# Patient Record
Sex: MALE | Race: ASIAN | NOT HISPANIC OR LATINO
[De-identification: names, ages, dates, MRNs, and addresses within clinical notes are randomized per-mention and may not be internally consistent; named-entity substitution may affect disease eponyms.]

---

## 2021-10-18 PROBLEM — Z00.00 ENCOUNTER FOR PREVENTIVE HEALTH EXAMINATION: Status: ACTIVE | Noted: 2021-10-18

## 2021-10-29 ENCOUNTER — APPOINTMENT (OUTPATIENT)
Dept: ORTHOPEDIC SURGERY | Facility: CLINIC | Age: 61
End: 2021-10-29
Payer: MEDICARE

## 2021-10-29 VITALS
HEIGHT: 62 IN | WEIGHT: 180 LBS | DIASTOLIC BLOOD PRESSURE: 69 MMHG | HEART RATE: 75 BPM | SYSTOLIC BLOOD PRESSURE: 126 MMHG | BODY MASS INDEX: 33.13 KG/M2

## 2021-10-29 DIAGNOSIS — Z87.39 PERSONAL HISTORY OF OTHER DISEASES OF THE MUSCULOSKELETAL SYSTEM AND CONNECTIVE TISSUE: ICD-10-CM

## 2021-10-29 DIAGNOSIS — Z87.19 PERSONAL HISTORY OF OTHER DISEASES OF THE DIGESTIVE SYSTEM: ICD-10-CM

## 2021-10-29 DIAGNOSIS — M17.0 BILATERAL PRIMARY OSTEOARTHRITIS OF KNEE: ICD-10-CM

## 2021-10-29 DIAGNOSIS — Z86.39 PERSONAL HISTORY OF OTHER ENDOCRINE, NUTRITIONAL AND METABOLIC DISEASE: ICD-10-CM

## 2021-10-29 PROCEDURE — 73562 X-RAY EXAM OF KNEE 3: CPT | Mod: LT

## 2021-10-29 PROCEDURE — 99204 OFFICE O/P NEW MOD 45 MIN: CPT

## 2021-10-29 NOTE — HISTORY OF PRESENT ILLNESS
[Pain Location] : pain [8] : a current pain level of 8/10 [3] : a minimum pain level of 3/10 [9] : a maximum pain level of 9/10 [Constant] : ~He/She~ states the symptoms seem to be constant [Walking] : worsened by walking [Rest] : relieved by rest [Worsening] : worsening [Bending] : worsened by bending [de-identified] : 59 y/o M presents with b/l knee pain. He has more pain in the left knee than the right knee. He has received both cortisone injections and HA injections in the left knee which were not that helpful. He has a lot of stiffness once he stands after sitting for a long period of time. He also reports pain with walking. He has not had any injections in the right knee. He has a hx of prediabetes and colon cancer.

## 2021-10-29 NOTE — END OF VISIT
[FreeTextEntry3] : I, Tera Tucker, acted solely as a scribe for Dr. Macros Ventura on this date 10/29/2021.

## 2021-10-29 NOTE — PHYSICAL EXAM
[LE] : Sensory: Intact in bilateral lower extremities [ALL] : dorsalis pedis, posterior tibial, femoral, popliteal, and radial 2+ and symmetric bilaterally [Normal] : Alert and in no acute distress [Poor Appearance] : well-appearing [de-identified] : GENERAL APPEARANCE: Well nourished and hydrated, pleasant, alert, and oriented x 3. Appears their stated age. \par HEENT: Normocephalic, extraocular eye motion intact. Nasal septum midline. Oral cavity clear. External auditory canal clear. \par RESPIRATORY: Breath sounds clear and audible in all lobes. No wheezing, No accessory muscle use.\par CARDIOVASCULAR: No apparent abnormalities. No lower leg edema. No varicosities. Pedal pulses are palpable.\par NEUROLOGIC: Sensation is normal, no muscle weakness in the upper or lower extremities.\par DERMATOLOGIC: No apparent skin lesions, moist, warm, no rash.\par SPINE: Cervical spine appears normal and moves freely; thoracic spine appears normal and moves freely; lumbosacral spine appears normal and moves freely, normal, nontender.\par MUSCULOSKELETAL: Hands, wrists, and elbows are normal and move freely, shoulders are normal and move freely.  [de-identified] : Left knee exam shows ROM 5-120 degrees, medial joint line tenderness, no effusion\par Right knee exam shows no effusion, ROM 0-120 degrees [de-identified] : 5V xray of the b/l knee done in the office today and reviewed by Dr. Marcos Ventura demonstrates bone on bone medial compartmental osteoarthritis of the left knee and mild tricompartmental osteoarthritis of the right knee.

## 2021-10-29 NOTE — DISCUSSION/SUMMARY
[Medication Risks Reviewed] : Medication risks reviewed [Surgical risks reviewed] : Surgical risks reviewed [de-identified] : 59 y/o M with bone on bone medial compartmental osteoarthritis of the left knee and mild tricompartmental osteoarthritis of the right knee. Conservative therapy and surgical options discussed in detail with the patient. Based on imaging, he is a candidate for a left TKA; however, he is not a candidate for a right TKA. He previously received cortisone injections and HA injections in the left knee; however, they did not provide a lot of relief. Since he has failed conservative therapies, he would like to pursue the left TKA. We discussed pre-op, surgery, and post-op in detail. In regards to the right knee, he defers a cortisone injection today. He scheduled the left TKA today and all of his questions were answered. \par \par The patient is a 60 year individual with end stage arthritis of their left knee joint. Based upon the patient's continued symptoms and failure to respond to conservative treatment I have recommended a left total knee arthroplasty for this patient. A long discussion took place with the patient describing what a total joint replacement is and what the procedure would entail. A total knee arthroplasty model, similar to the implant that was used during the operation, was utilized to demonstrate and to discuss the various bearing surfaces of the implants. The hospitalization and post-operative care and rehabilitation were also discussed. The use of perioperative antibiotics and DVT prophylaxis were discussed. The risk, benefits and alternatives to a surgical intervention were discussed at length with the patient. The patient was also advised of risks related to the medical comorbidities, elevated body mass index (BMI), and smoking where applicable. We discussed how to reduce modifiable risk factors and encouraged smoking cessation were applicable.. A lengthy discussion took place to review the most common complications including but not limited to: deep vein thrombosis, pulmonary embolus, heart attack, stroke, infection, wound breakdown, numbness, damage to nerves, tendon, muscles, arteries or other blood vessels, death and other possible complications from anesthesia. The patient was told that we will take steps to minimize these risks by using sterile technique, antibiotics and DVT prophylaxis when appropriate and follow the patient postoperatively in the office setting to monitor progress. The possibility of recurrent pain, no improvement in pain and actual worsening of pain were also discussed with the patient.\par The discharge plan of care focused on the patient going home following surgery. The patient was encouraged to make the necessary arrangements to have someone stay with them when they are discharged home. Following discharge, a home care nurse was to the patient. The home care nurse would open the patient’s home care case and request home physical therapy services. Home physical therapy was to commence following discharge provided it was appropriate and covered by the health insurance benefit plan. \par The benefits of surgery were discussed with the patient including the potential for improving his current clinical condition through operative intervention. Alternatives to surgical intervention including continued conservative management were also discussed in detail. All questions were answered to the satisfaction of the patient. The treatment plan of care, as well as a model of a total knee arthroplasty equivalent to the one that will be used for their total joint replacement, was shared with the patient. The patient agreed to the plan of care as well as the use of implants in their total joint replacement.

## 2022-02-25 ENCOUNTER — OUTPATIENT (OUTPATIENT)
Dept: OUTPATIENT SERVICES | Facility: HOSPITAL | Age: 62
LOS: 1 days | End: 2022-02-25
Payer: COMMERCIAL

## 2022-02-25 VITALS
TEMPERATURE: 98 F | HEIGHT: 62 IN | OXYGEN SATURATION: 96 % | RESPIRATION RATE: 20 BRPM | SYSTOLIC BLOOD PRESSURE: 130 MMHG | WEIGHT: 164.69 LBS | DIASTOLIC BLOOD PRESSURE: 78 MMHG | HEART RATE: 65 BPM

## 2022-02-25 DIAGNOSIS — Z01.818 ENCOUNTER FOR OTHER PREPROCEDURAL EXAMINATION: ICD-10-CM

## 2022-02-25 DIAGNOSIS — Z90.49 ACQUIRED ABSENCE OF OTHER SPECIFIED PARTS OF DIGESTIVE TRACT: Chronic | ICD-10-CM

## 2022-02-25 DIAGNOSIS — Z98.890 OTHER SPECIFIED POSTPROCEDURAL STATES: Chronic | ICD-10-CM

## 2022-02-25 LAB
A1C WITH ESTIMATED AVERAGE GLUCOSE RESULT: 6.3 % — HIGH (ref 4–5.6)
ALBUMIN SERPL ELPH-MCNC: 4.3 G/DL — SIGNIFICANT CHANGE UP (ref 3.3–5.2)
ALP SERPL-CCNC: 148 U/L — HIGH (ref 40–120)
ALT FLD-CCNC: 59 U/L — HIGH
ANION GAP SERPL CALC-SCNC: 16 MMOL/L — SIGNIFICANT CHANGE UP (ref 5–17)
APTT BLD: 30.1 SEC — SIGNIFICANT CHANGE UP (ref 27.5–35.5)
AST SERPL-CCNC: 59 U/L — HIGH
BASOPHILS # BLD AUTO: 0.05 K/UL — SIGNIFICANT CHANGE UP (ref 0–0.2)
BASOPHILS NFR BLD AUTO: 0.6 % — SIGNIFICANT CHANGE UP (ref 0–2)
BILIRUB SERPL-MCNC: 0.5 MG/DL — SIGNIFICANT CHANGE UP (ref 0.4–2)
BLD GP AB SCN SERPL QL: SIGNIFICANT CHANGE UP
BUN SERPL-MCNC: 24.7 MG/DL — HIGH (ref 8–20)
CALCIUM SERPL-MCNC: 9.4 MG/DL — SIGNIFICANT CHANGE UP (ref 8.6–10.2)
CHLORIDE SERPL-SCNC: 101 MMOL/L — SIGNIFICANT CHANGE UP (ref 98–107)
CO2 SERPL-SCNC: 20 MMOL/L — LOW (ref 22–29)
CREAT SERPL-MCNC: 0.67 MG/DL — SIGNIFICANT CHANGE UP (ref 0.5–1.3)
EOSINOPHIL # BLD AUTO: 0.31 K/UL — SIGNIFICANT CHANGE UP (ref 0–0.5)
EOSINOPHIL NFR BLD AUTO: 3.5 % — SIGNIFICANT CHANGE UP (ref 0–6)
ESTIMATED AVERAGE GLUCOSE: 134 MG/DL — HIGH (ref 68–114)
GLUCOSE SERPL-MCNC: 117 MG/DL — HIGH (ref 70–99)
HCT VFR BLD CALC: 40.1 % — SIGNIFICANT CHANGE UP (ref 39–50)
HGB BLD-MCNC: 12.9 G/DL — LOW (ref 13–17)
IMM GRANULOCYTES NFR BLD AUTO: 0.3 % — SIGNIFICANT CHANGE UP (ref 0–1.5)
INR BLD: 1.11 RATIO — SIGNIFICANT CHANGE UP (ref 0.88–1.16)
LYMPHOCYTES # BLD AUTO: 1.12 K/UL — SIGNIFICANT CHANGE UP (ref 1–3.3)
LYMPHOCYTES # BLD AUTO: 12.8 % — LOW (ref 13–44)
MCHC RBC-ENTMCNC: 27.2 PG — SIGNIFICANT CHANGE UP (ref 27–34)
MCHC RBC-ENTMCNC: 32.2 GM/DL — SIGNIFICANT CHANGE UP (ref 32–36)
MCV RBC AUTO: 84.4 FL — SIGNIFICANT CHANGE UP (ref 80–100)
MONOCYTES # BLD AUTO: 0.8 K/UL — SIGNIFICANT CHANGE UP (ref 0–0.9)
MONOCYTES NFR BLD AUTO: 9.1 % — SIGNIFICANT CHANGE UP (ref 2–14)
MRSA PCR RESULT.: SIGNIFICANT CHANGE UP
NEUTROPHILS # BLD AUTO: 6.45 K/UL — SIGNIFICANT CHANGE UP (ref 1.8–7.4)
NEUTROPHILS NFR BLD AUTO: 73.7 % — SIGNIFICANT CHANGE UP (ref 43–77)
PLATELET # BLD AUTO: 133 K/UL — LOW (ref 150–400)
POTASSIUM SERPL-MCNC: 4.8 MMOL/L — SIGNIFICANT CHANGE UP (ref 3.5–5.3)
POTASSIUM SERPL-SCNC: 4.8 MMOL/L — SIGNIFICANT CHANGE UP (ref 3.5–5.3)
PROT SERPL-MCNC: 6.6 G/DL — SIGNIFICANT CHANGE UP (ref 6.6–8.7)
PROTHROM AB SERPL-ACNC: 12.9 SEC — SIGNIFICANT CHANGE UP (ref 10.5–13.4)
RBC # BLD: 4.75 M/UL — SIGNIFICANT CHANGE UP (ref 4.2–5.8)
RBC # FLD: 15 % — HIGH (ref 10.3–14.5)
S AUREUS DNA NOSE QL NAA+PROBE: SIGNIFICANT CHANGE UP
SODIUM SERPL-SCNC: 136 MMOL/L — SIGNIFICANT CHANGE UP (ref 135–145)
WBC # BLD: 8.76 K/UL — SIGNIFICANT CHANGE UP (ref 3.8–10.5)
WBC # FLD AUTO: 8.76 K/UL — SIGNIFICANT CHANGE UP (ref 3.8–10.5)

## 2022-02-25 PROCEDURE — G0463: CPT

## 2022-02-25 PROCEDURE — 93010 ELECTROCARDIOGRAM REPORT: CPT

## 2022-02-25 PROCEDURE — 93005 ELECTROCARDIOGRAM TRACING: CPT

## 2022-02-25 RX ORDER — SODIUM CHLORIDE 9 MG/ML
3 INJECTION INTRAMUSCULAR; INTRAVENOUS; SUBCUTANEOUS EVERY 8 HOURS
Refills: 0 | Status: DISCONTINUED | OUTPATIENT
Start: 2022-03-18 | End: 2022-03-21

## 2022-02-25 NOTE — H&P PST ADULT - PROBLEM SELECTOR PLAN 2
Medical clearance pending for left total knee replacement on 3/17/22 with Dr. Marcos Ventura secondary to unilateral primary OA of left knee.

## 2022-02-25 NOTE — H&P PST ADULT - HISTORY OF PRESENT ILLNESS
60 y/o male presents today to PST pending left total knee replacement on 3/17/22 with Dr. Marcos Ventura secondary to unilateral primary OA of left knee. Pt. with history of type 2 DM, HTN, HLD, colon cancer s/p colon resection, prostate cancer 2015 s/p radiation therapy. Pt. states he has had left knee pain for past 5 years, states he has had injections in the knee that did not work, states he had an xray that showed "bone to bone."  Pt. states pain in left knee pain is "heavy," intermittent, states he "doesn't know a number" for the pain,  states she has pain at night and walking makes pain worse. Denies radiation of pain, numbness/ tingling of leg. Denies palliating measures. Denies cane or walker.

## 2022-02-25 NOTE — H&P PST ADULT - PROBLEM SELECTOR PLAN 3
STOP BANG =4, medical clearance pending, medical follow up and management with PCP as indicated, surgical team to follow.

## 2022-02-25 NOTE — H&P PST ADULT - NEGATIVE MUSCULOSKELETAL SYMPTOMS
no arthralgia/no joint swelling/no myalgia/no muscle cramps/no muscle weakness/no stiffness/no neck pain/no arm pain L/no arm pain R/no back pain

## 2022-02-25 NOTE — PATIENT PROFILE ADULT - FALL HARM RISK - HARM RISK INTERVENTIONS

## 2022-02-25 NOTE — H&P PST ADULT - NSICDXPASTMEDICALHX_GEN_ALL_CORE_FT
PAST MEDICAL HISTORY:  Colon cancer     COVID-19 vaccine series completed     Hyperlipidemia     Hypertension     Prostate cancer Treated with Radiation 2015    Type 2 diabetes mellitus     Unilateral primary osteoarthritis, left knee

## 2022-02-25 NOTE — PATIENT PROFILE ADULT - FUNCTIONAL SCREEN CURRENT LEVEL: COMMUNICATION, MLM
Efren Grigsby from the [facility] calling to stated a US biopsy needs to be ordered for the left breast.    Order pended. They also faxed the order yesterday. 0 = understands/communicates without difficulty

## 2022-02-25 NOTE — H&P PST ADULT - 
ADDITIONAL INFORMATION
pre-op covid swab appt info for 3/14. Pt. states he does not have card, requested that he fax copy of card PST fax number provided and pt. agreed. Denies desire for booster, advised to f/u with PCP and pt. agreed pre-op covid swab appt info for 3/14. Pt. states he does not have card, requested that he fax copy of card PST fax number provided and pt. agreed. Denies desire for booster, advised to f/u with PCP and pt. agreed. 3/1/22 10:54 Received  COVID vaccine card copy and placed on chart. Pawan MS, FNP-BC

## 2022-02-25 NOTE — H&P PST ADULT - NEGATIVE GENERAL GENITOURINARY SYMPTOMS
no hematuria/no renal colic/no flank pain L/no flank pain R/no urine discoloration/no gas in urine/no bladder infections/no incontinence/no dysuria/no urinary hesitancy/no nocturia/normal libido

## 2022-02-25 NOTE — H&P PST ADULT - LAB RESULTS AND INTERPRETATION
labs pending labs pending  2/25/22 15:05 All available labs noted as documented. All abnormal labs noted as documented and have been faxed to PCP with whom medical clearance, as well as emailed to Np Yeon Elebiary of Dr. Marcos Ventura. Yeon Elebiary of Dr. Marcos Ventura also made aware of oral Thrush and recent HGBA1C level and present prescription for metformin in setting of type 2 DM. Lena MS, FNP-BC

## 2022-02-25 NOTE — H&P PST ADULT - ASSESSMENT
60 y/o male presents today to PST pending left total knee replacement on 3/17/22 with Dr. Marcos Ventura secondary to unilateral primary OA of left knee. Pt. with history of type 2 DM, HTN, HLD, colon cancer s/p colon resection, prostate cancer 2015 s/p radiation therapy. Pt. states he has had left knee pain for past 5 years, states he has had injections in the knee that did not work, states he had an xray that showed "bone to bone."  Pt. states pain in left knee pain is "heavy," intermittent, states he "doesn't know a number" for the pain,  states she has pain at night and walking makes pain worse. Denies radiation of pain, numbness/ tingling of leg. Denies palliating measures. Denies cane or walker.     Pt. to have medical clearance with Dr. Rivera, pt. verbalized agreement and understanding.  Patient educated on surgical scrub, COVID testing, preadmission instructions, medical clearance and day of procedure medications as per policy, verbalizes understanding and agreement.   Pt. to discuss preoperative instructions regarding metformin with PCP and pt. verbalized agreement and understanding.   Pt instructed to stop vitamins/supplements/herbal medications/NSAIDS for one week prior to surgery and discuss with PMD and verbalizes understanding and agreement.   Pt. ed       60 y/o male presents today to PST pending left total knee replacement on 3/17/22 with Dr. Marcos Ventura secondary to unilateral primary OA of left knee. Pt. with history of type 2 DM, HTN, HLD, colon cancer s/p colon resection, prostate cancer 2015 s/p radiation therapy. Pt. states he has had left knee pain for past 5 years, states he has had injections in the knee that did not work, states he had an xray that showed "bone to bone."  Pt. states pain in left knee pain is "heavy," intermittent, states he "doesn't know a number" for the pain,  states she has pain at night and walking makes pain worse. Denies radiation of pain, numbness/ tingling of leg. Denies palliating measures. Denies cane or walker.     Pt. to have medical clearance with Dr. Rivera, pt. verbalized agreement and understanding.  Patient educated on surgical scrub, COVID testing, preadmission instructions, medical clearance and day of procedure medications as per policy, and pt. verbalizes understanding and agreement.   Pt. to discuss preoperative instructions regarding metformin with PCP and pt. verbalized agreement and understanding.   Pt instructed to stop vitamins/supplements/herbal medications/NSAIDS for one week prior to surgery and discuss with PMD and pt. verbalizes understanding and agreement.   Pt. educated regarding all preoperative instruction and education via both verbal and written means of communication as per policy  and pt. verbalizes understanding and agreement.     OPIOID RISK TOOL    JOVANNY EACH BOX THAT APPLIES AND ADD TOTALS AT THE END    FAMILY HISTORY OF SUBSTANCE ABUSE                 FEMALE         MALE                                                Alcohol                             [  ]1 pt          [  ]3pts                                               Illegal Durgs                     [  ]2 pts        [  ]3pts                                               Rx Drugs                           [  ]4 pts        [  ]4 pts    PERSONAL HISTORY OF SUBSTANCE ABUSE                                                                                          Alcohol                             [  ]3 pts       [  ]3 pts                                               Illegal Drugs                     [  ]4 pts        [  ]4 pts                                               Rx Drugs                           [  ]5 pts        [  ]5 pts    AGE BETWEEN 16-45 YEARS                                      [  ]1 pt         [  ]1 pt    HISTORY OF PREADOLESCENT   SEXUAL ABUSE                                                             [  ]3 pts        [  ]0pts    PSYCHOLOGICAL DISEASE                     ADD, OCD, Bipolar, Schizophrenia        [  ]2 pts         [  ]2 pts                      Depression                                               [  ]1 pt           [  ]1 pt           SCORING TOTAL   (add numbers and type here)              (0)                                     A score of 3 or lower indicated LOW risk for future opioid abuse  A score of 4 to 7 indicated moderate risk for future opioid abuse  A score of 8 or higher indicates a high risk for opioid abuse    CAPRINI SCORE    AGE RELATED RISK FACTORS                                                             [ ] Age 41-60 years                                            (1 Point)  [x ] Age: 61-74 years                                           (2 Points)                 [ ] Age= 75 years                                                (3 Points)             DISEASE RELATED RISK FACTORS                                                       [ ] Edema in the lower extremities                 (1 Point)                     [ ] Varicose veins                                               (1 Point)                                 [x ] BMI > 25 Kg/m2                                            (1 Point)                                  [ ] Serious infection (ie PNA)                            (1 Point)                     [ ] Lung disease ( COPD, Emphysema)            (1 Point)                                                                          [ ] Acute myocardial infarction                         (1 Point)                  [ ] Congestive heart failure (in the previous month)  (1 Point)         [ ] Inflammatory bowel disease                            (1 Point)                  [ ] Central venous access, PICC or Port               (2 points)       (within the last month)                                                                [ ] Stroke (in the previous month)                        (5 Points)    [x ] Previous or present malignancy                       (2 points)                                                                                                                                                         HEMATOLOGY RELATED FACTORS                                                         [ ] Prior episodes of VTE                                     (3 Points)                     [ ] Positive family history for VTE                      (3 Points)                  [ ] Prothrombin 90055 A                                     (3 Points)                     [ ] Factor V Leiden                                                (3 Points)                        [ ] Lupus anticoagulants                                      (3 Points)                                                           [ ] Anticardiolipin antibodies                              (3 Points)                                                       [ ] High homocysteine in the blood                   (3 Points)                                             [ ] Other congenital or acquired thrombophilia      (3 Points)                                                [ ] Heparin induced thrombocytopenia                  (3 Points)                                        MOBILITY RELATED FACTORS  [ ] Bed rest                                                         (1 Point)  [ ] Plaster cast                                                    (2 points)  [ ] Bed bound for more than 72 hours           (2 Points)    GENDER SPECIFIC FACTORS  [ ] Pregnancy or had a baby within the last month   (1 Point)  [ ] Post-partum < 6 weeks                                   (1 Point)  [ ] Hormonal therapy  or oral contraception   (1 Point)  [ ] History of pregnancy complications              (1 point)  [ ] Unexplained or recurrent              (1 Point)    OTHER RISK FACTORS                                           (1 Point)  [ ] BMI >40, smoking, diabetes requiring insulin, chemotherapy  blood transfusions and length of surgery over 2 hours    SURGERY RELATED RISK FACTORS  [ ]  Section within the last month     (1 Point)  [ ] Minor surgery                                                  (1 Point)  [ ] Arthroscopic surgery                                       (2 Points)  [ ] Planned major surgery lasting more            (2 Points)      than 45 minutes     [x ] Elective hip or knee joint replacement       (5 points)       surgery                                                TRAUMA RELATED RISK FACTORS  [ ] Fracture of the hip, pelvis, or leg                       (5 Points)  [ ] Spinal cord injury resulting in paralysis             (5 points)       (in the previous month)    [ ] Paralysis  (less than 1 month)                             (5 Points)  [ ] Multiple Trauma within 1 month                        (5 Points)    Total Score [     10   ]    Caprini Score 0-2: Low Risk, NO VTE prophylaxis required for most patients, encourage ambulation  Caprini Score 3-6: Moderate Risk , pharmacologic VTE prophylaxis is indicated for most patients (in the absence of contraindications)  Caprini Score Greater than or =7: High risk, pharmocologic VTE prophylaxis indicated for most patients (in the absence of contraindications)

## 2022-02-25 NOTE — H&P PST ADULT - MUSCULOSKELETAL
details… normal/ROM intact/no joint swelling/no joint erythema/no joint warmth/no calf tenderness/normal strength/decreased ROM/decreased ROM due to pain detailed exam

## 2022-03-17 ENCOUNTER — TRANSCRIPTION ENCOUNTER (OUTPATIENT)
Age: 62
End: 2022-03-17

## 2022-03-18 ENCOUNTER — APPOINTMENT (OUTPATIENT)
Dept: ORTHOPEDIC SURGERY | Facility: HOSPITAL | Age: 62
End: 2022-03-18

## 2022-03-18 ENCOUNTER — INPATIENT (INPATIENT)
Facility: HOSPITAL | Age: 62
LOS: 2 days | Discharge: HOME HEALTH | DRG: 470 | End: 2022-03-21
Attending: ORTHOPAEDIC SURGERY | Admitting: ORTHOPAEDIC SURGERY
Payer: COMMERCIAL

## 2022-03-18 ENCOUNTER — TRANSCRIPTION ENCOUNTER (OUTPATIENT)
Age: 62
End: 2022-03-18

## 2022-03-18 VITALS
SYSTOLIC BLOOD PRESSURE: 137 MMHG | DIASTOLIC BLOOD PRESSURE: 73 MMHG | RESPIRATION RATE: 16 BRPM | HEIGHT: 62 IN | OXYGEN SATURATION: 99 % | TEMPERATURE: 98 F | HEART RATE: 73 BPM | WEIGHT: 164.69 LBS

## 2022-03-18 DIAGNOSIS — M17.12 UNILATERAL PRIMARY OSTEOARTHRITIS, LEFT KNEE: ICD-10-CM

## 2022-03-18 DIAGNOSIS — Z98.890 OTHER SPECIFIED POSTPROCEDURAL STATES: Chronic | ICD-10-CM

## 2022-03-18 DIAGNOSIS — Z29.9 ENCOUNTER FOR PROPHYLACTIC MEASURES, UNSPECIFIED: ICD-10-CM

## 2022-03-18 DIAGNOSIS — Z91.89 OTHER SPECIFIED PERSONAL RISK FACTORS, NOT ELSEWHERE CLASSIFIED: ICD-10-CM

## 2022-03-18 DIAGNOSIS — Z90.49 ACQUIRED ABSENCE OF OTHER SPECIFIED PARTS OF DIGESTIVE TRACT: Chronic | ICD-10-CM

## 2022-03-18 LAB
BLD GP AB SCN SERPL QL: SIGNIFICANT CHANGE UP
GLUCOSE BLDC GLUCOMTR-MCNC: 111 MG/DL — HIGH (ref 70–99)
GLUCOSE BLDC GLUCOMTR-MCNC: 118 MG/DL — HIGH (ref 70–99)
GLUCOSE BLDC GLUCOMTR-MCNC: 125 MG/DL — HIGH (ref 70–99)
GLUCOSE BLDC GLUCOMTR-MCNC: 183 MG/DL — HIGH (ref 70–99)
SARS-COV-2 RNA SPEC QL NAA+PROBE: SIGNIFICANT CHANGE UP

## 2022-03-18 PROCEDURE — 27447 TOTAL KNEE ARTHROPLASTY: CPT | Mod: LT

## 2022-03-18 PROCEDURE — 73560 X-RAY EXAM OF KNEE 1 OR 2: CPT | Mod: 26,LT

## 2022-03-18 PROCEDURE — 27447 TOTAL KNEE ARTHROPLASTY: CPT | Mod: AS,LT

## 2022-03-18 PROCEDURE — 20985 CPTR-ASST DIR MS PX: CPT

## 2022-03-18 PROCEDURE — 99222 1ST HOSP IP/OBS MODERATE 55: CPT

## 2022-03-18 DEVICE — FEM PERSONA PS CMT CCR STD SZ 5 L: Type: IMPLANTABLE DEVICE | Site: LEFT | Status: FUNCTIONAL

## 2022-03-18 DEVICE — STEM TIB PSN SZ E L 5 DEG: Type: IMPLANTABLE DEVICE | Site: LEFT | Status: FUNCTIONAL

## 2022-03-18 DEVICE — PATELLA VE 29MM: Type: IMPLANTABLE DEVICE | Site: LEFT | Status: FUNCTIONAL

## 2022-03-18 DEVICE — IMPLANTABLE DEVICE: Type: IMPLANTABLE DEVICE | Site: LEFT | Status: FUNCTIONAL

## 2022-03-18 DEVICE — PIN THREADED HEADED STRL: Type: IMPLANTABLE DEVICE | Site: LEFT | Status: FUNCTIONAL

## 2022-03-18 DEVICE — STEM EXT PERSONA 14MM PLUS 30M: Type: IMPLANTABLE DEVICE | Site: LEFT | Status: FUNCTIONAL

## 2022-03-18 DEVICE — CEMENT BONE PALACOS R W/O GENTAMICIN 1X40: Type: IMPLANTABLE DEVICE | Site: LEFT | Status: FUNCTIONAL

## 2022-03-18 RX ORDER — SENNA PLUS 8.6 MG/1
2 TABLET ORAL AT BEDTIME
Refills: 0 | Status: DISCONTINUED | OUTPATIENT
Start: 2022-03-18 | End: 2022-03-21

## 2022-03-18 RX ORDER — OXYCODONE HYDROCHLORIDE 5 MG/1
5 TABLET ORAL
Refills: 0 | Status: DISCONTINUED | OUTPATIENT
Start: 2022-03-18 | End: 2022-03-21

## 2022-03-18 RX ORDER — CELECOXIB 200 MG/1
200 CAPSULE ORAL EVERY 12 HOURS
Refills: 0 | Status: DISCONTINUED | OUTPATIENT
Start: 2022-03-20 | End: 2022-03-21

## 2022-03-18 RX ORDER — HYDROMORPHONE HYDROCHLORIDE 2 MG/ML
0.5 INJECTION INTRAMUSCULAR; INTRAVENOUS; SUBCUTANEOUS
Refills: 0 | Status: DISCONTINUED | OUTPATIENT
Start: 2022-03-18 | End: 2022-03-18

## 2022-03-18 RX ORDER — DEXTROSE 50 % IN WATER 50 %
25 SYRINGE (ML) INTRAVENOUS ONCE
Refills: 0 | Status: DISCONTINUED | OUTPATIENT
Start: 2022-03-18 | End: 2022-03-21

## 2022-03-18 RX ORDER — GLUCAGON INJECTION, SOLUTION 0.5 MG/.1ML
1 INJECTION, SOLUTION SUBCUTANEOUS ONCE
Refills: 0 | Status: DISCONTINUED | OUTPATIENT
Start: 2022-03-18 | End: 2022-03-21

## 2022-03-18 RX ORDER — ONDANSETRON 8 MG/1
4 TABLET, FILM COATED ORAL ONCE
Refills: 0 | Status: DISCONTINUED | OUTPATIENT
Start: 2022-03-18 | End: 2022-03-18

## 2022-03-18 RX ORDER — DEXTROSE 50 % IN WATER 50 %
12.5 SYRINGE (ML) INTRAVENOUS ONCE
Refills: 0 | Status: DISCONTINUED | OUTPATIENT
Start: 2022-03-18 | End: 2022-03-21

## 2022-03-18 RX ORDER — NEBIVOLOL HYDROCHLORIDE 5 MG/1
5 TABLET ORAL DAILY
Refills: 0 | Status: DISCONTINUED | OUTPATIENT
Start: 2022-03-18 | End: 2022-03-21

## 2022-03-18 RX ORDER — SODIUM CHLORIDE 9 MG/ML
1000 INJECTION, SOLUTION INTRAVENOUS
Refills: 0 | Status: DISCONTINUED | OUTPATIENT
Start: 2022-03-18 | End: 2022-03-21

## 2022-03-18 RX ORDER — SODIUM CHLORIDE 9 MG/ML
500 INJECTION INTRAMUSCULAR; INTRAVENOUS; SUBCUTANEOUS ONCE
Refills: 0 | Status: COMPLETED | OUTPATIENT
Start: 2022-03-18 | End: 2022-03-18

## 2022-03-18 RX ORDER — OXYCODONE HYDROCHLORIDE 5 MG/1
10 TABLET ORAL
Refills: 0 | Status: DISCONTINUED | OUTPATIENT
Start: 2022-03-18 | End: 2022-03-21

## 2022-03-18 RX ORDER — SODIUM CHLORIDE 9 MG/ML
1000 INJECTION INTRAMUSCULAR; INTRAVENOUS; SUBCUTANEOUS
Refills: 0 | Status: DISCONTINUED | OUTPATIENT
Start: 2022-03-18 | End: 2022-03-21

## 2022-03-18 RX ORDER — TRANEXAMIC ACID 100 MG/ML
1000 INJECTION, SOLUTION INTRAVENOUS ONCE
Refills: 0 | Status: DISCONTINUED | OUTPATIENT
Start: 2022-03-18 | End: 2022-03-18

## 2022-03-18 RX ORDER — PANTOPRAZOLE SODIUM 20 MG/1
40 TABLET, DELAYED RELEASE ORAL
Refills: 0 | Status: DISCONTINUED | OUTPATIENT
Start: 2022-03-18 | End: 2022-03-21

## 2022-03-18 RX ORDER — APIXABAN 2.5 MG/1
2.5 TABLET, FILM COATED ORAL EVERY 12 HOURS
Refills: 0 | Status: DISCONTINUED | OUTPATIENT
Start: 2022-03-19 | End: 2022-03-21

## 2022-03-18 RX ORDER — ATORVASTATIN CALCIUM 80 MG/1
10 TABLET, FILM COATED ORAL AT BEDTIME
Refills: 0 | Status: DISCONTINUED | OUTPATIENT
Start: 2022-03-18 | End: 2022-03-21

## 2022-03-18 RX ORDER — DEXTROSE 50 % IN WATER 50 %
15 SYRINGE (ML) INTRAVENOUS ONCE
Refills: 0 | Status: DISCONTINUED | OUTPATIENT
Start: 2022-03-18 | End: 2022-03-21

## 2022-03-18 RX ORDER — APREPITANT 80 MG/1
40 CAPSULE ORAL ONCE
Refills: 0 | Status: COMPLETED | OUTPATIENT
Start: 2022-03-18 | End: 2022-03-18

## 2022-03-18 RX ORDER — POLYETHYLENE GLYCOL 3350 17 G/17G
17 POWDER, FOR SOLUTION ORAL AT BEDTIME
Refills: 0 | Status: DISCONTINUED | OUTPATIENT
Start: 2022-03-18 | End: 2022-03-21

## 2022-03-18 RX ORDER — SODIUM CHLORIDE 9 MG/ML
1000 INJECTION, SOLUTION INTRAVENOUS
Refills: 0 | Status: DISCONTINUED | OUTPATIENT
Start: 2022-03-18 | End: 2022-03-18

## 2022-03-18 RX ORDER — OXYBUTYNIN CHLORIDE 5 MG
10 TABLET ORAL AT BEDTIME
Refills: 0 | Status: DISCONTINUED | OUTPATIENT
Start: 2022-03-18 | End: 2022-03-21

## 2022-03-18 RX ORDER — ONDANSETRON 8 MG/1
4 TABLET, FILM COATED ORAL EVERY 6 HOURS
Refills: 0 | Status: DISCONTINUED | OUTPATIENT
Start: 2022-03-18 | End: 2022-03-21

## 2022-03-18 RX ORDER — ACETAMINOPHEN 500 MG
975 TABLET ORAL EVERY 8 HOURS
Refills: 0 | Status: DISCONTINUED | OUTPATIENT
Start: 2022-03-18 | End: 2022-03-19

## 2022-03-18 RX ORDER — KETOROLAC TROMETHAMINE 30 MG/ML
15 SYRINGE (ML) INJECTION EVERY 6 HOURS
Refills: 0 | Status: DISCONTINUED | OUTPATIENT
Start: 2022-03-18 | End: 2022-03-19

## 2022-03-18 RX ORDER — CELECOXIB 200 MG/1
400 CAPSULE ORAL ONCE
Refills: 0 | Status: COMPLETED | OUTPATIENT
Start: 2022-03-18 | End: 2022-03-18

## 2022-03-18 RX ORDER — MAGNESIUM HYDROXIDE 400 MG/1
30 TABLET, CHEWABLE ORAL DAILY
Refills: 0 | Status: DISCONTINUED | OUTPATIENT
Start: 2022-03-18 | End: 2022-03-21

## 2022-03-18 RX ORDER — HYDROMORPHONE HYDROCHLORIDE 2 MG/ML
0.5 INJECTION INTRAMUSCULAR; INTRAVENOUS; SUBCUTANEOUS
Refills: 0 | Status: DISCONTINUED | OUTPATIENT
Start: 2022-03-18 | End: 2022-03-21

## 2022-03-18 RX ORDER — HYDROMORPHONE HYDROCHLORIDE 2 MG/ML
4 INJECTION INTRAMUSCULAR; INTRAVENOUS; SUBCUTANEOUS
Refills: 0 | Status: DISCONTINUED | OUTPATIENT
Start: 2022-03-18 | End: 2022-03-21

## 2022-03-18 RX ORDER — ACETAMINOPHEN 500 MG
975 TABLET ORAL ONCE
Refills: 0 | Status: COMPLETED | OUTPATIENT
Start: 2022-03-18 | End: 2022-03-18

## 2022-03-18 RX ORDER — CEFAZOLIN SODIUM 1 G
2000 VIAL (EA) INJECTION
Refills: 0 | Status: COMPLETED | OUTPATIENT
Start: 2022-03-18 | End: 2022-03-19

## 2022-03-18 RX ORDER — INSULIN LISPRO 100/ML
VIAL (ML) SUBCUTANEOUS
Refills: 0 | Status: DISCONTINUED | OUTPATIENT
Start: 2022-03-18 | End: 2022-03-21

## 2022-03-18 RX ORDER — CEFAZOLIN SODIUM 1 G
2000 VIAL (EA) INJECTION ONCE
Refills: 0 | Status: DISCONTINUED | OUTPATIENT
Start: 2022-03-18 | End: 2022-03-18

## 2022-03-18 RX ADMIN — Medication 15 MILLIGRAM(S): at 18:00

## 2022-03-18 RX ADMIN — Medication 100 MILLIGRAM(S): at 20:19

## 2022-03-18 RX ADMIN — Medication 15 MILLIGRAM(S): at 17:35

## 2022-03-18 RX ADMIN — SODIUM CHLORIDE 500 MILLILITER(S): 9 INJECTION INTRAMUSCULAR; INTRAVENOUS; SUBCUTANEOUS at 15:06

## 2022-03-18 RX ADMIN — Medication 10 MILLIGRAM(S): at 21:07

## 2022-03-18 RX ADMIN — Medication 975 MILLIGRAM(S): at 21:08

## 2022-03-18 RX ADMIN — Medication 975 MILLIGRAM(S): at 10:41

## 2022-03-18 RX ADMIN — ATORVASTATIN CALCIUM 10 MILLIGRAM(S): 80 TABLET, FILM COATED ORAL at 21:07

## 2022-03-18 RX ADMIN — APREPITANT 40 MILLIGRAM(S): 80 CAPSULE ORAL at 10:41

## 2022-03-18 RX ADMIN — CELECOXIB 400 MILLIGRAM(S): 200 CAPSULE ORAL at 10:42

## 2022-03-18 RX ADMIN — Medication 15 MILLIGRAM(S): at 23:09

## 2022-03-18 RX ADMIN — SODIUM CHLORIDE 3 MILLILITER(S): 9 INJECTION INTRAMUSCULAR; INTRAVENOUS; SUBCUTANEOUS at 21:03

## 2022-03-18 RX ADMIN — Medication 975 MILLIGRAM(S): at 21:07

## 2022-03-18 RX ADMIN — SODIUM CHLORIDE 125 MILLILITER(S): 9 INJECTION INTRAMUSCULAR; INTRAVENOUS; SUBCUTANEOUS at 17:35

## 2022-03-18 RX ADMIN — Medication 15 MILLIGRAM(S): at 23:10

## 2022-03-18 NOTE — PHYSICAL THERAPY INITIAL EVALUATION ADULT - ACTIVE RANGE OF MOTION EXAMINATION, REHAB EVAL
left LE 2-/5 assessed during functional mobility, resistance not applied/bilateral upper extremity Active ROM was WFL (within functional limits)/Right LE Active ROM was WFL (within functional limits)

## 2022-03-18 NOTE — DISCHARGE NOTE PROVIDER - NSDCCPCAREPLAN_GEN_ALL_CORE_FT
PRINCIPAL DISCHARGE DIAGNOSIS  Diagnosis: Primary localized osteoarthritis of left knee  Assessment and Plan of Treatment:

## 2022-03-18 NOTE — CONSULT NOTE ADULT - ASSESSMENT
62 y/o male with Hx of  Colon cancer s/p colon resection, Hyperlipidemia, Hypertension, Prostate cancer Treated with Radiation 2015, diabetes mellitus unilateral primary OA of left knee, patient has left knee pain for past 5 years, states he has had injections in the knee that did not work, he came in here for elective left total knee replacement on 3/17/22 with Dr. Marcos Ventura s/p Procedure.     Plan:     OA of the left Knee s/p TKR post op day 0:     - post op no complications  - VS stable  - abx per ortho   - c/w anti hypertensive to be restarted post op day 02 except if blood pressure goes >150 systolic   - c/w IVF x 24 hrs then reassess per ortho  - opiate induced constipation regimen   - encouraging incentive spirometry   -c/w local wound care per ortho   -DVT prophylaxis and Pain meds as per Ortho team   -PT/OT and weight bearing per ortho     HTN: Bystolic 5 mg once a day with holding parameters     DM: Will hold metFORMIN 1000 mg oral tablet, extended release: Last Dose Taken:  , 1 tab(s) orally once a day  · 	oxybutynin 5 mg oral tablet: Last Dose Taken:  , 2  orally once a day (at bedtime)  · 	pravastatin 40 mg oral tablet: Last Dose Taken:  , 1 tab(s) orally once a day  · 	diclofenac sodium 75 mg oral delayed release tablet: Last Dose Taken:  , 1 tab(s) orally 2 times a day  · 	Tylenol Arthritis Caplet 650 mg oral tablet, extended release: Last Dose Taken:  , 1  orally 2 times a day   60 y/o male with Hx of  Colon cancer s/p colon resection, Hyperlipidemia, Hypertension, Prostate cancer Treated with Radiation 2015, diabetes mellitus unilateral primary OA of left knee, patient has left knee pain for past 5 years, states he has had injections in the knee that did not work, he came in here for elective left total knee replacement on 3/17/22 with Dr. Marcos Ventura s/p Procedure.     Plan:     OA of the left Knee s/p TKR post op day 0:     - post op no complications  - VS stable  - abx per ortho   - c/w anti hypertensive to be restarted post op day 02 except if blood pressure goes >150 systolic   - c/w IVF x 24 hrs then reassess per ortho  - opiate induced constipation regimen   - encouraging incentive spirometry   -c/w local wound care per ortho   -DVT prophylaxis and Pain meds as per Ortho team   -PT/OT and weight bearing per ortho     HTN: Bystolic 5 mg once a day with holding parameters     DM: Will hold metFORMIN, will do fs monitoring and coverage insulin    Over active bladder: will continue with Oxybutynin 5 mg once a day (at bedtime)    HLD: Will continue with pravastatin 40 mg once a day     62 y/o male with Hx of  Colon cancer s/p colon resection, Hyperlipidemia, Hypertension, Prostate cancer Treated with Radiation 2015, diabetes mellitus unilateral primary OA of left knee, patient has left knee pain for past 5 years, states he has had injections in the knee that did not work, he came in here for elective left total knee replacement on 3/17/22 with Dr. Marcos Ventura s/p Procedure.     Plan:     OA of the left Knee s/p TKR post op day 0:     - post op no complications  - VS stable  - abx per ortho   - c/w anti hypertensive to be restarted post op day 02 except if blood pressure goes >150 systolic   - c/w IVF x 24 hrs then reassess per ortho  - opiate induced constipation regimen   - encouraging incentive spirometry   -c/w local wound care per ortho   -DVT prophylaxis and Pain meds as per Ortho team   -PT/OT and weight bearing per ortho     HTN: Bystolic 5 mg once a day with holding parameters     DM: Will hold metFORMIN, will do fs monitoring and coverage insulin    Over active bladder: will continue with Oxybutynin 5 mg once a day (at bedtime)    HLD: Will continue with pravastatin 40 mg once a day    High risk DVT prophylaxis because of Hx of cancer: would recommend Lovenox for dvt prophylaxis.

## 2022-03-18 NOTE — DISCHARGE NOTE PROVIDER - NSDCFUADDINST_GEN_ALL_CORE_FT
The patient will be seen in the office between 2-3 weeks for wound check.   **Your first post-operative visit has been scheduled prior to your admission. PLEASE CONTACT OFFICE TO CONFIRM THE APPOINTMENT DATE. Sutures/Staples/Tape will be removed at that time.  **  The silver based dressing is to be removed 7 days from the date of surgery.   ** CONTACT THE OFFICE IF THE FOLLOWING DEVELOP:  - the dressing becomes soiled or saturated  - you develop a fever greater that 101F  - the wound becomes red or you develop blistering around the wound  * Patient may shower after post-op day #3.   * The patient will continue home PT consistent with  total knee replacement protocol. Transition to outpatient PT will occur at the time of the first office visit.   * The patient will practice knee extension exercises regularly to minimize hamstring contraction.   * The patient is FULL weight bearing.  * The patient will continue ASPIRIN for 6 weeks after surgery for blood clot prevention.  *** While on aspirin, the patient will take daily omeprazole or other similar medication to protect the stomach from irritation.   * The patient will take OXYCODONE AND TYLENOL for pain control and adjust according to prescription and patient needs. Contact the office if pain increases while taking prescribed pain medications or related concerns develop.  * Celebrex will be taken twice daily for 3 weeks for pain control and prevention of excessive bone growth. Additional prescription may be requested at your office follow-up visit.   * The patient will take Senna S while taking oxycodone to prevent narcotic associated constipation.  Additionally, increase water intake (drink at least 8 glasses of water daily) and try adding fiber to the diet by eating fruits, vegetables and foods that are rich in grains. If constipation is experienced, contact the medical/primary care provider to discuss further treatment options.  * To avoid injury at home:  - continue use of rolling walker until cleared by physical therapist  - have family or friend remove all throw rug or objects in hallways that may present a trip hazard.  - if you experience any dizziness or medical concerns, call your medical doctor or  911.  * The implant may activate metal detection devices.   The patient will be seen in the office between 2-3 weeks for wound check.   **Your first post-operative visit has been scheduled prior to your admission. PLEASE CONTACT OFFICE TO CONFIRM THE APPOINTMENT DATE. Sutures/Staples/Tape will be removed at that time.  **  The silver based dressing is to be removed 7 days from the date of surgery.   ** CONTACT THE OFFICE IF THE FOLLOWING DEVELOP:  - the dressing becomes soiled or saturated  - you develop a fever greater that 101F  - the wound becomes red or you develop blistering around the wound  * Patient may shower after post-op day #3.   * The patient will continue home PT consistent with  total knee replacement protocol. Transition to outpatient PT will occur at the time of the first office visit.   * The patient will practice knee extension exercises regularly to minimize hamstring contraction.   * The patient is FULL weight bearing.  * The patient will continue Eliquis 2.5 mg twice daily followed by ASPIRIN 325 mg twice daily for 4 weeks after surgery for blood clot prevention.  *** While on aspirin, the patient will take daily omeprazole or other similar medication to protect the stomach from irritation.   * The patient will take OXYCODONE AND TYLENOL for pain control and adjust according to prescription and patient needs. Contact the office if pain increases while taking prescribed pain medications or related concerns develop.  * Celebrex will be taken twice daily for 3 weeks for pain control and prevention of excessive bone growth. Additional prescription may be requested at your office follow-up visit.   * The patient will take Senna S while taking oxycodone to prevent narcotic associated constipation.  Additionally, increase water intake (drink at least 8 glasses of water daily) and try adding fiber to the diet by eating fruits, vegetables and foods that are rich in grains. If constipation is experienced, contact the medical/primary care provider to discuss further treatment options.  * To avoid injury at home:  - continue use of rolling walker until cleared by physical therapist  - have family or friend remove all throw rug or objects in hallways that may present a trip hazard.  - if you experience any dizziness or medical concerns, call your medical doctor or  911.  * The implant may activate metal detection devices.   The patient will be seen in the office between 2-3 weeks for wound check.   **Your first post-operative visit has been scheduled prior to your admission. PLEASE CONTACT OFFICE TO CONFIRM THE APPOINTMENT DATE. Tape will be removed at that time.  **  The silver based dressing is to be removed 7 days from the date of surgery (3/25/22)  ** CONTACT THE OFFICE IF THE FOLLOWING DEVELOP:  - the dressing becomes soiled or saturated  - you develop a fever greater that 101F  - the wound becomes red or you develop blistering around the wound  * Patient may shower after post-op day #3.   * The patient will continue home PT consistent with total knee replacement protocol. Transition to outpatient PT will occur at the time of the first office visit.   * The patient will practice knee extension exercises regularly to minimize hamstring contraction.   * The patient is FULL weight bearing.  * The patient will continue Eliquis 2.5 mg twice daily for two weeks followed by ASPIRIN 325 mg twice daily for 4 weeks after surgery for blood clot prevention.  *** While on aspirin, the patient will take daily omeprazole or other similar medication to protect the stomach from irritation.   * The patient will take OXYCODONE AND TYLENOL for pain control and adjust according to prescription and patient needs. Contact the office if pain increases while taking prescribed pain medications or related concerns develop.  * Celebrex will be taken twice daily for 3 weeks for pain control and prevention of excessive bone growth. Additional prescription may be requested at your office follow-up visit.   * The patient will take Senna S while taking oxycodone to prevent narcotic associated constipation.  Additionally, increase water intake (drink at least 8 glasses of water daily) and try adding fiber to the diet by eating fruits, vegetables and foods that are rich in grains. If constipation is experienced, contact the medical/primary care provider to discuss further treatment options.  * To avoid injury at home:  - continue use of rolling walker until cleared by physical therapist  - have family or friend remove all throw rug or objects in hallways that may present a trip hazard.  - if you experience any dizziness or medical concerns, call your medical doctor or  911.  * The implant may activate metal detection devices.

## 2022-03-18 NOTE — DISCHARGE NOTE PROVIDER - NSDCFUSCHEDAPPT_GEN_ALL_CORE_FT
RENATO LEVI ; 04/06/2022 ; [facility] [address] ; 05/10/2022 ; [facility] [address] ; 06/08/2022 ; [facility] [address]

## 2022-03-18 NOTE — PROGRESS NOTE ADULT - SUBJECTIVE AND OBJECTIVE BOX
Orthopedic PA Postop Note  Patient S/P Left TKA  Patient in bed comfortable   Left Leg  Dressing C/D/I   Pulse intact   Calf Soft NT  Dorsi/Plantar Flexion intact     A/P S/P Left TKA  1. DVTP - ASA  2. PT   3. Pain Control   Orthopedic PA Postop Note  Patient S/P Left TKA  Patient in bed comfortable   Left Leg  Dressing C/D/I   Pulse intact   Calf Soft NT  Dorsi/Plantar Flexion intact     A/P S/P Left TKA  1. DVTP - eliquis  2. PT   3. Pain Control

## 2022-03-18 NOTE — CONSULT NOTE ADULT - SUBJECTIVE AND OBJECTIVE BOX
62 y/o male with Hx of  Colon cancer s/p colon resection, Hyperlipidemia, Hypertension, Prostate cancer Treated with Radiation 2015, diabetes mellitus unilateral primary OA of left knee, patient has left knee pain for past 5 years, states he has had injections in the knee that did not work, he came in here for elective left total knee replacement on 3/17/22 with Dr. Marcos Ventura s/p Procedure.       Allergies:  	No Known Allergies:     Home Medications:   * Incomplete Medication History as of 25-Feb-2022 10:21 documented in Structured Notes  · 	Bystolic 5 mg oral tablet: Last Dose Taken:  , 1 tab(s) orally once a day  · 	folic acid 1 mg oral tablet: Last Dose Taken:  , 1 tab(s) orally once a day  · 	metFORMIN 1000 mg oral tablet, extended release: Last Dose Taken:  , 1 tab(s) orally once a day  · 	oxybutynin 5 mg oral tablet: Last Dose Taken:  , 2  orally once a day (at bedtime)  · 	pravastatin 40 mg oral tablet: Last Dose Taken:  , 1 tab(s) orally once a day  · 	diclofenac sodium 75 mg oral delayed release tablet: Last Dose Taken:  , 1 tab(s) orally 2 times a day  · 	Tylenol Arthritis Caplet 650 mg oral tablet, extended release: Last Dose Taken:  , 1  orally 2 times a day      PAST MEDICAL HISTORY:  Colon cancer     COVID-19 vaccine series completed     Hyperlipidemia     Hypertension     Prostate cancer Treated with Radiation 2015    Type 2 diabetes mellitus     Unilateral primary osteoarthritis, left knee.     PAST SURGICAL HISTORY:  S/P colon resection     S/P hernia repair.     FAMILY HISTORY:  No pertinent family history in first degree relatives. No pertinent family history of: congestive heart failure, COPD, coronary disease, diabetes and heart disease.      Social History: Not a smoker, drinker or using any drugs     Vital Signs Last 24 Hrs  T(C): 36.5 (18 Mar 2022 15:06), Max: 36.6 (18 Mar 2022 10:13)  T(F): 97.7 (18 Mar 2022 15:06), Max: 97.8 (18 Mar 2022 10:13)  HR: 62 (18 Mar 2022 15:45) (62 - 73)  BP: 110/65 (18 Mar 2022 15:45) (110/65 - 137/73)  BP(mean): --  RR: 20 (18 Mar 2022 15:45) (14 - 20)  SpO2: 95% (18 Mar 2022 15:45) (95% - 99%)    PHYSICAL EXAM:    GENERAL: Middle age male looking comfortable   HEENT: PERRL, +EOMI  NECK: soft, Supple, No JVD,   CHEST/LUNG: Clear to auscultate bilaterally; No wheezing  HEART: S1S2+, Regular rate and rhythm; No murmurs  ABDOMEN: Soft, Nontender, Nondistended; Bowel sounds present  EXTREMITIES:  2+ Peripheral Pulses, No edema, left Knee Joint area cover with dressing, no bleeding or soaking   SKIN: No rashes or lesions  NEURO: AAOX3, no focal deficits, no motor r sensory loss  PSYCH: normal mood   60 y/o male with Hx of  Colon cancer s/p colon resection, Hyperlipidemia, Hypertension, Prostate cancer Treated with Radiation 2015, diabetes mellitus unilateral primary OA of left knee, patient has left knee pain for past 5 years, states he has had injections in the knee that did not work, he came in here for elective left total knee replacement on 3/17/22 with Dr. Marcos Ventura s/p Procedure.     REVIEW OF SYSTEMS:    CONSTITUTIONAL: No fever, some fatigue  RESPIRATORY: No cough, No shortness of breath  CARDIOVASCULAR: No chest pain, palpitations  GASTROINTESTINAL: No abdominal, No nausea, vomiting  NEUROLOGICAL: No headaches,  loss of strength.  MISCELLANEOUS: Left knee pain is controlled     Allergies:  	No Known Allergies:     Home Medications:   * Incomplete Medication History as of 25-Feb-2022 10:21 documented in Structured Notes  · 	Bystolic 5 mg oral tablet: Last Dose Taken:  , 1 tab(s) orally once a day  · 	folic acid 1 mg oral tablet: Last Dose Taken:  , 1 tab(s) orally once a day  · 	metFORMIN 1000 mg oral tablet, extended release: Last Dose Taken:  , 1 tab(s) orally once a day  · 	oxybutynin 5 mg oral tablet: Last Dose Taken:  , 2  orally once a day (at bedtime)  · 	pravastatin 40 mg oral tablet: Last Dose Taken:  , 1 tab(s) orally once a day  · 	diclofenac sodium 75 mg oral delayed release tablet: Last Dose Taken:  , 1 tab(s) orally 2 times a day  · 	Tylenol Arthritis Caplet 650 mg oral tablet, extended release: Last Dose Taken:  , 1  orally 2 times a day      PAST MEDICAL HISTORY:  Colon cancer     COVID-19 vaccine series completed     Hyperlipidemia     Hypertension     Prostate cancer Treated with Radiation 2015    Type 2 diabetes mellitus     Unilateral primary osteoarthritis, left knee.     PAST SURGICAL HISTORY:  S/P colon resection     S/P hernia repair.     FAMILY HISTORY:  No pertinent family history in first degree relatives. No pertinent family history of: congestive heart failure, COPD, coronary disease, diabetes and heart disease.      Social History: Not a smoker, drinker or using any drugs     Vital Signs Last 24 Hrs  T(C): 36.5 (18 Mar 2022 15:06), Max: 36.6 (18 Mar 2022 10:13)  T(F): 97.7 (18 Mar 2022 15:06), Max: 97.8 (18 Mar 2022 10:13)  HR: 62 (18 Mar 2022 15:45) (62 - 73)  BP: 110/65 (18 Mar 2022 15:45) (110/65 - 137/73)  BP(mean): --  RR: 20 (18 Mar 2022 15:45) (14 - 20)  SpO2: 95% (18 Mar 2022 15:45) (95% - 99%)    PHYSICAL EXAM:    GENERAL: Middle age male looking comfortable   HEENT: PERRL, +EOMI  NECK: soft, Supple, No JVD,   CHEST/LUNG: Clear to auscultate bilaterally; No wheezing  HEART: S1S2+, Regular rate and rhythm; No murmurs  ABDOMEN: Soft, Nontender, Nondistended; Bowel sounds present  EXTREMITIES:  2+ Peripheral Pulses, No edema, left Knee Joint area cover with dressing, no bleeding or soaking   SKIN: No rashes or lesions  NEURO: AAOX3, no focal deficits, no motor r sensory loss  PSYCH: normal mood

## 2022-03-18 NOTE — DISCHARGE NOTE PROVIDER - NSDCMRMEDTOKEN_GEN_ALL_CORE_FT
Bystolic 5 mg oral tablet: 1 tab(s) orally once a day  diclofenac sodium 75 mg oral delayed release tablet: 1 tab(s) orally 2 times a day  folic acid 1 mg oral tablet: 1 tab(s) orally once a day  metFORMIN 1000 mg oral tablet, extended release: 1 tab(s) orally once a day  oxybutynin 5 mg oral tablet: 2  orally once a day (at bedtime)  pravastatin 40 mg oral tablet: 1 tab(s) orally once a day  Tylenol Arthritis Caplet 650 mg oral tablet, extended release: 1  orally 2 times a day   acetaminophen 325 mg oral tablet: 3 tab(s) orally every 8 hours  Aspirin Enteric Coated 325 mg oral delayed release tablet: 1 tab(s) orally 2 times a day MDD:2  Bystolic 5 mg oral tablet: 1 tab(s) orally once a day  CeleBREX 200 mg oral capsule: 1 cap(s) orally 2 times a day MDD:2  Eliquis 2.5 mg oral tablet: 1 tab(s) orally 2 times a day MDD:2  folic acid 1 mg oral tablet: 1 tab(s) orally once a day  metFORMIN 1000 mg oral tablet, extended release: 1 tab(s) orally once a day  omeprazole 20 mg oral delayed release capsule: 1 cap(s) orally once a day before breakfast MDD:1  oxybutynin 5 mg oral tablet: 2  orally once a day (at bedtime)  oxyCODONE 5 mg oral tablet: 1 tab(s) orally every 6 hours, As Needed -for severe pain MDD:4  pravastatin 40 mg oral tablet: 1 tab(s) orally once a day  Senna S 50 mg-8.6 mg oral tablet: 2 tab(s) orally once a day (at bedtime) MDD:2   acetaminophen 325 mg oral tablet: 2 tab(s) orally every 8 hours  Aspirin Enteric Coated 325 mg oral delayed release tablet: 1 tab(s) orally 2 times a day MDD:2  Bystolic 5 mg oral tablet: 1 tab(s) orally once a day  CeleBREX 200 mg oral capsule: 1 cap(s) orally 2 times a day MDD:2  Eliquis 2.5 mg oral tablet: 1 tab(s) orally 2 times a day MDD:2  folic acid 1 mg oral tablet: 1 tab(s) orally once a day  metFORMIN 1000 mg oral tablet, extended release: 1 tab(s) orally once a day  omeprazole 20 mg oral delayed release capsule: 1 cap(s) orally once a day before breakfast MDD:1  oxybutynin 5 mg oral tablet: 2  orally once a day (at bedtime)  oxyCODONE 5 mg oral tablet: 1 tab(s) orally every 6 hours, As Needed -for severe pain MDD:4  pravastatin 40 mg oral tablet: 1 tab(s) orally once a day  Senna S 50 mg-8.6 mg oral tablet: 2 tab(s) orally once a day (at bedtime) MDD:2   acetaminophen 325 mg oral tablet: 2 tab(s) orally every 8 hours  Bystolic 5 mg oral tablet: 1 tab(s) orally once a day  folic acid 1 mg oral tablet: 1 tab(s) orally once a day  metFORMIN 1000 mg oral tablet, extended release: 1 tab(s) orally once a day  oxybutynin 5 mg oral tablet: 2  orally once a day (at bedtime)  pravastatin 40 mg oral tablet: 1 tab(s) orally once a day  Senna S 50 mg-8.6 mg oral tablet: 2 tab(s) orally once a day (at bedtime) MDD:2

## 2022-03-18 NOTE — DISCHARGE NOTE PROVIDER - CARE PROVIDER_API CALL
Marcos Ventura)  Orthopaedic Surgery  [address]  [place] [address]  Phone: [phone]  Fax: [phone]  Follow Up Time:

## 2022-03-18 NOTE — PHYSICAL THERAPY INITIAL EVALUATION ADULT - ADDITIONAL COMMENTS
as per pt: resides in the private house with few steps (+) rail to enter, denies hx of falls, family available to assist as needed upon D/C home

## 2022-03-18 NOTE — DISCHARGE NOTE PROVIDER - HOSPITAL COURSE
The patient underwent a LEFT TOTAL KNEE REPLACEMENT on 3/18/22. The patient received antibiotics consistent with SCIP guidelines. The patient underwent the procedure and had no intra-operative complications. Post-operatively, the patient was seen by medicine and PT. The patient received ASA for DVTP. The patient received pain medications per orthopedic pain management protocol and the pain was appropriately controlled. The patient did not have any post-operative medical complications. The patient was discharged in stable condition.   The patient underwent a LEFT TOTAL KNEE REPLACEMENT on 3/18/22. The patient received antibiotics consistent with SCIP guidelines. The patient underwent the procedure and had no intra-operative complications. Post-operatively, the patient was seen by medicine and PT. The patient received Eliquis for DVTP. The patient received pain medications per orthopedic pain management protocol and the pain was appropriately controlled. The patient did not have any post-operative medical complications. The patient was discharged in stable condition.

## 2022-03-19 LAB
ANION GAP SERPL CALC-SCNC: 14 MMOL/L — SIGNIFICANT CHANGE UP (ref 5–17)
ANISOCYTOSIS BLD QL: SLIGHT — SIGNIFICANT CHANGE UP
BUN SERPL-MCNC: 23.1 MG/DL — HIGH (ref 8–20)
CALCIUM SERPL-MCNC: 8.4 MG/DL — LOW (ref 8.6–10.2)
CHLORIDE SERPL-SCNC: 104 MMOL/L — SIGNIFICANT CHANGE UP (ref 98–107)
CO2 SERPL-SCNC: 20 MMOL/L — LOW (ref 22–29)
CREAT SERPL-MCNC: 0.65 MG/DL — SIGNIFICANT CHANGE UP (ref 0.5–1.3)
EGFR: 107 ML/MIN/1.73M2 — SIGNIFICANT CHANGE UP
GIANT PLATELETS BLD QL SMEAR: PRESENT — SIGNIFICANT CHANGE UP
GLUCOSE BLDC GLUCOMTR-MCNC: 113 MG/DL — HIGH (ref 70–99)
GLUCOSE BLDC GLUCOMTR-MCNC: 135 MG/DL — HIGH (ref 70–99)
GLUCOSE BLDC GLUCOMTR-MCNC: 162 MG/DL — HIGH (ref 70–99)
GLUCOSE SERPL-MCNC: 132 MG/DL — HIGH (ref 70–99)
HCT VFR BLD CALC: 34.2 % — LOW (ref 39–50)
HGB BLD-MCNC: 11.1 G/DL — LOW (ref 13–17)
MANUAL SMEAR VERIFICATION: SIGNIFICANT CHANGE UP
MCHC RBC-ENTMCNC: 28 PG — SIGNIFICANT CHANGE UP (ref 27–34)
MCHC RBC-ENTMCNC: 32.5 GM/DL — SIGNIFICANT CHANGE UP (ref 32–36)
MCV RBC AUTO: 86.1 FL — SIGNIFICANT CHANGE UP (ref 80–100)
MICROCYTES BLD QL: SLIGHT — SIGNIFICANT CHANGE UP
MYELOCYTES NFR BLD: 0.9 % — HIGH (ref 0–0)
OVALOCYTES BLD QL SMEAR: SLIGHT — SIGNIFICANT CHANGE UP
PLAT MORPH BLD: NORMAL — SIGNIFICANT CHANGE UP
PLATELET # BLD AUTO: 127 K/UL — LOW (ref 150–400)
POIKILOCYTOSIS BLD QL AUTO: SLIGHT — SIGNIFICANT CHANGE UP
POTASSIUM SERPL-MCNC: 4.5 MMOL/L — SIGNIFICANT CHANGE UP (ref 3.5–5.3)
POTASSIUM SERPL-SCNC: 4.5 MMOL/L — SIGNIFICANT CHANGE UP (ref 3.5–5.3)
RBC # BLD: 3.97 M/UL — LOW (ref 4.2–5.8)
RBC # FLD: 14.9 % — HIGH (ref 10.3–14.5)
RBC BLD AUTO: ABNORMAL
SODIUM SERPL-SCNC: 138 MMOL/L — SIGNIFICANT CHANGE UP (ref 135–145)
WBC # BLD: 11.25 K/UL — HIGH (ref 3.8–10.5)
WBC # FLD AUTO: 11.25 K/UL — HIGH (ref 3.8–10.5)

## 2022-03-19 PROCEDURE — 99232 SBSQ HOSP IP/OBS MODERATE 35: CPT

## 2022-03-19 RX ORDER — ACETAMINOPHEN 500 MG
650 TABLET ORAL EVERY 8 HOURS
Refills: 0 | Status: DISCONTINUED | OUTPATIENT
Start: 2022-03-19 | End: 2022-03-21

## 2022-03-19 RX ORDER — DICLOFENAC SODIUM 75 MG/1
1 TABLET, DELAYED RELEASE ORAL
Qty: 0 | Refills: 0 | DISCHARGE

## 2022-03-19 RX ORDER — ACETAMINOPHEN 500 MG
2 TABLET ORAL
Qty: 0 | Refills: 0 | DISCHARGE
Start: 2022-03-19

## 2022-03-19 RX ORDER — OXYCODONE HYDROCHLORIDE 5 MG/1
1 TABLET ORAL
Qty: 28 | Refills: 0
Start: 2022-03-19 | End: 2022-03-25

## 2022-03-19 RX ORDER — APIXABAN 2.5 MG/1
1 TABLET, FILM COATED ORAL
Qty: 28 | Refills: 0
Start: 2022-03-19 | End: 2022-04-01

## 2022-03-19 RX ORDER — ACETAMINOPHEN 500 MG
3 TABLET ORAL
Qty: 0 | Refills: 0 | DISCHARGE
Start: 2022-03-19

## 2022-03-19 RX ORDER — ACETAMINOPHEN 500 MG
650 TABLET ORAL EVERY 6 HOURS
Refills: 0 | Status: DISCONTINUED | OUTPATIENT
Start: 2022-03-19 | End: 2022-03-19

## 2022-03-19 RX ORDER — SENNOSIDES/DOCUSATE SODIUM 8.6MG-50MG
2 TABLET ORAL
Qty: 14 | Refills: 0
Start: 2022-03-19 | End: 2022-03-25

## 2022-03-19 RX ORDER — OMEPRAZOLE 10 MG/1
1 CAPSULE, DELAYED RELEASE ORAL
Qty: 42 | Refills: 0
Start: 2022-03-19 | End: 2022-04-29

## 2022-03-19 RX ORDER — ACETAMINOPHEN 500 MG
1 TABLET ORAL
Qty: 0 | Refills: 0 | DISCHARGE

## 2022-03-19 RX ADMIN — SODIUM CHLORIDE 3 MILLILITER(S): 9 INJECTION INTRAMUSCULAR; INTRAVENOUS; SUBCUTANEOUS at 04:48

## 2022-03-19 RX ADMIN — ATORVASTATIN CALCIUM 10 MILLIGRAM(S): 80 TABLET, FILM COATED ORAL at 21:39

## 2022-03-19 RX ADMIN — APIXABAN 2.5 MILLIGRAM(S): 2.5 TABLET, FILM COATED ORAL at 17:33

## 2022-03-19 RX ADMIN — Medication 975 MILLIGRAM(S): at 04:53

## 2022-03-19 RX ADMIN — Medication 650 MILLIGRAM(S): at 21:39

## 2022-03-19 RX ADMIN — Medication 10 MILLIGRAM(S): at 21:39

## 2022-03-19 RX ADMIN — Medication 650 MILLIGRAM(S): at 13:38

## 2022-03-19 RX ADMIN — PANTOPRAZOLE SODIUM 40 MILLIGRAM(S): 20 TABLET, DELAYED RELEASE ORAL at 04:51

## 2022-03-19 RX ADMIN — APIXABAN 2.5 MILLIGRAM(S): 2.5 TABLET, FILM COATED ORAL at 04:51

## 2022-03-19 RX ADMIN — Medication 15 MILLIGRAM(S): at 04:53

## 2022-03-19 RX ADMIN — SODIUM CHLORIDE 3 MILLILITER(S): 9 INJECTION INTRAMUSCULAR; INTRAVENOUS; SUBCUTANEOUS at 21:38

## 2022-03-19 RX ADMIN — Medication 650 MILLIGRAM(S): at 22:30

## 2022-03-19 RX ADMIN — Medication 2: at 11:26

## 2022-03-19 RX ADMIN — Medication 15 MILLIGRAM(S): at 04:51

## 2022-03-19 RX ADMIN — Medication 15 MILLIGRAM(S): at 11:20

## 2022-03-19 RX ADMIN — SODIUM CHLORIDE 3 MILLILITER(S): 9 INJECTION INTRAMUSCULAR; INTRAVENOUS; SUBCUTANEOUS at 13:19

## 2022-03-19 RX ADMIN — OXYCODONE HYDROCHLORIDE 10 MILLIGRAM(S): 5 TABLET ORAL at 21:45

## 2022-03-19 RX ADMIN — Medication 100 MILLIGRAM(S): at 04:51

## 2022-03-19 RX ADMIN — Medication 650 MILLIGRAM(S): at 13:37

## 2022-03-19 RX ADMIN — Medication 15 MILLIGRAM(S): at 11:35

## 2022-03-19 RX ADMIN — Medication 975 MILLIGRAM(S): at 04:52

## 2022-03-19 RX ADMIN — OXYCODONE HYDROCHLORIDE 10 MILLIGRAM(S): 5 TABLET ORAL at 22:50

## 2022-03-19 NOTE — PROGRESS NOTE ADULT - SUBJECTIVE AND OBJECTIVE BOX
Patient seen and examined . S/p L TKA   , POD # 1.Pain well controlled , no n/v , voiding without difficulty , participating with physical therapy .     CC : L knee chronic pain postop well controlled         MEDICATIONS  (STANDING):  acetaminophen     Tablet .. 975 milliGRAM(s) Oral every 8 hours  apixaban 2.5 milliGRAM(s) Oral every 12 hours  atorvastatin 10 milliGRAM(s) Oral at bedtime  dextrose 40% Gel 15 Gram(s) Oral once  dextrose 5%. 1000 milliLiter(s) (50 mL/Hr) IV Continuous <Continuous>  dextrose 5%. 1000 milliLiter(s) (100 mL/Hr) IV Continuous <Continuous>  dextrose 50% Injectable 25 Gram(s) IV Push once  dextrose 50% Injectable 12.5 Gram(s) IV Push once  dextrose 50% Injectable 25 Gram(s) IV Push once  glucagon  Injectable 1 milliGRAM(s) IntraMuscular once  insulin lispro (ADMELOG) corrective regimen sliding scale   SubCutaneous three times a day before meals  nebivolol 5 milliGRAM(s) Oral daily  oxybutynin 10 milliGRAM(s) Oral at bedtime  pantoprazole    Tablet 40 milliGRAM(s) Oral before breakfast  polyethylene glycol 3350 17 Gram(s) Oral at bedtime  senna 2 Tablet(s) Oral at bedtime  sodium chloride 0.9% lock flush 3 milliLiter(s) IV Push every 8 hours  sodium chloride 0.9%. 1000 milliLiter(s) (125 mL/Hr) IV Continuous <Continuous>    MEDICATIONS  (PRN):  HYDROmorphone   Tablet 4 milliGRAM(s) Oral every 3 hours PRN Severe Pain (7 - 10)  HYDROmorphone  Injectable 0.5 milliGRAM(s) IV Push every 3 hours PRN breakthru  magnesium hydroxide Suspension 30 milliLiter(s) Oral daily PRN Constipation  ondansetron Injectable 4 milliGRAM(s) IV Push every 6 hours PRN Nausea and/or Vomiting  oxyCODONE    IR 5 milliGRAM(s) Oral every 3 hours PRN Mild Pain (1 - 3)  oxyCODONE    IR 10 milliGRAM(s) Oral every 3 hours PRN Moderate Pain (4 - 6)      LABS:                          11.1   11.25 )-----------( 127      ( 19 Mar 2022 06:08 )             34.2     03-19    138  |  104  |  23.1<H>  ----------------------------<  132<H>  4.5   |  20.0<L>  |  0.65    Ca    8.4<L>      19 Mar 2022 06:08      RADIOLOGY & ADDITIONAL TESTS:  < from: Xray Knee 1 or 2 Views, Left (03.18.22 @ 15:05) >    ACC: [id number] EXAM:  XR KNEE 1-2 VIEWS LT                          PROCEDURE DATE:  03/18/2022          INTERPRETATION:  HISTORY: Postoperative  knee replacement.    TECHNIQUE: Two views of the LEFT knee are submitted.    FINDINGS: Status post tricompartmental knee replacement with the femoral,   tibial and patellar components in anatomic alignment.  There is no fracture .    IMPRESSION:  Knee prosthetic components in proper anatomical alignment.    --- End of Report ---    < end of copied text >        REVIEW OF SYSTEMS:    L knee chronic pain postop well controlled , all other systems are reviewed   and are negative .     Vital Signs Last 24 Hrs  T(C): 36.6 (19 Mar 2022 11:28), Max: 36.8 (18 Mar 2022 18:09)  T(F): 97.8 (19 Mar 2022 11:28), Max: 98.2 (18 Mar 2022 18:09)  HR: 73 (19 Mar 2022 11:28) (60 - 73)  BP: 94/52 (19 Mar 2022 11:28) (94/52 - 155/79)  BP(mean): --  RR: 18 (19 Mar 2022 11:28) (14 - 20)  SpO2: 93% (19 Mar 2022 11:28) (93% - 100%)  PHYSICAL EXAM:    GENERAL: NAD, well-groomed, well-developed  HEAD:  Atraumatic, Normocephalic  EYES: EOMI, PERRLA, conjunctiva and sclera clear  NECK: Supple, No JVD, Normal thyroid  NERVOUS SYSTEM:  Alert & Oriented X3, no focal deficit  CHEST/LUNG: CTA b/l ,  no  rales, rhonchi, wheezing, or rubs  HEART: Regular rate and rhythm; No murmurs, rubs, or gallops  ABDOMEN: Soft, Nontender, Nondistended; Bowel sounds present  EXTREMITIES:  2+ Peripheral Pulses, No clubbing, cyanosis, or edema,   L knee dressing + , clean and dry   LYMPH: No lymphadenopathy noted  SKIN: No rashes or lesions

## 2022-03-19 NOTE — PROGRESS NOTE ADULT - SUBJECTIVE AND OBJECTIVE BOX
RENATO LEVI  [id number]    History: 61y Male (history of colon cancer, prostate cancer, HTN, HLD, DM) is status post left total knee arthroplasty on 3/18/22, POD # 1. Patient was examined at the bedside, reports he is doing well and is comfortable. The patient's pain is controlled using the prescribed pain medications. The patient is participating in physical therapy. Denies nausea, vomiting, chest pain, shortness of breath, abdominal pain or fever. No new complaints.                          11.1   11.25 )-----------( 127      ( 19 Mar 2022 06:08 )             34.2     03-19    138  |  104  |  23.1<H>  ----------------------------<  132<H>  4.5   |  20.0<L>  |  0.65    Ca    8.4<L>      19 Mar 2022 06:08            Physical exam: The left knee dressing is clean, dry and intact. No drainage or discharge. No erythema, blistering, or ecchymosis is noted to visible skin. The calf is supple and nontender. Sensation to light touch is grossly intact distally. + plantar/dorsi/EHL/FHL. No foot drop. 2+ dorsalis pedis pulse. Cap refill < 2 seconds. No cyanosis.        Primary Orthopedic Assessment:  • s/p LEFT total knee replacement, POD#1    Secondary  Orthopedic Assessment(s):   •     Secondary  Medical Assessment(s):   •     Plan:   • DVT prophylaxis as prescribed (Eliquis for two weeks, then Aspirin for four weeks per Dr. Leslie and Dr. Ventura), including use of compression devices and ankle pumps  • Continue physical therapy  • Weightbearing as tolerated of the left lower extremity with assistance of a walker  • Incentive spirometry encouraged  • Pain control as clinically indicated  • Discharge planning – anticipated discharge is Home

## 2022-03-19 NOTE — PROGRESS NOTE ADULT - ASSESSMENT
60 y/o male with Hx of  Colon cancer s/p colon resection, Hyperlipidemia, Hypertension, Prostate cancer Treated with Radiation 2015, diabetes mellitus unilateral primary OA of left knee, patient has left knee pain for past 5 years, states he has had injections in the knee that did not work, he came in here for elective left total knee replacement on 3/17/22 with Dr. Marcos Ventura s/p Procedure.     Plan:     OA of the left Knee s/p TKR post op day 01.   PT/OT/pain mgmt  DVT prophylaxis- as per ortho  Abx as per SCIP- given   Incentive spirometry  Prophylaxis of opioid  induced constipation.      HTN: Bystolic 5 mg once a day with holding parameters     DM: Will hold Metformin , restart on d/c , ISSC , accu check     Over active bladder: will continue with Oxybutynin 5 mg once a day (at bedtime)    HLD: Will continue with pravastatin 40 mg once a day    Preop LFT's noted elevated - patient states - known to him , known history of fatty liver ,   will recommend to decrease Tylenol tp 650 mg Q8 hrs .     Anemia - acute blood lost anemia - secondary to surgical blood lost ,   likely on chronic - patient is  asymptomatic.     DVT prophylaxis  - as per ortho protocol- agree with Eliquis   Opioid induced constipation  prophylaxis - bowel regimen   Dispo plan is Home - likely today .   D/W ortho PA / nurse / CM.    Medically stable to d/c once cleared by physical therapy / ortho .

## 2022-03-20 LAB
ANION GAP SERPL CALC-SCNC: 13 MMOL/L — SIGNIFICANT CHANGE UP (ref 5–17)
BUN SERPL-MCNC: 21.4 MG/DL — HIGH (ref 8–20)
CALCIUM SERPL-MCNC: 8 MG/DL — LOW (ref 8.6–10.2)
CHLORIDE SERPL-SCNC: 102 MMOL/L — SIGNIFICANT CHANGE UP (ref 98–107)
CO2 SERPL-SCNC: 21 MMOL/L — LOW (ref 22–29)
CREAT SERPL-MCNC: 0.66 MG/DL — SIGNIFICANT CHANGE UP (ref 0.5–1.3)
EGFR: 106 ML/MIN/1.73M2 — SIGNIFICANT CHANGE UP
GLUCOSE BLDC GLUCOMTR-MCNC: 117 MG/DL — HIGH (ref 70–99)
GLUCOSE BLDC GLUCOMTR-MCNC: 119 MG/DL — HIGH (ref 70–99)
GLUCOSE BLDC GLUCOMTR-MCNC: 120 MG/DL — HIGH (ref 70–99)
GLUCOSE BLDC GLUCOMTR-MCNC: 125 MG/DL — HIGH (ref 70–99)
GLUCOSE SERPL-MCNC: 126 MG/DL — HIGH (ref 70–99)
HCT VFR BLD CALC: 30.8 % — LOW (ref 39–50)
HGB BLD-MCNC: 10.1 G/DL — LOW (ref 13–17)
MCHC RBC-ENTMCNC: 28.5 PG — SIGNIFICANT CHANGE UP (ref 27–34)
MCHC RBC-ENTMCNC: 32.8 GM/DL — SIGNIFICANT CHANGE UP (ref 32–36)
MCV RBC AUTO: 86.8 FL — SIGNIFICANT CHANGE UP (ref 80–100)
PLATELET # BLD AUTO: 100 K/UL — LOW (ref 150–400)
POTASSIUM SERPL-MCNC: 3.8 MMOL/L — SIGNIFICANT CHANGE UP (ref 3.5–5.3)
POTASSIUM SERPL-SCNC: 3.8 MMOL/L — SIGNIFICANT CHANGE UP (ref 3.5–5.3)
RBC # BLD: 3.55 M/UL — LOW (ref 4.2–5.8)
RBC # FLD: 15.4 % — HIGH (ref 10.3–14.5)
SODIUM SERPL-SCNC: 136 MMOL/L — SIGNIFICANT CHANGE UP (ref 135–145)
WBC # BLD: 7.53 K/UL — SIGNIFICANT CHANGE UP (ref 3.8–10.5)
WBC # FLD AUTO: 7.53 K/UL — SIGNIFICANT CHANGE UP (ref 3.8–10.5)

## 2022-03-20 PROCEDURE — 99233 SBSQ HOSP IP/OBS HIGH 50: CPT

## 2022-03-20 RX ORDER — SODIUM CHLORIDE 9 MG/ML
1000 INJECTION, SOLUTION INTRAVENOUS
Refills: 0 | Status: DISCONTINUED | OUTPATIENT
Start: 2022-03-20 | End: 2022-03-21

## 2022-03-20 RX ORDER — OXYCODONE HYDROCHLORIDE 5 MG/1
1 TABLET ORAL
Qty: 28 | Refills: 0
Start: 2022-03-20 | End: 2022-03-26

## 2022-03-20 RX ORDER — CELECOXIB 200 MG/1
1 CAPSULE ORAL
Qty: 42 | Refills: 0
Start: 2022-03-20 | End: 2022-04-09

## 2022-03-20 RX ORDER — APIXABAN 2.5 MG/1
1 TABLET, FILM COATED ORAL
Qty: 28 | Refills: 0
Start: 2022-03-20 | End: 2022-04-02

## 2022-03-20 RX ORDER — OMEPRAZOLE 10 MG/1
1 CAPSULE, DELAYED RELEASE ORAL
Qty: 42 | Refills: 0
Start: 2022-03-20 | End: 2022-04-30

## 2022-03-20 RX ORDER — SENNOSIDES/DOCUSATE SODIUM 8.6MG-50MG
2 TABLET ORAL
Qty: 14 | Refills: 0
Start: 2022-03-20 | End: 2022-03-26

## 2022-03-20 RX ORDER — LACTULOSE 10 G/15ML
30 SOLUTION ORAL ONCE
Refills: 0 | Status: COMPLETED | OUTPATIENT
Start: 2022-03-20 | End: 2022-03-20

## 2022-03-20 RX ADMIN — Medication 650 MILLIGRAM(S): at 21:25

## 2022-03-20 RX ADMIN — OXYCODONE HYDROCHLORIDE 10 MILLIGRAM(S): 5 TABLET ORAL at 06:21

## 2022-03-20 RX ADMIN — OXYCODONE HYDROCHLORIDE 10 MILLIGRAM(S): 5 TABLET ORAL at 05:33

## 2022-03-20 RX ADMIN — OXYCODONE HYDROCHLORIDE 10 MILLIGRAM(S): 5 TABLET ORAL at 22:25

## 2022-03-20 RX ADMIN — CELECOXIB 200 MILLIGRAM(S): 200 CAPSULE ORAL at 06:20

## 2022-03-20 RX ADMIN — LACTULOSE 30 GRAM(S): 10 SOLUTION ORAL at 18:15

## 2022-03-20 RX ADMIN — Medication 650 MILLIGRAM(S): at 06:20

## 2022-03-20 RX ADMIN — APIXABAN 2.5 MILLIGRAM(S): 2.5 TABLET, FILM COATED ORAL at 18:16

## 2022-03-20 RX ADMIN — ONDANSETRON 4 MILLIGRAM(S): 8 TABLET, FILM COATED ORAL at 16:50

## 2022-03-20 RX ADMIN — SENNA PLUS 2 TABLET(S): 8.6 TABLET ORAL at 00:14

## 2022-03-20 RX ADMIN — OXYCODONE HYDROCHLORIDE 10 MILLIGRAM(S): 5 TABLET ORAL at 21:25

## 2022-03-20 RX ADMIN — Medication 650 MILLIGRAM(S): at 13:18

## 2022-03-20 RX ADMIN — APIXABAN 2.5 MILLIGRAM(S): 2.5 TABLET, FILM COATED ORAL at 05:35

## 2022-03-20 RX ADMIN — PANTOPRAZOLE SODIUM 40 MILLIGRAM(S): 20 TABLET, DELAYED RELEASE ORAL at 05:37

## 2022-03-20 RX ADMIN — Medication 650 MILLIGRAM(S): at 05:35

## 2022-03-20 RX ADMIN — NEBIVOLOL HYDROCHLORIDE 5 MILLIGRAM(S): 5 TABLET ORAL at 05:35

## 2022-03-20 RX ADMIN — ATORVASTATIN CALCIUM 10 MILLIGRAM(S): 80 TABLET, FILM COATED ORAL at 21:24

## 2022-03-20 RX ADMIN — CELECOXIB 200 MILLIGRAM(S): 200 CAPSULE ORAL at 18:16

## 2022-03-20 RX ADMIN — OXYCODONE HYDROCHLORIDE 10 MILLIGRAM(S): 5 TABLET ORAL at 11:10

## 2022-03-20 RX ADMIN — SODIUM CHLORIDE 100 MILLILITER(S): 9 INJECTION, SOLUTION INTRAVENOUS at 13:16

## 2022-03-20 RX ADMIN — Medication 10 MILLIGRAM(S): at 21:24

## 2022-03-20 RX ADMIN — CELECOXIB 200 MILLIGRAM(S): 200 CAPSULE ORAL at 18:17

## 2022-03-20 RX ADMIN — SODIUM CHLORIDE 3 MILLILITER(S): 9 INJECTION INTRAMUSCULAR; INTRAVENOUS; SUBCUTANEOUS at 05:09

## 2022-03-20 RX ADMIN — SODIUM CHLORIDE 100 MILLILITER(S): 9 INJECTION, SOLUTION INTRAVENOUS at 21:23

## 2022-03-20 RX ADMIN — OXYCODONE HYDROCHLORIDE 10 MILLIGRAM(S): 5 TABLET ORAL at 10:11

## 2022-03-20 RX ADMIN — Medication 650 MILLIGRAM(S): at 22:25

## 2022-03-20 RX ADMIN — CELECOXIB 200 MILLIGRAM(S): 200 CAPSULE ORAL at 05:35

## 2022-03-20 RX ADMIN — SODIUM CHLORIDE 3 MILLILITER(S): 9 INJECTION INTRAMUSCULAR; INTRAVENOUS; SUBCUTANEOUS at 13:17

## 2022-03-20 NOTE — PROGRESS NOTE ADULT - ASSESSMENT
62 y/o male with Hx of  Colon cancer s/p colon resection, Hyperlipidemia, Hypertension, Prostate cancer Treated with Radiation 2015, diabetes mellitus unilateral primary OA of left knee, patient has left knee pain for past 5 years, states he has had injections in the knee that did not work, he came in here for elective left total knee replacement on 3/17/22 with Dr. Marcos Ventura s/p Procedure.     Plan:     OA of the left Knee s/p TKR post op day 01.   PT/OT/pain mgmt  DVT prophylaxis- as per ortho  Abx as per SCIP- given   Incentive spirometry  Prophylaxis of opioid  induced constipation.      HTN: Bystolic 5 mg once a day with holding parameters     DM: Will hold Metformin , restart on d/c , ISSC , accu check     Over active bladder: will continue with Oxybutynin 5 mg once a day (at bedtime)    HLD: Will continue with pravastatin 40 mg once a day    Preop LFT's noted elevated - patient states - known to him , known history of fatty liver ,   will recommend to decrease Tylenol  650 mg Q8 hrs .     Anemia - acute blood lost anemia - secondary to surgical blood lost ,   likely on chronic - patient is  asymptomatic.     Abdominal distention / decreased BS / frequent BM since   yesterday . Patient with hx of colon cancer and s/p resection ( 12/21) - states   since then having frequent BM 2-3 BM / day . Since yesterday with   increasing abd distention , more frequent BM , on exam diminished bowel sounds ,   ? cause . Will get abdominal exam , NPO , IVF for now .       DVT prophylaxis  - as per ortho protocol- agree with Eliquis      D/ISELA ortho PA / nurse- aware of plan .   Not stable to be d/c today .

## 2022-03-20 NOTE — PROGRESS NOTE ADULT - SUBJECTIVE AND OBJECTIVE BOX
RENATO LEVI  [id number]    History: 61y Male is status post left total knee arthroplasty, POD # 2. Patient is doing well and is comfortable. The patient's pain is controlled using the prescribed pain medications. The patient is participating in physical therapy. Denies nausea, vomiting, chest pain, shortness of breath, abdominal pain or fever. No new complaints.    Vital Signs Last 24 Hrs  T(C): 37.1 (20 Mar 2022 07:43), Max: 37.1 (20 Mar 2022 07:43)  T(F): 98.8 (20 Mar 2022 07:43), Max: 98.8 (20 Mar 2022 07:43)  HR: 68 (20 Mar 2022 07:43) (62 - 96)  BP: 124/61 (20 Mar 2022 07:43) (94/52 - 132/73)  BP(mean): --  RR: 18 (20 Mar 2022 07:43) (18 - 18)  SpO2: 93% (20 Mar 2022 07:43) (93% - 96%)                        10.1   7.53  )-----------( 100      ( 20 Mar 2022 05:18 )             30.8     03-20    136  |  102  |  21.4<H>  ----------------------------<  126<H>  3.8   |  21.0<L>  |  0.66    Ca    8.0<L>      20 Mar 2022 05:18    MEDICATIONS  (STANDING):  acetaminophen     Tablet .. 650 milliGRAM(s) Oral every 8 hours  apixaban 2.5 milliGRAM(s) Oral every 12 hours  atorvastatin 10 milliGRAM(s) Oral at bedtime  celecoxib 200 milliGRAM(s) Oral every 12 hours  dextrose 40% Gel 15 Gram(s) Oral once  dextrose 5%. 1000 milliLiter(s) (50 mL/Hr) IV Continuous <Continuous>  dextrose 5%. 1000 milliLiter(s) (100 mL/Hr) IV Continuous <Continuous>  dextrose 50% Injectable 25 Gram(s) IV Push once  dextrose 50% Injectable 12.5 Gram(s) IV Push once  dextrose 50% Injectable 25 Gram(s) IV Push once  glucagon  Injectable 1 milliGRAM(s) IntraMuscular once  insulin lispro (ADMELOG) corrective regimen sliding scale   SubCutaneous three times a day before meals  nebivolol 5 milliGRAM(s) Oral daily  oxybutynin 10 milliGRAM(s) Oral at bedtime  pantoprazole    Tablet 40 milliGRAM(s) Oral before breakfast  polyethylene glycol 3350 17 Gram(s) Oral at bedtime  senna 2 Tablet(s) Oral at bedtime  sodium chloride 0.9% lock flush 3 milliLiter(s) IV Push every 8 hours  sodium chloride 0.9%. 1000 milliLiter(s) (125 mL/Hr) IV Continuous <Continuous>    MEDICATIONS  (PRN):  bisacodyl Suppository 10 milliGRAM(s) Rectal once PRN Constipation  HYDROmorphone   Tablet 4 milliGRAM(s) Oral every 3 hours PRN Severe Pain (7 - 10)  HYDROmorphone  Injectable 0.5 milliGRAM(s) IV Push every 3 hours PRN breakthru  magnesium hydroxide Suspension 30 milliLiter(s) Oral daily PRN Constipation  ondansetron Injectable 4 milliGRAM(s) IV Push every 6 hours PRN Nausea and/or Vomiting  oxyCODONE    IR 5 milliGRAM(s) Oral every 3 hours PRN Mild Pain (1 - 3)  oxyCODONE    IR 10 milliGRAM(s) Oral every 3 hours PRN Moderate Pain (4 - 6)    Physical exam: The left knee ACE dressing is clean, dry and intact. ACE/Webril dressing removed. Mepilex dressing C/D/I. No drainage or discharge. No erythema is noted. No blistering. No ecchymosis. The calf is supple nontender. Passive range of motion is acceptable to due postoperative pain. Sensation to light touch is grossly intact distally. Motor function distally is 5/5. Extensor hallucis longus and flexor hallucis longus are intact. No foot drop. 2+ dorsalis pedis pulse. Capillary refill is less than 2 seconds. No cyanosis.    Primary Orthopedic Assessment:  •	s/p LEFT total knee replacement	    Secondary  Medical Assessment(s):   •	Colon CA, Prostate CA, HTN, DM, HLD    Plan:   •	DVT prophylaxis as prescribed, including use of compression devices and ankle pumps  •	Continue physical therapy  •	Weightbearing as tolerated of the left lower extremity with assistance of a walker  •	Incentive spirometry encouraged  •	Pain control as clinically indicated  •	Discharge planning – anticipated discharge is Home today when cleared by PT, Medicine clearance appreciated

## 2022-03-20 NOTE — PROGRESS NOTE ADULT - SUBJECTIVE AND OBJECTIVE BOX
Patient seen and examined . S/p L TKA , POD #2 . Pain well controlled , no n/v , voiding without difficulty , c/o abdominal distention , having frequent BM since yesterday , no abdominal pain .    CC : L knee chronic pain postop well controlled , abdominal distention ,   frequent loose BM since yesterday             MEDICATIONS  (STANDING):  acetaminophen     Tablet .. 650 milliGRAM(s) Oral every 8 hours  apixaban 2.5 milliGRAM(s) Oral every 12 hours  atorvastatin 10 milliGRAM(s) Oral at bedtime  celecoxib 200 milliGRAM(s) Oral every 12 hours  dextrose 40% Gel 15 Gram(s) Oral once  dextrose 5%. 1000 milliLiter(s) (100 mL/Hr) IV Continuous <Continuous>  dextrose 5%. 1000 milliLiter(s) (50 mL/Hr) IV Continuous <Continuous>  dextrose 50% Injectable 25 Gram(s) IV Push once  dextrose 50% Injectable 12.5 Gram(s) IV Push once  dextrose 50% Injectable 25 Gram(s) IV Push once  glucagon  Injectable 1 milliGRAM(s) IntraMuscular once  insulin lispro (ADMELOG) corrective regimen sliding scale   SubCutaneous three times a day before meals  lactated ringers. 1000 milliLiter(s) (100 mL/Hr) IV Continuous <Continuous>  nebivolol 5 milliGRAM(s) Oral daily  oxybutynin 10 milliGRAM(s) Oral at bedtime  pantoprazole    Tablet 40 milliGRAM(s) Oral before breakfast  polyethylene glycol 3350 17 Gram(s) Oral at bedtime  senna 2 Tablet(s) Oral at bedtime  sodium chloride 0.9% lock flush 3 milliLiter(s) IV Push every 8 hours  sodium chloride 0.9%. 1000 milliLiter(s) (125 mL/Hr) IV Continuous <Continuous>    MEDICATIONS  (PRN):  bisacodyl Suppository 10 milliGRAM(s) Rectal once PRN Constipation  HYDROmorphone   Tablet 4 milliGRAM(s) Oral every 3 hours PRN Severe Pain (7 - 10)  HYDROmorphone  Injectable 0.5 milliGRAM(s) IV Push every 3 hours PRN breakthru  magnesium hydroxide Suspension 30 milliLiter(s) Oral daily PRN Constipation  ondansetron Injectable 4 milliGRAM(s) IV Push every 6 hours PRN Nausea and/or Vomiting  oxyCODONE    IR 5 milliGRAM(s) Oral every 3 hours PRN Mild Pain (1 - 3)  oxyCODONE    IR 10 milliGRAM(s) Oral every 3 hours PRN Moderate Pain (4 - 6)      LABS:                          10.1   7.53  )-----------( 100      ( 20 Mar 2022 05:18 )             30.8     03-20    136  |  102  |  21.4<H>  ----------------------------<  126<H>  3.8   |  21.0<L>  |  0.66    Ca    8.0<L>      20 Mar 2022 05:18            RADIOLOGY & ADDITIONAL TESTS:      REVIEW OF SYSTEMS:  L knee chronic pain postop well controlled , abdominal distention ,   frequent loose BM since yesterday , all other systems are reviewed and are negative .     Vital Signs Last 24 Hrs  T(C): 37.1 (20 Mar 2022 07:43), Max: 37.1 (20 Mar 2022 07:43)  T(F): 98.8 (20 Mar 2022 07:43), Max: 98.8 (20 Mar 2022 07:43)  HR: 68 (20 Mar 2022 07:43) (62 - 96)  BP: 124/61 (20 Mar 2022 07:43) (105/54 - 132/73)  BP(mean): --  RR: 18 (20 Mar 2022 07:43) (18 - 18)  SpO2: 93% (20 Mar 2022 07:43) (93% - 96%)  PHYSICAL EXAM:    GENERAL: NAD, well-groomed, well-developed  HEAD:  Atraumatic, Normocephalic  EYES: EOMI, PERRLA, conjunctiva and sclera clear  NECK: Supple, No JVD, Normal thyroid  NERVOUS SYSTEM:  Alert & Oriented X3, no focal deficit  CHEST/LUNG: CTA b/l ,  no  rales, rhonchi, wheezing, or rubs  HEART: Regular rate and rhythm; No murmurs, rubs, or gallops  ABDOMEN: Soft, Nontender, distended , no rebound ,   no guarding , Bowel sounds  diminished   EXTREMITIES:  2+ Peripheral Pulses, No clubbing, cyanosis, or edema, L knee   dressing + , clean and dry   LYMPH: No lymphadenopathy noted  SKIN: No rashes or lesions

## 2022-03-21 ENCOUNTER — TRANSCRIPTION ENCOUNTER (OUTPATIENT)
Age: 62
End: 2022-03-21

## 2022-03-21 VITALS
OXYGEN SATURATION: 93 % | DIASTOLIC BLOOD PRESSURE: 61 MMHG | HEART RATE: 69 BPM | TEMPERATURE: 98 F | RESPIRATION RATE: 14 BRPM | SYSTOLIC BLOOD PRESSURE: 107 MMHG

## 2022-03-21 LAB
ANION GAP SERPL CALC-SCNC: 11 MMOL/L — SIGNIFICANT CHANGE UP (ref 5–17)
BUN SERPL-MCNC: 10.3 MG/DL — SIGNIFICANT CHANGE UP (ref 8–20)
CALCIUM SERPL-MCNC: 8.3 MG/DL — LOW (ref 8.6–10.2)
CHLORIDE SERPL-SCNC: 105 MMOL/L — SIGNIFICANT CHANGE UP (ref 98–107)
CO2 SERPL-SCNC: 24 MMOL/L — SIGNIFICANT CHANGE UP (ref 22–29)
CREAT SERPL-MCNC: 0.63 MG/DL — SIGNIFICANT CHANGE UP (ref 0.5–1.3)
EGFR: 108 ML/MIN/1.73M2 — SIGNIFICANT CHANGE UP
GLUCOSE BLDC GLUCOMTR-MCNC: 122 MG/DL — HIGH (ref 70–99)
GLUCOSE BLDC GLUCOMTR-MCNC: 173 MG/DL — HIGH (ref 70–99)
GLUCOSE SERPL-MCNC: 119 MG/DL — HIGH (ref 70–99)
HCT VFR BLD CALC: 30.3 % — LOW (ref 39–50)
HGB BLD-MCNC: 9.7 G/DL — LOW (ref 13–17)
MAGNESIUM SERPL-MCNC: 1.8 MG/DL — SIGNIFICANT CHANGE UP (ref 1.6–2.6)
MCHC RBC-ENTMCNC: 28.2 PG — SIGNIFICANT CHANGE UP (ref 27–34)
MCHC RBC-ENTMCNC: 32 GM/DL — SIGNIFICANT CHANGE UP (ref 32–36)
MCV RBC AUTO: 88.1 FL — SIGNIFICANT CHANGE UP (ref 80–100)
PLATELET # BLD AUTO: 102 K/UL — LOW (ref 150–400)
POTASSIUM SERPL-MCNC: 3.9 MMOL/L — SIGNIFICANT CHANGE UP (ref 3.5–5.3)
POTASSIUM SERPL-SCNC: 3.9 MMOL/L — SIGNIFICANT CHANGE UP (ref 3.5–5.3)
RBC # BLD: 3.44 M/UL — LOW (ref 4.2–5.8)
RBC # FLD: 15.9 % — HIGH (ref 10.3–14.5)
SODIUM SERPL-SCNC: 139 MMOL/L — SIGNIFICANT CHANGE UP (ref 135–145)
WBC # BLD: 6.11 K/UL — SIGNIFICANT CHANGE UP (ref 3.8–10.5)
WBC # FLD AUTO: 6.11 K/UL — SIGNIFICANT CHANGE UP (ref 3.8–10.5)

## 2022-03-21 PROCEDURE — 74019 RADEX ABDOMEN 2 VIEWS: CPT | Mod: 26

## 2022-03-21 PROCEDURE — 85027 COMPLETE CBC AUTOMATED: CPT

## 2022-03-21 PROCEDURE — C1776: CPT

## 2022-03-21 PROCEDURE — 82962 GLUCOSE BLOOD TEST: CPT

## 2022-03-21 PROCEDURE — 74019 RADEX ABDOMEN 2 VIEWS: CPT

## 2022-03-21 PROCEDURE — 97110 THERAPEUTIC EXERCISES: CPT

## 2022-03-21 PROCEDURE — C1713: CPT

## 2022-03-21 PROCEDURE — 97116 GAIT TRAINING THERAPY: CPT

## 2022-03-21 PROCEDURE — 36415 COLL VENOUS BLD VENIPUNCTURE: CPT

## 2022-03-21 PROCEDURE — U0005: CPT

## 2022-03-21 PROCEDURE — 83735 ASSAY OF MAGNESIUM: CPT

## 2022-03-21 PROCEDURE — 86900 BLOOD TYPING SEROLOGIC ABO: CPT

## 2022-03-21 PROCEDURE — 80048 BASIC METABOLIC PNL TOTAL CA: CPT

## 2022-03-21 PROCEDURE — 73560 X-RAY EXAM OF KNEE 1 OR 2: CPT

## 2022-03-21 PROCEDURE — 86901 BLOOD TYPING SEROLOGIC RH(D): CPT

## 2022-03-21 PROCEDURE — 99232 SBSQ HOSP IP/OBS MODERATE 35: CPT

## 2022-03-21 PROCEDURE — 97163 PT EVAL HIGH COMPLEX 45 MIN: CPT

## 2022-03-21 PROCEDURE — 86850 RBC ANTIBODY SCREEN: CPT

## 2022-03-21 PROCEDURE — U0003: CPT

## 2022-03-21 RX ORDER — LACTULOSE 10 G/15ML
10 SOLUTION ORAL ONCE
Refills: 0 | Status: COMPLETED | OUTPATIENT
Start: 2022-03-21 | End: 2022-03-21

## 2022-03-21 RX ADMIN — LACTULOSE 10 GRAM(S): 10 SOLUTION ORAL at 10:39

## 2022-03-21 RX ADMIN — Medication 2: at 11:58

## 2022-03-21 RX ADMIN — Medication 650 MILLIGRAM(S): at 14:45

## 2022-03-21 RX ADMIN — PANTOPRAZOLE SODIUM 40 MILLIGRAM(S): 20 TABLET, DELAYED RELEASE ORAL at 06:20

## 2022-03-21 RX ADMIN — CELECOXIB 200 MILLIGRAM(S): 200 CAPSULE ORAL at 06:20

## 2022-03-21 RX ADMIN — SODIUM CHLORIDE 3 MILLILITER(S): 9 INJECTION INTRAMUSCULAR; INTRAVENOUS; SUBCUTANEOUS at 13:49

## 2022-03-21 RX ADMIN — Medication 650 MILLIGRAM(S): at 13:59

## 2022-03-21 RX ADMIN — APIXABAN 2.5 MILLIGRAM(S): 2.5 TABLET, FILM COATED ORAL at 06:21

## 2022-03-21 RX ADMIN — Medication 650 MILLIGRAM(S): at 06:20

## 2022-03-21 NOTE — DISCHARGE NOTE NURSING/CASE MANAGEMENT/SOCIAL WORK - NSDCPEFALRISK_GEN_ALL_CORE
For information on Fall & Injury Prevention, visit: [email or web address] OR  [email or web address] OR  https://www.cdc.gov/steadi/patient.html

## 2022-03-21 NOTE — PROGRESS NOTE ADULT - SUBJECTIVE AND OBJECTIVE BOX
Patient seen and examined . S/p L TKA  , POD # 3. Pain well controlled , had episode of vomiting yesterday late after noon , kept NPO /IVF, given Lactulose with good BM last night . This am feels better , passing flatus , abdominal distention resolved , no abdominal pain , no n/v, voiding without difficulty .     CC : as above           MEDICATIONS  (STANDING):  acetaminophen     Tablet .. 650 milliGRAM(s) Oral every 8 hours  apixaban 2.5 milliGRAM(s) Oral every 12 hours  atorvastatin 10 milliGRAM(s) Oral at bedtime  celecoxib 200 milliGRAM(s) Oral every 12 hours  dextrose 40% Gel 15 Gram(s) Oral once  dextrose 5%. 1000 milliLiter(s) (50 mL/Hr) IV Continuous <Continuous>  dextrose 5%. 1000 milliLiter(s) (100 mL/Hr) IV Continuous <Continuous>  dextrose 50% Injectable 25 Gram(s) IV Push once  dextrose 50% Injectable 12.5 Gram(s) IV Push once  dextrose 50% Injectable 25 Gram(s) IV Push once  glucagon  Injectable 1 milliGRAM(s) IntraMuscular once  insulin lispro (ADMELOG) corrective regimen sliding scale   SubCutaneous three times a day before meals  lactated ringers. 1000 milliLiter(s) (100 mL/Hr) IV Continuous <Continuous>  nebivolol 5 milliGRAM(s) Oral daily  oxybutynin 10 milliGRAM(s) Oral at bedtime  pantoprazole    Tablet 40 milliGRAM(s) Oral before breakfast  polyethylene glycol 3350 17 Gram(s) Oral at bedtime  senna 2 Tablet(s) Oral at bedtime  sodium chloride 0.9% lock flush 3 milliLiter(s) IV Push every 8 hours  sodium chloride 0.9%. 1000 milliLiter(s) (125 mL/Hr) IV Continuous <Continuous>    MEDICATIONS  (PRN):  bisacodyl Suppository 10 milliGRAM(s) Rectal once PRN Constipation  HYDROmorphone   Tablet 4 milliGRAM(s) Oral every 3 hours PRN Severe Pain (7 - 10)  HYDROmorphone  Injectable 0.5 milliGRAM(s) IV Push every 3 hours PRN breakthru  magnesium hydroxide Suspension 30 milliLiter(s) Oral daily PRN Constipation  ondansetron Injectable 4 milliGRAM(s) IV Push every 6 hours PRN Nausea and/or Vomiting  oxyCODONE    IR 5 milliGRAM(s) Oral every 3 hours PRN Mild Pain (1 - 3)  oxyCODONE    IR 10 milliGRAM(s) Oral every 3 hours PRN Moderate Pain (4 - 6)      LABS:                          9.7    6.11  )-----------( 102      ( 21 Mar 2022 06:33 )             30.3     03-21    139  |  105  |  10.3  ----------------------------<  119<H>  3.9   |  24.0  |  0.63    Ca    8.3<L>      21 Mar 2022 06:33  Mg     1.8     03-21        RADIOLOGY & ADDITIONAL TESTS:        REVIEW OF SYSTEMS:    As above , all other systems are reviewed and are negative .    Vital Signs Last 24 Hrs  T(C): 36.7 (21 Mar 2022 08:46), Max: 36.9 (21 Mar 2022 00:03)  T(F): 98.1 (21 Mar 2022 08:46), Max: 98.4 (21 Mar 2022 00:03)  HR: 61 (21 Mar 2022 08:46) (61 - 78)  BP: 106/67 (21 Mar 2022 08:46) (98/61 - 147/84)  BP(mean): --  RR: 16 (21 Mar 2022 08:46) (16 - 19)  SpO2: 96% (21 Mar 2022 08:46) (91% - 97%)    PHYSICAL EXAM:    GENERAL: NAD, well-groomed, well-developed  HEAD:  Atraumatic, Normocephalic  EYES: EOMI, PERRLA, conjunctiva and sclera clear  NECK: Supple, No JVD, Normal thyroid  NERVOUS SYSTEM:  Alert & Oriented X3, no focal deficit  CHEST/LUNG: CTA b/l ,  no  rales, rhonchi, wheezing, or rubs  HEART: Regular rate and rhythm; No murmurs, rubs, or gallops  ABDOMEN: Soft, Nontender, Nondistended; Bowel sounds present  EXTREMITIES:  2+ Peripheral Pulses, No clubbing, cyanosis, or edema  LYMPH: No lymphadenopathy noted  SKIN: No rashes or lesions

## 2022-03-21 NOTE — DISCHARGE NOTE NURSING/CASE MANAGEMENT/SOCIAL WORK - PATIENT PORTAL LINK FT
You can access the FollowMyHealth Patient Portal offered by [facility] by registering at the following website: [email or web address]. By joining [facility]’s FollowMyHealth portal, you will also be able to view your health information using other applications (apps) compatible with our system.

## 2022-03-21 NOTE — PROGRESS NOTE ADULT - ASSESSMENT
62 y/o male with Hx of  Colon cancer s/p colon resection, Hyperlipidemia, Hypertension, Prostate cancer Treated with Radiation 2015, diabetes mellitus unilateral primary OA of left knee, patient has left knee pain for past 5 years, states he has had injections in the knee that did not work, he came in here for elective left total knee replacement on 3/17/22 with Dr. Marcos Ventura s/p Procedure.     Plan:     OA of the left Knee s/p TKR post op day 01.   PT/OT/pain mgmt  DVT prophylaxis- as per ortho  Abx as per SCIP- given   Incentive spirometry  Prophylaxis of opioid  induced constipation.      HTN: Bystolic 5 mg once a day with holding parameters     DM: Will hold Metformin , restart on d/c , ISSC , accu check     Over active bladder: will continue with Oxybutynin 5 mg once a day (at bedtime), no  symptoms     HLD: Will continue with pravastatin 40 mg once a day    Preop LFT's noted elevated - patient states - known to him , known history of fatty liver ,   will recommend to decrease Tylenol  650 mg Q8 hrs .     Anemia - acute blood lost anemia - secondary to surgical blood lost ,   likely on chronic - patient is  asymptomatic.     Abdominal distention , episode of vomiting - abdominal XR noted and reviewed by me -   likely fecal impaction , awaiting for official report . Patient kept NPO with ivf , lactulose dose of 30 gm given yesterday with good BM after. This am patient states he is feeling better , passing gas , abdominal distention resolved .   Will start full liquid diet and advance to regular if tolerates, will give dose of lactulose , continue Senna / Miralax .     DVT prophylaxis  - as per ortho protocol- agree with Eliquis      D/ISELA ortho PA / nurse- aware of plan .   If patient tolerates diet may d/c Home after lunch .  60 y/o male with Hx of  Colon cancer s/p colon resection, Hyperlipidemia, Hypertension, Prostate cancer Treated with Radiation 2015, diabetes mellitus unilateral primary OA of left knee, patient has left knee pain for past 5 years, states he has had injections in the knee that did not work, he came in here for elective left total knee replacement on 3/17/22 with Dr. Marcos Ventura s/p Procedure.     Plan:     OA of the left Knee s/p TKR post op day 01.   PT/OT/pain mgmt  DVT prophylaxis- as per ortho  Abx as per SCIP- given   Incentive spirometry  Prophylaxis of opioid  induced constipation.      HTN: Bystolic 5 mg once a day with holding parameters     DM: Will hold Metformin , restart on d/c , ISSC , accu check     Over active bladder: will continue with Oxybutynin 5 mg once a day (at bedtime), no  symptoms     HLD: Will continue with pravastatin 40 mg once a day    Preop LFT's noted elevated - patient states - known to him , known history of fatty liver ,   will recommend to decrease Tylenol  650 mg Q8 hrs .     Anemia - acute blood lost anemia - secondary to surgical blood lost ,   likely on chronic - patient is  asymptomatic.     Abdominal distention , episode of vomiting - abdominal XR noted and reviewed by me -   likely fecal impaction , awaiting for official report . Patient kept NPO with ivf , lactulose dose of 30 gm given yesterday with good BM after. This am patient states he is feeling better , passing gas , abdominal distention resolved .   Will start full liquid diet and advance to regular if tolerates, will give dose of lactulose , continue Senna / Miralax .     DVT prophylaxis  - as per ortho protocol- agree with Eliquis     Thrombocytopenia - stable - likely due to increase consumption post op .      D/W ortho PA / nurse- aware of plan .   If patient tolerates diet may d/c Home after lunch .

## 2022-03-21 NOTE — PROGRESS NOTE ADULT - SUBJECTIVE AND OBJECTIVE BOX
RENATO LEVI  [id number]    History: 61y Male is status post left total knee arthroplasty on 3/18, POD#3. Patient is doing well and is comfortable. The patient's pain is controlled using the prescribed pain medications. The patient is participating in physical therapy, [facility].   Denies nausea, vomiting, chest pain, shortness of breath, abdominal pain or fever. No new complaints. Patient denies abdominal pain at this time. Patient states he had a bowel movement yesterday.                            9.7    6.11  )-----------( 102      ( 21 Mar 2022 06:33 )             30.3     03-21    139  |  105  |  10.3  ----------------------------<  119<H>  3.9   |  24.0  |  0.63    Ca    8.3<L>      21 Mar 2022 06:33  Mg     1.8     03-21        MEDICATIONS  (STANDING):  acetaminophen     Tablet .. 650 milliGRAM(s) Oral every 8 hours  apixaban 2.5 milliGRAM(s) Oral every 12 hours  atorvastatin 10 milliGRAM(s) Oral at bedtime  celecoxib 200 milliGRAM(s) Oral every 12 hours  dextrose 40% Gel 15 Gram(s) Oral once  dextrose 5%. 1000 milliLiter(s) (50 mL/Hr) IV Continuous <Continuous>  dextrose 5%. 1000 milliLiter(s) (100 mL/Hr) IV Continuous <Continuous>  dextrose 50% Injectable 25 Gram(s) IV Push once  dextrose 50% Injectable 12.5 Gram(s) IV Push once  dextrose 50% Injectable 25 Gram(s) IV Push once  glucagon  Injectable 1 milliGRAM(s) IntraMuscular once  insulin lispro (ADMELOG) corrective regimen sliding scale   SubCutaneous three times a day before meals  lactated ringers. 1000 milliLiter(s) (100 mL/Hr) IV Continuous <Continuous>  nebivolol 5 milliGRAM(s) Oral daily  oxybutynin 10 milliGRAM(s) Oral at bedtime  pantoprazole    Tablet 40 milliGRAM(s) Oral before breakfast  polyethylene glycol 3350 17 Gram(s) Oral at bedtime  senna 2 Tablet(s) Oral at bedtime  sodium chloride 0.9% lock flush 3 milliLiter(s) IV Push every 8 hours  sodium chloride 0.9%. 1000 milliLiter(s) (125 mL/Hr) IV Continuous <Continuous>    MEDICATIONS  (PRN):  bisacodyl Suppository 10 milliGRAM(s) Rectal once PRN Constipation  HYDROmorphone   Tablet 4 milliGRAM(s) Oral every 3 hours PRN Severe Pain (7 - 10)  HYDROmorphone  Injectable 0.5 milliGRAM(s) IV Push every 3 hours PRN breakthru  magnesium hydroxide Suspension 30 milliLiter(s) Oral daily PRN Constipation  ondansetron Injectable 4 milliGRAM(s) IV Push every 6 hours PRN Nausea and/or Vomiting  oxyCODONE    IR 5 milliGRAM(s) Oral every 3 hours PRN Mild Pain (1 - 3)  oxyCODONE    IR 10 milliGRAM(s) Oral every 3 hours PRN Moderate Pain (4 - 6)      Physical exam: The left knee dressing is clean, dry and intact. No drainage or discharge. No erythema is noted. No blistering. No ecchymosis. The calf is supple nontender. Passive range of motion is acceptable to due postoperative pain. No calf tenderness. Sensation to light touch is grossly intact distally. Motor function distally is 5/5. Extensor hallucis longus and flexor hallucis longus are intact. No foot drop. 2+ dorsalis pedis pulse. Capillary refill is less than 2 seconds. No cyanosis.    Primary Orthopedic Assessment:  •	s/p LEFT total knee replacement    Secondary  Orthopedic Assessment(s):   •	    Secondary  Medical Assessment(s):   •	    Plan:   •	DVT prophylaxis as prescribed, including use of compression devices and ankle pumps  •	Continue physical therapy  •	Weightbearing as tolerated of the Left  lower extremity with assistance of a walker  •	Incentive spirometry encouraged  •	Pain control as clinically indicated  •	Discharge planning – anticipated discharge is Home once medically optimized.

## 2022-03-25 ENCOUNTER — INPATIENT (INPATIENT)
Facility: HOSPITAL | Age: 62
LOS: 8 days | Discharge: HOME HEALTH | DRG: 233 | End: 2022-04-03
Attending: THORACIC SURGERY (CARDIOTHORACIC VASCULAR SURGERY) | Admitting: STUDENT IN AN ORGANIZED HEALTH CARE EDUCATION/TRAINING PROGRAM
Payer: COMMERCIAL

## 2022-03-25 VITALS
OXYGEN SATURATION: 97 % | HEIGHT: 62 IN | HEART RATE: 75 BPM | DIASTOLIC BLOOD PRESSURE: 81 MMHG | TEMPERATURE: 98 F | SYSTOLIC BLOOD PRESSURE: 137 MMHG | RESPIRATION RATE: 18 BRPM | WEIGHT: 164.91 LBS

## 2022-03-25 DIAGNOSIS — Z98.890 OTHER SPECIFIED POSTPROCEDURAL STATES: Chronic | ICD-10-CM

## 2022-03-25 DIAGNOSIS — I21.4 NON-ST ELEVATION (NSTEMI) MYOCARDIAL INFARCTION: ICD-10-CM

## 2022-03-25 DIAGNOSIS — Z90.49 ACQUIRED ABSENCE OF OTHER SPECIFIED PARTS OF DIGESTIVE TRACT: Chronic | ICD-10-CM

## 2022-03-25 PROBLEM — Z92.29 PERSONAL HISTORY OF OTHER DRUG THERAPY: Chronic | Status: ACTIVE | Noted: 2022-02-25

## 2022-03-25 PROBLEM — E78.5 HYPERLIPIDEMIA, UNSPECIFIED: Chronic | Status: ACTIVE | Noted: 2022-02-25

## 2022-03-25 PROBLEM — C61 MALIGNANT NEOPLASM OF PROSTATE: Chronic | Status: ACTIVE | Noted: 2022-02-25

## 2022-03-25 PROBLEM — I10 ESSENTIAL (PRIMARY) HYPERTENSION: Chronic | Status: ACTIVE | Noted: 2022-02-25

## 2022-03-25 PROBLEM — E11.9 TYPE 2 DIABETES MELLITUS WITHOUT COMPLICATIONS: Chronic | Status: ACTIVE | Noted: 2022-02-25

## 2022-03-25 PROBLEM — C18.9 MALIGNANT NEOPLASM OF COLON, UNSPECIFIED: Chronic | Status: ACTIVE | Noted: 2022-02-25

## 2022-03-25 PROBLEM — M17.12 UNILATERAL PRIMARY OSTEOARTHRITIS, LEFT KNEE: Chronic | Status: ACTIVE | Noted: 2022-02-25

## 2022-03-25 LAB
ALBUMIN SERPL ELPH-MCNC: 3.9 G/DL — SIGNIFICANT CHANGE UP (ref 3.3–5.2)
ALP SERPL-CCNC: 146 U/L — HIGH (ref 40–120)
ALT FLD-CCNC: 44 U/L — HIGH
ANION GAP SERPL CALC-SCNC: 16 MMOL/L — SIGNIFICANT CHANGE UP (ref 5–17)
APTT BLD: 28.3 SEC — SIGNIFICANT CHANGE UP (ref 27.5–35.5)
AST SERPL-CCNC: 58 U/L — HIGH
BASOPHILS # BLD AUTO: 0.04 K/UL — SIGNIFICANT CHANGE UP (ref 0–0.2)
BASOPHILS NFR BLD AUTO: 0.5 % — SIGNIFICANT CHANGE UP (ref 0–2)
BILIRUB SERPL-MCNC: 1 MG/DL — SIGNIFICANT CHANGE UP (ref 0.4–2)
BUN SERPL-MCNC: 27.5 MG/DL — HIGH (ref 8–20)
CALCIUM SERPL-MCNC: 9.1 MG/DL — SIGNIFICANT CHANGE UP (ref 8.6–10.2)
CHLORIDE SERPL-SCNC: 102 MMOL/L — SIGNIFICANT CHANGE UP (ref 98–107)
CK SERPL-CCNC: 124 U/L — SIGNIFICANT CHANGE UP (ref 30–200)
CO2 SERPL-SCNC: 21 MMOL/L — LOW (ref 22–29)
CREAT SERPL-MCNC: 0.77 MG/DL — SIGNIFICANT CHANGE UP (ref 0.5–1.3)
EGFR: 101 ML/MIN/1.73M2 — SIGNIFICANT CHANGE UP
EOSINOPHIL # BLD AUTO: 0.38 K/UL — SIGNIFICANT CHANGE UP (ref 0–0.5)
EOSINOPHIL NFR BLD AUTO: 4.4 % — SIGNIFICANT CHANGE UP (ref 0–6)
GLUCOSE SERPL-MCNC: 148 MG/DL — HIGH (ref 70–99)
HCT VFR BLD CALC: 34.2 % — LOW (ref 39–50)
HGB BLD-MCNC: 10.3 G/DL — LOW (ref 13–17)
IMM GRANULOCYTES NFR BLD AUTO: 2 % — HIGH (ref 0–1.5)
INR BLD: 1.41 RATIO — HIGH (ref 0.88–1.16)
LACTATE SERPL-SCNC: 1.7 MMOL/L — SIGNIFICANT CHANGE UP (ref 0.5–2)
LYMPHOCYTES # BLD AUTO: 0.83 K/UL — LOW (ref 1–3.3)
LYMPHOCYTES # BLD AUTO: 9.5 % — LOW (ref 13–44)
MAGNESIUM SERPL-MCNC: 1.9 MG/DL — SIGNIFICANT CHANGE UP (ref 1.6–2.6)
MCHC RBC-ENTMCNC: 27.7 PG — SIGNIFICANT CHANGE UP (ref 27–34)
MCHC RBC-ENTMCNC: 30.1 GM/DL — LOW (ref 32–36)
MCV RBC AUTO: 91.9 FL — SIGNIFICANT CHANGE UP (ref 80–100)
MONOCYTES # BLD AUTO: 0.95 K/UL — HIGH (ref 0–0.9)
MONOCYTES NFR BLD AUTO: 10.9 % — SIGNIFICANT CHANGE UP (ref 2–14)
NEUTROPHILS # BLD AUTO: 6.34 K/UL — SIGNIFICANT CHANGE UP (ref 1.8–7.4)
NEUTROPHILS NFR BLD AUTO: 72.7 % — SIGNIFICANT CHANGE UP (ref 43–77)
NRBC # BLD: 1 /100 WBCS — HIGH (ref 0–0)
NT-PROBNP SERPL-SCNC: 5573 PG/ML — HIGH (ref 0–300)
PLATELET # BLD AUTO: 172 K/UL — SIGNIFICANT CHANGE UP (ref 150–400)
POTASSIUM SERPL-MCNC: 4.6 MMOL/L — SIGNIFICANT CHANGE UP (ref 3.5–5.3)
POTASSIUM SERPL-SCNC: 4.6 MMOL/L — SIGNIFICANT CHANGE UP (ref 3.5–5.3)
PROT SERPL-MCNC: 6.6 G/DL — SIGNIFICANT CHANGE UP (ref 6.6–8.7)
PROTHROM AB SERPL-ACNC: 16.4 SEC — HIGH (ref 10.5–13.4)
RAPID RVP RESULT: SIGNIFICANT CHANGE UP
RBC # BLD: 3.72 M/UL — LOW (ref 4.2–5.8)
RBC # FLD: 18.3 % — HIGH (ref 10.3–14.5)
SARS-COV-2 RNA SPEC QL NAA+PROBE: SIGNIFICANT CHANGE UP
SODIUM SERPL-SCNC: 139 MMOL/L — SIGNIFICANT CHANGE UP (ref 135–145)
TROPONIN T SERPL-MCNC: 1.53 NG/ML — HIGH (ref 0–0.06)
TROPONIN T SERPL-MCNC: 1.75 NG/ML — HIGH (ref 0–0.06)
WBC # BLD: 8.71 K/UL — SIGNIFICANT CHANGE UP (ref 3.8–10.5)
WBC # FLD AUTO: 8.71 K/UL — SIGNIFICANT CHANGE UP (ref 3.8–10.5)

## 2022-03-25 PROCEDURE — 71275 CT ANGIOGRAPHY CHEST: CPT | Mod: 26,MA

## 2022-03-25 PROCEDURE — 99233 SBSQ HOSP IP/OBS HIGH 50: CPT

## 2022-03-25 PROCEDURE — 99223 1ST HOSP IP/OBS HIGH 75: CPT

## 2022-03-25 PROCEDURE — 93971 EXTREMITY STUDY: CPT | Mod: 26,LT

## 2022-03-25 PROCEDURE — 73562 X-RAY EXAM OF KNEE 3: CPT | Mod: 26,LT

## 2022-03-25 PROCEDURE — 71045 X-RAY EXAM CHEST 1 VIEW: CPT | Mod: 26

## 2022-03-25 PROCEDURE — 93010 ELECTROCARDIOGRAM REPORT: CPT

## 2022-03-25 PROCEDURE — 93010 ELECTROCARDIOGRAM REPORT: CPT | Mod: 77

## 2022-03-25 PROCEDURE — 99285 EMERGENCY DEPT VISIT HI MDM: CPT

## 2022-03-25 RX ORDER — HEPARIN SODIUM 5000 [USP'U]/ML
6000 INJECTION INTRAVENOUS; SUBCUTANEOUS EVERY 6 HOURS
Refills: 0 | Status: DISCONTINUED | OUTPATIENT
Start: 2022-03-25 | End: 2022-03-25

## 2022-03-25 RX ORDER — HEPARIN SODIUM 5000 [USP'U]/ML
6000 INJECTION INTRAVENOUS; SUBCUTANEOUS ONCE
Refills: 0 | Status: COMPLETED | OUTPATIENT
Start: 2022-03-25 | End: 2022-03-25

## 2022-03-25 RX ORDER — HEPARIN SODIUM 5000 [USP'U]/ML
4400 INJECTION INTRAVENOUS; SUBCUTANEOUS EVERY 6 HOURS
Refills: 0 | Status: DISCONTINUED | OUTPATIENT
Start: 2022-03-25 | End: 2022-03-29

## 2022-03-25 RX ORDER — ATORVASTATIN CALCIUM 80 MG/1
80 TABLET, FILM COATED ORAL AT BEDTIME
Refills: 0 | Status: DISCONTINUED | OUTPATIENT
Start: 2022-03-25 | End: 2022-03-29

## 2022-03-25 RX ORDER — HEPARIN SODIUM 5000 [USP'U]/ML
INJECTION INTRAVENOUS; SUBCUTANEOUS
Qty: 25000 | Refills: 0 | Status: DISCONTINUED | OUTPATIENT
Start: 2022-03-25 | End: 2022-03-25

## 2022-03-25 RX ORDER — HEPARIN SODIUM 5000 [USP'U]/ML
3000 INJECTION INTRAVENOUS; SUBCUTANEOUS EVERY 6 HOURS
Refills: 0 | Status: DISCONTINUED | OUTPATIENT
Start: 2022-03-25 | End: 2022-03-25

## 2022-03-25 RX ORDER — FUROSEMIDE 40 MG
40 TABLET ORAL ONCE
Refills: 0 | Status: COMPLETED | OUTPATIENT
Start: 2022-03-25 | End: 2022-03-25

## 2022-03-25 RX ORDER — FUROSEMIDE 40 MG
40 TABLET ORAL ONCE
Refills: 0 | Status: DISCONTINUED | OUTPATIENT
Start: 2022-03-25 | End: 2022-03-25

## 2022-03-25 RX ORDER — FUROSEMIDE 40 MG
40 TABLET ORAL DAILY
Refills: 0 | Status: DISCONTINUED | OUTPATIENT
Start: 2022-03-26 | End: 2022-03-28

## 2022-03-25 RX ORDER — HEPARIN SODIUM 5000 [USP'U]/ML
INJECTION INTRAVENOUS; SUBCUTANEOUS
Qty: 25000 | Refills: 0 | Status: DISCONTINUED | OUTPATIENT
Start: 2022-03-25 | End: 2022-03-26

## 2022-03-25 RX ORDER — ASPIRIN/CALCIUM CARB/MAGNESIUM 324 MG
324 TABLET ORAL ONCE
Refills: 0 | Status: COMPLETED | OUTPATIENT
Start: 2022-03-25 | End: 2022-03-25

## 2022-03-25 RX ADMIN — HEPARIN SODIUM 6000 UNIT(S): 5000 INJECTION INTRAVENOUS; SUBCUTANEOUS at 20:06

## 2022-03-25 RX ADMIN — HEPARIN SODIUM 1300 UNIT(S)/HR: 5000 INJECTION INTRAVENOUS; SUBCUTANEOUS at 20:07

## 2022-03-25 RX ADMIN — ATORVASTATIN CALCIUM 80 MILLIGRAM(S): 80 TABLET, FILM COATED ORAL at 22:41

## 2022-03-25 RX ADMIN — Medication 100 MILLIGRAM(S): at 22:40

## 2022-03-25 RX ADMIN — Medication 40 MILLIGRAM(S): at 21:51

## 2022-03-25 RX ADMIN — HEPARIN SODIUM 900 UNIT(S)/HR: 5000 INJECTION INTRAVENOUS; SUBCUTANEOUS at 22:48

## 2022-03-25 RX ADMIN — Medication 324 MILLIGRAM(S): at 22:42

## 2022-03-25 NOTE — ED ADULT TRIAGE NOTE - CHIEF COMPLAINT QUOTE
patient c/o shortness of breath since had knee surgery last week, was sent home on Eliquis, but dyspnea continued. patient was found to be hypoxic on room air at 85%. patient was placed on 3LNC and patient stated feeling better, denies chest pain.

## 2022-03-25 NOTE — ED PROVIDER NOTE - PROGRESS NOTE DETAILS
MD Ananda: pt has elevated trop and pro-bnp; requested expedited CTA, ordered for coags and heparin. cardiology was consulted for evaluation. Maurisio: Pt started on heparin. CTA/US negative for DVT/PE. Will give lasix and admit.

## 2022-03-25 NOTE — ED ADULT TRIAGE NOTE - HEART RATE (BEATS/MIN)
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  She was drinking green tea for the last 2 weeks.  That may explain the spike:  Oct 19 labs spiked again: alk 198 and ast 128 and alt 248. Tb 0.5. na 136 and k k 4.5. bun 15 and cr 0.94 and glu 112.   Oct 6 ast 16 and alt 25 and alk 111.   prior she was also on abx for 6 weeks and so finished oct 8 or so and so that not the cause,  Some stomach issues and she says that neuropathy and joints hurting.  She says was hurting and using goodys q other day.  Need to stop the green tea and she said when saw labs and she stopped that and stopped herbal tea.  Oct 6: wbc 11.2 little up, hg 14.4 and hct 42.2, plat 141. Mcv 90, glu 103 little up and maybe not fasting.  Bun 20 and cr 0.98 and na 135 and k 4.5, cl 99 and co2 22. Alb 4.2 and tb 0.5 and alk 111 and ast 16 and alt 25. Ideal alt <25.   Prior aug ast 24 and alt 28 so better now and alk 104.  So labs wre better prior to the green tea.   Aug bun 17 and cr 0.92 and na 138 and k 4.1 and cl 101 and co2 25 and ca 9.7 and tb 0.5 and alk 88 and ast 16 and alt 20. vit d low 16.6.   In the process of her divorce and she says she is waiting for  to sign off.  She states no history of other new medications or alcohol use and she has stayed off that.  She says lamictal came down from 75/50 to 50/25 oct 9 and added gabapenting 100mg am and 300mg.  She says has some marijuana a few times a week instead of q day.  She says Sampa went through.   Off the cocaine also.  The patient reports a personal history of no other habits that could cause liver damage.   Aunt Ewelina had oltx and she had failed from 2nd tx. Not from covid 19.  Interval testing:   [facility]: they see mild fatty infiltration of the liver. Prior gb surgery changes seen. Visualized pancreas portions normal. No liver masses. Common duct 4mm. Portal vein patent. Kidneys unremarkable. Spleen 7.8cm unremarkable.  Should get better as time passes from the issues we discussed.  Aug 4 2020 labs: glu 61 and little low bun 17 and cr 0.91 and na 135 and k 5.2 and cl 96 and co2 20 and ca 10.9 elevated and show local md. alb 4.7 and tb 0.6 and alk 104 and ast 24 and alt 28. ideal alt <25. so Labs much better.  Oct 6 started [employer] insurance and not able to have number 10-26.  Aug 28 2020 ca 9.7 and she says that just pre labs she was craving cereal and may have be fortified.  Rare advil or aleve.  2020 bun 15 and cr 0.75 and na 137 and k 4.9 and cl 103 and cpo2 22 and alb 4.2 and tb 0.2 and ast 28 and alt 106.  tb 0.2.   2020 glu 92 and bun 12 and cr 0.80,  na 139 and k 4.3 and cl 105 and co2 20 and ca 9.7 and tp 7.0 and alb 4.2 and tb 0.4 and alk 125 and ast 196 and alt 178. tsh 1.170.  She stopped the alcohol and cocaine  or 2nd week and alcohol.  She had  for 5 yr s divorce ongoing.  In Oct 2019 and she started the cocaine [place] more regularly.  2015: wbc 8, hg 11.9, plat 444. glu 90, cr 0.66, na 137 k 4, ast 18 and alt 19, alk 91.   May 2015: glu 130, cr 0.72, na 140, k 4.3, alb 4.0, tb 0.4, alk 79, ast 50 and alt 54. She had started a new medicine Invega and off that and on Xyprexa and still on effexor and back on the ursodiol and having cramps but not taking that with food so we will do so.  For her bipolar not on meds and psych told all current is depression and anxiety and for this gabapentin 300mg qpm and 100mg qam and the  mirtazepine  3.75 po qhs and the lamictal 50/25mg and buspar 5mg po bid prn.  Nov 10 2014 labs ast 15 and alt 16 alk 82, alb 3.8, bili 0.4.cr 0.83, na 138 and k 3.8, wbc 9.9, hg 13.1, plat 429 but then down to 364 on redo. Inr 0.99.   2014 Bx nonspecific hepatitis and subscapsular biopsy so hard to see fibrosis focal portal fibrosis. They suspected could be drug but not clearly.  Occ she has had alcohol issues and been off and she had a lot of issues.  Marijuana also being used and cautioned inc lfts in some and also suspected to cause fibrosis. In hcv pts who used it would be noted to have worse fibrosis progression cure.  regarding med: on furosemide and still on toprol and Spironolactone. Also on some omeprazole and dulera.  Bp on the weekend spiked up on  and says better now. Weight up a few pounds.  Some tiny ankle swelling at times.  Mother had mrsa pneumonia and in for a week and she was given some abx.  She does not feel well and eyes puffy.  Trouble sleeping and memory off.   She was on ursodiol for has been on it for her liver started  and she will stop it and see if labs stay well as having a lot of side effects. She stopped this.  wonder if ursodiol allows her to tolerate more meds.  Stressed to pt the need for social distancing and strict handwashing and wearing a mask and to follow any other new or added CDC recommendations as this is an evolving target.  Duration of visit was 27 minutes via [employer] video devonte (12 min and then 1 min as called dropped) with greater than 50% of time spent reviewing chart and events with the patient.   More than half of the face-to-face time used for counseling and coordination of care. Patient seen today via telehealth by agreement and consent of patient in light of current COVID-19 pandemic. I used video conferencing during the visit. The patient encounter is appropriate and reasonable under the circumstances given the patient's particular presentation at this time. The patient has been advised of the followin) the potential risks and limitations of this mode of treatment (including but not limited to the absence of in-person examination); 2) the right to refuse telehealth services at any point without affecting the right to future care; 3) the right to receive in-person services, included immediately after this consultation if an urgent need arises; 4) information, including identifiable images or information from this telehealth consult, will only be shared in accordance with HIPAA regulations. Any and all of the patient's and/or patient's family member's questions on this issue have been answered. The patient has verbally consented to be treated via telehealth services. The patient has also been advised to contact this office for worsening conditions or problems, and seek emergency medical treatment and/or call 911 if the patient deems either necessary. 75

## 2022-03-25 NOTE — ED PROVIDER NOTE - CARE PLAN
1 Principal Discharge DX:	Hypoxic   Principal Discharge DX:	Acute respiratory failure with hypoxia  Secondary Diagnosis:	Bilateral pleural effusion  Secondary Diagnosis:	Elevated troponin  Secondary Diagnosis:	Acute CHF   Principal Discharge DX:	NSTEMI (non-ST elevation myocardial infarction)  Secondary Diagnosis:	Bilateral pleural effusion  Secondary Diagnosis:	Acute CHF  Secondary Diagnosis:	Acute respiratory failure with hypoxia

## 2022-03-25 NOTE — ED ADULT NURSE NOTE - OBJECTIVE STATEMENT
Pt to ED from home c/o ongoing SOB and desaturations at home. Pt has a home nurse come for his R knee. [facility] home nurse noticed his levels to be 90/91. Denies CP, n/v, dizziness. Ambulates with a cane. Arrives 88% on RA. Improved to 100% on 3L

## 2022-03-25 NOTE — ED PROVIDER NOTE - OBJECTIVE STATEMENT
62yo male with history of HTN, HLD, DM, s/p left knee surgery 3/18/22 presenting with shortness of breath x 3 days with no chest pain, syncope. Patient reports LLE swelling. Patient noted to be hypoxic at 85% RA. Denies fevers, chills, headache, chest pain, palpitations, nausea, vomiting, diarrhea, hematuria, dysuria, dark stools, focal neurologic symptoms.

## 2022-03-25 NOTE — ED PROVIDER NOTE - PHYSICAL EXAMINATION
General: Well appearing in no acute distress. Alert and cooperative.   Head: Normocephalic, atraumatic.  Eyes: PERRLA. No conjunctival injection. No scleral icterus. EOMI  ENMT: Atraumatic external nose and ears. Moist mucous membranes. Oropharynx clear.  Neck: Soft and supple. Full ROM without pain. No midline tenderness. No Thyromegaly. No lymphadenopathy.  Cardiac: Regular rate and regular rhythm. No murmurs. Peripheral pulses 2+ and symmetric in all extremities. No LE edema.  Resp: Unlabored respiratory effort. Lungs CTAB. Speaking in full sentences. No wheezes.  Abd: Soft, non-tender, non-distended. No guarding or rebound. No scars.  MSK: Spine midline and non-tender. No CVA tenderness.    Skin: Warm and dry. No rashes, abrasions, or lacerations.  Neuro: AO x 3. Moves all extremities symmetrically. Motor strength and sensation grossly intact.

## 2022-03-25 NOTE — ED ADULT NURSE NOTE - NSIMPLEMENTINTERV_GEN_ALL_ED
Implemented All Fall Risk Interventions:  [place]mo to call system. Call bell, personal items and telephone within reach. Instruct patient to call for assistance. Room bathroom lighting operational. Non-slip footwear when patient is off stretcher. Physically safe environment: no spills, clutter or unnecessary equipment. Stretcher in lowest position, wheels locked, appropriate side rails in place. Provide visual cue, wrist band, yellow gown, etc. Monitor gait and stability. Monitor for mental status changes and reorient to person, place, and time. Review medications for side effects contributing to fall risk. Reinforce activity limits and safety measures with patient and family.

## 2022-03-25 NOTE — CONSULT NOTE ADULT - SUBJECTIVE AND OBJECTIVE BOX
[facility] - Cardiology Team Note                                                                             [facility] Faculty Practice                                                                     Office:  [address] [facility]                                                                                  Telephone: [phone]. Fax:[phone]                                                                                 [facility] CARDIOLOGY CONSULTATION NOTE                                                                                             Consult requested by:  ER  Reason for Consultation: Possible PE   History obtained by: Patient and medical record   obtained: No  Hospital Transferred from: In the ER   On call accepting attending (if transferred):     CC:      HPI:    62 y/o M with PMH of HTN, Dyslipidemia, DM2, Prostate Cancer ( in remission) and Colon cancer (s/p colon resection dx last year, no chemo no radiation) presents s/p L knee replacement with acute shortness of breath x 1 day. Patient was discharged s/p L knee arthoplasty on 3/18, noted that he was doing well at home with ambulation and then had acute onset of shortness of breath at rest and upon exertion.   There is increased swelling of the LLE.   No prior hx of VTE/PE       Risk Factors:  Family Hx of Thrombophilia none   Recent long haul travel none   Immobility questionable   Prior hx of VTE none   Cancer screening: Colonoscopy last month - negative [] Mammogram [] Papsmear [] PSA, follows with Urology  [x]      ALLERGIES: Allergies    No Known Allergies    Intolerances          PAST MEDICAL HISTORY  Hypertension    Hyperlipidemia    COVID-19 vaccine series completed    Colon cancer    Prostate cancer    BPH (benign prostatic hyperplasia)    Type 2 diabetes mellitus    Unilateral primary osteoarthritis, left knee        PAST SURGICAL HISTORY  S/P colon resection    S/P hernia repair        FAMILY HISTORY:  No pertinent family history in first degree relatives        SOCIAL HISTORY:  Denies smoking/alcohol/drugs  CIGARETTES:     ALCOHOL:  DRUGS:                                        CURRENT MEDICATIONS:     heparin   Injectable  heparin  Infusion.        HOME MEDICATIONS:  Vital Signs Last 24 Hrs  T(C): 36.9 (25 Mar 2022 17:42), Max: 36.9 (25 Mar 2022 17:42)  T(F): 98.4 (25 Mar 2022 17:42), Max: 98.4 (25 Mar 2022 17:42)  HR: 74 (25 Mar 2022 17:42) (74 - 75)  BP: 123/63 (25 Mar 2022 17:42) (123/63 - 137/81)  BP(mean): --  RR: 18 (25 Mar 2022 17:42) (18 - 24)  SpO2: 100% (25 Mar 2022 17:42) (86% - 100%)      Review of Systems    [ x] All others negative	  [ ] Unable to obtain    CONSTITUTIONAL: No fever, weight loss, or fatigue  EYES: No eye pain, visual disturbances, or discharge  ENT:  No difficulty hearing, tinnitus, vertigo; No sinus or throat pain  NECK: No pain or stiffness  RESPIRATORY:  + shortness of breath at rest or upon exertion   CARDIOVASCULAR:  no chest pain + shortness of breath   GASTROINTESTINAL: No abdominal or epigastric pain. No nausea, vomiting, or hematemesis; No diarrhea or constipation. No melena or hematochezia.  GENITOURINARY: No dysuria, frequency, hematuria, or incontinence  NEUROLOGICAL: No headaches, memory loss, loss of strength, numbness, or tremors  SKIN:   LYMPH Nodes: No enlarged glands  ENDOCRINE: No heat or cold intolerance; No hair loss  MUSCULOSKELETAL: No joint pain or + LLE swelling; No muscle, back, or extremity pain  PSYCHIATRIC: No depression, anxiety, mood swings, or difficulty sleeping  HEME/LYMPH: No easy bruising, or bleeding gums  ALLERGY AND IMMUNOLOGIC: No hives or eczema    PHYSICAL EXAM:  Vital Signs Last 24 Hrs  T(C): 36.9 (25 Mar 2022 17:42), Max: 36.9 (25 Mar 2022 17:42)  T(F): 98.4 (25 Mar 2022 17:42), Max: 98.4 (25 Mar 2022 17:42)  HR: 74 (25 Mar 2022 17:42) (74 - 75)  BP: 123/63 (25 Mar 2022 17:42) (123/63 - 137/81)  BP(mean): --  RR: 18 (25 Mar 2022 17:42) (18 - 24)  SpO2: 100% (25 Mar 2022 17:42) (86% - 100%)    Constitutional: Comfortable . No acute distress.   HEENT: Atraumatic and normocephalic , neck is supple . no JVD. No carotid bruit. PEERL   CNS: A&Ox3. No focal deficits. EOMI. Cranial nerves II-IX are intact.   Lymph Nodes: Cervical : Not palpable.  Respiratory: CTAB  Cardiovascular: S1S2 RRR. No murmur/rubs or gallop.  Gastrointestinal: Soft non-tender and non distended . +Bowel sounds. negative Bowers's sign.  Extremities:+ LLE swelling, non pitting edema   Psychiatric: Calm . no agitation.  Skin: No skin rash/ulcers visualized to face, hands or feet.    Vascular Pulse Exam:  Right DP: []palpable []non-palpable []audible      Left DP :   []palpable []non-palpable []audible  Right PT: []palpable [] non-palpable []audible   Left PT:  [] palpable [] non-palpable []audible         Foot Exam:        Intake and output:     LABS:                        10.3   8.71  )-----------( 172      ( 25 Mar 2022 17:27 )             34.2     03-25    139  |  102  |  27.5<H>  ----------------------------<  148<H>  4.6   |  21.0<L>  |  0.77    Ca    9.1      25 Mar 2022 17:27  Mg     1.9     03-25    TPro  6.6  /  Alb  3.9  /  TBili  1.0  /  DBili  x   /  AST  58<H>  /  ALT  44<H>  /  AlkPhos  146<H>  03-25    CARDIAC MARKERS ( 25 Mar 2022 17:27 )  x     / 1.53 ng/mL / x     / x     / x        ;p-BNP=Serum Pro-Brain Natriuretic Peptide: 5573 pg/mL (03-25 @ 17:27)    PT/INR - ( 25 Mar 2022 18:58 )   PT: 16.4 sec;   INR: 1.41 ratio         PTT - ( 25 Mar 2022 18:58 )  PTT:28.3 sec      INTERPRETATION OF TELEMETRY: Reviewed by me.   ECG: Reviewed by me.     PREVIOUS DIAGNOSTIC TESTING  ECHO FINDINGS:      STRESS FINDINGS:      CATHETERIZATION FINDINGS:       US DUPLEX: 3/25: US duplex of the LLE   No evidence of DVT     CTA OF THE CHEST:       RADIOLOGY & ADDITIONAL STUDIES:    X-ray:  reviewed by me.                                                                                       [facility] - Cardiology Team Note                                                                      [facility] Faculty Practice                                                              Office:  [address] [facility]-[address]                                                                        Telephone: [phone]. Fax:[phone]                                                                      [facility] CARDIOLOGY CONSULTATION NOTE                                                                                             Consult requested by:  ER  Reason for Consultation: Possible PE   History obtained by: Patient and medical record   obtained: No  Hospital Transferred from: In the ER   On call accepting attending (if transferred):     CC:      HPI:    60 y/o M with PMH of HTN, Dyslipidemia, DM2, Prostate Cancer ( in remission) and Colon cancer (s/p colon resection dx last year, no chemo no radiation) presents s/p L knee replacement with acute shortness of breath x 1 day. Patient was discharged s/p L knee arthoplasty on 3/18, noted that he was doing well at home with ambulation and then had acute onset of shortness of breath at rest and upon exertion.   There is increased swelling of the LLE.   No prior hx of VTE/PE       Risk Factors:  Family Hx of Thrombophilia none   Recent long haul travel none   Immobility questionable   Prior hx of VTE none   Cancer screening: Colonoscopy last month - negative [] Mammogram [] Papsmear [] PSA, follows with Urology  [x]      ALLERGIES: Allergies    No Known Allergies    Intolerances          PAST MEDICAL HISTORY  Hypertension    Hyperlipidemia    COVID-19 vaccine series completed    Colon cancer    Prostate cancer    BPH (benign prostatic hyperplasia)    Type 2 diabetes mellitus    Unilateral primary osteoarthritis, left knee        PAST SURGICAL HISTORY  S/P colon resection    S/P hernia repair        FAMILY HISTORY:  No pertinent family history in first degree relatives        SOCIAL HISTORY:  Denies smoking/alcohol/drugs  CIGARETTES:     ALCOHOL:  DRUGS:                                        CURRENT MEDICATIONS:     heparin   Injectable  heparin  Infusion.        HOME MEDICATIONS:  Vital Signs Last 24 Hrs  T(C): 36.9 (25 Mar 2022 17:42), Max: 36.9 (25 Mar 2022 17:42)  T(F): 98.4 (25 Mar 2022 17:42), Max: 98.4 (25 Mar 2022 17:42)  HR: 74 (25 Mar 2022 17:42) (74 - 75)  BP: 123/63 (25 Mar 2022 17:42) (123/63 - 137/81)  BP(mean): --  RR: 18 (25 Mar 2022 17:42) (18 - 24)  SpO2: 100% (25 Mar 2022 17:42) (86% - 100%)      Review of Systems    [ x] All others negative	  [ ] Unable to obtain    CONSTITUTIONAL: No fever, weight loss, or fatigue  EYES: No eye pain, visual disturbances, or discharge  ENT:  No difficulty hearing, tinnitus, vertigo; No sinus or throat pain  NECK: No pain or stiffness  RESPIRATORY:  + shortness of breath at rest or upon exertion   CARDIOVASCULAR:  no chest pain + shortness of breath   GASTROINTESTINAL: No abdominal or epigastric pain. No nausea, vomiting, or hematemesis; No diarrhea or constipation. No melena or hematochezia.  GENITOURINARY: No dysuria, frequency, hematuria, or incontinence  NEUROLOGICAL: No headaches, memory loss, loss of strength, numbness, or tremors  SKIN:   LYMPH Nodes: No enlarged glands  ENDOCRINE: No heat or cold intolerance; No hair loss  MUSCULOSKELETAL: No joint pain or + LLE swelling; No muscle, back, or extremity pain  PSYCHIATRIC: No depression, anxiety, mood swings, or difficulty sleeping  HEME/LYMPH: No easy bruising, or bleeding gums  ALLERGY AND IMMUNOLOGIC: No hives or eczema    PHYSICAL EXAM:  Vital Signs Last 24 Hrs  T(C): 36.9 (25 Mar 2022 17:42), Max: 36.9 (25 Mar 2022 17:42)  T(F): 98.4 (25 Mar 2022 17:42), Max: 98.4 (25 Mar 2022 17:42)  HR: 74 (25 Mar 2022 17:42) (74 - 75)  BP: 123/63 (25 Mar 2022 17:42) (123/63 - 137/81)  BP(mean): --  RR: 18 (25 Mar 2022 17:42) (18 - 24)  SpO2: 100% (25 Mar 2022 17:42) (86% - 100%)    Constitutional: Comfortable . No acute distress.   HEENT: Atraumatic and normocephalic , neck is supple . no JVD. No carotid bruit. PEERL   CNS: A&Ox3. No focal deficits. EOMI. Cranial nerves II-IX are intact.   Lymph Nodes: Cervical : Not palpable.  Respiratory: CTAB  Cardiovascular: S1S2 RRR. No murmur/rubs or gallop.  Gastrointestinal: Soft non-tender and non distended . +Bowel sounds. negative Bowers's sign.  Extremities:+ LLE swelling, non pitting edema   Psychiatric: Calm . no agitation.  Skin: No skin rash/ulcers visualized to face, hands or feet.    Vascular Pulse Exam:  Right DP: []palpable []non-palpable []audible      Left DP :   []palpable []non-palpable []audible  Right PT: []palpable [] non-palpable []audible   Left PT:  [] palpable [] non-palpable []audible         Foot Exam:        Intake and output:     LABS:                        10.3   8.71  )-----------( 172      ( 25 Mar 2022 17:27 )             34.2     03-25    139  |  102  |  27.5<H>  ----------------------------<  148<H>  4.6   |  21.0<L>  |  0.77    Ca    9.1      25 Mar 2022 17:27  Mg     1.9     03-25    TPro  6.6  /  Alb  3.9  /  TBili  1.0  /  DBili  x   /  AST  58<H>  /  ALT  44<H>  /  AlkPhos  146<H>  03-25    CARDIAC MARKERS ( 25 Mar 2022 17:27 )  x     / 1.53 ng/mL / x     / x     / x        ;p-BNP=Serum Pro-Brain Natriuretic Peptide: 5573 pg/mL (03-25 @ 17:27)    PT/INR - ( 25 Mar 2022 18:58 )   PT: 16.4 sec;   INR: 1.41 ratio         PTT - ( 25 Mar 2022 18:58 )  PTT:28.3 sec      INTERPRETATION OF TELEMETRY: Reviewed by me.   ECG: Reviewed by me.     PREVIOUS DIAGNOSTIC TESTING  ECHO FINDINGS:      STRESS FINDINGS:      CATHETERIZATION FINDINGS:       US DUPLEX: 3/25: US duplex of the LLE   No evidence of DVT     CTA OF THE CHEST:       RADIOLOGY & ADDITIONAL STUDIES:    X-ray:  reviewed by me.                                                                            [facility]                                                       [facility] Faculty Practice                                                               Office:  [address] [address]                                                              Telephone: [phone]. Fax:[phone]                                                                        CARDIOLOGY CONSULTATION NOTE                                                                                             Consult requested by:  ER  Reason for Consultation: NSTEMI   History obtained by: Patient and medical record   obtained: No    Chief complaint:    Patient is a 61y old  Male who presents with a chief complaint of       HPI: 62 y/o M with PMH of HTN, DM2, Dyslipidemia Prostate Cancer (in remission) and Colon cancer (s/p resection) presents with acute shortness of breath x 1 day. Patient is s/p L nee arthroscopy on 3/18/2022 @ [facility], the notes that he was discharged and doing well with no complaints of any chest pain or shortness of breath but with ? dizziness or lightheadedness then noted today to have acute onset of shortness of breath at rest and upon exertion, with worsening LLE swelling. No exertional chest pain as per patient.   Prior to surgery no prior cardiovascular evaluation i.e no stress test or TTE   ER consulted initially for PE but CTA PE done shows no evidence of pulmonary embolism and US duplex of the LLE shows no evidence of DVT       REVIEW OF SYMPTOMS:     CONSTITUTIONAL: No fever, weight loss, or fatigue  ENMT:  No difficulty hearing, tinnitus, vertigo; No sinus or throat pain  NECK: No pain or stiffness  CARDIOVASCULAR: No chest pain, dyspnea, syncope, palpitations, dizziness, Orthopnea, Paroxsymal nocturnal dyspnea  RESPIRATORY: No Dyspnea on exertion, +Shortness of breath, cough, wheezing  : No dysuria, no hematuria   GI: No dark color stool, no melena, no diarrhea, no constipation, no abdominal pain   NEURO: No headache, no dizziness, no slurred speech   MUSCULOSKELETAL: No joint pain or + LLE swelling; No muscle, back, or extremity pain  PSYCH: No agitation, no anxiety.    ALL OTHER REVIEW OF SYSTEMS ARE NEGATIVE.      PREVIOUS DIAGNOSTIC TESTING  ECHO FINDINGS:      STRESS FINDINGS:      CATHETERIZATION FINDINGS:         ALLERGIES: Allergies    No Known Allergies    Intolerances          PAST MEDICAL HISTORY  Hypertension    Hyperlipidemia    COVID-19 vaccine series completed    Colon cancer    Prostate cancer    BPH (benign prostatic hyperplasia)    Type 2 diabetes mellitus    Unilateral primary osteoarthritis, left knee        PAST SURGICAL HISTORY  S/P colon resection    S/P hernia repair        FAMILY HISTORY:  No pertinent family history in first degree relatives        SOCIAL HISTORY:  Denies smoking/alcohol/drugs  CIGARETTES:     ALCOHOL:  DRUGS:      CURRENT MEDICATIONS:     heparin  Infusion.        HOME MEDICATIONS:      Vital Signs Last 24 Hrs  T(C): 36.9 (25 Mar 2022 17:42), Max: 36.9 (25 Mar 2022 17:42)  T(F): 98.4 (25 Mar 2022 17:42), Max: 98.4 (25 Mar 2022 17:42)  HR: 74 (25 Mar 2022 17:42) (74 - 75)  BP: 123/63 (25 Mar 2022 17:42) (123/63 - 137/81)  BP(mean): --  RR: 18 (25 Mar 2022 17:42) (18 - 24)  SpO2: 100% (25 Mar 2022 17:42) (86% - 100%)      PHYSICAL EXAM:  Constitutional: Comfortable . No acute distress.   HEENT: Atraumatic and normocephalic , neck is supple . no JVD. No carotid bruit. PEERL   CNS: A&Ox3. No focal deficits. EOMI. Cranial nerves II-IX are intact.   Lymph Nodes: Cervical : Not palpable.  Respiratory: CTAB  Cardiovascular: S1S2 RRR. No murmur/rubs or gallop.  Gastrointestinal: Soft non-tender and non distended . +Bowel sounds. negative Bowers's sign.  Extremities: No edema.   Psychiatric: Calm . no agitation.  Skin: No skin rash/ulcers visualized to face, hands or feet.    Intake and output:     LABS:                        10.3   8.71  )-----------( 172      ( 25 Mar 2022 17:27 )             34.2     03-25    139  |  102  |  27.5<H>  ----------------------------<  148<H>  4.6   |  21.0<L>  |  0.77    Ca    9.1      25 Mar 2022 17:27  Mg     1.9     03-25    TPro  6.6  /  Alb  3.9  /  TBili  1.0  /  DBili  x   /  AST  58<H>  /  ALT  44<H>  /  AlkPhos  146<H>  03-25    CARDIAC MARKERS ( 25 Mar 2022 17:27 )  x     / 1.53 ng/mL / x     / x     / x        ;p-BNP=Serum Pro-Brain Natriuretic Peptide: 5573 pg/mL (03-25 @ 17:27)    PT/INR - ( 25 Mar 2022 18:58 )   PT: 16.4 sec;   INR: 1.41 ratio         PTT - ( 25 Mar 2022 18:58 )  PTT:28.3 sec      INTERPRETATION OF TELEMETRY: Reviewed by me. Sinus rhythm   ECG: Reviewed by me.     RADIOLOGY & ADDITIONAL STUDIES:    X-ray:  reviewed by me.   CT scan:   MRI:

## 2022-03-25 NOTE — ED PROVIDER NOTE - CLINICAL SUMMARY MEDICAL DECISION MAKING FREE TEXT BOX
62yo male with history of HTN, HLD, DM, s/p left knee surgery 3/18/22 presenting with shortness of breath x 3 days with no chest pain, syncope. LLE swelling. Hypoxic at 85% RA. Labs, CTA, ecg, cxr, reassess.

## 2022-03-25 NOTE — ED PROVIDER NOTE - ATTENDING CONTRIBUTION TO CARE
I have personally performed a face to face medical and diagnostic evaluation of the patient. I have discussed with and reviewed the resident's note and agree with the History, ROS, Physical Exam and MDM unless otherwise indicated. A brief summary of my personal evaluation and impression can be found below.    62yo man PMH HTN, HLD, colon cancer s/p resection presents with SOB x 3 days in setting of recent left knee replacement 1 week ago. No associated chest pain, lightheadedness, cough, fever. He notes swelling of LLE.   Pt is hypoxic but not in respiratory distress with improvement on 3L NC, crackles in bilateral lung fields, soft, nontender abdomen, swollen LLE and minimally warm left knee with mild erythema, clean appearing incision site  Concern for PE vs PNA vs ACS. Blood work and CTA ordered.

## 2022-03-26 LAB
A1C WITH ESTIMATED AVERAGE GLUCOSE RESULT: 5.7 % — HIGH (ref 4–5.6)
ANION GAP SERPL CALC-SCNC: 20 MMOL/L — HIGH (ref 5–17)
APTT BLD: 46.6 SEC — HIGH (ref 27.5–35.5)
APTT BLD: 47.2 SEC — HIGH (ref 27.5–35.5)
APTT BLD: 48 SEC — HIGH (ref 27.5–35.5)
APTT BLD: 53.1 SEC — HIGH (ref 27.5–35.5)
APTT BLD: 55.2 SEC — HIGH (ref 27.5–35.5)
BUN SERPL-MCNC: 22.4 MG/DL — HIGH (ref 8–20)
CALCIUM SERPL-MCNC: 9.6 MG/DL — SIGNIFICANT CHANGE UP (ref 8.6–10.2)
CHLORIDE SERPL-SCNC: 100 MMOL/L — SIGNIFICANT CHANGE UP (ref 98–107)
CHOLEST SERPL-MCNC: 135 MG/DL — SIGNIFICANT CHANGE UP
CK SERPL-CCNC: 141 U/L — SIGNIFICANT CHANGE UP (ref 30–200)
CO2 SERPL-SCNC: 21 MMOL/L — LOW (ref 22–29)
CREAT SERPL-MCNC: 0.82 MG/DL — SIGNIFICANT CHANGE UP (ref 0.5–1.3)
EGFR: 99 ML/MIN/1.73M2 — SIGNIFICANT CHANGE UP
ESTIMATED AVERAGE GLUCOSE: 117 MG/DL — HIGH (ref 68–114)
GLUCOSE BLDC GLUCOMTR-MCNC: 122 MG/DL — HIGH (ref 70–99)
GLUCOSE BLDC GLUCOMTR-MCNC: 129 MG/DL — HIGH (ref 70–99)
GLUCOSE BLDC GLUCOMTR-MCNC: 132 MG/DL — HIGH (ref 70–99)
GLUCOSE BLDC GLUCOMTR-MCNC: 143 MG/DL — HIGH (ref 70–99)
GLUCOSE SERPL-MCNC: 113 MG/DL — HIGH (ref 70–99)
HCT VFR BLD CALC: 32.1 % — LOW (ref 39–50)
HCV AB S/CO SERPL IA: 0.13 S/CO — SIGNIFICANT CHANGE UP (ref 0–0.99)
HCV AB SERPL-IMP: SIGNIFICANT CHANGE UP
HDLC SERPL-MCNC: 27 MG/DL — LOW
HGB BLD-MCNC: 10.3 G/DL — LOW (ref 13–17)
LIPID PNL WITH DIRECT LDL SERPL: 76 MG/DL — SIGNIFICANT CHANGE UP
MAGNESIUM SERPL-MCNC: 1.8 MG/DL — SIGNIFICANT CHANGE UP (ref 1.6–2.6)
MAGNESIUM SERPL-MCNC: 2 MG/DL — SIGNIFICANT CHANGE UP (ref 1.6–2.6)
MCHC RBC-ENTMCNC: 28.6 PG — SIGNIFICANT CHANGE UP (ref 27–34)
MCHC RBC-ENTMCNC: 32.1 GM/DL — SIGNIFICANT CHANGE UP (ref 32–36)
MCV RBC AUTO: 89.2 FL — SIGNIFICANT CHANGE UP (ref 80–100)
NON HDL CHOLESTEROL: 108 MG/DL — SIGNIFICANT CHANGE UP
PLATELET # BLD AUTO: 179 K/UL — SIGNIFICANT CHANGE UP (ref 150–400)
POTASSIUM SERPL-MCNC: 3.7 MMOL/L — SIGNIFICANT CHANGE UP (ref 3.5–5.3)
POTASSIUM SERPL-SCNC: 3.7 MMOL/L — SIGNIFICANT CHANGE UP (ref 3.5–5.3)
RBC # BLD: 3.6 M/UL — LOW (ref 4.2–5.8)
RBC # FLD: 18.2 % — HIGH (ref 10.3–14.5)
SODIUM SERPL-SCNC: 141 MMOL/L — SIGNIFICANT CHANGE UP (ref 135–145)
TRIGL SERPL-MCNC: 161 MG/DL — HIGH
TROPONIN T SERPL-MCNC: 1.7 NG/ML — HIGH (ref 0–0.06)
TROPONIN T SERPL-MCNC: 1.81 NG/ML — HIGH (ref 0–0.06)
TSH SERPL-MCNC: 1.2 UIU/ML — SIGNIFICANT CHANGE UP (ref 0.27–4.2)
WBC # BLD: 9.48 K/UL — SIGNIFICANT CHANGE UP (ref 3.8–10.5)
WBC # FLD AUTO: 9.48 K/UL — SIGNIFICANT CHANGE UP (ref 3.8–10.5)

## 2022-03-26 PROCEDURE — 99232 SBSQ HOSP IP/OBS MODERATE 35: CPT

## 2022-03-26 PROCEDURE — 93010 ELECTROCARDIOGRAM REPORT: CPT

## 2022-03-26 RX ORDER — PANTOPRAZOLE SODIUM 20 MG/1
40 TABLET, DELAYED RELEASE ORAL
Refills: 0 | Status: DISCONTINUED | OUTPATIENT
Start: 2022-03-26 | End: 2022-03-29

## 2022-03-26 RX ORDER — SODIUM CHLORIDE 9 MG/ML
1000 INJECTION, SOLUTION INTRAVENOUS
Refills: 0 | Status: DISCONTINUED | OUTPATIENT
Start: 2022-03-26 | End: 2022-03-29

## 2022-03-26 RX ORDER — OXYCODONE HYDROCHLORIDE 5 MG/1
5 TABLET ORAL EVERY 6 HOURS
Refills: 0 | Status: DISCONTINUED | OUTPATIENT
Start: 2022-03-26 | End: 2022-03-29

## 2022-03-26 RX ORDER — FUROSEMIDE 40 MG
40 TABLET ORAL ONCE
Refills: 0 | Status: COMPLETED | OUTPATIENT
Start: 2022-03-26 | End: 2022-03-26

## 2022-03-26 RX ORDER — DEXTROSE 50 % IN WATER 50 %
15 SYRINGE (ML) INTRAVENOUS ONCE
Refills: 0 | Status: DISCONTINUED | OUTPATIENT
Start: 2022-03-26 | End: 2022-03-29

## 2022-03-26 RX ORDER — MAGNESIUM SULFATE 500 MG/ML
1 VIAL (ML) INJECTION ONCE
Refills: 0 | Status: COMPLETED | OUTPATIENT
Start: 2022-03-26 | End: 2022-03-26

## 2022-03-26 RX ORDER — NEBIVOLOL HYDROCHLORIDE 5 MG/1
5 TABLET ORAL DAILY
Refills: 0 | Status: DISCONTINUED | OUTPATIENT
Start: 2022-03-26 | End: 2022-03-26

## 2022-03-26 RX ORDER — SENNA PLUS 8.6 MG/1
2 TABLET ORAL AT BEDTIME
Refills: 0 | Status: DISCONTINUED | OUTPATIENT
Start: 2022-03-26 | End: 2022-03-29

## 2022-03-26 RX ORDER — OXYBUTYNIN CHLORIDE 5 MG
5 TABLET ORAL AT BEDTIME
Refills: 0 | Status: DISCONTINUED | OUTPATIENT
Start: 2022-03-26 | End: 2022-03-29

## 2022-03-26 RX ORDER — INSULIN LISPRO 100/ML
VIAL (ML) SUBCUTANEOUS
Refills: 0 | Status: DISCONTINUED | OUTPATIENT
Start: 2022-03-26 | End: 2022-03-29

## 2022-03-26 RX ORDER — METOPROLOL TARTRATE 50 MG
25 TABLET ORAL
Refills: 0 | Status: DISCONTINUED | OUTPATIENT
Start: 2022-03-26 | End: 2022-03-29

## 2022-03-26 RX ORDER — GLUCAGON INJECTION, SOLUTION 0.5 MG/.1ML
1 INJECTION, SOLUTION SUBCUTANEOUS ONCE
Refills: 0 | Status: DISCONTINUED | OUTPATIENT
Start: 2022-03-26 | End: 2022-03-29

## 2022-03-26 RX ORDER — FOLIC ACID 0.8 MG
1 TABLET ORAL DAILY
Refills: 0 | Status: DISCONTINUED | OUTPATIENT
Start: 2022-03-26 | End: 2022-03-29

## 2022-03-26 RX ORDER — DEXTROSE 50 % IN WATER 50 %
12.5 SYRINGE (ML) INTRAVENOUS ONCE
Refills: 0 | Status: DISCONTINUED | OUTPATIENT
Start: 2022-03-26 | End: 2022-03-29

## 2022-03-26 RX ORDER — HEPARIN SODIUM 5000 [USP'U]/ML
1200 INJECTION INTRAVENOUS; SUBCUTANEOUS
Qty: 25000 | Refills: 0 | Status: DISCONTINUED | OUTPATIENT
Start: 2022-03-26 | End: 2022-03-29

## 2022-03-26 RX ORDER — POTASSIUM CHLORIDE 20 MEQ
40 PACKET (EA) ORAL ONCE
Refills: 0 | Status: COMPLETED | OUTPATIENT
Start: 2022-03-26 | End: 2022-03-26

## 2022-03-26 RX ORDER — ACETAMINOPHEN 500 MG
650 TABLET ORAL EVERY 6 HOURS
Refills: 0 | Status: DISCONTINUED | OUTPATIENT
Start: 2022-03-26 | End: 2022-03-29

## 2022-03-26 RX ORDER — ASPIRIN/CALCIUM CARB/MAGNESIUM 324 MG
81 TABLET ORAL DAILY
Refills: 0 | Status: DISCONTINUED | OUTPATIENT
Start: 2022-03-26 | End: 2022-03-28

## 2022-03-26 RX ORDER — DEXTROSE 50 % IN WATER 50 %
25 SYRINGE (ML) INTRAVENOUS ONCE
Refills: 0 | Status: DISCONTINUED | OUTPATIENT
Start: 2022-03-26 | End: 2022-03-29

## 2022-03-26 RX ORDER — INSULIN LISPRO 100/ML
VIAL (ML) SUBCUTANEOUS AT BEDTIME
Refills: 0 | Status: DISCONTINUED | OUTPATIENT
Start: 2022-03-26 | End: 2022-03-29

## 2022-03-26 RX ADMIN — PANTOPRAZOLE SODIUM 40 MILLIGRAM(S): 20 TABLET, DELAYED RELEASE ORAL at 05:35

## 2022-03-26 RX ADMIN — Medication 40 MILLIGRAM(S): at 17:16

## 2022-03-26 RX ADMIN — HEPARIN SODIUM 1200 UNIT(S)/HR: 5000 INJECTION INTRAVENOUS; SUBCUTANEOUS at 18:33

## 2022-03-26 RX ADMIN — NEBIVOLOL HYDROCHLORIDE 5 MILLIGRAM(S): 5 TABLET ORAL at 05:35

## 2022-03-26 RX ADMIN — Medication 81 MILLIGRAM(S): at 12:22

## 2022-03-26 RX ADMIN — Medication 40 MILLIGRAM(S): at 05:06

## 2022-03-26 RX ADMIN — HEPARIN SODIUM 1050 UNIT(S)/HR: 5000 INJECTION INTRAVENOUS; SUBCUTANEOUS at 10:48

## 2022-03-26 RX ADMIN — Medication 5 MILLIGRAM(S): at 21:24

## 2022-03-26 RX ADMIN — ATORVASTATIN CALCIUM 80 MILLIGRAM(S): 80 TABLET, FILM COATED ORAL at 21:24

## 2022-03-26 RX ADMIN — HEPARIN SODIUM 1200 UNIT(S)/HR: 5000 INJECTION INTRAVENOUS; SUBCUTANEOUS at 23:44

## 2022-03-26 RX ADMIN — HEPARIN SODIUM 900 UNIT(S)/HR: 5000 INJECTION INTRAVENOUS; SUBCUTANEOUS at 03:36

## 2022-03-26 RX ADMIN — SENNA PLUS 2 TABLET(S): 8.6 TABLET ORAL at 21:24

## 2022-03-26 RX ADMIN — Medication 40 MILLIEQUIVALENT(S): at 05:06

## 2022-03-26 RX ADMIN — Medication 1 MILLIGRAM(S): at 12:22

## 2022-03-26 RX ADMIN — HEPARIN SODIUM 1200 UNIT(S)/HR: 5000 INJECTION INTRAVENOUS; SUBCUTANEOUS at 18:48

## 2022-03-26 RX ADMIN — Medication 25 MILLIGRAM(S): at 17:16

## 2022-03-26 RX ADMIN — Medication 650 MILLIGRAM(S): at 19:42

## 2022-03-26 RX ADMIN — Medication 100 GRAM(S): at 05:05

## 2022-03-26 NOTE — PATIENT PROFILE ADULT - FALL HARM RISK - HARM RISK INTERVENTIONS

## 2022-03-26 NOTE — H&P ADULT - NSHPPHYSICALEXAM_GEN_ALL_CORE
Vital Signs Last 24 Hrs  T(C): 36.7 (26 Mar 2022 00:23), Max: 36.9 (25 Mar 2022 17:42)  T(F): 98 (26 Mar 2022 00:23), Max: 98.4 (25 Mar 2022 17:42)  HR: 71 (26 Mar 2022 00:23) (71 - 75)  BP: 112/73 (26 Mar 2022 00:23) (112/73 - 137/81)  BP(mean): --  RR: 18 (26 Mar 2022 00:23) (18 - 24)  SpO2: 98% (26 Mar 2022 00:23) (86% - 100%)    GENERAL:  Well-appearing, not in acute distress, wearing nasal cannula  EYES:  Clear conjunctiva, extraocular movement intact  ENT: Moist mucous membranes  RESP:  Non-labored breathing pattern, bibasilar crackles  CV: Regular rate and rhythm, no murmurs appreciated, moderate pitting L-leg edema, mild R-ankle edema  GI: Soft, non-tender, non-distended  MSK: R-knee with well healing surgical scar  NEURO: Awake, alert, conversant, able to lift all extremities against gravity, light touch sensation grossly intact  PSYCH: Calm, cooperative  SKIN: No rash or lesions, warm and dry

## 2022-03-26 NOTE — H&P ADULT - HISTORY OF PRESENT ILLNESS
61yoM hx colon CA s/p resection, prostate CA s/p RT, both in remission, also hx HTN, HLD, DM, recently admitted at [facility] from 3/18 to 3/20 under orthopedic service for L-total knee replacement (3/18/22), presenting with 3 days of dyspnea at rest and on exertion associated with orthopnea, productive cough and some increased bilateral leg swelling.  Pt denies any occurrences of chest pain, palpitations, lightheadedness or syncope.  He reports some L-left swelling since the knee surgery but reports it is worse and also noticed some minor ankle swelling on the right side.  On admission, pt hypoxic to 86% on room air. Admission labs notable for elevated troponin of 1.53 and proBNP of >5500.  CTA negative for PE but showed bilateral pleural effusion and evidence of pulmonary edema.

## 2022-03-26 NOTE — H&P ADULT - NSHPSOCIALHISTORY_GEN_ALL_CORE
, but states wife currently lives in [place] and he resides with a friend  Denies hx of smoking, heavy EtOH use or illicit drug use

## 2022-03-26 NOTE — PROGRESS NOTE ADULT - SUBJECTIVE AND OBJECTIVE BOX
HPI  Pt is a 62yo M admitted to [facility] for NSTIMI with new CHF.  Pt was seen and examined at bedside. Pt states he is feeling much better today. occasional burst of SVT noted however asymptomatic     Vital Signs Last 24 Hrs  T(C): 36.8 (26 Mar 2022 11:01), Max: 37.3 (26 Mar 2022 09:08)  T(F): 98.2 (26 Mar 2022 11:01), Max: 99.2 (26 Mar 2022 09:08)  HR: 68 (26 Mar 2022 11:01) (68 - 75)  BP: 110/69 (26 Mar 2022 11:01) (100/55 - 137/81)  BP(mean): --  RR: 18 (26 Mar 2022 11:01) (18 - 24)  SpO2: 99% (26 Mar 2022 11:01) (86% - 100%)    I&O's Summary      CAPILLARY BLOOD GLUCOSE      POCT Blood Glucose.: 129 mg/dL (26 Mar 2022 08:57)      PHYSICAL EXAM:    Constitutional: NAD,   HEENT: PERR, EOMI,    Neck: Soft and supple,   Respiratory: Breath sounds are clear bilaterally, No wheezing, rales or rhonchi  Cardiovascular: S1 and S2,   Gastrointestinal: Bowel Sounds present, soft, nontender, nondistended, no guarding, no rebound  Extremities: No peripheral edema  Vascular: 2+ peripheral pulses  Neurological: A/O x 3,    Musculoskeletal: left knee scar noted with large hematoma left thigh and hamstring     MEDICATIONS:  MEDICATIONS  (STANDING):  aspirin enteric coated 81 milliGRAM(s) Oral daily  atorvastatin 80 milliGRAM(s) Oral at bedtime  dextrose 5%. 1000 milliLiter(s) (50 mL/Hr) IV Continuous <Continuous>  dextrose 5%. 1000 milliLiter(s) (100 mL/Hr) IV Continuous <Continuous>  dextrose 50% Injectable 25 Gram(s) IV Push once  dextrose 50% Injectable 12.5 Gram(s) IV Push once  dextrose 50% Injectable 25 Gram(s) IV Push once  folic acid 1 milliGRAM(s) Oral daily  furosemide   Injectable 40 milliGRAM(s) IV Push daily  glucagon  Injectable 1 milliGRAM(s) IntraMuscular once  heparin  Infusion.  Unit(s)/Hr (9 mL/Hr) IV Continuous <Continuous>  insulin lispro (ADMELOG) corrective regimen sliding scale   SubCutaneous three times a day before meals  insulin lispro (ADMELOG) corrective regimen sliding scale   SubCutaneous at bedtime  nebivolol 5 milliGRAM(s) Oral daily  oxybutynin 5 milliGRAM(s) Oral at bedtime  pantoprazole    Tablet 40 milliGRAM(s) Oral before breakfast  senna 2 Tablet(s) Oral at bedtime      LABS: All Labs Reviewed:                        10.3   9.48  )-----------( 179      ( 26 Mar 2022 05:58 )             32.1     03-26    141  |  100  |  22.4<H>  ----------------------------<  113<H>  3.7   |  21.0<L>  |  0.82    Ca    9.6      26 Mar 2022 03:11  Mg     1.8     03-26    TPro  6.6  /  Alb  3.9  /  TBili  1.0  /  DBili  x   /  AST  58<H>  /  ALT  44<H>  /  AlkPhos  146<H>  03-25    PT/INR - ( 25 Mar 2022 18:58 )   PT: 16.4 sec;   INR: 1.41 ratio         PTT - ( 26 Mar 2022 09:19 )  PTT:48.0 sec  CARDIAC MARKERS ( 26 Mar 2022 03:11 )  x     / 1.81 ng/mL / 141 U/L / x     / x      CARDIAC MARKERS ( 25 Mar 2022 23:07 )  x     / 1.75 ng/mL / 124 U/L / x     / x      CARDIAC MARKERS ( 25 Mar 2022 17:27 )  x     / 1.53 ng/mL / x     / x     / x          Blood Culture:     RADIOLOGY/EKG:    DVT PPX:    ADVANCED DIRECTIVE:    DISPOSITION:

## 2022-03-26 NOTE — H&P ADULT - NSHPLABSRESULTS_GEN_ALL_CORE
10.3   8.71  )-----------( 172      ( 25 Mar 2022 17:27 )             34.2       03-26    141  |  100  |  22.4<H>  ----------------------------<  113<H>  3.7   |  21.0<L>  |  0.82    Ca    9.6      26 Mar 2022 03:11  Mg     1.8     03-26    TPro  6.6  /  Alb  3.9  /  TBili  1.0  /  DBili  x   /  AST  58<H>  /  ALT  44<H>  /  AlkPhos  146<H>  03-25

## 2022-03-26 NOTE — PROGRESS NOTE ADULT - ASSESSMENT
60 y/o M with PMH of HTN, Dyslipidemia, DM2, Prostate Cancer ( in remission) and Colon cancer (s/p colon resection dx last year, no chemo no radiation) presents s/p L knee replacement with acute shortness of breath x 1 day, secondary to acute hypoxemic resp failure secondary to acute decompensated HFrEF and NSTEMI   Troponin: 1.53  ProBNP: 5573     1) Acute hypoxemic resp failure secondary to acute decompensated HFrEF (EF 35-40%) and NSTEMI   - patient notes shortness of breath and lower extremity edema improved   - EKG reviewed no acute ST-T segment changes   - troponin trending up TnI 1.81, recommend to trend with serial EKGs   - CTA reviewed and shows no evidence of PE, US duplex negative for DVT of the LLE   - Lasix 40mg IVP q daily, monitor I/Os, keep K~4 and Mag ~2; will give Lasix 40mg IVP @ 6 pm   -on  ASA 81mg po q daily, c/w Lipitor 80 mg po q daily   - switched to Lopressor 25mg po 12hrs; post active diuresis and LHC will transition to Entresto 24/26mg po q 12hrs   - c/w Hep ggt   -TTE reviewed    - if stable and no evidence of recurrent NSVT, or hemodynamic compromise will plan for LHC/RHC on Monday     2) New onset AF   - patient paroxysms of AF and AT, episodes are approx. > 30 seconds   - on Hep ggt   - will continue with rate control, Lopressor 25mg po q 12hrs and will likely post cath be transitioned to Eliquis     3) L Knee arthroscopy on 3/18  - recommend to monitor LLE while on Hep ggt   - recommend physical therapy     4) HTN   - blood pressure controlled     Ludy Woods D.O. [facility]  Cardiology/Vascular Cardiology -[facility]   Telephone # [phone]

## 2022-03-26 NOTE — PATIENT PROFILE ADULT - FUNCTIONAL SCREEN CURRENT LEVEL: SWALLOWING (IF SCORE 2 OR MORE FOR ANY ITEM, CONSULT REHAB SERVICES), MLM)
Include Z78.9 (Other Specified Conditions Influencing Health Status) As An Associated Diagnosis?: No Detail Level: Detailed Consent: The patient's consent was obtained including but not limited to risks of crusting, scabbing, blistering, scarring, darker or lighter pigmentary change, recurrence, incomplete removal and infection. Medical Necessity Information: It is in your best interest to select a reason for this procedure from the list below. All of these items fulfill various CMS LCD requirements except the new and changing color options. Medical Necessity Clause: This procedure was medically necessary because the lesions that were treated were: Post-Care Instructions: I reviewed with the patient in detail post-care instructions. Patient is to wear sunprotection, and avoid picking at any of the treated lesions. Pt may apply Vaseline to crusted or scabbing areas. 0 = swallows foods/liquids without difficulty

## 2022-03-26 NOTE — PROGRESS NOTE ADULT - ASSESSMENT
61yoM hx colon CA s/p resection, prostate CA s/p RT, both in remission, also hx HTN, HLD, DM, recently admitted at [facility] from 3/18 to 3/20 under orthopedic service for L-total knee replacement (3/18/22), presenting with 3 days of dyspnea at rest and on exertion associated with orthopnea, found to be hypoxic on admission with elevated troponin, proBNP and with pulmonary edema on chest imaging     *STEMI  EKG reviewed, shows NSR, isolated TWI in V1, no other ST-T changes  Received ASA 324mg x1, continue with ASA 81mg   atorvastatin 80mg  CTA neg for PE  US duppler neg for DVT   Heparin ggt   f/u TTE   LHC/RHC Monday     *cute hypoxemic respiratory failure due to new onset decompensated CHF  Hypoxic to 86% on room air, placed on 3L nasal cannula with improvement  Suspect CHF precipitated by ischemia given NSTEMI  CTA shows pleural effusions and bilateral perihilar opacities suggestive of pulmonary edema  Received IV Lasix 40mg by ED  Lasix 40mg     *SVT  Cont Bystolic   cont telemetry   Keep Mg>2, K>4 -Asked CD RN to place strip into physical chart    *S/p left knee replacement  Was discharged on high dose ASA and Eliquis for post-surgery VTE ppx  ASA 81mg as per cardiology  Holding Eliquis as on heparin gtt  Continue with oxycodone PRN for pain control  Also discharged on Celebrex, will hold given ASA, heparin gtt and risk of bleed    *HTN  On Bystolic, resumed    *HLD   On pravastatin, transitioned to atorvastatin 80mg    *DM  Hold metformin  ISS  HgA1C 5.7     Hx overactive bladder  -On oxybutynin    Prophylactic measure   n heparin gtt

## 2022-03-26 NOTE — H&P ADULT - ASSESSMENT
61yoM hx colon CA s/p resection, prostate CA s/p RT, both in remission, also hx HTN, HLD, DM, recently admitted at [facility] from 3/18 to 3/20 under orthopedic service for L-total knee replacement (3/18/22), presenting with 3 days of dyspnea at rest and on exertion associated with orthopnea, found to be hypoxic on admission with elevated troponin, proBNP and with pulmonary edema on chest imaging     NSTEMI  -Presenting with dyspnea only, no chest pain, probably anginal equivalent   -High risk CAD given age, gender, ethnicity, comorbidities  -EKG reviewed, shows NSR, isolated TWI in V1, no other ST-T changes  -Seen by cardiology in ED with recommendations as outlined below  -Received ASA 324mg x1, continue with ASA 81mg   -Start atorvastatin 80mg  -TTE ordered to assess for structural and/or wall motion abnormalities  -Tentative plan for LHC on Mon, 3/28  -Serial cardiac enzymes and EKG  -Telemetry monitoring   -Check lipid panel and Hgb A1c    Acute hypoxemic respiratory failure due to new onset decompensated CHF  -Hypoxic to 86% on room air, placed on 3L nasal cannula with improvement  -CTA shows pleural effusions and bilateral perihilar opacities suggestive of pulmonary edema  -Received IV Lasix 40mg by ED  -Started on IV Lasix 40mg daily by cardiologist  - monitoring   -Wean supplemental O2 as tolerated  -TTE as above    Episode of sustained VT  -CDU RN Neelima Jacob reporting that pt had a run of VT on monitor that resolved ‘after few minutes’  -Check STAT electrolyte and subsequent set of cardiac enzymes  -Mg 1.8 and K+ 3.7, will supplement to attain Mg >2 and K+ >4   -On Bystolic, will resume at this time to minimize further NSVT/VT  -On telemetry     Status post left knee replacement  -Was discharged on high dose ASA and Eliquis for post-surgery VTE ppx  -Will transition to ASA 81mg as per cardiology  -Holding Eliquis as on heparin gtt  -Continue with oxycodone PRN for pain control  -Also discharged on Celebrex, will hold given ASA, heparin gtt and risk of bleed    Hx HTN  -On Bystolic, resumed    Hx HLD   -On pravastatin, transitioned to atorvastatin 80mg    Hx DM  -Hold metformin, start insulin sliding scale    Hx overactive bladder  -On oxybutynin    Prophylactic measure  -On heparin gtt 61yoM hx colon CA s/p resection, prostate CA s/p RT, both in remission, also hx HTN, HLD, DM, recently admitted at [facility] from 3/18 to 3/20 under orthopedic service for L-total knee replacement (3/18/22), presenting with 3 days of dyspnea at rest and on exertion associated with orthopnea, found to be hypoxic on admission with elevated troponin, proBNP and with pulmonary edema on chest imaging     NSTEMI  -Presenting with dyspnea only, no chest pain, probably anginal equivalent   -High risk CAD given age, gender, ethnicity, comorbidities  -EKG reviewed, shows NSR, isolated TWI in V1, no other ST-T changes  -Seen by cardiology in ED with recommendations as outlined below  -Received ASA 324mg x1, continue with ASA 81mg   -Start atorvastatin 80mg  -TTE ordered to assess for structural and/or wall motion abnormalities  -Tentative plan for LHC on Mon, 3/28  -Serial cardiac enzymes and EKG  -Telemetry monitoring   -Check lipid panel and Hgb A1c    Acute hypoxemic respiratory failure due to new onset decompensated CHF  -Hypoxic to 86% on room air, placed on 3L nasal cannula with improvement  -CTA shows pleural effusions and bilateral perihilar opacities suggestive of pulmonary edema  -Received IV Lasix 40mg by ED  -Started on IV Lasix 40mg daily by cardiologist  - monitoring   -Wean supplemental O2 as tolerated  -TTE as above    Episode of SVT  -Pt had 2.5 to 3 minutes episode of SVT with HR peaking into 160s, strip reviewed with telemetry tech  -Check STAT electrolyte and subsequent set of cardiac enzymes  -Mg 1.8 and K+ 3.7, will supplement to attain Mg >2 and K+ >4   -On Bystolic, will resume at this time to minimize further NSVT/VT  -On telemetry   -Asked CD RN to place strip into physical chart    Status post left knee replacement  -Was discharged on high dose ASA and Eliquis for post-surgery VTE ppx  -Will transition to ASA 81mg as per cardiology  -Holding Eliquis as on heparin gtt  -Continue with oxycodone PRN for pain control  -Also discharged on Celebrex, will hold given ASA, heparin gtt and risk of bleed    Hx HTN  -On Bystolic, resumed    Hx HLD   -On pravastatin, transitioned to atorvastatin 80mg    Hx DM  -Hold metformin, start insulin sliding scale    Hx overactive bladder  -On oxybutynin    Prophylactic measure  -On heparin gtt 61yoM hx colon CA s/p resection, prostate CA s/p RT, both in remission, also hx HTN, HLD, DM, recently admitted at [facility] from 3/18 to 3/20 under orthopedic service for L-total knee replacement (3/18/22), presenting with 3 days of dyspnea at rest and on exertion associated with orthopnea, found to be hypoxic on admission with elevated troponin, proBNP and with pulmonary edema on chest imaging     NSTEMI  -Presenting with dyspnea only, no chest pain, probably anginal equivalent   -High risk CAD given age, gender, ethnicity, comorbidities  -EKG reviewed, shows NSR, isolated TWI in V1, no other ST-T changes  -Seen by cardiology in ED with recommendations as outlined below  -Received ASA 324mg x1, continue with ASA 81mg   -Start atorvastatin 80mg  -TTE ordered to assess for structural and/or wall motion abnormalities  -Tentative plan for LHC on Mon, 3/28  -Serial cardiac enzymes and EKG  -Telemetry monitoring   -Check lipid panel and Hgb A1c    Acute hypoxemic respiratory failure due to new onset decompensated CHF  -Hypoxic to 86% on room air, placed on 3L nasal cannula with improvement  -Suspect CHF precipitated by ischemia given NSTEMI  -CTA shows pleural effusions and bilateral perihilar opacities suggestive of pulmonary edema  -Received IV Lasix 40mg by ED  -Started on IV Lasix 40mg daily by cardiologist  - monitoring   -Wean supplemental O2 as tolerated  -TTE as above    Episode of SVT  -Pt had 2.5 to 3 minutes episode of SVT with HR peaking into 160s, strip reviewed with tele tech  -Check STAT electrolyte and subsequent set of cardiac enzymes  -Mg 1.8 and K+ 3.7, will supplement to attain Mg >2 and K+ >4   -On Bystolic, will resume at this time to minimize further NSVT/VT  -On telemetry   -Asked CD RN to place strip into physical chart    Status post left knee replacement  -Was discharged on high dose ASA and Eliquis for post-surgery VTE ppx  -Will transition to ASA 81mg as per cardiology  -Holding Eliquis as on heparin gtt  -Continue with oxycodone PRN for pain control  -Also discharged on Celebrex, will hold given ASA, heparin gtt and risk of bleed    Hx HTN  -On Bystolic, resumed    Hx HLD   -On pravastatin, transitioned to atorvastatin 80mg    Hx DM  -Hold metformin, start insulin sliding scale    Hx overactive bladder  -On oxybutynin    Prophylactic measure  -On heparin gtt

## 2022-03-26 NOTE — PROGRESS NOTE ADULT - SUBJECTIVE AND OBJECTIVE BOX
[place] [facility]                                                               Office: [address] [address]                                                              Telephone: [phone]. Fax:[phone]                                                                             PROGRESS NOTE  Reason for follow up: Acute hypoxemic resp failure secondary to acute decompensated HFrEF   Overnight:  Patient had short episodes of SVT and AF on the monitor   Patient notes interval improvement in shortness of breath and lower extremity edema  No chest pain overnight     Subjective: "  ______________________"      	  Vitals:  T(C): 36.8 (03-26-22 @ 11:01), Max: 37.3 (03-26-22 @ 09:08)  HR: 68 (03-26-22 @ 11:01) (68 - 75)  BP: 110/69 (03-26-22 @ 11:01) (100/55 - 137/81)  RR: 18 (03-26-22 @ 11:01) (18 - 24)  SpO2: 99% (03-26-22 @ 11:01) (86% - 100%)  Wt(kg): --  I&O's Summary    Weight (kg): 68.039 (03-26 @ 10:52)      PHYSICAL EXAM:  Appearance: Comfortable. No acute distress  HEENT:  Head and neck: Atraumatic. Normocephalic.  Normal oral mucosa, PERRL, Neck is supple. No JVD, No carotid bruit.   Neurologic: A & O x 3, no focal deficits. EOMI.  Lymphatic: No cervical lymphadenopathy  Cardiovascular: Normal S1 S2, No murmur, rubs/gallops. No JVD, + pitting edema of the bilateral lower extremities   Respiratory: Lungs clear to auscultation  Gastrointestinal:  Soft, Non-tender, + BS  Lower Extremities: LLE: + post surgical scar of the L knee with no discharge or oozing + ecchymosis of the skin  Psychiatry: Patient is calm. No agitation. Mood & affect appropriate  Skin: No rashes/ ecchymoses/cyanosis/ulcers visualized on the face, hands or feet.      CURRENT MEDICATIONS:  furosemide   Injectable 40 milliGRAM(s) IV Push daily  metoprolol tartrate 25 milliGRAM(s) Oral two times a day    pantoprazole    Tablet  senna  atorvastatin  dextrose 50% Injectable  dextrose 50% Injectable  dextrose 50% Injectable  glucagon  Injectable  insulin lispro (ADMELOG) corrective regimen sliding scale  insulin lispro (ADMELOG) corrective regimen sliding scale  aspirin enteric coated  dextrose 5%.  dextrose 5%.  folic acid  heparin  Infusion.  oxybutynin      DIAGNOSTIC TESTING:  [ x] Echocardiogram:   < from: TTE Echo Complete w/o Contrast w/ Doppler (03.25.22 @ 22:05) >  Summary:   1. Left ventricular ejection fraction, by visual estimation, is 35 to   40%.   2. Moderately decreased global left ventricular systolic function.   3. Apical septal segment and apex are abnormal as described above.   4. Spectral Doppler shows pseudonormal pattern of left ventricular   myocardial filling (Grade II diastolic dysfunction). Elevated mean left   atrial pressure.   5. Normal left ventricular internal cavity size.   6. Normal right ventricular size and function.   7. Normal left atrial size.   8. Normal right atrial size.   9. Mild mitral annular calcification.  10. Mild thickening of the anterior and posterior mitral valve leaflets.  11. Mild mitral valve regurgitation.  12. Sclerotic aortic valve with normal opening.  13. Moderate pulmonic valve regurgitation.  14. Mildly dilated Ao root at 3.9cm.  15. There is no evidence of pericardial effusion.    MD David Electronically signed on 3/26/2022 at 10:29:53 AM    < end of copied text >    [ ]  Catheterization:  [ ] Stress Test:    OTHER: 	      LABS:	 	  CARDIAC MARKERS ( 26 Mar 2022 03:11 )  x     / 1.81 ng/mL / 141 U/L / x     / x      p-BNP 26 Mar 2022 03:11: x    , CARDIAC MARKERS ( 25 Mar 2022 23:07 )  x     / 1.75 ng/mL / 124 U/L / x     / x      p-BNP 25 Mar 2022 23:07: x    , CARDIAC MARKERS ( 25 Mar 2022 17:27 )  x     / 1.53 ng/mL / x     / x     / x      p-BNP 25 Mar 2022 17:27: 5573 pg/mL                          10.3   9.48  )-----------( 179      ( 26 Mar 2022 05:58 )             32.1     03-26    141  |  100  |  22.4<H>  ----------------------------<  113<H>  3.7   |  21.0<L>  |  0.82    Ca    9.6      26 Mar 2022 03:11  Mg     1.8     03-26    TPro  6.6  /  Alb  3.9  /  TBili  1.0  /  DBili  x   /  AST  58<H>  /  ALT  44<H>  /  AlkPhos  146<H>  03-25    proBNP: Serum Pro-Brain Natriuretic Peptide: 5573 pg/mL (03-25 @ 17:27)    Lipid Profile: Date: 03-26 @ 03:11  Total cholesterol 135; Direct LDL: --; HDL: 27; Triglycerides:161    HgA1c:   TSH: Thyroid Stimulating Hormone, Serum: 1.20 uIU/mL        TELEMETRY: Reviewed  40 seconds of AF with short episodes of AT   ECG:  Reviewed by me.

## 2022-03-27 DIAGNOSIS — I48.91 UNSPECIFIED ATRIAL FIBRILLATION: ICD-10-CM

## 2022-03-27 DIAGNOSIS — I50.23 ACUTE ON CHRONIC SYSTOLIC (CONGESTIVE) HEART FAILURE: ICD-10-CM

## 2022-03-27 LAB
ANION GAP SERPL CALC-SCNC: 15 MMOL/L — SIGNIFICANT CHANGE UP (ref 5–17)
APTT BLD: 65.8 SEC — HIGH (ref 27.5–35.5)
BUN SERPL-MCNC: 23.9 MG/DL — HIGH (ref 8–20)
CALCIUM SERPL-MCNC: 9.3 MG/DL — SIGNIFICANT CHANGE UP (ref 8.6–10.2)
CHLORIDE SERPL-SCNC: 93 MMOL/L — LOW (ref 98–107)
CO2 SERPL-SCNC: 26 MMOL/L — SIGNIFICANT CHANGE UP (ref 22–29)
CREAT SERPL-MCNC: 0.83 MG/DL — SIGNIFICANT CHANGE UP (ref 0.5–1.3)
EGFR: 99 ML/MIN/1.73M2 — SIGNIFICANT CHANGE UP
GLUCOSE BLDC GLUCOMTR-MCNC: 125 MG/DL — HIGH (ref 70–99)
GLUCOSE BLDC GLUCOMTR-MCNC: 129 MG/DL — HIGH (ref 70–99)
GLUCOSE BLDC GLUCOMTR-MCNC: 133 MG/DL — HIGH (ref 70–99)
GLUCOSE BLDC GLUCOMTR-MCNC: 139 MG/DL — HIGH (ref 70–99)
GLUCOSE SERPL-MCNC: 136 MG/DL — HIGH (ref 70–99)
HCT VFR BLD CALC: 32.1 % — LOW (ref 39–50)
HGB BLD-MCNC: 10.1 G/DL — LOW (ref 13–17)
MCHC RBC-ENTMCNC: 28.2 PG — SIGNIFICANT CHANGE UP (ref 27–34)
MCHC RBC-ENTMCNC: 31.5 GM/DL — LOW (ref 32–36)
MCV RBC AUTO: 89.7 FL — SIGNIFICANT CHANGE UP (ref 80–100)
PLATELET # BLD AUTO: 162 K/UL — SIGNIFICANT CHANGE UP (ref 150–400)
POTASSIUM SERPL-MCNC: 3.7 MMOL/L — SIGNIFICANT CHANGE UP (ref 3.5–5.3)
POTASSIUM SERPL-SCNC: 3.7 MMOL/L — SIGNIFICANT CHANGE UP (ref 3.5–5.3)
RBC # BLD: 3.58 M/UL — LOW (ref 4.2–5.8)
RBC # FLD: 18.3 % — HIGH (ref 10.3–14.5)
SARS-COV-2 RNA SPEC QL NAA+PROBE: SIGNIFICANT CHANGE UP
SODIUM SERPL-SCNC: 134 MMOL/L — LOW (ref 135–145)
TROPONIN T SERPL-MCNC: 1.21 NG/ML — HIGH (ref 0–0.06)
WBC # BLD: 7.68 K/UL — SIGNIFICANT CHANGE UP (ref 3.8–10.5)
WBC # FLD AUTO: 7.68 K/UL — SIGNIFICANT CHANGE UP (ref 3.8–10.5)

## 2022-03-27 PROCEDURE — 99232 SBSQ HOSP IP/OBS MODERATE 35: CPT

## 2022-03-27 RX ORDER — FUROSEMIDE 40 MG
40 TABLET ORAL ONCE
Refills: 0 | Status: COMPLETED | OUTPATIENT
Start: 2022-03-27 | End: 2022-03-27

## 2022-03-27 RX ADMIN — Medication 40 MILLIGRAM(S): at 18:45

## 2022-03-27 RX ADMIN — Medication 25 MILLIGRAM(S): at 17:42

## 2022-03-27 RX ADMIN — OXYCODONE HYDROCHLORIDE 5 MILLIGRAM(S): 5 TABLET ORAL at 17:42

## 2022-03-27 RX ADMIN — Medication 650 MILLIGRAM(S): at 05:30

## 2022-03-27 RX ADMIN — PANTOPRAZOLE SODIUM 40 MILLIGRAM(S): 20 TABLET, DELAYED RELEASE ORAL at 05:30

## 2022-03-27 RX ADMIN — OXYCODONE HYDROCHLORIDE 5 MILLIGRAM(S): 5 TABLET ORAL at 09:03

## 2022-03-27 RX ADMIN — OXYCODONE HYDROCHLORIDE 5 MILLIGRAM(S): 5 TABLET ORAL at 00:26

## 2022-03-27 RX ADMIN — OXYCODONE HYDROCHLORIDE 5 MILLIGRAM(S): 5 TABLET ORAL at 09:46

## 2022-03-27 RX ADMIN — Medication 25 MILLIGRAM(S): at 05:30

## 2022-03-27 RX ADMIN — SENNA PLUS 2 TABLET(S): 8.6 TABLET ORAL at 21:37

## 2022-03-27 RX ADMIN — HEPARIN SODIUM 1200 UNIT(S)/HR: 5000 INJECTION INTRAVENOUS; SUBCUTANEOUS at 07:39

## 2022-03-27 RX ADMIN — Medication 5 MILLIGRAM(S): at 22:25

## 2022-03-27 RX ADMIN — Medication 40 MILLIGRAM(S): at 05:30

## 2022-03-27 RX ADMIN — Medication 81 MILLIGRAM(S): at 09:03

## 2022-03-27 RX ADMIN — HEPARIN SODIUM 1200 UNIT(S)/HR: 5000 INJECTION INTRAVENOUS; SUBCUTANEOUS at 14:09

## 2022-03-27 RX ADMIN — Medication 1 MILLIGRAM(S): at 09:03

## 2022-03-27 RX ADMIN — ATORVASTATIN CALCIUM 80 MILLIGRAM(S): 80 TABLET, FILM COATED ORAL at 21:35

## 2022-03-27 RX ADMIN — HEPARIN SODIUM 1200 UNIT(S)/HR: 5000 INJECTION INTRAVENOUS; SUBCUTANEOUS at 06:11

## 2022-03-27 RX ADMIN — OXYCODONE HYDROCHLORIDE 5 MILLIGRAM(S): 5 TABLET ORAL at 17:52

## 2022-03-27 NOTE — PROGRESS NOTE ADULT - SUBJECTIVE AND OBJECTIVE BOX
HPI  Pt is a 60yo M admitted to [facility] for NSTIMI with new CHF.  Pt was seen and examined in chair. Pt states he is feeling much better today.       Vital Signs Last 24 Hrs  T(C): 36.3 (27 Mar 2022 08:13), Max: 36.9 (26 Mar 2022 19:17)  T(F): 97.3 (27 Mar 2022 08:13), Max: 98.5 (26 Mar 2022 19:17)  HR: 60 (27 Mar 2022 08:48) (56 - 78)  BP: 103/67 (27 Mar 2022 08:48) (96/61 - 127/72)  BP(mean): --  RR: 19 (27 Mar 2022 08:13) (18 - 19)  SpO2: 98% (27 Mar 2022 08:13) (97% - 99%)    I&O's Summary    27 Mar 2022 07:01  -  27 Mar 2022 12:23  --------------------------------------------------------  IN: 240 mL / OUT: 300 mL / NET: -60 mL        CAPILLARY BLOOD GLUCOSE      POCT Blood Glucose.: 125 mg/dL (27 Mar 2022 12:16)  POCT Blood Glucose.: 133 mg/dL (27 Mar 2022 08:19)  POCT Blood Glucose.: 143 mg/dL (26 Mar 2022 21:22)  POCT Blood Glucose.: 132 mg/dL (26 Mar 2022 17:01)      PHYSICAL EXAM:    Constitutional: NAD,   HEENT: PERR, EOMI,    Neck: Soft and supple,   Respiratory: Breath sounds are clear bilaterally, No wheezing, rales or rhonchi  Cardiovascular: S1 and S2,   Gastrointestinal: Bowel Sounds present, soft, nontender, nondistended, no guarding, no rebound  Extremities: No peripheral edema  Vascular: 2+ peripheral pulses  Neurological: A/O x 3,    Musculoskeletal: left knee scar noted with large hematoma left thigh and hamstring    MEDICATIONS:  MEDICATIONS  (STANDING):  aspirin enteric coated 81 milliGRAM(s) Oral daily  atorvastatin 80 milliGRAM(s) Oral at bedtime  dextrose 5%. 1000 milliLiter(s) (50 mL/Hr) IV Continuous <Continuous>  dextrose 5%. 1000 milliLiter(s) (100 mL/Hr) IV Continuous <Continuous>  dextrose 50% Injectable 25 Gram(s) IV Push once  dextrose 50% Injectable 12.5 Gram(s) IV Push once  dextrose 50% Injectable 25 Gram(s) IV Push once  folic acid 1 milliGRAM(s) Oral daily  furosemide   Injectable 40 milliGRAM(s) IV Push daily  glucagon  Injectable 1 milliGRAM(s) IntraMuscular once  heparin  Infusion. 1200 Unit(s)/Hr (12 mL/Hr) IV Continuous <Continuous>  insulin lispro (ADMELOG) corrective regimen sliding scale   SubCutaneous three times a day before meals  insulin lispro (ADMELOG) corrective regimen sliding scale   SubCutaneous at bedtime  metoprolol tartrate 25 milliGRAM(s) Oral two times a day  oxybutynin 5 milliGRAM(s) Oral at bedtime  pantoprazole    Tablet 40 milliGRAM(s) Oral before breakfast  senna 2 Tablet(s) Oral at bedtime      LABS: All Labs Reviewed:                        10.1   7.68  )-----------( 162      ( 27 Mar 2022 03:23 )             32.1     03-26    141  |  100  |  22.4<H>  ----------------------------<  113<H>  3.7   |  21.0<L>  |  0.82    Ca    9.6      26 Mar 2022 03:11  Mg     2.0     03-26    TPro  6.6  /  Alb  3.9  /  TBili  1.0  /  DBili  x   /  AST  58<H>  /  ALT  44<H>  /  AlkPhos  146<H>  03-25    PT/INR - ( 25 Mar 2022 18:58 )   PT: 16.4 sec;   INR: 1.41 ratio         PTT - ( 27 Mar 2022 05:53 )  PTT:65.8 sec  CARDIAC MARKERS ( 26 Mar 2022 17:17 )  x     / 1.70 ng/mL / x     / x     / x      CARDIAC MARKERS ( 26 Mar 2022 03:11 )  x     / 1.81 ng/mL / 141 U/L / x     / x      CARDIAC MARKERS ( 25 Mar 2022 23:07 )  x     / 1.75 ng/mL / 124 U/L / x     / x      CARDIAC MARKERS ( 25 Mar 2022 17:27 )  x     / 1.53 ng/mL / x     / x     / x          Blood Culture:     RADIOLOGY/EKG:    DVT PPX:    ADVANCED DIRECTIVE:    DISPOSITION:

## 2022-03-27 NOTE — PROGRESS NOTE ADULT - PROBLEM SELECTOR PLAN 2
Sr this am and controlled rate of 62  Continue Heparin drip and transition to DOAC before discharge  Continue lopressor  Continue to monitor

## 2022-03-27 NOTE — PROGRESS NOTE ADULT - ASSESSMENT
62 y/o M with PMH of HTN, Dyslipidemia, DM2, Prostate Cancer ( in remission) and Colon cancer (s/p colon resection dx last year, no chemo no radiation) presents s/p L knee replacement with acute shortness of breath x 1 day, secondary to acute hypoxemic resp failure secondary to acute decompensated HFrEF and NSTEMI   Troponin: 1.53  ProBNP: 5573

## 2022-03-27 NOTE — PROGRESS NOTE ADULT - PROBLEM SELECTOR PLAN 1
and NSTEMI   - patient feeling better on oxygen today  - troponin trending down 1. 7 last night, repeat pending  - LDL 76 and HDL 26, continue statin  - Lasix 40mg IVP q daily, monitor I/Os, keep K~4 and Mag ~2; will give Lasix 40mg IVP @ 6 pm   - Strict I and O's - ordered  - monitor kidney funciton while on diuretics, bmp penidng  -on  ASA 81mg po q daily, c/w Lipitor 80 mg po q daily   - Lopressor 25mg po 12hrs; post active diuresis and LHC will transition to Entresto 24/26mg po q 12hrs   - c/w Hep ggt   -TTE reviewed    - if stable and no evidence of recurrent NSVT, or hemodynamic compromise will plan for LHC/RHC on Monday

## 2022-03-27 NOTE — PROGRESS NOTE ADULT - NS ATTEND AMEND GEN_ALL_CORE FT
Patient was seen and examined for NSTEMI and Acute hypoxemic resp failure secondary to acute decompensated HFrEF (EF 35-40%) and NSTEMI  No events of telemetry   Labs: pending     60 y/o M with PMH of HTN, Dyslipidemia, DM2, Prostate Cancer ( in remission) and Colon cancer (s/p colon resection dx last year, no chemo no radiation) presents s/p L knee replacement with acute shortness of breath x 1 day, secondary to acute hypoxemic resp failure secondary to acute decompensated HFrEF and NSTEMI   - patient notes shortness of breath and lower extremity edema improved   - EKG reviewed no acute ST-T segment changes   - troponin trending up TnI 1.81- > 1.70,  - CTA reviewed and shows no evidence of PE, US duplex negative for DVT of the LLE   - based on BMP will recommend to HOLD Lasix 40mg IVP q daily, monitor I/Os, keep K~4 and Mag ~2  -on  ASA 81mg po q daily, c/w Lipitor 80 mg po q daily   - switched to Lopressor 25mg po 12hrs; post active diuresis and LHC will transition to Entresto 24/26mg po q 12hrs   - c/w Hep ggt   -TTE reviewed  - plan for LHC in AM       2) 2) New onset AF   - patient paroxysms of AF and AT, episodes are approx. > 30 seconds   - on Hep ggt   - will continue with rate control, Lopressor 25mg po q 12hrs and will likely post cath [facility]  Cardiology/Vascular Cardiology -[facility]   Telephone # [phone]

## 2022-03-27 NOTE — PROGRESS NOTE ADULT - ASSESSMENT
61yoM hx colon CA s/p resection, prostate CA s/p RT, both in remission, also hx HTN, HLD, DM, recently admitted at [facility] from 3/18 to 3/20 under orthopedic service for L-total knee replacement (3/18/22), presenting with 3 days of dyspnea at rest and on exertion associated with orthopnea, found to be hypoxic on admission with elevated troponin, proBNP and with pulmonary edema on chest imaging     *STEMI  EKG reviewed, shows NSR, isolated TWI in V1, no other ST-T changes  Received ASA 324mg x1, continue with ASA 81mg   atorvastatin 80mg  CTA neg for PE  US duppler neg for DVT   Heparin ggt   Echo EF 35-40%, moderate dec LVF, grade II diastolic dysfunction with elevated left Main artrial pressure   LHC/RHC tomorrow      *cute hypoxemic respiratory failure due to new onset decompensated CHF  Hypoxic to 86% on room air, placed on 3L nasal cannula with improvement  Suspect CHF precipitated by ischemia given NSTEMI  CTA shows pleural effusions and bilateral perihilar opacities suggestive of pulmonary edema  Received IV Lasix 40mg by ED  Lasix 40mg     *SVT  Cont Bystolic   cont telemetry   Keep Mg>2, K>4 -Asked CD RN to place strip into physical chart    *S/p left knee replacement  Was discharged on high dose ASA and Eliquis for post-surgery VTE ppx  ASA 81mg as per cardiology  Holding Eliquis as on heparin gtt  Continue with oxycodone PRN for pain control  Also discharged on Celebrex, will hold given ASA, heparin gtt and risk of bleed    *HTN  On Bystolic, resumed    *HLD   On pravastatin, transitioned to atorvastatin 80mg    *DM  Hold metformin  ISS  HgA1C 5.7     Hx overactive bladder  -On oxybutynin    Prophylactic measure   n heparin gtt     none

## 2022-03-27 NOTE — PROGRESS NOTE ADULT - SUBJECTIVE AND OBJECTIVE BOX
[facility]                                                         CARDIOLOGY AT [facility]                                                                  [address] [facility], [place] [address]                                                         Telephone: [phone]. Fax:[phone]                                                                             PROGRESS NOTE    Reason for follow up:  NSTEMI -  Acute hypoxemic resp failure secondary to acute decompensated HFrEF   Overnight:  Update:   Sitting up in bed, 3lnc, telemetry: SR 62 with multiple foccal pvc's.     Review of symptoms:   Cardiac:  No chest pain. No dyspnea. No palpitations.  Respiratory: no cough. No dyspnea  Gastrointestinal: No diarrhea. No abdominal pain. No bleeding.   Neuro: No focal neuro complaints.    Vitals:  T(C): 36.3 (03-27-22 @ 08:13), Max: 36.9 (03-26-22 @ 19:17)  HR: 60 (03-27-22 @ 08:48) (56 - 78)  BP: 103/67 (03-27-22 @ 08:48) (96/61 - 127/72)  RR: 19 (03-27-22 @ 08:13) (18 - 19)  SpO2: 98% (03-27-22 @ 08:13) (97% -    Weight (kg): 68.039 (03-26 @ 10:52)    PHYSICAL EXAM:  Appearance: Comfortable. No acute distress  HEENT:  Atraumatic. Normocephalic.  Normal oral mucosa  Neurologic: A & O x 3, no gross focal deficits.  Cardiovascular: RRR S1 S2, No murmur, no rubs/gallops. No JVD  Respiratory: Lungs clear to auscultation, unlabored, diminished bilaterally bases.    Gastrointestinal:  Soft, Non-tender, + BS  Lower Extremities: 2+ Peripheral Pulses, left knee with swelling, dry and intake suture line. Patient state swelling has decreased.     Psychiatry: Patient is calm. No agitation.   Skin: warm and dry.    CURRENT CARDIAC MEDICATIONS:  furosemide   Injectable 40 milliGRAM(s) IV Push daily  metoprolol tartrate 25 milliGRAM(s) Oral two times a day      CURRENT OTHER MEDICATIONS:  benzonatate 100 milliGRAM(s) Oral every 8 hours PRN Cough  acetaminophen     Tablet .. 650 milliGRAM(s) Oral every 6 hours PRN Temp greater or equal to 38C (100.4F), Mild Pain (1 - 3)  oxyCODONE    IR 5 milliGRAM(s) Oral every 6 hours PRN Moderate to Severe Pain  pantoprazole    Tablet 40 milliGRAM(s) Oral before breakfast  senna 2 Tablet(s) Oral at bedtime  atorvastatin 80 milliGRAM(s) Oral at bedtime  glucagon  Injectable 1 milliGRAM(s) IntraMuscular once, Stop order after: 1 Doses  insulin lispro (ADMELOG) corrective regimen sliding scale   SubCutaneous three times a day before meals  insulin lispro (ADMELOG) corrective regimen sliding scale   SubCutaneous at bedtime  aspirin enteric coated 81 milliGRAM(s) Oral daily  folic acid 1 milliGRAM(s) Oral daily  heparin   Injectable 4400 Unit(s) IV Push every 6 hours PRN For aPTT less than 40  heparin  Infusion. 1200 Unit(s)/Hr (12 mL/Hr) IV Continuous <Continuous>  oxybutynin 5 milliGRAM(s) Oral at bedtime      LABS:	 	  ( 26 Mar 2022 17:17 )  Troponin T  1.70<H>,  CPK  X    , CKMB  X    , BNP X        , ( 26 Mar 2022 03:11 )  Troponin T  1.81<H>,  CPK  141  , CKMB  X    , BNP X        , ( 25 Mar 2022 23:07 )  Troponin T  1.75<H>,  CPK  124  , CKMB  X    , BNP X                              10.1   7.68  )-----------( 162      ( 27 Mar 2022 03:23 )             32.1     03-26  141  |  100  |  22.4<H>  ----------------------------<  113<H>  3.7   |  21.0<L>  |  0.82  Ca    9.6      26 Mar 2022 03:11  Mg     2.0     03-26  TPro  6.6  /  Alb  3.9  /  TBili  1.0  /  DBili  x   /  AST  58<H>  /  ALT  44<H>  /  AlkPhos  146<H>  03-25  PT/INR/PTT ( 27 Mar 2022 05:53 )                     :                       :      X            :       65.8                  .        .                   .              .           .       X           .                                       Lipid Profile: Date: 03-26 @ 03:11  Total cholesterol 135; Direct LDL: --; HDL: 27; Triglycerides:161  TSH: Thyroid Stimulating Hormone, Serum: 1.20 uIU/mL    TELEMETRY:  St 62 with PVC's noted.

## 2022-03-28 DIAGNOSIS — I25.110 ATHEROSCLEROTIC HEART DISEASE OF NATIVE CORONARY ARTERY WITH UNSTABLE ANGINA PECTORIS: ICD-10-CM

## 2022-03-28 DIAGNOSIS — I10 ESSENTIAL (PRIMARY) HYPERTENSION: ICD-10-CM

## 2022-03-28 DIAGNOSIS — I21.4 NON-ST ELEVATION (NSTEMI) MYOCARDIAL INFARCTION: ICD-10-CM

## 2022-03-28 DIAGNOSIS — I48.0 PAROXYSMAL ATRIAL FIBRILLATION: ICD-10-CM

## 2022-03-28 DIAGNOSIS — E11.59 TYPE 2 DIABETES MELLITUS WITH OTHER CIRCULATORY COMPLICATIONS: ICD-10-CM

## 2022-03-28 DIAGNOSIS — E78.5 HYPERLIPIDEMIA, UNSPECIFIED: ICD-10-CM

## 2022-03-28 DIAGNOSIS — I50.20 UNSPECIFIED SYSTOLIC (CONGESTIVE) HEART FAILURE: ICD-10-CM

## 2022-03-28 LAB
ANION GAP SERPL CALC-SCNC: 14 MMOL/L — SIGNIFICANT CHANGE UP (ref 5–17)
APTT BLD: 43.5 SEC — HIGH (ref 27.5–35.5)
APTT BLD: 62.1 SEC — HIGH (ref 27.5–35.5)
BLD GP AB SCN SERPL QL: SIGNIFICANT CHANGE UP
BUN SERPL-MCNC: 21.5 MG/DL — HIGH (ref 8–20)
CALCIUM SERPL-MCNC: 9.3 MG/DL — SIGNIFICANT CHANGE UP (ref 8.6–10.2)
CHLORIDE SERPL-SCNC: 96 MMOL/L — LOW (ref 98–107)
CO2 SERPL-SCNC: 24 MMOL/L — SIGNIFICANT CHANGE UP (ref 22–29)
CREAT SERPL-MCNC: 0.89 MG/DL — SIGNIFICANT CHANGE UP (ref 0.5–1.3)
EGFR: 97 ML/MIN/1.73M2 — SIGNIFICANT CHANGE UP
GLUCOSE BLDC GLUCOMTR-MCNC: 129 MG/DL — HIGH (ref 70–99)
GLUCOSE BLDC GLUCOMTR-MCNC: 156 MG/DL — HIGH (ref 70–99)
GLUCOSE BLDC GLUCOMTR-MCNC: 179 MG/DL — HIGH (ref 70–99)
GLUCOSE SERPL-MCNC: 122 MG/DL — HIGH (ref 70–99)
HCT VFR BLD CALC: 34.8 % — LOW (ref 39–50)
HGB BLD-MCNC: 11.1 G/DL — LOW (ref 13–17)
MAGNESIUM SERPL-MCNC: 1.8 MG/DL — SIGNIFICANT CHANGE UP (ref 1.6–2.6)
MCHC RBC-ENTMCNC: 28 PG — SIGNIFICANT CHANGE UP (ref 27–34)
MCHC RBC-ENTMCNC: 31.9 GM/DL — LOW (ref 32–36)
MCV RBC AUTO: 87.7 FL — SIGNIFICANT CHANGE UP (ref 80–100)
PA ADP PRP-ACNC: 212 PRU — SIGNIFICANT CHANGE UP (ref 180–376)
PHOSPHATE SERPL-MCNC: 2.9 MG/DL — SIGNIFICANT CHANGE UP (ref 2.4–4.7)
PLATELET # BLD AUTO: 171 K/UL — SIGNIFICANT CHANGE UP (ref 150–400)
POTASSIUM SERPL-MCNC: 3.7 MMOL/L — SIGNIFICANT CHANGE UP (ref 3.5–5.3)
POTASSIUM SERPL-SCNC: 3.7 MMOL/L — SIGNIFICANT CHANGE UP (ref 3.5–5.3)
RBC # BLD: 3.97 M/UL — LOW (ref 4.2–5.8)
RBC # FLD: 17.6 % — HIGH (ref 10.3–14.5)
SODIUM SERPL-SCNC: 134 MMOL/L — LOW (ref 135–145)
WBC # BLD: 9.08 K/UL — SIGNIFICANT CHANGE UP (ref 3.8–10.5)
WBC # FLD AUTO: 9.08 K/UL — SIGNIFICANT CHANGE UP (ref 3.8–10.5)

## 2022-03-28 PROCEDURE — 93880 EXTRACRANIAL BILAT STUDY: CPT | Mod: 26

## 2022-03-28 PROCEDURE — 93460 R&L HRT ART/VENTRICLE ANGIO: CPT | Mod: 26

## 2022-03-28 PROCEDURE — 93971 EXTREMITY STUDY: CPT | Mod: 26,LT

## 2022-03-28 PROCEDURE — 99152 MOD SED SAME PHYS/QHP 5/>YRS: CPT | Mod: 59

## 2022-03-28 PROCEDURE — 99232 SBSQ HOSP IP/OBS MODERATE 35: CPT

## 2022-03-28 RX ORDER — CHLORHEXIDINE GLUCONATE 213 G/1000ML
1 SOLUTION TOPICAL ONCE
Refills: 0 | Status: COMPLETED | OUTPATIENT
Start: 2022-03-28 | End: 2022-03-28

## 2022-03-28 RX ORDER — POTASSIUM CHLORIDE 20 MEQ
40 PACKET (EA) ORAL ONCE
Refills: 0 | Status: COMPLETED | OUTPATIENT
Start: 2022-03-28 | End: 2022-03-28

## 2022-03-28 RX ORDER — CHLORHEXIDINE GLUCONATE 213 G/1000ML
1 SOLUTION TOPICAL ONCE
Refills: 0 | Status: DISCONTINUED | OUTPATIENT
Start: 2022-03-28 | End: 2022-03-28

## 2022-03-28 RX ORDER — MAGNESIUM SULFATE 500 MG/ML
2 VIAL (ML) INJECTION ONCE
Refills: 0 | Status: COMPLETED | OUTPATIENT
Start: 2022-03-28 | End: 2022-03-28

## 2022-03-28 RX ORDER — FUROSEMIDE 40 MG
40 TABLET ORAL
Refills: 0 | Status: COMPLETED | OUTPATIENT
Start: 2022-03-28 | End: 2022-03-29

## 2022-03-28 RX ORDER — CEFUROXIME AXETIL 250 MG
1500 TABLET ORAL ONCE
Refills: 0 | Status: DISCONTINUED | OUTPATIENT
Start: 2022-03-28 | End: 2022-03-29

## 2022-03-28 RX ORDER — VANCOMYCIN HCL 1 G
1000 VIAL (EA) INTRAVENOUS ONCE
Refills: 0 | Status: DISCONTINUED | OUTPATIENT
Start: 2022-03-28 | End: 2022-03-29

## 2022-03-28 RX ORDER — CHLORHEXIDINE GLUCONATE 213 G/1000ML
15 SOLUTION TOPICAL ONCE
Refills: 0 | Status: COMPLETED | OUTPATIENT
Start: 2022-03-28 | End: 2022-03-28

## 2022-03-28 RX ORDER — ASPIRIN/CALCIUM CARB/MAGNESIUM 324 MG
81 TABLET ORAL ONCE
Refills: 0 | Status: COMPLETED | OUTPATIENT
Start: 2022-03-28 | End: 2022-03-28

## 2022-03-28 RX ADMIN — Medication 25 GRAM(S): at 20:23

## 2022-03-28 RX ADMIN — Medication 5 MILLIGRAM(S): at 22:14

## 2022-03-28 RX ADMIN — CHLORHEXIDINE GLUCONATE 15 MILLILITER(S): 213 SOLUTION TOPICAL at 22:15

## 2022-03-28 RX ADMIN — HEPARIN SODIUM 1200 UNIT(S)/HR: 5000 INJECTION INTRAVENOUS; SUBCUTANEOUS at 14:21

## 2022-03-28 RX ADMIN — Medication 1 MILLIGRAM(S): at 12:48

## 2022-03-28 RX ADMIN — Medication 650 MILLIGRAM(S): at 14:26

## 2022-03-28 RX ADMIN — Medication 25 MILLIGRAM(S): at 05:24

## 2022-03-28 RX ADMIN — Medication 40 MILLIGRAM(S): at 05:24

## 2022-03-28 RX ADMIN — Medication 100 MILLIGRAM(S): at 00:57

## 2022-03-28 RX ADMIN — Medication 650 MILLIGRAM(S): at 15:26

## 2022-03-28 RX ADMIN — Medication 40 MILLIEQUIVALENT(S): at 20:24

## 2022-03-28 RX ADMIN — Medication 1: at 12:49

## 2022-03-28 RX ADMIN — OXYCODONE HYDROCHLORIDE 5 MILLIGRAM(S): 5 TABLET ORAL at 14:27

## 2022-03-28 RX ADMIN — Medication 25 MILLIGRAM(S): at 18:00

## 2022-03-28 RX ADMIN — Medication 81 MILLIGRAM(S): at 12:48

## 2022-03-28 RX ADMIN — SENNA PLUS 2 TABLET(S): 8.6 TABLET ORAL at 22:14

## 2022-03-28 RX ADMIN — OXYCODONE HYDROCHLORIDE 5 MILLIGRAM(S): 5 TABLET ORAL at 15:57

## 2022-03-28 RX ADMIN — Medication 40 MILLIGRAM(S): at 18:00

## 2022-03-28 RX ADMIN — HEPARIN SODIUM 1350 UNIT(S)/HR: 5000 INJECTION INTRAVENOUS; SUBCUTANEOUS at 22:48

## 2022-03-28 RX ADMIN — CHLORHEXIDINE GLUCONATE 1 APPLICATION(S): 213 SOLUTION TOPICAL at 22:11

## 2022-03-28 RX ADMIN — Medication 81 MILLIGRAM(S): at 05:36

## 2022-03-28 RX ADMIN — ATORVASTATIN CALCIUM 80 MILLIGRAM(S): 80 TABLET, FILM COATED ORAL at 22:14

## 2022-03-28 NOTE — PROGRESS NOTE ADULT - SUBJECTIVE AND OBJECTIVE BOX
[facility]                                                         CARDIOLOGY AT [facility]                                                                  [address] [facility] [place] [address]- [facility]                                                         Telephone: [phone]. Fax:[phone]                                                                             PROGRESS NOTE    Reason for follow up: NSTEMI, new HFrEF  Update:     Right Heart Pressures:       RA:        RV:        PA:        PCWP:        CO:        CI:        SVR:        PVR:        :        Víctor:     Review of symptoms:   Cardiac:  No chest pain. No dyspnea. No palpitations.  Respiratory: no cough. No dyspnea  Gastrointestinal: No diarrhea. No abdominal pain. No bleeding.   Neuro: No focal neuro complaints.    Vitals:  T(C): 36.6 (03-28-22 @ 06:26), Max: 37.1 (03-27-22 @ 21:34)  HR: 61 (03-28-22 @ 06:26) (60 - 73)  BP: 108/56 (03-28-22 @ 06:26) (96/61 - 114/75)  RR: 16 (03-28-22 @ 06:26) (16 - 19)  SpO2: 98% (03-28-22 @ 06:26) (98% - 100%)    I&O's Summary  27 Mar 2022 07:01  -  28 Mar 2022 07:00  --------------------------------------------------------  IN: 744 mL / OUT: 1170 mL / NET: -426 mL    Weight (kg): 68.039 (03-26 @ 10:52)    PHYSICAL EXAM:  Appearance: Comfortable. No acute distress  HEENT:  Atraumatic. Normocephalic.  Normal oral mucosa  Neurologic: A & O x 3, no gross focal deficits.  Cardiovascular: RRR S1 S2, No murmur, no rubs/gallops. No JVD  Respiratory: Lungs clear to auscultation, unlabored   Gastrointestinal:  Soft, Non-tender, + BS  Lower Extremities: No edema   Upper Extremities: Right radial band, no bleeding, fingers warm with good cap refil.  Psychiatry: Patient is calm. No agitation.   Skin: warm and dry.    MEDICATIONS  (STANDING):  aspirin enteric coated 81 milliGRAM(s) Oral daily  atorvastatin 80 milliGRAM(s) Oral at bedtime  dextrose 5%. 1000 milliLiter(s) (50 mL/Hr) IV Continuous <Continuous>  dextrose 5%. 1000 milliLiter(s) (100 mL/Hr) IV Continuous <Continuous>  dextrose 50% Injectable 25 Gram(s) IV Push once  dextrose 50% Injectable 12.5 Gram(s) IV Push once  dextrose 50% Injectable 25 Gram(s) IV Push once  folic acid 1 milliGRAM(s) Oral daily  furosemide   Injectable 40 milliGRAM(s) IV Push daily  glucagon  Injectable 1 milliGRAM(s) IntraMuscular once  heparin  Infusion. 1200 Unit(s)/Hr (12 mL/Hr) IV Continuous <Continuous>  insulin lispro (ADMELOG) corrective regimen sliding scale   SubCutaneous three times a day before meals  insulin lispro (ADMELOG) corrective regimen sliding scale   SubCutaneous at bedtime  metoprolol tartrate 25 milliGRAM(s) Oral two times a day  oxybutynin 5 milliGRAM(s) Oral at bedtime  pantoprazole    Tablet 40 milliGRAM(s) Oral before breakfast  senna 2 Tablet(s) Oral at bedtime    MEDICATIONS  (PRN):  acetaminophen     Tablet .. 650 milliGRAM(s) Oral every 6 hours PRN Temp greater or equal to 38C (100.4F), Mild Pain (1 - 3)  benzonatate 100 milliGRAM(s) Oral every 8 hours PRN Cough  dextrose Oral Gel 15 Gram(s) Oral once PRN Blood Glucose LESS THAN 70 milliGRAM(s)/deciliter  heparin   Injectable 4400 Unit(s) IV Push every 6 hours PRN For aPTT less than 40  oxyCODONE    IR 5 milliGRAM(s) Oral every 6 hours PRN Moderate to Severe Pain    LABS:	 	  CARDIAC MARKERS ( 27 Mar 2022 13:57 ): 1.21 ng/mL,   CARDIAC MARKERS ( 26 Mar 2022 17:17 ): 1.70 ng/mL    CARDIAC MARKERS ( 26 Mar 2022 03:11 ): 1.81 ng/mL     CARDIAC MARKERS ( 25 Mar 2022 23:07 ): 1.75 ng/mL  CARDIAC MARKERS ( 25 Mar 2022 17:27 ): 1.53 ng/mL, p-BNP 25 Mar 2022 17:27: 5573 pg/mL                          11.1   9.08  )-----------( 171      ( 28 Mar 2022 06:16 )             34.8     03-28    134  |  96    |  21.5  ----------------------------<  122  3.7   |  24.0  |  0.89    Ca    9.3      28 Mar 2022 06:16  Phos  2.9     03-28  Mg     1.8     03-28    Lipids       Total Cholesterol: 135       Triglycerides: 161       HDL: 27       Non-HDL: 108       LDL: 76    HgbA1C: 5.7%    TELEMETRY:   ECG:  	    DIAGNOSTIC TESTING:  [X] Echocardiogram:   Left Ventricle: The left ventricular internal cavity size is normal. Global LV systolic function was moderately decreased. Left ventricular ejection fraction, by visual estimation, is 35 to 40%. Spectral Doppler shows pseudonormal pattern of left ventricular myocardial filling (Grade II diastolic dysfunction). Elevated mean left atrial pressure.  LV Wall Scoring: The apical septal segment and apex are akinetic.  Right Ventricle: Normal right ventricular size and function.  Left Atrium: Normal left atrial size.  Right Atrium: Normal right atrial size.  Pericardium: There is no evidence of pericardial effusion.  Mitral Valve: Structurally normal mitral valve, with normal leaflet excursion. Mild thickening of the anterior and posterior mitral valve leaflets. There is mild mitral annular calcification. Mild mitral valve regurgitation is seen. The MR jet is centrally-directed.  Tricuspid Valve: The tricuspid valve is normal in structure. Trivial tricuspid regurgitation is visualized. Adequate TR velocity was not obtained to accurately assess RVSP.  Aortic Valve: The aortic valve is trileaflet. No evidence of aortic stenosis. Sclerotic aortic valve with normal opening. No evidence of aortic valve regurgitation is seen.  Pulmonic Valve: Moderate pulmonic valve regurgitation.  Aorta: The aortic root is normal in size and structure. Mildly dilated Ao root at 3.9cm.  Venous: The inferior vena cava was dilated, with respiratory size variation less than 50%.                                                            [facility]                                                         CARDIOLOGY AT [facility]                                                                  [address] [place] [address]                                                         Telephone: [phone]. Fax:[phone]                                                                             PROGRESS NOTE    Reason for follow up: NSTEMI, new HFrEF  Update: The patient had a left and right heart catheterization which showed severe multivessel CAD.  LHC:       LM: 50% calcified/ulcerated plaque       LAD: Proximal  with collaterals from the RCA       Ramus: 75% stenosis       LCX: 99% mid stenosis with ZORAN 1 flow       RCA: 80% mid ulcerated/complex stenosis  Right Heart Pressures:       RA:        RV:        PA:        PCWP:        CO:        CI:        SVR:        PVR:        :        Víctor:     Review of symptoms:   Cardiac:  No chest pain. No dyspnea. No palpitations.  Respiratory: no cough. No dyspnea  Gastrointestinal: No diarrhea. No abdominal pain. No bleeding.   Neuro: No focal neuro complaints.    Vitals:  T(C): 36.6 (03-28-22 @ 06:26), Max: 37.1 (03-27-22 @ 21:34)  HR: 61 (03-28-22 @ 06:26) (60 - 73)  BP: 108/56 (03-28-22 @ 06:26) (96/61 - 114/75)  RR: 16 (03-28-22 @ 06:26) (16 - 19)  SpO2: 98% (03-28-22 @ 06:26) (98% - 100%)    I&O's Summary  27 Mar 2022 07:01  -  28 Mar 2022 07:00  --------------------------------------------------------  IN: 744 mL / OUT: 1170 mL / NET: -426 mL    Weight (kg): 68.039 (03-26 @ 10:52)    PHYSICAL EXAM:  Appearance: Comfortable. No acute distress  HEENT:  Atraumatic. Normocephalic.  Normal oral mucosa  Neurologic: A & O x 3, no gross focal deficits.  Cardiovascular: RRR S1 S2, No murmur, no rubs/gallops. No JVD  Respiratory: Lungs clear to auscultation, unlabored   Gastrointestinal:  Soft, Non-tender, + BS  Lower Extremities: No edema   Upper Extremities: Right radial band, no bleeding, fingers warm with good cap refil.  Psychiatry: Patient is calm. No agitation.   Skin: warm and dry.    MEDICATIONS  (STANDING):  aspirin enteric coated 81 milliGRAM(s) Oral daily  atorvastatin 80 milliGRAM(s) Oral at bedtime  dextrose 5%. 1000 milliLiter(s) (50 mL/Hr) IV Continuous <Continuous>  dextrose 5%. 1000 milliLiter(s) (100 mL/Hr) IV Continuous <Continuous>  dextrose 50% Injectable 25 Gram(s) IV Push once  dextrose 50% Injectable 12.5 Gram(s) IV Push once  dextrose 50% Injectable 25 Gram(s) IV Push once  folic acid 1 milliGRAM(s) Oral daily  furosemide   Injectable 40 milliGRAM(s) IV Push daily  glucagon  Injectable 1 milliGRAM(s) IntraMuscular once  heparin  Infusion. 1200 Unit(s)/Hr (12 mL/Hr) IV Continuous <Continuous>  insulin lispro (ADMELOG) corrective regimen sliding scale   SubCutaneous three times a day before meals  insulin lispro (ADMELOG) corrective regimen sliding scale   SubCutaneous at bedtime  metoprolol tartrate 25 milliGRAM(s) Oral two times a day  oxybutynin 5 milliGRAM(s) Oral at bedtime  pantoprazole    Tablet 40 milliGRAM(s) Oral before breakfast  senna 2 Tablet(s) Oral at bedtime    MEDICATIONS  (PRN):  acetaminophen     Tablet .. 650 milliGRAM(s) Oral every 6 hours PRN Temp greater or equal to 38C (100.4F), Mild Pain (1 - 3)  benzonatate 100 milliGRAM(s) Oral every 8 hours PRN Cough  dextrose Oral Gel 15 Gram(s) Oral once PRN Blood Glucose LESS THAN 70 milliGRAM(s)/deciliter  heparin   Injectable 4400 Unit(s) IV Push every 6 hours PRN For aPTT less than 40  oxyCODONE    IR 5 milliGRAM(s) Oral every 6 hours PRN Moderate to Severe Pain    LABS:	 	  CARDIAC MARKERS ( 27 Mar 2022 13:57 ): 1.21 ng/mL,   CARDIAC MARKERS ( 26 Mar 2022 17:17 ): 1.70 ng/mL    CARDIAC MARKERS ( 26 Mar 2022 03:11 ): 1.81 ng/mL     CARDIAC MARKERS ( 25 Mar 2022 23:07 ): 1.75 ng/mL  CARDIAC MARKERS ( 25 Mar 2022 17:27 ): 1.53 ng/mL, p-BNP 25 Mar 2022 17:27: 5573 pg/mL                          11.1   9.08  )-----------( 171      ( 28 Mar 2022 06:16 )             34.8     03-28    134  |  96    |  21.5  ----------------------------<  122  3.7   |  24.0  |  0.89    Ca    9.3      28 Mar 2022 06:16  Phos  2.9     03-28  Mg     1.8     03-28    Lipids       Total Cholesterol: 135       Triglycerides: 161       HDL: 27       Non-HDL: 108       LDL: 76    HgbA1C: 5.7%    TELEMETRY:   ECG:  	    DIAGNOSTIC TESTING:  [X] Echocardiogram:   Left Ventricle: The left ventricular internal cavity size is normal. Global LV systolic function was moderately decreased. Left ventricular ejection fraction, by visual estimation, is 35 to 40%. Spectral Doppler shows pseudonormal pattern of left ventricular myocardial filling (Grade II diastolic dysfunction). Elevated mean left atrial pressure.  LV Wall Scoring: The apical septal segment and apex are akinetic.  Right Ventricle: Normal right ventricular size and function.  Left Atrium: Normal left atrial size.  Right Atrium: Normal right atrial size.  Pericardium: There is no evidence of pericardial effusion.  Mitral Valve: Structurally normal mitral valve, with normal leaflet excursion. Mild thickening of the anterior and posterior mitral valve leaflets. There is mild mitral annular calcification. Mild mitral valve regurgitation is seen. The MR jet is centrally-directed.  Tricuspid Valve: The tricuspid valve is normal in structure. Trivial tricuspid regurgitation is visualized. Adequate TR velocity was not obtained to accurately assess RVSP.  Aortic Valve: The aortic valve is trileaflet. No evidence of aortic stenosis. Sclerotic aortic valve with normal opening. No evidence of aortic valve regurgitation is seen.  Pulmonic Valve: Moderate pulmonic valve regurgitation.  Aorta: The aortic root is normal in size and structure. Mildly dilated Ao root at 3.9cm.  Venous: The inferior vena cava was dilated, with respiratory size variation less than 50%.                                                            [facility]                                                         CARDIOLOGY AT [place] [address]                                                         Telephone: [phone]. Fax:[phone]                                                                             PROGRESS NOTE    Reason for follow up: NSTEMI, new HFrEF  Update: The patient had a left and right heart catheterization which showed severe multivessel CAD.  LHC:       LVEDP: 21       LM: 50% calcified/ulcerated plaque       LAD: Proximal  with collaterals from the RCA       Ramus: 75% stenosis       LCX: 99% mid stenosis with ZORAN 1 flow       RCA: 80% mid ulcerated/complex stenosis  Right Heart Pressures:       RA: 5       RV: 36/6       PA: 31/8/19       PCWP: 15       CO: 5.35 LPM       CI: 2.89 LPM/m2       SVR: 13.64 Wood Units       PVR: 0.75 Wood Units       : 0.93 Watt       Víctor: 4.6    Review of symptoms:   Cardiac:  No chest pain. No dyspnea. No palpitations.  Respiratory: no cough. No dyspnea  Gastrointestinal: No diarrhea. No abdominal pain. No bleeding.   Neuro: No focal neuro complaints.    Vitals:  T(C): 36.6 (03-28-22 @ 06:26), Max: 37.1 (03-27-22 @ 21:34)  HR: 61 (03-28-22 @ 06:26) (60 - 73)  BP: 108/56 (03-28-22 @ 06:26) (96/61 - 114/75)  RR: 16 (03-28-22 @ 06:26) (16 - 19)  SpO2: 98% (03-28-22 @ 06:26) (98% - 100%)    I&O's Summary  27 Mar 2022 07:01  -  28 Mar 2022 07:00  --------------------------------------------------------  IN: 744 mL / OUT: 1170 mL / NET: -426 mL    Weight (kg): 68.039 (03-26 @ 10:52)    PHYSICAL EXAM:  Appearance: Comfortable. No acute distress  HEENT:  Atraumatic. Normocephalic.  Normal oral mucosa  Neurologic: A & O x 3, no gross focal deficits.  Cardiovascular: RRR S1 S2, No murmur, no rubs/gallops. No JVD  Respiratory: B/L crackle in lower fields, unlabored   Gastrointestinal:  Soft, Non-tender, + BS  Lower Extremities: No edema   Upper Extremities: Right radial band, right brachial sheath, no bleeding, fingers warm with good cap refil.  Psychiatry: Patient is calm. No agitation.   Skin: warm and dry.    MEDICATIONS  (STANDING):  aspirin enteric coated 81 milliGRAM(s) Oral daily  atorvastatin 80 milliGRAM(s) Oral at bedtime  dextrose 5%. 1000 milliLiter(s) (50 mL/Hr) IV Continuous <Continuous>  dextrose 5%. 1000 milliLiter(s) (100 mL/Hr) IV Continuous <Continuous>  dextrose 50% Injectable 25 Gram(s) IV Push once  dextrose 50% Injectable 12.5 Gram(s) IV Push once  dextrose 50% Injectable 25 Gram(s) IV Push once  folic acid 1 milliGRAM(s) Oral daily  furosemide   Injectable 40 milliGRAM(s) IV Push daily  glucagon  Injectable 1 milliGRAM(s) IntraMuscular once  heparin  Infusion. 1200 Unit(s)/Hr (12 mL/Hr) IV Continuous <Continuous>  insulin lispro (ADMELOG) corrective regimen sliding scale   SubCutaneous three times a day before meals  insulin lispro (ADMELOG) corrective regimen sliding scale   SubCutaneous at bedtime  metoprolol tartrate 25 milliGRAM(s) Oral two times a day  oxybutynin 5 milliGRAM(s) Oral at bedtime  pantoprazole    Tablet 40 milliGRAM(s) Oral before breakfast  senna 2 Tablet(s) Oral at bedtime    MEDICATIONS  (PRN):  acetaminophen     Tablet .. 650 milliGRAM(s) Oral every 6 hours PRN Temp greater or equal to 38C (100.4F), Mild Pain (1 - 3)  benzonatate 100 milliGRAM(s) Oral every 8 hours PRN Cough  dextrose Oral Gel 15 Gram(s) Oral once PRN Blood Glucose LESS THAN 70 milliGRAM(s)/deciliter  heparin   Injectable 4400 Unit(s) IV Push every 6 hours PRN For aPTT less than 40  oxyCODONE    IR 5 milliGRAM(s) Oral every 6 hours PRN Moderate to Severe Pain    LABS:	 	  CARDIAC MARKERS ( 27 Mar 2022 13:57 ): 1.21 ng/mL,   CARDIAC MARKERS ( 26 Mar 2022 17:17 ): 1.70 ng/mL    CARDIAC MARKERS ( 26 Mar 2022 03:11 ): 1.81 ng/mL     CARDIAC MARKERS ( 25 Mar 2022 23:07 ): 1.75 ng/mL  CARDIAC MARKERS ( 25 Mar 2022 17:27 ): 1.53 ng/mL, p-BNP 25 Mar 2022 17:27: 5573 pg/mL                          11.1   9.08  )-----------( 171      ( 28 Mar 2022 06:16 )             34.8     03-28    134  |  96    |  21.5  ----------------------------<  122  3.7   |  24.0  |  0.89    Ca    9.3      28 Mar 2022 06:16  Phos  2.9     03-28  Mg     1.8     03-28    Lipids       Total Cholesterol: 135       Triglycerides: 161       HDL: 27       Non-HDL: 108       LDL: 76    HgbA1C: 5.7%    TELEMETRY:   ECG:  	    DIAGNOSTIC TESTING:  [X] Echocardiogram:   Left Ventricle: The left ventricular internal cavity size is normal. Global LV systolic function was moderately decreased. Left ventricular ejection fraction, by visual estimation, is 35 to 40%. Spectral Doppler shows pseudonormal pattern of left ventricular myocardial filling (Grade II diastolic dysfunction). Elevated mean left atrial pressure.  LV Wall Scoring: The apical septal segment and apex are akinetic.  Right Ventricle: Normal right ventricular size and function.  Left Atrium: Normal left atrial size.  Right Atrium: Normal right atrial size.  Pericardium: There is no evidence of pericardial effusion.  Mitral Valve: Structurally normal mitral valve, with normal leaflet excursion. Mild thickening of the anterior and posterior mitral valve leaflets. There is mild mitral annular calcification. Mild mitral valve regurgitation is seen. The MR jet is centrally-directed.  Tricuspid Valve: The tricuspid valve is normal in structure. Trivial tricuspid regurgitation is visualized. Adequate TR velocity was not obtained to accurately assess RVSP.  Aortic Valve: The aortic valve is trileaflet. No evidence of aortic stenosis. Sclerotic aortic valve with normal opening. No evidence of aortic valve regurgitation is seen.  Pulmonic Valve: Moderate pulmonic valve regurgitation.  Aorta: The aortic root is normal in size and structure. Mildly dilated Ao root at 3.9cm.  Venous: The inferior vena cava was dilated, with respiratory size variation less than 50%.

## 2022-03-28 NOTE — CHART NOTE - NSCHARTNOTEFT_GEN_A_CORE
Department of Cardiology                                                                  [facility]/[place] [address] [address] [facility]                                                            Telephone: [phone]. Fax:[phone]                                                                                   Pre Cath Note    61y Male     Planned Procedure: R&LHC    Pertinent Prior Medications:        Antiplatelet: N/A       Aspirin: 81 mg daily       Statin: Lipitor 80 mg daily       Beta Blocker: Metoprolol 25 mg BID       CCB: N/A       Other Antianginal: N/A       ACEI: N/A    ASA: 3  Mallampati: 2  GFR: 97  Adjusted Bleeding Risk: 1.6%    HPI: 61yoM hx colon CA s/p resection, prostate CA s/p RT, both in remission, also hx HTN, HLD, DM, recently admitted at [facility] from 3/18 to 3/20 under orthopedic service for L-total knee replacement (3/18/22), presenting with 3 days of dyspnea at rest and on exertion associated with orthopnea, productive cough and some increased bilateral leg swelling.  Pt denies any occurrences of chest pain, palpitations, lightheadedness or syncope.  He reports some L-left swelling since the knee surgery but reports it is worse and also noticed some minor ankle swelling on the right side.  On admission, pt hypoxic to 86% on room air. Admission labs notable for elevated troponin of 1.53 and proBNP of >5500.  CTA negative for PE but showed bilateral pleural effusion and evidence of pulmonary edema.  (26 Mar 2022 00:06)    Nuclear Stress Test: N/A    Echo:   Left Ventricle: The left ventricular internal cavity size is normal. Global LV systolic function was moderately decreased. Left ventricular ejection fraction, by visual estimation, is 35 to 40%. Spectral Doppler shows pseudonormal pattern of left ventricular myocardial filling (Grade II diastolic dysfunction). Elevated mean left atrial pressure.  LV Wall Scoring: The apical septal segment and apex are akinetic.  Right Ventricle: Normal right ventricular size and function.  Left Atrium: Normal left atrial size.  Right Atrium: Normal right atrial size.  Pericardium: There is no evidence of pericardial effusion.  Mitral Valve: Structurally normal mitral valve, with normal leaflet excursion. Mild thickening of the anterior and posterior mitral valve leaflets. There is mild mitral annular calcification. Mild mitral valve regurgitation is seen. The MR jet is centrally-directed.  Tricuspid Valve: The tricuspid valve is normal in structure. Trivial tricuspid regurgitation is visualized. Adequate TR velocity was not obtained to accurately assess RVSP.  Aortic Valve: The aortic valve is trileaflet. No evidence of aortic stenosis. Sclerotic aortic valve with normal opening. No evidence of aortic valve regurgitation is seen.  Pulmonic Valve: Moderate pulmonic valve regurgitation.  Aorta: The aortic root is normal in size and structure. Mildly dilated Ao root at 3.9cm.  Venous: The inferior vena cava was dilated, with respiratory size variation less than 50%.    Allergies: No Known Allergies    PAST MEDICAL & SURGICAL HISTORY:  Hypertension  Hyperlipidemia  COVID-19 vaccine series completed  Colon cancer  Prostate cancer: Treated with Radiation 2015  Type 2 diabetes mellitus  Unilateral primary osteoarthritis, left knee  S/P colon resection  S/P hernia repair    Home Medications:  acetaminophen 325 mg oral tablet: 2 tab(s) orally every 8 hours (26 Mar 2022 04:35)  Bystolic 5 mg oral tablet: 1 tab(s) orally once a day (26 Mar 2022 04:35)  folic acid 1 mg oral tablet: 1 tab(s) orally once a day (26 Mar 2022 04:35)  metFORMIN 1000 mg oral tablet, extended release: 1 tab(s) orally once a day (26 Mar 2022 04:35)  oxybutynin 5 mg oral tablet: 2  orally once a day (at bedtime) (26 Mar 2022 04:35)  pravastatin 40 mg oral tablet: 1 tab(s) orally once a day (26 Mar 2022 04:35)    Physical Examination:   General: Awake, alert, speech clear, no acute distress.  Neck: No bruit, normal jugular venous pressures  Chest: Clear CTA, S1, S2, no murmur, RRR  Extremities: No edema                          11.1   9.08  )-----------( 171      ( 28 Mar 2022 06:16 )             34.8     03-28    134  |  96    |  21.5  ----------------------------<  122  3.7   |  24.0  |  0.89    Ca    9.3      28 Mar 2022 06:16  Phos  2.9     03-28  Mg     1.8     03-28      CARDIAC MARKERS ( 27 Mar 2022 13:57 ): 1.21 ng/mL    CARDIAC MARKERS ( 26 Mar 2022 17:17 ): 1.70 ng/mL        PTT - ( 28 Mar 2022 06:16 )  PTT:62.1 sec    Assessment and Plan:   The patient was examined and interviewed by me today. There has been no change from the original history and physical except as noted above.    Problem List:   1. NSTEMI  LHC and possible intervention    2. New HFrEF  RHC    3. PAF Department of Cardiology                                                                  [facility]/[place] [address] [address] [facility]                                                            Telephone: [phone]. Fax:[phone]                                                                                   Pre Cath Note    61y Male     Planned Procedure: R&LHC    Pertinent Prior Medications:        Antiplatelet: N/A       Aspirin: 81 mg daily       Statin: Lipitor 80 mg daily       Beta Blocker: Metoprolol 25 mg BID       CCB: N/A       Other Antianginal: N/A       ACEI: N/A    ASA: 3  Mallampati: 2  GFR: 97  Adjusted Bleeding Risk: 1.6%    HPI: 61yoM hx colon CA s/p resection, prostate CA s/p RT, both in remission, also hx HTN, HLD, DM, recently admitted at [facility] from 3/18 to 3/20 under orthopedic service for L-total knee replacement (3/18/22), presenting with 3 days of dyspnea at rest and on exertion associated with orthopnea, productive cough and some increased bilateral leg swelling.  Pt denies any occurrences of chest pain, palpitations, lightheadedness or syncope.  He reports some L-left swelling since the knee surgery but reports it is worse and also noticed some minor ankle swelling on the right side.  On admission, pt hypoxic to 86% on room air. Admission labs notable for elevated troponin of 1.53 and proBNP of >5500.  CTA negative for PE but showed bilateral pleural effusion and evidence of pulmonary edema.  (26 Mar 2022 00:06)    Nuclear Stress Test: N/A    Echo:   Left Ventricle: The left ventricular internal cavity size is normal. Global LV systolic function was moderately decreased. Left ventricular ejection fraction, by visual estimation, is 35 to 40%. Spectral Doppler shows pseudonormal pattern of left ventricular myocardial filling (Grade II diastolic dysfunction). Elevated mean left atrial pressure.  LV Wall Scoring: The apical septal segment and apex are akinetic.  Right Ventricle: Normal right ventricular size and function.  Left Atrium: Normal left atrial size.  Right Atrium: Normal right atrial size.  Pericardium: There is no evidence of pericardial effusion.  Mitral Valve: Structurally normal mitral valve, with normal leaflet excursion. Mild thickening of the anterior and posterior mitral valve leaflets. There is mild mitral annular calcification. Mild mitral valve regurgitation is seen. The MR jet is centrally-directed.  Tricuspid Valve: The tricuspid valve is normal in structure. Trivial tricuspid regurgitation is visualized. Adequate TR velocity was not obtained to accurately assess RVSP.  Aortic Valve: The aortic valve is trileaflet. No evidence of aortic stenosis. Sclerotic aortic valve with normal opening. No evidence of aortic valve regurgitation is seen.  Pulmonic Valve: Moderate pulmonic valve regurgitation.  Aorta: The aortic root is normal in size and structure. Mildly dilated Ao root at 3.9cm.  Venous: The inferior vena cava was dilated, with respiratory size variation less than 50%.    Allergies: No Known Allergies    PAST MEDICAL & SURGICAL HISTORY:  Hypertension  Hyperlipidemia  COVID-19 vaccine series completed  Colon cancer  Prostate cancer: Treated with Radiation 2015  Type 2 diabetes mellitus  Unilateral primary osteoarthritis, left knee  S/P colon resection  S/P hernia repair    Home Medications:  acetaminophen 325 mg oral tablet: 2 tab(s) orally every 8 hours (26 Mar 2022 04:35)  Bystolic 5 mg oral tablet: 1 tab(s) orally once a day (26 Mar 2022 04:35)  folic acid 1 mg oral tablet: 1 tab(s) orally once a day (26 Mar 2022 04:35)  metFORMIN 1000 mg oral tablet, extended release: 1 tab(s) orally once a day (26 Mar 2022 04:35)  oxybutynin 5 mg oral tablet: 2  orally once a day (at bedtime) (26 Mar 2022 04:35)  pravastatin 40 mg oral tablet: 1 tab(s) orally once a day (26 Mar 2022 04:35)    Physical Examination:   General: Awake, alert, speech clear, no acute distress.  Neck: No bruit, normal jugular venous pressures  Chest: B/L crackles lower fields, S1, S2, no murmur, RRR  Extremities: No edema                          11.1   9.08  )-----------( 171      ( 28 Mar 2022 06:16 )             34.8     03-28    134  |  96    |  21.5  ----------------------------<  122  3.7   |  24.0  |  0.89    Ca    9.3      28 Mar 2022 06:16  Phos  2.9     03-28  Mg     1.8     03-28      CARDIAC MARKERS ( 27 Mar 2022 13:57 ): 1.21 ng/mL    CARDIAC MARKERS ( 26 Mar 2022 17:17 ): 1.70 ng/mL        PTT - ( 28 Mar 2022 06:16 )  PTT:62.1 sec    Assessment and Plan:   The patient was examined and interviewed by me today. There has been no change from the original history and physical except as noted above.    Problem List:   1. NSTEMI  LHC and possible intervention    2. New HFrEF  RHC    3. PAF

## 2022-03-28 NOTE — PROGRESS NOTE ADULT - PROBLEM SELECTOR PLAN 1
Acute ACC/AHA stage C, NYHA functional class 3-4 HFrEF  GDMT       Beta Blocker: Metoprolol 25 mg BID, will transition to succinate       RAAS Inhibitor: Will initiate Entresto 50 mg BID after sufficient diuresis.       MRA:        Diuretic:        Other:   Strict I&O's  Daily standing weights (if able) S/P LHC: Severe multivessel CAD.   CT surgery evaluation for CABG (Dr. Nicholas)  GDMT:        DAPT: Aspirin 81 mg daily, no Plavix/Effient/Brilinta with pending CT surgery.       Statin: Lipitor 80 mg daily       Beta Blocker: Metoprolol 25 mg BID       Other Antianginals: N/A       Restart heparin drip 4 hours after band removal at prior rate with no bolus.       Aggressive cardiovascular risk reduction and lifestyle modification.  Remove radial band at 9:30AM  Wrist restrictions explained to patient. S/P LHC: Severe multivessel CAD.   CT surgery evaluation for CABG (Dr. Nicholas)  GDMT:        DAPT: Aspirin 81 mg daily, no Plavix/Effient/Brilinta with pending CT surgery.       Statin: Lipitor 80 mg daily       Beta Blocker: Metoprolol 25 mg BID       Other Antianginals: N/A       Restart heparin drip 4 hours after band removal at prior rate with no bolus.       Aggressive cardiovascular risk reduction and lifestyle modification.  Remove radial band and brachial sheath at 9:30AM  Wrist restrictions explained to patient.

## 2022-03-28 NOTE — PROGRESS NOTE ADULT - NS ATTEND AMEND GEN_ALL_CORE FT
Patient was seen and examined at bedside and see post [facility] which showed      LVEDP: 21, LM: 50% calcified/ulcerated plaque,  LAD: Proximal  with collaterals from the RCA, Ramus: 75% stenosis, LCX: 99% mid stenosis with ZORAN 1 flow, RCA: 80% mid ulcerated/complex stenosis  discussed plan with patient for evaluation for CABG w/ Dr. Nicholas   Based on LVEDP patient is still volume overloaded and will continue with Lasix 40mg IVP q 12hrs for another 24hrs and then Lasix 40mg po q daily   continue with ASA and HOLD PLavix   check P2Y12 inhibitor level   continue with Lipitor, continue with BB and HOLD Entresto at this time   no recurrence of AF: but upon discharge would transition to Eliquis, continue with Hep ggt   will follow up     Ludy Woods D.O. [facility]  Cardiology/Vascular Cardiology -[facility]   Telephone # [phone]

## 2022-03-28 NOTE — PROGRESS NOTE ADULT - PROBLEM SELECTOR PROBLEM 1
HFrEF (heart failure with reduced ejection fraction) Coronary artery disease involving native coronary artery of native heart with unstable angina pector

## 2022-03-28 NOTE — PROGRESS NOTE ADULT - SUBJECTIVE AND OBJECTIVE BOX
Ortho Post Op Check    Name: RENATO LEVI    MR #: [id number]    Procedure: S/P Left TKA  Surgeon: Dr Ventura    Pt comfortable without complaints, pain controlled  Denies CP, SOB, N/V, numbness/tingling     MEDICATIONS  (STANDING):  atorvastatin 80 milliGRAM(s) Oral at bedtime  cefuroxime  IVPB 1500 milliGRAM(s) IV Intermittent once  chlorhexidine 0.12% Liquid 15 milliLiter(s) Swish and Spit once  chlorhexidine 4% Liquid 1 Application(s) Topical once  dextrose 5%. 1000 milliLiter(s) (50 mL/Hr) IV Continuous <Continuous>  dextrose 5%. 1000 milliLiter(s) (100 mL/Hr) IV Continuous <Continuous>  dextrose 50% Injectable 25 Gram(s) IV Push once  dextrose 50% Injectable 12.5 Gram(s) IV Push once  dextrose 50% Injectable 25 Gram(s) IV Push once  folic acid 1 milliGRAM(s) Oral daily  furosemide   Injectable 40 milliGRAM(s) IV Push two times a day  glucagon  Injectable 1 milliGRAM(s) IntraMuscular once  heparin  Infusion. 1200 Unit(s)/Hr (12 mL/Hr) IV Continuous <Continuous>  insulin lispro (ADMELOG) corrective regimen sliding scale   SubCutaneous three times a day before meals  insulin lispro (ADMELOG) corrective regimen sliding scale   SubCutaneous at bedtime  metoprolol tartrate 25 milliGRAM(s) Oral two times a day  oxybutynin 5 milliGRAM(s) Oral at bedtime  pantoprazole    Tablet 40 milliGRAM(s) Oral before breakfast  senna 2 Tablet(s) Oral at bedtime  vancomycin  IVPB 1000 milliGRAM(s) IV Intermittent once    MEDICATIONS  (PRN):  acetaminophen     Tablet .. 650 milliGRAM(s) Oral every 6 hours PRN Temp greater or equal to 38C (100.4F), Mild Pain (1 - 3)  benzonatate 100 milliGRAM(s) Oral every 8 hours PRN Cough  dextrose Oral Gel 15 Gram(s) Oral once PRN Blood Glucose LESS THAN 70 milliGRAM(s)/deciliter  heparin   Injectable 4400 Unit(s) IV Push every 6 hours PRN For aPTT less than 40  oxyCODONE    IR 5 milliGRAM(s) Oral every 6 hours PRN Moderate to Severe Pain      General Exam:  Vital Signs Last 24 Hrs  T(C): 36.7 (03-28-22 @ 17:58), Max: 37.3 (03-28-22 @ 16:03)  T(F): 98.1 (03-28-22 @ 17:58), Max: 99.2 (03-28-22 @ 16:03)  HR: 71 (03-28-22 @ 17:58) (71 - 74)  BP: 111/61 (03-28-22 @ 17:58) (100/60 - 111/61)  BP(mean): 78 (03-28-22 @ 17:58) (73 - 78)  RR: 18 (03-28-22 @ 17:58) (16 - 18)  SpO2: 99% (03-28-22 @ 17:58) (93% - 99%)    General: Pt Alert and oriented, NAD, controlled pain.  Knee- prineo intact. No dc. No erythema  Pulses: 2+ dorsalis pedis pulse. Cap refill < 2 sec.  Sensation: Grossly intact to light touch without deficit.  Motor: + EHL/FHL/TA/GS      A/P: 61yMale admitted for cardiac procedure, s/p left TKA  - Stable  - Pain Control  - DVT ppx  - mepilex dressing applied  - f/u with Dr Ventura in office  d/w Dr Ventura

## 2022-03-28 NOTE — CONSULT NOTE ADULT - PROBLEM SELECTOR RECOMMENDATION 6
d/w Dr Nicholas  Plan for CABG, ALANNA closure in AM  hold heparin gtt at 5am tomorrow  NPO after midnight  Will need Pfts, Carotids

## 2022-03-28 NOTE — PROGRESS NOTE ADULT - SUBJECTIVE AND OBJECTIVE BOX
HPI  Pt is a 60yo M admitted to [facility] for NSTIMI with new CHF.  Pt was seen and examined in the cath lab. Denies of any SOB, palpitation. Reviewed 3 vessel dz with pt.     Vital Signs Last 24 Hrs  T(C): 36.7 (28 Mar 2022 11:00), Max: 37.1 (27 Mar 2022 21:34)  T(F): 98 (28 Mar 2022 11:00), Max: 98.7 (27 Mar 2022 21:34)  HR: 75 (28 Mar 2022 11:00) (57 - 75)  BP: 92/53 (28 Mar 2022 10:15) (92/53 - 114/75)  BP(mean): --  RR: 16 (28 Mar 2022 11:00) (15 - 19)  SpO2: 97% (28 Mar 2022 11:00) (93% - 100%)    I&O's Summary    27 Mar 2022 07:01  -  28 Mar 2022 07:00  --------------------------------------------------------  IN: 1014 mL / OUT: 1170 mL / NET: -156 mL        CAPILLARY BLOOD GLUCOSE      POCT Blood Glucose.: 139 mg/dL (27 Mar 2022 21:27)  POCT Blood Glucose.: 129 mg/dL (27 Mar 2022 16:39)  POCT Blood Glucose.: 125 mg/dL (27 Mar 2022 12:16)      PHYSICAL EXAM:    Constitutional: NAD,    HEENT: PERR, EOMI,    Neck: Soft and supple, No LAD, No JVD  Respiratory: Breath sounds are clear bilaterally, on 1.5L NC   Cardiovascular: S1 and S2,   Gastrointestinal: Bowel Sounds present, soft, nontender, nondistended, no guarding, no rebound  Extremities: No peripheral edema  Vascular: 2+ peripheral pulses  Neurological: A/O x 3, no focal deficits  Musculoskeletal: left knee scar noted with large hematoma left thigh and hamstring    MEDICATIONS:  MEDICATIONS  (STANDING):  aspirin enteric coated 81 milliGRAM(s) Oral daily  atorvastatin 80 milliGRAM(s) Oral at bedtime  dextrose 5%. 1000 milliLiter(s) (50 mL/Hr) IV Continuous <Continuous>  dextrose 5%. 1000 milliLiter(s) (100 mL/Hr) IV Continuous <Continuous>  dextrose 50% Injectable 25 Gram(s) IV Push once  dextrose 50% Injectable 12.5 Gram(s) IV Push once  dextrose 50% Injectable 25 Gram(s) IV Push once  folic acid 1 milliGRAM(s) Oral daily  furosemide   Injectable 40 milliGRAM(s) IV Push two times a day  glucagon  Injectable 1 milliGRAM(s) IntraMuscular once  heparin  Infusion. 1200 Unit(s)/Hr (12 mL/Hr) IV Continuous <Continuous>  insulin lispro (ADMELOG) corrective regimen sliding scale   SubCutaneous three times a day before meals  insulin lispro (ADMELOG) corrective regimen sliding scale   SubCutaneous at bedtime  metoprolol tartrate 25 milliGRAM(s) Oral two times a day  oxybutynin 5 milliGRAM(s) Oral at bedtime  pantoprazole    Tablet 40 milliGRAM(s) Oral before breakfast  senna 2 Tablet(s) Oral at bedtime      LABS: All Labs Reviewed:                        11.1   9.08  )-----------( 171      ( 28 Mar 2022 06:16 )             34.8     03-28    134<L>  |  96<L>  |  21.5<H>  ----------------------------<  122<H>  3.7   |  24.0  |  0.89    Ca    9.3      28 Mar 2022 06:16  Phos  2.9     03-28  Mg     1.8     03-28      PTT - ( 28 Mar 2022 06:16 )  PTT:62.1 sec  CARDIAC MARKERS ( 27 Mar 2022 13:57 )  x     / 1.21 ng/mL / x     / x     / x      CARDIAC MARKERS ( 26 Mar 2022 17:17 )  x     / 1.70 ng/mL / x     / x     / x          Blood Culture:     RADIOLOGY/EKG:    DVT PPX:    ADVANCED DIRECTIVE:    DISPOSITION:

## 2022-03-28 NOTE — PROGRESS NOTE ADULT - PROBLEM SELECTOR PLAN 3
CHADS2-Vasc:   Anticoagulation:   Rate/Rhythm Control: Metoprolol 25 mg BID CHADS2-Vasc: 4 for HTN, DM, heart failure, and vascular disease.  Anticoagulation: Will restart heparin drip 4 hours post band removal, will transition to Eliquis prior to discharge  Rate/Rhythm Control: Metoprolol 25 mg BID Patient paroxysms of AF and AT, episodes are approx. > 30 seconds  CHADS2-Vasc: 4 for HTN, DM, heart failure, and vascular disease.  Anticoagulation: Will restart heparin drip 4 hours post band removal, will transition to Eliquis prior to discharge  Rate/Rhythm Control: Metoprolol 25 mg BID

## 2022-03-28 NOTE — PROGRESS NOTE ADULT - ASSESSMENT
61yoM hx colon CA s/p resection, prostate CA s/p RT, both in remission, also hx HTN, HLD, DM, recently admitted at [facility] from 3/18 to 3/20 under orthopedic service for L-total knee replacement (3/18/22), presenting with 3 days of dyspnea at rest and on exertion associated with orthopnea, found to be hypoxic on admission with elevated troponin, proBNP and with pulmonary edema on chest imaging     *STEMI w triple vessel dz   EKG reviewed, shows NSR, isolated TWI in V1, no other ST-T changes  Received ASA 324mg x1, continue with ASA 81mg   atorvastatin 80mg  CTA neg for PE  US duppler neg for DVT   Heparin ggt transition to Eliquis upon discharge   Echo EF 35-40%, moderate dec LVF, grade II diastolic dysfunction with elevated left Main artrial pressure   LHC/RHC showed triple vessel dz LVEDP: 21, LM: 50% calcified/ulcerated plaque,  LAD: Proximal  with collaterals from the RCA, Ramus: 75% stenosis, LCX: 99% mid stenosis with ZORAN 1 flow, RCA: 80% mid ulcerated/complex stenosis  pending CT surg for possible CABG   Metoprolol 25mg BID for now, will transition to Toprol on discharge       *acute hypoxemic respiratory failure due to new onset decompensated CHF  1.5L NC   Suspect CHF precipitated by ischemia given NSTEMI  CTA shows pleural effusions and bilateral perihilar opacities suggestive of pulmonary edema  Received IV Lasix 40mg by ED  Lasix 40mg BID today then 40mg qd tomorrow   likely will iniciate entresto prior to discharge   hold for now pending CT surg eval     *SVT  Cont Bystolic   cont telemetry   Keep Mg>2, K>4 -Asked CD RN to place strip into physical chart    *S/p left knee replacement  Was discharged on high dose ASA and Eliquis for post-surgery VTE ppx  ASA 81mg as per cardiology  Holding Eliquis as on heparin gtt  Continue with oxycodone PRN for pain control  Also discharged on Celebrex, will hold given ASA, heparin gtt and risk of bleed    *HTN  On Bystolic, resumed    *HLD   On pravastatin, transitioned to atorvastatin 80mg    *DM  Hold metformin  ISS  HgA1C 5.7     Hx overactive bladder  On oxybutynin    Prophylactic measure  heparin gtt    Dispo: pt still acute

## 2022-03-28 NOTE — PROGRESS NOTE ADULT - PROBLEM SELECTOR PLAN 2
Acute ACC/AHA stage C, NYHA functional class 3-4 HFrEF  GDMT       Beta Blocker: Metoprolol 25 mg BID, will transition to succinate       RAAS Inhibitor: Will initiate Entresto 50 mg BID after sufficient diuresis.       MRA: N/A       Diuretic: Lasix 40 mg IV BID today, then 40 mg daily.       Other: May benefit form dapagliflozin upon discharge  Strict I&O's  Daily standing weights (if able) Acute ACC/AHA stage C, NYHA functional class 3-4 HFrEF  S/P RHC: PA: 31/8/19, PCWP: 15, CO: 5.35 LPM, CI: 2.89 LPM/m2  GDMT       Beta Blocker: Metoprolol 25 mg BID, will transition to succinate       RAAS Inhibitor: Will initiate Entresto 50 mg BID prior to discharge, no RAASi now with impending CT surgery.       MRA: N/A       Diuretic: Lasix 40 mg IV BID today, then 40 mg daily.       Other: May benefit form dapagliflozin upon discharge  Strict I&O's  Daily standing weights (if able)

## 2022-03-28 NOTE — PROGRESS NOTE ADULT - PROBLEM SELECTOR PLAN 6
GDMT:        Diabetic diet.       FS Q AC and HS with coverage       HgbA1C: 5.7       Basal Insulin: N/A       Nutritional Insulin: N/A       Oral Antihyperglycemics: Will restart metformin 1000 mg daily upon discharge (at least 48 hours post contrast).       SGLT2i/GLP1-RA: May benefit form dapagliflozin in the setting of heart failure. GDMT:        Diabetic diet.       FS Q AC and HS with coverage       HgbA1C: 5.7%       Basal Insulin: N/A       Nutritional Insulin: N/A       Oral Antihyperglycemics: Will restart metformin 1000 mg daily upon discharge (at least 48 hours post contrast).       SGLT2i/GLP1-RA: May benefit form dapagliflozin in the setting of heart failure.

## 2022-03-28 NOTE — CONSULT NOTE ADULT - SUBJECTIVE AND OBJECTIVE BOX
Surgeon: MD Cristopher    Consult requesting by: Sofie    HISTORY OF PRESENT ILLNESS:  61y Male h/o L TKR 3/18/22 presents with 3 day history of SOB.  F/w NSTEMI, HFrEF, pulm edema with elevated BNP.  Cath today revealed LM 50% ulcerated plaque LAD proximal  with colaterals from RCA, Ramus 75%, LCx 99%, RCA 80% mid ulcerated/complex stenosis.      PAST MEDICAL & SURGICAL HISTORY:  Hypertension    Hyperlipidemia    COVID-19 vaccine series completed    Colon cancer    Prostate cancer  Treated with Radiation 2015    Type 2 diabetes mellitus    Unilateral primary osteoarthritis, left knee    S/P colon resection    S/P hernia repair        MEDICATIONS  (STANDING):  aspirin enteric coated 81 milliGRAM(s) Oral daily  atorvastatin 80 milliGRAM(s) Oral at bedtime  dextrose 5%. 1000 milliLiter(s) (50 mL/Hr) IV Continuous <Continuous>  dextrose 5%. 1000 milliLiter(s) (100 mL/Hr) IV Continuous <Continuous>  dextrose 50% Injectable 25 Gram(s) IV Push once  dextrose 50% Injectable 12.5 Gram(s) IV Push once  dextrose 50% Injectable 25 Gram(s) IV Push once  folic acid 1 milliGRAM(s) Oral daily  furosemide   Injectable 40 milliGRAM(s) IV Push two times a day  glucagon  Injectable 1 milliGRAM(s) IntraMuscular once  heparin  Infusion. 1200 Unit(s)/Hr (12 mL/Hr) IV Continuous <Continuous>  insulin lispro (ADMELOG) corrective regimen sliding scale   SubCutaneous three times a day before meals  insulin lispro (ADMELOG) corrective regimen sliding scale   SubCutaneous at bedtime  metoprolol tartrate 25 milliGRAM(s) Oral two times a day  oxybutynin 5 milliGRAM(s) Oral at bedtime  pantoprazole    Tablet 40 milliGRAM(s) Oral before breakfast  senna 2 Tablet(s) Oral at bedtime    MEDICATIONS  (PRN):  acetaminophen     Tablet .. 650 milliGRAM(s) Oral every 6 hours PRN Temp greater or equal to 38C (100.4F), Mild Pain (1 - 3)  benzonatate 100 milliGRAM(s) Oral every 8 hours PRN Cough  dextrose Oral Gel 15 Gram(s) Oral once PRN Blood Glucose LESS THAN 70 milliGRAM(s)/deciliter  heparin   Injectable 4400 Unit(s) IV Push every 6 hours PRN For aPTT less than 40  oxyCODONE    IR 5 milliGRAM(s) Oral every 6 hours PRN Moderate to Severe Pain    Antiplatelet therapy:          ASA/heparin                 Last dose/amt: 3/28    Allergies    No Known Allergies    Intolerances        SOCIAL HISTORY:  Smoker: [ ] Yes  [x ] No        PACK YEARS:     2                    WHEN QUIT? 20 years ago  ETOH use: [ ] Yes  [x ] No              FREQUENCY / QUANTITY:  Ilicit Drug use:  [ ] Yes  [ x] No  Occupation:   Live with: friend  Assisted device use: walker and cane since L TKR    FAMILY HISTORY:  No pertinent family history in first degree relatives        Review of Systems  CONSTITUTIONAL:  Fevers[ ] chills[ ] sweats[ ] fatigue[ ] weight loss[ ] weight gain [ ]                                     NEGATIVE [x ]   NEURO:  parathesias[ ] seizures [ ]  syncope [ ]  confusion [ ]                                                                                NEGATIVE[ x]   EYES: glasses[ ]  blurry vision[ ]  discharge[ ] pain[ ] glaucoma [ ]                                                                          NEGATIVE[x ]   ENMT:  difficulty hearing [ ]  vertigo[ ]  dysphagia[ ] epistaxis[ ] recent dental work [ ]                                    NEGATIVE[x ]   CV:  chest pain[ ] palpitations[ ] ESPINOZA [x ] diaphoresis [ ]                                                                                           NEGATIVE[ ]   RESPIRATORY:  wheezing[ ] SOB[ ] cough [ ] sputum[ ] hemoptysis[ ]                                                                  NEGATIVE[x ]   GI:  nausea[ ]  vommiting [ ]  diarrhea[ ] constipation [ ] melena [ ]                                                                      NEGATIVE[x ]   : hematuria[ ]  dysuria[ ] urgency[ ] incontinence[ ]                                                                                            NEGATIVE[x ]   MUSKULOSKELETAL:  arthritis[ ]  joint swelling [x ] muscle weakness [ ]                                                                NEGATIVE[ ]   SKIN/BREAST:  rash[ ] itching [ ]  hair loss[ ] masses[ ]                                                                                              NEGATIVE[x ]   PSYCH:  dementia [ ] depresion [ ] anxiety[ ]                                                                                                               NEGATIVE[x ]   HEME/LYMPH:  bruises easily[ ] enlarged lymph nodes[ ] tender lymph nodes[ ]                                               NEGATIVE[x ]   ENDOCRINE:  cold intolerance[ ] heat intolerance[ ] polydipsia[ ]                                                                          NEGATIVE[x ]     PHYSICAL EXAM  Vital Signs Last 24 Hrs  T(C): 36.7 (28 Mar 2022 11:00), Max: 37.1 (27 Mar 2022 21:34)  T(F): 98 (28 Mar 2022 11:00), Max: 98.7 (27 Mar 2022 21:34)  HR: 75 (28 Mar 2022 11:00) (57 - 75)  BP: 92/53 (28 Mar 2022 10:15) (92/53 - 114/75)  BP(mean): --  RR: 16 (28 Mar 2022 11:00) (15 - 19)  SpO2: 97% (28 Mar 2022 11:00) (93% - 100%)    CONSTITUTIONAL:                                                                          WNL[x ]   Neuro: WNL[ ] Normal exam oriented to person/place & time with no focal motor or sensory  deficits. Other __________                    Eyes: WNL[x ]   Normal exam of conjunctiva & lids, pupils equally reactive. Other______________________________     ENT: WNL[x ]    Normal exam of nasal/oral mucosa with absence of cyanosis. Other_____________________________  Neck: WNL[x ]  Normal exam of jugular veins, trachea & thyroid. Other_________________________________________  Chest: WNL[ ] Normal lung exam with good air movement absence of wheezes, rales, or rhonchi: Other_________crackles L base________________________________                                                                                CV:  Auscultation: normal x[ ] S3[ ] S4[ ] Irregular [ ] Rub[ ] Clicks[ ]    Murmurs none:[x ]systolic [ ]  diastolic [ ] holosystolic [ ]  Carotids: No Bruits[x ] Other____________ Abdominal Aorta: normal [ ] nonpalpable[ ]Other___________                                                                                      GI: WNL[ x] Normal exam of abdomen, liver & spleen with no noted masses or tenderness. Other_well healed horizontal scar mid abdomen_____________________                                                                                                        Extremities: WNL[ ] Normal no evidence of cyanosis or deformity Edema: none[ ]trace[ ]1+[ ]2+[ ]3+[ ]4+[ ]  L TKR steri strips in place purple ecchymosis L thigh 3+ pitting edema LLE.  Superficial varicosities R LE 1+ edema   Lower Extremity Pulses: Right[ x] Left[x ]Varicosities[x ]  SKIN :WNL[ ] Normal exam to inspection & palation. Other:___as described above_________________                                                          LABS:                        11.1   9.08  )-----------( 171      ( 28 Mar 2022 06:16 )             34.8     03-28    134<L>  |  96<L>  |  21.5<H>  ----------------------------<  122<H>  3.7   |  24.0  |  0.89    Ca    9.3      28 Mar 2022 06:16  Phos  2.9     03-28  Mg     1.8     03-28      PTT - ( 28 Mar 2022 06:16 )  PTT:62.1 sec    CARDIAC MARKERS ( 27 Mar 2022 13:57 )  x     / 1.21 ng/mL / x     / x     / x      CARDIAC MARKERS ( 26 Mar 2022 17:17 )  x     / 1.70 ng/mL / x     / x     / x              Cardiac Cath: above    TTE / YURY: < from: TTE Echo Complete w/o Contrast w/ Doppler (03.25.22 @ 22:05) >  mmary:   1. Left ventricular ejection fraction, by visual estimation, is 35 to   40%.   2. Moderately decreased global left ventricular systolic function.   3. Apical septal segment and apex are abnormal as described above.   4. Spectral Doppler shows pseudonormal pattern of left ventricular   myocardial filling (Grade II diastolic dysfunction). Elevated mean left   atrial pressure.   5. Normal left ventricular internal cavity size.   6. Normal right ventricular size and function.   7. Normal left atrial size.   8. Normal right atrial size.   9. Mild mitral annular calcification.  10. Mild thickening of the anterior and posterior mitral valve leaflets.  11. Mild mitral valve regurgitation.  12. Sclerotic aortic valve with normal opening.  13. Moderate pulmonic valve regurgitation.  14. Mildly dilated Ao root at 3.9cm.  15. There is no evidence of pericardial effusion.      < end of copied text >    dop< from: US Duplex Venous Lower Ext Ltd, Left (03.25.22 @ 19:38) >  FINDINGS:    There is normal compressibility of the left common femoral, femoral and   popliteal veins.  The contralateral common femoral vein is patent.  Doppler examination shows normal spontaneous and phasic flow.    No calf vein thrombosis is detected.    IMPRESSION:  No evidence of left lower extremity deep venous thrombosis.          --- End of Report ---            TACOS GAMBOA MD; Attending Radiologist  This document has been electronically signed. Mar 25 2022  7:40PM    < end of copied text >        Assessment:          Plan:

## 2022-03-28 NOTE — PROGRESS NOTE ADULT - PROBLEM SELECTOR PLAN 5
Goal Non-HDL < 100, LDL < 70  Lipid Panel:   Statin: Lipitor 80 mg daily  Other: Goal Non-HDL < 100, LDL < 70  Lipid Panel: Not at goal, patient changed to Lipitor 80 mg daily  Statin: Lipitor 80 mg daily  Other: N/A

## 2022-03-28 NOTE — CONSULT NOTE ADULT - ASSESSMENT
61M NSTEMI, pulm edema, acute HFrEF, HTN, HLD, DM s/p TKR 10 days ago found with MVD on cath.   Condition stable, NAD.

## 2022-03-28 NOTE — CHART NOTE - NSCHARTNOTEFT_GEN_A_CORE
S/w Dr Ventura about need for conduit for CABG tomorrow.  No absolute contraindication to Left leg greater saphenous vein harvest if needed.  Prefers right greater saphenous vein harvest if possible, however pt with varicosities noted in RLE.  Dr. Nicholas aware.

## 2022-03-29 ENCOUNTER — APPOINTMENT (OUTPATIENT)
Dept: CARDIOTHORACIC SURGERY | Facility: HOSPITAL | Age: 62
End: 2022-03-29

## 2022-03-29 ENCOUNTER — TRANSCRIPTION ENCOUNTER (OUTPATIENT)
Age: 62
End: 2022-03-29

## 2022-03-29 LAB
ALBUMIN SERPL ELPH-MCNC: 2.9 G/DL — LOW (ref 3.3–5.2)
ALBUMIN SERPL ELPH-MCNC: 3.8 G/DL — SIGNIFICANT CHANGE UP (ref 3.3–5.2)
ALP SERPL-CCNC: 109 U/L — SIGNIFICANT CHANGE UP (ref 40–120)
ALP SERPL-CCNC: 146 U/L — HIGH (ref 40–120)
ALT FLD-CCNC: 24 U/L — SIGNIFICANT CHANGE UP
ALT FLD-CCNC: 30 U/L — SIGNIFICANT CHANGE UP
ANION GAP SERPL CALC-SCNC: 11 MMOL/L — SIGNIFICANT CHANGE UP (ref 5–17)
ANION GAP SERPL CALC-SCNC: 16 MMOL/L — SIGNIFICANT CHANGE UP (ref 5–17)
APTT BLD: 30.9 SEC — SIGNIFICANT CHANGE UP (ref 27.5–35.5)
APTT BLD: 68.6 SEC — HIGH (ref 27.5–35.5)
AST SERPL-CCNC: 40 U/L — HIGH
AST SERPL-CCNC: 41 U/L — HIGH
BASE EXCESS BLDV CALC-SCNC: -2.6 MMOL/L — LOW (ref -2–3)
BASOPHILS # BLD AUTO: 0.03 K/UL — SIGNIFICANT CHANGE UP (ref 0–0.2)
BASOPHILS NFR BLD AUTO: 0.3 % — SIGNIFICANT CHANGE UP (ref 0–2)
BILIRUB SERPL-MCNC: 1 MG/DL — SIGNIFICANT CHANGE UP (ref 0.4–2)
BILIRUB SERPL-MCNC: 1.8 MG/DL — SIGNIFICANT CHANGE UP (ref 0.4–2)
BUN SERPL-MCNC: 16.8 MG/DL — SIGNIFICANT CHANGE UP (ref 8–20)
BUN SERPL-MCNC: 19.6 MG/DL — SIGNIFICANT CHANGE UP (ref 8–20)
CALCIUM SERPL-MCNC: 8.7 MG/DL — SIGNIFICANT CHANGE UP (ref 8.6–10.2)
CALCIUM SERPL-MCNC: 9 MG/DL — SIGNIFICANT CHANGE UP (ref 8.6–10.2)
CHLORIDE SERPL-SCNC: 102 MMOL/L — SIGNIFICANT CHANGE UP (ref 98–107)
CHLORIDE SERPL-SCNC: 96 MMOL/L — LOW (ref 98–107)
CHOLEST SERPL-MCNC: 123 MG/DL — SIGNIFICANT CHANGE UP
CK SERPL-CCNC: 142 U/L — SIGNIFICANT CHANGE UP (ref 30–200)
CO2 SERPL-SCNC: 20 MMOL/L — LOW (ref 22–29)
CO2 SERPL-SCNC: 24 MMOL/L — SIGNIFICANT CHANGE UP (ref 22–29)
CREAT SERPL-MCNC: 0.66 MG/DL — SIGNIFICANT CHANGE UP (ref 0.5–1.3)
CREAT SERPL-MCNC: 0.86 MG/DL — SIGNIFICANT CHANGE UP (ref 0.5–1.3)
EGFR: 106 ML/MIN/1.73M2 — SIGNIFICANT CHANGE UP
EGFR: 98 ML/MIN/1.73M2 — SIGNIFICANT CHANGE UP
EOSINOPHIL # BLD AUTO: 0.44 K/UL — SIGNIFICANT CHANGE UP (ref 0–0.5)
EOSINOPHIL NFR BLD AUTO: 4.7 % — SIGNIFICANT CHANGE UP (ref 0–6)
GAS PNL BLDA: SIGNIFICANT CHANGE UP
GAS PNL BLDA: SIGNIFICANT CHANGE UP
GAS PNL BLDV: SIGNIFICANT CHANGE UP
GLUCOSE BLDC GLUCOMTR-MCNC: 144 MG/DL — HIGH (ref 70–99)
GLUCOSE BLDC GLUCOMTR-MCNC: 144 MG/DL — HIGH (ref 70–99)
GLUCOSE BLDC GLUCOMTR-MCNC: 169 MG/DL — HIGH (ref 70–99)
GLUCOSE BLDC GLUCOMTR-MCNC: 171 MG/DL — HIGH (ref 70–99)
GLUCOSE BLDC GLUCOMTR-MCNC: 177 MG/DL — HIGH (ref 70–99)
GLUCOSE BLDC GLUCOMTR-MCNC: 179 MG/DL — HIGH (ref 70–99)
GLUCOSE SERPL-MCNC: 125 MG/DL — HIGH (ref 70–99)
GLUCOSE SERPL-MCNC: 173 MG/DL — HIGH (ref 70–99)
HCO3 BLDV-SCNC: 24 MMOL/L — SIGNIFICANT CHANGE UP (ref 22–29)
HCT VFR BLD CALC: 34 % — LOW (ref 39–50)
HCT VFR BLD CALC: 37.1 % — LOW (ref 39–50)
HDLC SERPL-MCNC: 28 MG/DL — LOW
HGB BLD-MCNC: 11 G/DL — LOW (ref 13–17)
HGB BLD-MCNC: 12.2 G/DL — LOW (ref 13–17)
IMM GRANULOCYTES NFR BLD AUTO: 0.6 % — SIGNIFICANT CHANGE UP (ref 0–1.5)
INR BLD: 1.23 RATIO — HIGH (ref 0.88–1.16)
INR BLD: 1.48 RATIO — HIGH (ref 0.88–1.16)
LIPID PNL WITH DIRECT LDL SERPL: 48 MG/DL — SIGNIFICANT CHANGE UP
LYMPHOCYTES # BLD AUTO: 1.12 K/UL — SIGNIFICANT CHANGE UP (ref 1–3.3)
LYMPHOCYTES # BLD AUTO: 11.9 % — LOW (ref 13–44)
MAGNESIUM SERPL-MCNC: 1.8 MG/DL — SIGNIFICANT CHANGE UP (ref 1.8–2.6)
MAGNESIUM SERPL-MCNC: 2.2 MG/DL — SIGNIFICANT CHANGE UP (ref 1.6–2.6)
MCHC RBC-ENTMCNC: 28.1 PG — SIGNIFICANT CHANGE UP (ref 27–34)
MCHC RBC-ENTMCNC: 28.4 PG — SIGNIFICANT CHANGE UP (ref 27–34)
MCHC RBC-ENTMCNC: 32.4 GM/DL — SIGNIFICANT CHANGE UP (ref 32–36)
MCHC RBC-ENTMCNC: 32.9 GM/DL — SIGNIFICANT CHANGE UP (ref 32–36)
MCV RBC AUTO: 86.5 FL — SIGNIFICANT CHANGE UP (ref 80–100)
MCV RBC AUTO: 87 FL — SIGNIFICANT CHANGE UP (ref 80–100)
MONOCYTES # BLD AUTO: 0.95 K/UL — HIGH (ref 0–0.9)
MONOCYTES NFR BLD AUTO: 10.1 % — SIGNIFICANT CHANGE UP (ref 2–14)
NEUTROPHILS # BLD AUTO: 6.84 K/UL — SIGNIFICANT CHANGE UP (ref 1.8–7.4)
NEUTROPHILS NFR BLD AUTO: 72.4 % — SIGNIFICANT CHANGE UP (ref 43–77)
NON HDL CHOLESTEROL: 95 MG/DL — SIGNIFICANT CHANGE UP
NT-PROBNP SERPL-SCNC: 789 PG/ML — HIGH (ref 0–300)
PCO2 BLDV: 47 MMHG — SIGNIFICANT CHANGE UP (ref 42–55)
PH BLDV: 7.31 — LOW (ref 7.32–7.43)
PLATELET # BLD AUTO: 181 K/UL — SIGNIFICANT CHANGE UP (ref 150–400)
PLATELET # BLD AUTO: 213 K/UL — SIGNIFICANT CHANGE UP (ref 150–400)
PO2 BLDV: 48 MMHG — HIGH (ref 25–45)
POTASSIUM SERPL-MCNC: 3.8 MMOL/L — SIGNIFICANT CHANGE UP (ref 3.5–5.3)
POTASSIUM SERPL-MCNC: 4.5 MMOL/L — SIGNIFICANT CHANGE UP (ref 3.5–5.3)
POTASSIUM SERPL-SCNC: 3.8 MMOL/L — SIGNIFICANT CHANGE UP (ref 3.5–5.3)
POTASSIUM SERPL-SCNC: 4.5 MMOL/L — SIGNIFICANT CHANGE UP (ref 3.5–5.3)
PREALB SERPL-MCNC: 14 MG/DL — LOW (ref 18–38)
PROT SERPL-MCNC: 5 G/DL — LOW (ref 6.6–8.7)
PROT SERPL-MCNC: 6.3 G/DL — LOW (ref 6.6–8.7)
PROTHROM AB SERPL-ACNC: 14.3 SEC — HIGH (ref 10.5–13.4)
PROTHROM AB SERPL-ACNC: 17.2 SEC — HIGH (ref 10.5–13.4)
RBC # BLD: 3.91 M/UL — LOW (ref 4.2–5.8)
RBC # BLD: 4.29 M/UL — SIGNIFICANT CHANGE UP (ref 4.2–5.8)
RBC # FLD: 16.7 % — HIGH (ref 10.3–14.5)
RBC # FLD: 17.2 % — HIGH (ref 10.3–14.5)
SAO2 % BLDV: 74.7 % — SIGNIFICANT CHANGE UP
SODIUM SERPL-SCNC: 133 MMOL/L — LOW (ref 135–145)
SODIUM SERPL-SCNC: 136 MMOL/L — SIGNIFICANT CHANGE UP (ref 135–145)
TRIGL SERPL-MCNC: 235 MG/DL — HIGH
TROPONIN T SERPL-MCNC: 0.5 NG/ML — HIGH (ref 0–0.06)
WBC # BLD: 31.57 K/UL — HIGH (ref 3.8–10.5)
WBC # BLD: 9.44 K/UL — SIGNIFICANT CHANGE UP (ref 3.8–10.5)
WBC # FLD AUTO: 31.57 K/UL — HIGH (ref 3.8–10.5)
WBC # FLD AUTO: 9.44 K/UL — SIGNIFICANT CHANGE UP (ref 3.8–10.5)

## 2022-03-29 PROCEDURE — 93010 ELECTROCARDIOGRAM REPORT: CPT

## 2022-03-29 PROCEDURE — 76998 US GUIDE INTRAOP: CPT | Mod: 26,NC,AS,59

## 2022-03-29 PROCEDURE — 71045 X-RAY EXAM CHEST 1 VIEW: CPT | Mod: 26,77

## 2022-03-29 PROCEDURE — 33533 CABG ARTERIAL SINGLE: CPT

## 2022-03-29 PROCEDURE — 33518 CABG ARTERY-VEIN TWO: CPT

## 2022-03-29 PROCEDURE — 71045 X-RAY EXAM CHEST 1 VIEW: CPT | Mod: 26

## 2022-03-29 PROCEDURE — 33518 CABG ARTERY-VEIN TWO: CPT | Mod: AS

## 2022-03-29 PROCEDURE — 33508 ENDOSCOPIC VEIN HARVEST: CPT | Mod: 59

## 2022-03-29 PROCEDURE — 76998 US GUIDE INTRAOP: CPT | Mod: 26,59

## 2022-03-29 PROCEDURE — 99024 POSTOP FOLLOW-UP VISIT: CPT

## 2022-03-29 PROCEDURE — 33533 CABG ARTERIAL SINGLE: CPT | Mod: AS

## 2022-03-29 DEVICE — SPONGE GELFOAM SZ 100 UNCOMPRESSED: Type: IMPLANTABLE DEVICE | Status: FUNCTIONAL

## 2022-03-29 DEVICE — IMPLANTABLE DEVICE: Type: IMPLANTABLE DEVICE | Status: FUNCTIONAL

## 2022-03-29 DEVICE — SYS EXCLUSION LAA ATRICLIP STD 50MM: Type: IMPLANTABLE DEVICE | Status: FUNCTIONAL

## 2022-03-29 DEVICE — ELECT PACE MYOCARDIAL TEMP ORANGE: Type: IMPLANTABLE DEVICE | Status: FUNCTIONAL

## 2022-03-29 DEVICE — SHUNT INTLUMN FLO-THRU 2X12MM: Type: IMPLANTABLE DEVICE | Status: FUNCTIONAL

## 2022-03-29 DEVICE — MEDIASTINAL CATH DRAIN 9MM: Type: IMPLANTABLE DEVICE | Status: FUNCTIONAL

## 2022-03-29 DEVICE — OCCL VES INT FLO-RES 2X12MM: Type: IMPLANTABLE DEVICE | Status: FUNCTIONAL

## 2022-03-29 DEVICE — SEALANT FLOSEAL FAST PREP HEMOSTATIC MATRIX 10ML: Type: IMPLANTABLE DEVICE | Status: FUNCTIONAL

## 2022-03-29 DEVICE — SYS EXCLUSION LAA DISP ATRICLIP STD 40MM: Type: IMPLANTABLE DEVICE | Status: FUNCTIONAL

## 2022-03-29 DEVICE — CATH VENTRICULAR VENT PVC 18FRX10.8CM: Type: IMPLANTABLE DEVICE | Status: FUNCTIONAL

## 2022-03-29 DEVICE — BONE WAX 2.5GM: Type: IMPLANTABLE DEVICE | Status: FUNCTIONAL

## 2022-03-29 DEVICE — KIT A-LINE 1LUM 20GX12CM SAFE KIT: Type: IMPLANTABLE DEVICE | Status: FUNCTIONAL

## 2022-03-29 DEVICE — CANNULA SOFT FLOW ART EXT BODY 7MM 21FR: Type: IMPLANTABLE DEVICE | Status: FUNCTIONAL

## 2022-03-29 DEVICE — SHUNT INTLUMN 2.5X12MM: Type: IMPLANTABLE DEVICE | Status: FUNCTIONAL

## 2022-03-29 DEVICE — SHUNT INTLUMN 3X12MM: Type: IMPLANTABLE DEVICE | Status: FUNCTIONAL

## 2022-03-29 DEVICE — CANNULA VENOUS 11.3X15.3MM: Type: IMPLANTABLE DEVICE | Status: FUNCTIONAL

## 2022-03-29 DEVICE — HEARTSTRING III PROXIMAL SEAL SYSTEM: Type: IMPLANTABLE DEVICE | Status: FUNCTIONAL

## 2022-03-29 DEVICE — CATH THERMODIL PACE 7.5FR: Type: IMPLANTABLE DEVICE | Status: FUNCTIONAL

## 2022-03-29 DEVICE — BIOGLUE 5ML SYR: Type: IMPLANTABLE DEVICE | Status: FUNCTIONAL

## 2022-03-29 DEVICE — CANNULA MEDTRONIC VES 1 WAY 3MMX6.4CM: Type: IMPLANTABLE DEVICE | Status: FUNCTIONAL

## 2022-03-29 DEVICE — CATH INFUS SL 7FR 20CM: Type: IMPLANTABLE DEVICE | Status: FUNCTIONAL

## 2022-03-29 DEVICE — SYS EXCLUSION LAA DISP ATRICLIP STD 45MM: Type: IMPLANTABLE DEVICE | Status: FUNCTIONAL

## 2022-03-29 DEVICE — CANNULA SOFT FLOW ART EXT BODY 8MM 24FR: Type: IMPLANTABLE DEVICE | Status: FUNCTIONAL

## 2022-03-29 DEVICE — M25 WHT WIRE PACING TEMP: Type: IMPLANTABLE DEVICE | Status: FUNCTIONAL

## 2022-03-29 DEVICE — CANNULA AOR ROOT FLNG 7FRX14CM: Type: IMPLANTABLE DEVICE | Status: FUNCTIONAL

## 2022-03-29 DEVICE — KIT CVC 2LUM MAC 9FR CHG: Type: IMPLANTABLE DEVICE | Status: FUNCTIONAL

## 2022-03-29 DEVICE — OCCL VES INT FLO-RES 1X12MM: Type: IMPLANTABLE DEVICE | Status: FUNCTIONAL

## 2022-03-29 DEVICE — LIGATING CLIPS AESCULAP SMALL WIDE 24: Type: IMPLANTABLE DEVICE | Status: FUNCTIONAL

## 2022-03-29 DEVICE — SPONGE HSTAT SURGCEL FIBRILLAR 4X4IN: Type: IMPLANTABLE DEVICE | Status: FUNCTIONAL

## 2022-03-29 DEVICE — SYS EXCLUSION LAA DISP ATRICLIP STD 35MM: Type: IMPLANTABLE DEVICE | Status: FUNCTIONAL

## 2022-03-29 DEVICE — SHUNT E/OTH2U: Type: IMPLANTABLE DEVICE | Status: FUNCTIONAL

## 2022-03-29 DEVICE — CATH THOR RT ANG 28FR: Type: IMPLANTABLE DEVICE | Status: FUNCTIONAL

## 2022-03-29 DEVICE — CATH CARDIOPLEGIA RETROGRADE: Type: IMPLANTABLE DEVICE | Status: FUNCTIONAL

## 2022-03-29 DEVICE — CANNULA ATRA SUMP .25IN 38CM: Type: IMPLANTABLE DEVICE | Status: FUNCTIONAL

## 2022-03-29 DEVICE — CANNULA VENOUS DUAL DRAINAGE LIGHTHOUSE TIP 32 TO 40FR X 37.: Type: IMPLANTABLE DEVICE | Status: FUNCTIONAL

## 2022-03-29 DEVICE — OCCL VES INT FLO-RES 2.5X12MM: Type: IMPLANTABLE DEVICE | Status: FUNCTIONAL

## 2022-03-29 DEVICE — SHUNT INTLUMN 1.5X12MM: Type: IMPLANTABLE DEVICE | Status: FUNCTIONAL

## 2022-03-29 RX ORDER — SODIUM CHLORIDE 9 MG/ML
1000 INJECTION, SOLUTION INTRAVENOUS ONCE
Refills: 0 | Status: COMPLETED | OUTPATIENT
Start: 2022-03-29 | End: 2022-03-29

## 2022-03-29 RX ORDER — MULTIVIT-MIN/FERROUS GLUCONATE 9 MG/15 ML
1 LIQUID (ML) ORAL DAILY
Refills: 0 | Status: DISCONTINUED | OUTPATIENT
Start: 2022-03-30 | End: 2022-04-03

## 2022-03-29 RX ORDER — ASPIRIN/CALCIUM CARB/MAGNESIUM 324 MG
325 TABLET ORAL ONCE
Refills: 0 | Status: COMPLETED | OUTPATIENT
Start: 2022-03-29 | End: 2022-03-29

## 2022-03-29 RX ORDER — POTASSIUM CHLORIDE 20 MEQ
10 PACKET (EA) ORAL
Refills: 0 | Status: DISCONTINUED | OUTPATIENT
Start: 2022-03-29 | End: 2022-03-30

## 2022-03-29 RX ORDER — DEXTROSE 50 % IN WATER 50 %
50 SYRINGE (ML) INTRAVENOUS
Refills: 0 | Status: DISCONTINUED | OUTPATIENT
Start: 2022-03-29 | End: 2022-03-30

## 2022-03-29 RX ORDER — PANTOPRAZOLE SODIUM 20 MG/1
40 TABLET, DELAYED RELEASE ORAL ONCE
Refills: 0 | Status: COMPLETED | OUTPATIENT
Start: 2022-03-29 | End: 2022-03-29

## 2022-03-29 RX ORDER — ASCORBIC ACID 60 MG
500 TABLET,CHEWABLE ORAL DAILY
Refills: 0 | Status: DISCONTINUED | OUTPATIENT
Start: 2022-03-30 | End: 2022-04-03

## 2022-03-29 RX ORDER — CLOPIDOGREL BISULFATE 75 MG/1
75 TABLET, FILM COATED ORAL ONCE
Refills: 0 | Status: COMPLETED | OUTPATIENT
Start: 2022-03-29 | End: 2022-03-29

## 2022-03-29 RX ORDER — FOLIC ACID 0.8 MG
1 TABLET ORAL DAILY
Refills: 0 | Status: DISCONTINUED | OUTPATIENT
Start: 2022-03-30 | End: 2022-04-03

## 2022-03-29 RX ORDER — POTASSIUM CHLORIDE 20 MEQ
10 PACKET (EA) ORAL
Refills: 0 | Status: DISCONTINUED | OUTPATIENT
Start: 2022-03-29 | End: 2022-03-29

## 2022-03-29 RX ORDER — CLOPIDOGREL BISULFATE 75 MG/1
75 TABLET, FILM COATED ORAL DAILY
Refills: 0 | Status: DISCONTINUED | OUTPATIENT
Start: 2022-03-30 | End: 2022-04-03

## 2022-03-29 RX ORDER — CHLORHEXIDINE GLUCONATE 213 G/1000ML
1 SOLUTION TOPICAL DAILY
Refills: 0 | Status: DISCONTINUED | OUTPATIENT
Start: 2022-03-29 | End: 2022-04-02

## 2022-03-29 RX ORDER — ATORVASTATIN CALCIUM 80 MG/1
80 TABLET, FILM COATED ORAL AT BEDTIME
Refills: 0 | Status: DISCONTINUED | OUTPATIENT
Start: 2022-03-29 | End: 2022-04-03

## 2022-03-29 RX ORDER — PANTOPRAZOLE SODIUM 20 MG/1
40 TABLET, DELAYED RELEASE ORAL DAILY
Refills: 0 | Status: DISCONTINUED | OUTPATIENT
Start: 2022-03-30 | End: 2022-04-03

## 2022-03-29 RX ORDER — OXYCODONE HYDROCHLORIDE 5 MG/1
5 TABLET ORAL EVERY 4 HOURS
Refills: 0 | Status: DISCONTINUED | OUTPATIENT
Start: 2022-03-30 | End: 2022-04-03

## 2022-03-29 RX ORDER — MAGNESIUM SULFATE 500 MG/ML
2 VIAL (ML) INJECTION ONCE
Refills: 0 | Status: COMPLETED | OUTPATIENT
Start: 2022-03-29 | End: 2022-03-29

## 2022-03-29 RX ORDER — ALBUMIN HUMAN 25 %
250 VIAL (ML) INTRAVENOUS ONCE
Refills: 0 | Status: COMPLETED | OUTPATIENT
Start: 2022-03-29 | End: 2022-03-29

## 2022-03-29 RX ORDER — OXYCODONE HYDROCHLORIDE 5 MG/1
10 TABLET ORAL EVERY 4 HOURS
Refills: 0 | Status: DISCONTINUED | OUTPATIENT
Start: 2022-03-29 | End: 2022-04-03

## 2022-03-29 RX ORDER — ENOXAPARIN SODIUM 100 MG/ML
40 INJECTION SUBCUTANEOUS EVERY 24 HOURS
Refills: 0 | Status: DISCONTINUED | OUTPATIENT
Start: 2022-03-30 | End: 2022-04-03

## 2022-03-29 RX ORDER — VANCOMYCIN HCL 1 G
1000 VIAL (EA) INTRAVENOUS EVERY 12 HOURS
Refills: 0 | Status: COMPLETED | OUTPATIENT
Start: 2022-03-29 | End: 2022-03-31

## 2022-03-29 RX ORDER — PROPOFOL 10 MG/ML
10 INJECTION, EMULSION INTRAVENOUS
Qty: 1000 | Refills: 0 | Status: DISCONTINUED | OUTPATIENT
Start: 2022-03-29 | End: 2022-03-29

## 2022-03-29 RX ORDER — CEFUROXIME AXETIL 250 MG
1500 TABLET ORAL EVERY 8 HOURS
Refills: 0 | Status: COMPLETED | OUTPATIENT
Start: 2022-03-29 | End: 2022-03-31

## 2022-03-29 RX ORDER — SODIUM CHLORIDE 9 MG/ML
1000 INJECTION INTRAMUSCULAR; INTRAVENOUS; SUBCUTANEOUS
Refills: 0 | Status: DISCONTINUED | OUTPATIENT
Start: 2022-03-29 | End: 2022-03-30

## 2022-03-29 RX ORDER — SENNA PLUS 8.6 MG/1
2 TABLET ORAL AT BEDTIME
Refills: 0 | Status: DISCONTINUED | OUTPATIENT
Start: 2022-03-29 | End: 2022-04-03

## 2022-03-29 RX ORDER — ACETAMINOPHEN 500 MG
1000 TABLET ORAL ONCE
Refills: 0 | Status: COMPLETED | OUTPATIENT
Start: 2022-03-29 | End: 2022-03-29

## 2022-03-29 RX ORDER — NOREPINEPHRINE BITARTRATE/D5W 8 MG/250ML
0.05 PLASTIC BAG, INJECTION (ML) INTRAVENOUS
Qty: 8 | Refills: 0 | Status: DISCONTINUED | OUTPATIENT
Start: 2022-03-29 | End: 2022-03-30

## 2022-03-29 RX ORDER — OXYBUTYNIN CHLORIDE 5 MG
10 TABLET ORAL AT BEDTIME
Refills: 0 | Status: DISCONTINUED | OUTPATIENT
Start: 2022-03-29 | End: 2022-04-03

## 2022-03-29 RX ORDER — FENTANYL CITRATE 50 UG/ML
50 INJECTION INTRAVENOUS
Refills: 0 | Status: DISCONTINUED | OUTPATIENT
Start: 2022-03-29 | End: 2022-03-30

## 2022-03-29 RX ORDER — POLYETHYLENE GLYCOL 3350 17 G/17G
17 POWDER, FOR SOLUTION ORAL DAILY
Refills: 0 | Status: DISCONTINUED | OUTPATIENT
Start: 2022-03-30 | End: 2022-04-03

## 2022-03-29 RX ORDER — ASPIRIN/CALCIUM CARB/MAGNESIUM 324 MG
325 TABLET ORAL DAILY
Refills: 0 | Status: DISCONTINUED | OUTPATIENT
Start: 2022-03-30 | End: 2022-04-03

## 2022-03-29 RX ORDER — ALBUMIN HUMAN 25 %
250 VIAL (ML) INTRAVENOUS ONCE
Refills: 0 | Status: COMPLETED | OUTPATIENT
Start: 2022-03-29 | End: 2022-03-30

## 2022-03-29 RX ORDER — CHLORHEXIDINE GLUCONATE 213 G/1000ML
15 SOLUTION TOPICAL EVERY 12 HOURS
Refills: 0 | Status: DISCONTINUED | OUTPATIENT
Start: 2022-03-29 | End: 2022-03-29

## 2022-03-29 RX ORDER — INSULIN HUMAN 100 [IU]/ML
2 INJECTION, SOLUTION SUBCUTANEOUS
Qty: 50 | Refills: 0 | Status: DISCONTINUED | OUTPATIENT
Start: 2022-03-29 | End: 2022-03-30

## 2022-03-29 RX ORDER — DEXTROSE 50 % IN WATER 50 %
25 SYRINGE (ML) INTRAVENOUS
Refills: 0 | Status: DISCONTINUED | OUTPATIENT
Start: 2022-03-29 | End: 2022-03-30

## 2022-03-29 RX ADMIN — PANTOPRAZOLE SODIUM 40 MILLIGRAM(S): 20 TABLET, DELAYED RELEASE ORAL at 14:47

## 2022-03-29 RX ADMIN — Medication 325 MILLIGRAM(S): at 20:27

## 2022-03-29 RX ADMIN — Medication 100 MILLIGRAM(S): at 17:26

## 2022-03-29 RX ADMIN — Medication 100 MILLIEQUIVALENT(S): at 04:30

## 2022-03-29 RX ADMIN — Medication 250 MILLIGRAM(S): at 21:24

## 2022-03-29 RX ADMIN — CHLORHEXIDINE GLUCONATE 15 MILLILITER(S): 213 SOLUTION TOPICAL at 17:26

## 2022-03-29 RX ADMIN — CLOPIDOGREL BISULFATE 75 MILLIGRAM(S): 75 TABLET, FILM COATED ORAL at 20:27

## 2022-03-29 RX ADMIN — Medication 125 MILLILITER(S): at 18:58

## 2022-03-29 RX ADMIN — Medication 25 GRAM(S): at 15:15

## 2022-03-29 RX ADMIN — OXYCODONE HYDROCHLORIDE 5 MILLIGRAM(S): 5 TABLET ORAL at 00:02

## 2022-03-29 RX ADMIN — OXYCODONE HYDROCHLORIDE 5 MILLIGRAM(S): 5 TABLET ORAL at 00:39

## 2022-03-29 RX ADMIN — Medication 25 MILLIGRAM(S): at 05:58

## 2022-03-29 RX ADMIN — Medication 400 MILLIGRAM(S): at 18:43

## 2022-03-29 RX ADMIN — SODIUM CHLORIDE 1000 MILLILITER(S): 9 INJECTION, SOLUTION INTRAVENOUS at 18:57

## 2022-03-29 RX ADMIN — CHLORHEXIDINE GLUCONATE 1 APPLICATION(S): 213 SOLUTION TOPICAL at 17:26

## 2022-03-29 RX ADMIN — Medication 1000 MILLIGRAM(S): at 19:45

## 2022-03-29 RX ADMIN — Medication 100 MILLIEQUIVALENT(S): at 05:58

## 2022-03-29 RX ADMIN — Medication 40 MILLIGRAM(S): at 05:58

## 2022-03-29 NOTE — BRIEF OPERATIVE NOTE - OPERATION/FINDINGS
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

## 2022-03-29 NOTE — PROGRESS NOTE ADULT - SUBJECTIVE AND OBJECTIVE BOX
Subjective:  Patient states "I was getting SOB when I walked but it is much better from the water pill".  Patient denies any CP, SOB, N/V/D, fever or chills    HISTORY OF PRESENT ILLNESS:  61y Male h/o L TKR 3/18/22 presents with 3 day history of SOB.  F/w NSTEMI, HFrEF, pulm edema with elevated BNP.  Cath revealed LM 50% ulcerated plaque LAD proximal  with colaterals from RCA, Ramus 75%, LCx 99%, RCA 80% mid ulcerated/complex stenosis.      PAST MEDICAL & SURGICAL HISTORY:  Hypertension    Hyperlipidemia    COVID-19 vaccine series completed    Colon cancer    Prostate cancer  Treated with Radiation 2015    Type 2 diabetes mellitus    Unilateral primary osteoarthritis, left knee    S/P colon resection    S/P hernia repair    ICU Vital Signs Last 24 Hrs  T(C): 36.8 (29 Mar 2022 01:26), Max: 37.3 (28 Mar 2022 16:03)  T(F): 98.3 (29 Mar 2022 01:26), Max: 99.2 (28 Mar 2022 16:03)  HR: 76 (29 Mar 2022 01:00) (57 - 76)  BP: 110/58 (28 Mar 2022 20:00) (92/53 - 111/61)  BP(mean): 78 (28 Mar 2022 20:00) (73 - 78)  RR: 29 (29 Mar 2022 01:00) (15 - 33)  SpO2: 95% (29 Mar 2022 01:26) (90% - 100%)                        11.1   9.08  )-----------( 171      ( 28 Mar 2022 06:16 )             34.8     I&O's Summary    27 Mar 2022 07:01  -  28 Mar 2022 07:00  --------------------------------------------------------  IN: 1014 mL / OUT: 1170 mL / NET: -156 mL    28 Mar 2022 07:01  -  29 Mar 2022 01:34  --------------------------------------------------------  IN: 255 mL / OUT: 900 mL / NET: -645 mL      Labs:                        11.1   9.08  )-----------( 171      ( 28 Mar 2022 06:16 )             34.8   03-28    134<L>  |  96<L>  |  21.5<H>  ----------------------------<  122<H>  3.7   |  24.0  |  0.89    Ca    9.3      28 Mar 2022 06:16  Phos  2.9     03-28  Mg     1.8     03-28    PTT - ( 28 Mar 2022 20:49 )  PTT:43.5 sec    MEDICATIONS  (STANDING):  atorvastatin 80 milliGRAM(s) Oral at bedtime  cefuroxime  IVPB 1500 milliGRAM(s) IV Intermittent once  dextrose 5%. 1000 milliLiter(s) (50 mL/Hr) IV Continuous <Continuous>  dextrose 5%. 1000 milliLiter(s) (100 mL/Hr) IV Continuous <Continuous>  dextrose 50% Injectable 25 Gram(s) IV Push once  dextrose 50% Injectable 12.5 Gram(s) IV Push once  dextrose 50% Injectable 25 Gram(s) IV Push once  folic acid 1 milliGRAM(s) Oral daily  furosemide   Injectable 40 milliGRAM(s) IV Push two times a day  glucagon  Injectable 1 milliGRAM(s) IntraMuscular once  heparin  Infusion. 1200 Unit(s)/Hr (12 mL/Hr) IV Continuous <Continuous>  insulin lispro (ADMELOG) corrective regimen sliding scale   SubCutaneous three times a day before meals  insulin lispro (ADMELOG) corrective regimen sliding scale   SubCutaneous at bedtime  metoprolol tartrate 25 milliGRAM(s) Oral two times a day  oxybutynin 5 milliGRAM(s) Oral at bedtime  pantoprazole    Tablet 40 milliGRAM(s) Oral before breakfast  senna 2 Tablet(s) Oral at bedtime  vancomycin  IVPB 1000 milliGRAM(s) IV Intermittent once        PHYSICAL EXAM:  Neuro: A+O x 3, non-focal, speech clear and intact  CV: regular rate, regular rhythm, +S1S2, no murmurs or rub  Pulm/chest: lung sounds CTA and equal bilaterally, no accessory muscle use noted  Abd: soft, NT, ND  Ext: ACEVEDO x 4, 1 plus pedal edema  Skin: warm, well perfused

## 2022-03-29 NOTE — PROGRESS NOTE ADULT - PROBLEM SELECTOR PLAN 1
Preop work up complete including carotid US to assess for carotid stenosis, PFTs to eval lung function, TTE to eval EF / WMA / Valve function, and lab work including TSH, prealbumin, Hgb A1C, P2Y12, BNP, T&S, MRSA/MSSA, and UA.  Plan for OR this morning

## 2022-03-30 DIAGNOSIS — Z95.1 PRESENCE OF AORTOCORONARY BYPASS GRAFT: ICD-10-CM

## 2022-03-30 LAB
ALBUMIN SERPL ELPH-MCNC: 2.8 G/DL — LOW (ref 3.3–5.2)
ALP SERPL-CCNC: 70 U/L — SIGNIFICANT CHANGE UP (ref 40–120)
ALT FLD-CCNC: 16 U/L — SIGNIFICANT CHANGE UP
ANION GAP SERPL CALC-SCNC: 11 MMOL/L — SIGNIFICANT CHANGE UP (ref 5–17)
AST SERPL-CCNC: 25 U/L — SIGNIFICANT CHANGE UP
BILIRUB SERPL-MCNC: 0.9 MG/DL — SIGNIFICANT CHANGE UP (ref 0.4–2)
BUN SERPL-MCNC: 17.1 MG/DL — SIGNIFICANT CHANGE UP (ref 8–20)
CALCIUM SERPL-MCNC: 6.9 MG/DL — LOW (ref 8.6–10.2)
CHLORIDE SERPL-SCNC: 110 MMOL/L — HIGH (ref 98–107)
CK MB CFR SERPL CALC: 4.3 NG/ML — SIGNIFICANT CHANGE UP (ref 0–6.7)
CK SERPL-CCNC: 155 U/L — SIGNIFICANT CHANGE UP (ref 30–200)
CO2 SERPL-SCNC: 17 MMOL/L — LOW (ref 22–29)
CREAT SERPL-MCNC: 0.5 MG/DL — SIGNIFICANT CHANGE UP (ref 0.5–1.3)
EGFR: 115 ML/MIN/1.73M2 — SIGNIFICANT CHANGE UP
GLUCOSE BLDC GLUCOMTR-MCNC: 112 MG/DL — HIGH (ref 70–99)
GLUCOSE BLDC GLUCOMTR-MCNC: 125 MG/DL — HIGH (ref 70–99)
GLUCOSE BLDC GLUCOMTR-MCNC: 140 MG/DL — HIGH (ref 70–99)
GLUCOSE BLDC GLUCOMTR-MCNC: 158 MG/DL — HIGH (ref 70–99)
GLUCOSE BLDC GLUCOMTR-MCNC: 173 MG/DL — HIGH (ref 70–99)
GLUCOSE BLDC GLUCOMTR-MCNC: 90 MG/DL — SIGNIFICANT CHANGE UP (ref 70–99)
GLUCOSE SERPL-MCNC: 105 MG/DL — HIGH (ref 70–99)
HCT VFR BLD CALC: 28.3 % — LOW (ref 39–50)
HGB BLD-MCNC: 9.5 G/DL — LOW (ref 13–17)
MAGNESIUM SERPL-MCNC: 1.9 MG/DL — SIGNIFICANT CHANGE UP (ref 1.6–2.6)
MCHC RBC-ENTMCNC: 28.9 PG — SIGNIFICANT CHANGE UP (ref 27–34)
MCHC RBC-ENTMCNC: 33.6 GM/DL — SIGNIFICANT CHANGE UP (ref 32–36)
MCV RBC AUTO: 86 FL — SIGNIFICANT CHANGE UP (ref 80–100)
PLATELET # BLD AUTO: 101 K/UL — LOW (ref 150–400)
POTASSIUM SERPL-MCNC: 3.9 MMOL/L — SIGNIFICANT CHANGE UP (ref 3.5–5.3)
POTASSIUM SERPL-SCNC: 3.9 MMOL/L — SIGNIFICANT CHANGE UP (ref 3.5–5.3)
PROT SERPL-MCNC: 4.2 G/DL — LOW (ref 6.6–8.7)
RBC # BLD: 3.29 M/UL — LOW (ref 4.2–5.8)
RBC # FLD: 16.9 % — HIGH (ref 10.3–14.5)
SODIUM SERPL-SCNC: 138 MMOL/L — SIGNIFICANT CHANGE UP (ref 135–145)
TROPONIN T SERPL-MCNC: 0.24 NG/ML — HIGH (ref 0–0.06)
WBC # BLD: 15.52 K/UL — HIGH (ref 3.8–10.5)
WBC # FLD AUTO: 15.52 K/UL — HIGH (ref 3.8–10.5)

## 2022-03-30 PROCEDURE — 71045 X-RAY EXAM CHEST 1 VIEW: CPT | Mod: 26

## 2022-03-30 PROCEDURE — 93010 ELECTROCARDIOGRAM REPORT: CPT | Mod: 76

## 2022-03-30 PROCEDURE — 99024 POSTOP FOLLOW-UP VISIT: CPT

## 2022-03-30 RX ORDER — HYDROMORPHONE HYDROCHLORIDE 2 MG/ML
0.5 INJECTION INTRAMUSCULAR; INTRAVENOUS; SUBCUTANEOUS
Refills: 0 | Status: DISCONTINUED | OUTPATIENT
Start: 2022-03-30 | End: 2022-03-30

## 2022-03-30 RX ORDER — DEXTROSE 50 % IN WATER 50 %
12.5 SYRINGE (ML) INTRAVENOUS ONCE
Refills: 0 | Status: DISCONTINUED | OUTPATIENT
Start: 2022-03-30 | End: 2022-03-30

## 2022-03-30 RX ORDER — SODIUM CHLORIDE 9 MG/ML
1000 INJECTION, SOLUTION INTRAVENOUS
Refills: 0 | Status: DISCONTINUED | OUTPATIENT
Start: 2022-03-30 | End: 2022-03-30

## 2022-03-30 RX ORDER — GLUCAGON INJECTION, SOLUTION 0.5 MG/.1ML
1 INJECTION, SOLUTION SUBCUTANEOUS ONCE
Refills: 0 | Status: DISCONTINUED | OUTPATIENT
Start: 2022-03-30 | End: 2022-03-30

## 2022-03-30 RX ORDER — INSULIN LISPRO 100/ML
VIAL (ML) SUBCUTANEOUS
Refills: 0 | Status: DISCONTINUED | OUTPATIENT
Start: 2022-03-30 | End: 2022-03-30

## 2022-03-30 RX ORDER — POTASSIUM CHLORIDE 20 MEQ
20 PACKET (EA) ORAL
Refills: 0 | Status: COMPLETED | OUTPATIENT
Start: 2022-03-30 | End: 2022-03-30

## 2022-03-30 RX ORDER — METOPROLOL TARTRATE 50 MG
25 TABLET ORAL EVERY 12 HOURS
Refills: 0 | Status: DISCONTINUED | OUTPATIENT
Start: 2022-03-30 | End: 2022-03-30

## 2022-03-30 RX ORDER — DEXTROSE 50 % IN WATER 50 %
25 SYRINGE (ML) INTRAVENOUS ONCE
Refills: 0 | Status: DISCONTINUED | OUTPATIENT
Start: 2022-03-30 | End: 2022-03-30

## 2022-03-30 RX ORDER — DEXTROSE 50 % IN WATER 50 %
15 SYRINGE (ML) INTRAVENOUS ONCE
Refills: 0 | Status: DISCONTINUED | OUTPATIENT
Start: 2022-03-30 | End: 2022-03-30

## 2022-03-30 RX ORDER — SODIUM CHLORIDE 9 MG/ML
3 INJECTION INTRAMUSCULAR; INTRAVENOUS; SUBCUTANEOUS EVERY 8 HOURS
Refills: 0 | Status: DISCONTINUED | OUTPATIENT
Start: 2022-03-30 | End: 2022-04-03

## 2022-03-30 RX ORDER — METOPROLOL TARTRATE 50 MG
12.5 TABLET ORAL EVERY 8 HOURS
Refills: 0 | Status: DISCONTINUED | OUTPATIENT
Start: 2022-03-31 | End: 2022-04-01

## 2022-03-30 RX ORDER — FUROSEMIDE 40 MG
40 TABLET ORAL ONCE
Refills: 0 | Status: COMPLETED | OUTPATIENT
Start: 2022-03-30 | End: 2022-03-30

## 2022-03-30 RX ORDER — AMIODARONE HYDROCHLORIDE 400 MG/1
150 TABLET ORAL ONCE
Refills: 0 | Status: COMPLETED | OUTPATIENT
Start: 2022-03-30 | End: 2022-03-30

## 2022-03-30 RX ORDER — METOPROLOL TARTRATE 50 MG
5 TABLET ORAL ONCE
Refills: 0 | Status: COMPLETED | OUTPATIENT
Start: 2022-03-30 | End: 2022-03-30

## 2022-03-30 RX ORDER — ACETAMINOPHEN 500 MG
975 TABLET ORAL EVERY 6 HOURS
Refills: 0 | Status: COMPLETED | OUTPATIENT
Start: 2022-03-30 | End: 2022-03-31

## 2022-03-30 RX ORDER — ACETAMINOPHEN 500 MG
975 TABLET ORAL EVERY 6 HOURS
Refills: 0 | Status: DISCONTINUED | OUTPATIENT
Start: 2022-04-01 | End: 2022-04-03

## 2022-03-30 RX ORDER — MAGNESIUM SULFATE 500 MG/ML
2 VIAL (ML) INJECTION ONCE
Refills: 0 | Status: COMPLETED | OUTPATIENT
Start: 2022-03-30 | End: 2022-03-30

## 2022-03-30 RX ORDER — METOPROLOL TARTRATE 50 MG
12.5 TABLET ORAL EVERY 8 HOURS
Refills: 0 | Status: DISCONTINUED | OUTPATIENT
Start: 2022-03-30 | End: 2022-03-30

## 2022-03-30 RX ORDER — INSULIN LISPRO 100/ML
VIAL (ML) SUBCUTANEOUS
Refills: 0 | Status: DISCONTINUED | OUTPATIENT
Start: 2022-03-30 | End: 2022-04-01

## 2022-03-30 RX ADMIN — Medication 975 MILLIGRAM(S): at 12:36

## 2022-03-30 RX ADMIN — Medication 25 GRAM(S): at 03:16

## 2022-03-30 RX ADMIN — HYDROMORPHONE HYDROCHLORIDE 0.5 MILLIGRAM(S): 2 INJECTION INTRAMUSCULAR; INTRAVENOUS; SUBCUTANEOUS at 08:17

## 2022-03-30 RX ADMIN — Medication 975 MILLIGRAM(S): at 11:36

## 2022-03-30 RX ADMIN — Medication 1: at 21:35

## 2022-03-30 RX ADMIN — Medication 325 MILLIGRAM(S): at 11:36

## 2022-03-30 RX ADMIN — Medication 100 MILLIGRAM(S): at 08:18

## 2022-03-30 RX ADMIN — Medication 250 MILLIGRAM(S): at 21:20

## 2022-03-30 RX ADMIN — Medication 50 MILLIEQUIVALENT(S): at 11:38

## 2022-03-30 RX ADMIN — Medication 500 MILLIGRAM(S): at 11:35

## 2022-03-30 RX ADMIN — OXYCODONE HYDROCHLORIDE 10 MILLIGRAM(S): 5 TABLET ORAL at 06:10

## 2022-03-30 RX ADMIN — ATORVASTATIN CALCIUM 80 MILLIGRAM(S): 80 TABLET, FILM COATED ORAL at 21:22

## 2022-03-30 RX ADMIN — OXYCODONE HYDROCHLORIDE 10 MILLIGRAM(S): 5 TABLET ORAL at 06:46

## 2022-03-30 RX ADMIN — SODIUM CHLORIDE 3 MILLILITER(S): 9 INJECTION INTRAMUSCULAR; INTRAVENOUS; SUBCUTANEOUS at 21:40

## 2022-03-30 RX ADMIN — Medication 50 MILLIEQUIVALENT(S): at 10:01

## 2022-03-30 RX ADMIN — POLYETHYLENE GLYCOL 3350 17 GRAM(S): 17 POWDER, FOR SOLUTION ORAL at 11:35

## 2022-03-30 RX ADMIN — Medication 975 MILLIGRAM(S): at 18:02

## 2022-03-30 RX ADMIN — CLOPIDOGREL BISULFATE 75 MILLIGRAM(S): 75 TABLET, FILM COATED ORAL at 11:37

## 2022-03-30 RX ADMIN — Medication 10 MILLIGRAM(S): at 00:18

## 2022-03-30 RX ADMIN — Medication 1 TABLET(S): at 11:37

## 2022-03-30 RX ADMIN — Medication 100 MILLIGRAM(S): at 18:02

## 2022-03-30 RX ADMIN — Medication 10 MILLIGRAM(S): at 21:21

## 2022-03-30 RX ADMIN — ATORVASTATIN CALCIUM 80 MILLIGRAM(S): 80 TABLET, FILM COATED ORAL at 00:19

## 2022-03-30 RX ADMIN — Medication 50 MILLIEQUIVALENT(S): at 07:09

## 2022-03-30 RX ADMIN — HYDROMORPHONE HYDROCHLORIDE 0.5 MILLIGRAM(S): 2 INJECTION INTRAMUSCULAR; INTRAVENOUS; SUBCUTANEOUS at 08:35

## 2022-03-30 RX ADMIN — Medication 100 MILLIGRAM(S): at 00:20

## 2022-03-30 RX ADMIN — Medication 250 MILLIGRAM(S): at 08:42

## 2022-03-30 RX ADMIN — OXYCODONE HYDROCHLORIDE 10 MILLIGRAM(S): 5 TABLET ORAL at 12:36

## 2022-03-30 RX ADMIN — Medication 25 MILLIGRAM(S): at 18:02

## 2022-03-30 RX ADMIN — PANTOPRAZOLE SODIUM 40 MILLIGRAM(S): 20 TABLET, DELAYED RELEASE ORAL at 11:36

## 2022-03-30 RX ADMIN — Medication 1 MILLIGRAM(S): at 11:37

## 2022-03-30 RX ADMIN — SENNA PLUS 2 TABLET(S): 8.6 TABLET ORAL at 00:18

## 2022-03-30 RX ADMIN — SENNA PLUS 2 TABLET(S): 8.6 TABLET ORAL at 21:22

## 2022-03-30 RX ADMIN — Medication 125 MILLILITER(S): at 00:19

## 2022-03-30 RX ADMIN — Medication 25 MILLIGRAM(S): at 08:42

## 2022-03-30 RX ADMIN — AMIODARONE HYDROCHLORIDE 618 MILLIGRAM(S): 400 TABLET ORAL at 22:18

## 2022-03-30 RX ADMIN — ENOXAPARIN SODIUM 40 MILLIGRAM(S): 100 INJECTION SUBCUTANEOUS at 11:35

## 2022-03-30 RX ADMIN — Medication 5 MILLIGRAM(S): at 07:09

## 2022-03-30 RX ADMIN — HYDROMORPHONE HYDROCHLORIDE 0.5 MILLIGRAM(S): 2 INJECTION INTRAMUSCULAR; INTRAVENOUS; SUBCUTANEOUS at 22:25

## 2022-03-30 RX ADMIN — OXYCODONE HYDROCHLORIDE 10 MILLIGRAM(S): 5 TABLET ORAL at 11:36

## 2022-03-30 RX ADMIN — HYDROMORPHONE HYDROCHLORIDE 0.5 MILLIGRAM(S): 2 INJECTION INTRAMUSCULAR; INTRAVENOUS; SUBCUTANEOUS at 21:21

## 2022-03-30 RX ADMIN — CHLORHEXIDINE GLUCONATE 1 APPLICATION(S): 213 SOLUTION TOPICAL at 11:38

## 2022-03-30 RX ADMIN — Medication 1: at 11:48

## 2022-03-30 RX ADMIN — Medication 40 MILLIGRAM(S): at 09:56

## 2022-03-30 RX ADMIN — Medication 975 MILLIGRAM(S): at 18:30

## 2022-03-30 NOTE — PROGRESS NOTE ADULT - NS ATTEND AMEND GEN_ALL_CORE FT
Patient was seen and examined at bedside, post 3V CABG, he notes  no chest pian or shortness of breath    Post LHC: LVEDP: 21, LM: 50% calcified/ulcerated plaque,  LAD: Proximal  with collaterals from the RCA, Ramus: 75% stenosis, LCX: 99% mid stenosis with ZORAN 1 flow, RCA: 80% mid ulcerated/complex stenosis    CAD s/p 3V CABG: no chest pain or shortness of breath, continue with ASA and Plavix and high intensity statin, continue with BB   Ischemic cardiomyopathy (LVEF 35-40%) : mildly volume overloaded with bilateral lower extremity edema: c/w Metoprolol 12.5 mg BID, will transition to succinate, Will initiate Entresto 50 mg BID prior to discharge, no RAASi now with impending CT surgery, Diuretic: start Lasix 40 mg daily and Other: May benefit form dapagliflozin upon discharge    New onset pAF: CHADS2-Vasc: 4 for HTN, DM, heart failure, and vascular disease. when cleared from CTSx standpoint would recommend Eliquis 5mgpo q 12hrs c/w , Rate/Rhythm Control: Metoprolol 12.5mg po q 8hrs; started on Amiodarone     HTN: blood pressure is controlled,       Ludy Woods D.O. [facility]  Cardiology/Vascular Cardiology -[facility]   Telephone # [phone].

## 2022-03-30 NOTE — PROGRESS NOTE ADULT - PROBLEM SELECTOR PLAN 4
currently NSR, Plan to start BB as soon as patient can tolerate  not ready to resume eliquis, will d/w attending when appropriate to restart

## 2022-03-30 NOTE — PROGRESS NOTE ADULT - SUBJECTIVE AND OBJECTIVE BOX
[facility]                                                         CARDIOLOGY AT [place] [address]                                                         Telephone: [phone]. Fax:[phone]                                                                             PROGRESS NOTE    Reason for follow up: s/p Cagb  doing well  Update: Pain controlled      Review of symptoms:   Cardiac:  No chest pain. No dyspnea. No palpitations.  Respiratory: no cough. No dyspnea  Gastrointestinal: No diarrhea. No abdominal pain. No bleeding.   Neuro: No focal neuro complaints.      Vitals:  T(C): 36.4 (03-30-22 @ 08:00), Max: 36.7 (03-29-22 @ 20:00)  HR: 76 (03-30-22 @ 11:00) (63 - 87)  BP: --  RR: 20 (03-30-22 @ 11:00) (12 - 35)  SpO2: 96% (03-30-22 @ 11:00) (93% - 100%)  Wt(kg): --  I&O's Summary    29 Mar 2022 07:01  -  30 Mar 2022 07:00  --------------------------------------------------------  IN: 2526 mL / OUT: 1445 mL / NET: 1081 mL    30 Mar 2022 07:01  -  30 Mar 2022 12:00  --------------------------------------------------------  IN: 815 mL / OUT: 750 mL / NET: 65 mL      Weight (kg): 68 (03-29 @ 14:05)      PHYSICAL EXAM:  Appearance: Comfortable. No acute distress  HEENT:  Atraumatic. Normocephalic.  Normal oral mucosa  Neurologic: A & O x 3, no gross focal deficits.  Cardiovascular: RRR S1 S2, No murmur, no rubs/gallops. No JVD sternal dressing d&I  Respiratory: Lungs clear to auscultation, unlabored   Gastrointestinal:  Soft, Non-tender, + BS  Lower Extremities: No edema  Psychiatry: Patient is calm. No agitation.   Skin: warm and dry.      CURRENT MEDICATIONS:  MEDICATIONS  (STANDING):  acetaminophen     Tablet .. 975 milliGRAM(s) Oral every 6 hours  ascorbic acid 500 milliGRAM(s) Oral daily  aspirin enteric coated 325 milliGRAM(s) Oral daily  atorvastatin 80 milliGRAM(s) Oral at bedtime  cefuroxime  IVPB 1500 milliGRAM(s) IV Intermittent every 8 hours  chlorhexidine 2% Cloths 1 Application(s) Topical daily  clopidogrel Tablet 75 milliGRAM(s) Oral daily  dextrose 5%. 1000 milliLiter(s) (50 mL/Hr) IV Continuous <Continuous>  dextrose 5%. 1000 milliLiter(s) (100 mL/Hr) IV Continuous <Continuous>  dextrose 50% Injectable 25 Gram(s) IV Push once  dextrose 50% Injectable 12.5 Gram(s) IV Push once  dextrose 50% Injectable 25 Gram(s) IV Push once  enoxaparin Injectable 40 milliGRAM(s) SubCutaneous every 24 hours  folic acid 1 milliGRAM(s) Oral daily  glucagon  Injectable 1 milliGRAM(s) IntraMuscular once  insulin lispro (ADMELOG) corrective regimen sliding scale   SubCutaneous three times a day before meals  metoprolol tartrate 25 milliGRAM(s) Oral every 12 hours  multivitamin/minerals 1 Tablet(s) Oral daily  oxybutynin 10 milliGRAM(s) Oral at bedtime  pantoprazole    Tablet 40 milliGRAM(s) Oral daily  polyethylene glycol 3350 17 Gram(s) Oral daily  potassium chloride  10 mEq/50 mL IVPB 10 milliEquivalent(s) IV Intermittent every 1 hour  potassium chloride  10 mEq/50 mL IVPB 10 milliEquivalent(s) IV Intermittent every 1 hour  potassium chloride  10 mEq/50 mL IVPB 10 milliEquivalent(s) IV Intermittent every 1 hour  senna 2 Tablet(s) Oral at bedtime  sodium chloride 0.9%. 1000 milliLiter(s) (10 mL/Hr) IV Continuous <Continuous>  sodium chloride 0.9%. 1000 milliLiter(s) (5 mL/Hr) IV Continuous <Continuous>  vancomycin  IVPB 1000 milliGRAM(s) IV Intermittent every 12 hours          LABS:	 	  CARDIAC MARKERS ( 30 Mar 2022 02:06 )  x     / 0.24 ng/mL / 155 U/L / x     / 4.3 ng/mL  p-BNP 30 Mar 2022 02:06: x    , CARDIAC MARKERS ( 29 Mar 2022 14:13 )  x     / 0.50 ng/mL / 142 U/L / x     / x      p-BNP 29 Mar 2022 14:13: x    , CARDIAC MARKERS ( 29 Mar 2022 02:29 )  x     / x     / x     / x     / x      p-BNP 29 Mar 2022 02:29: 789 pg/mL, CARDIAC MARKERS ( 27 Mar 2022 13:57 )  x     / 1.21 ng/mL / x     / x     / x      p-BNP 27 Mar 2022 13:57: x                              9.5    15.52 )-----------( 101      ( 30 Mar 2022 02:32 )             28.3     03-30    138  |  110<H>  |  17.1  ----------------------------<  105<H>  3.9   |  17.0<L>  |  0.50    Ca    6.9<L>      30 Mar 2022 02:06  Mg     1.9     03-30    TPro  4.2<L>  /  Alb  2.8<L>  /  TBili  0.9  /  DBili  x   /  AST  25  /  ALT  16  /  AlkPhos  70  03-30    proBNP: Serum Pro-Brain Natriuretic Peptide: 789 pg/mL (03-29 @ 02:29)  Serum Pro-Brain Natriuretic Peptide: 5573 pg/mL (03-25 @ 17:27)    Lipid Profile: Date: 03-29 @ 02:29  Total cholesterol 123; Direct LDL: --; HDL: 28; Triglycerides:235  Date: 03-26 @ 03:11  Total cholesterol 135; Direct LDL: --; HDL: 27; Triglycerides:161    HgA1c:   TSH: Thyroid Stimulating Hormone, Serum: 1.20 uIU/mL    TELEMETRY: sinus    < from: TTE Echo Complete w/o Contrast w/ Doppler (03.25.22 @ 22:05) >   1. Left ventricular ejection fraction, by visual estimation, is 35 to   40%.   2. Moderately decreased global left ventricular systolic function.   3. Apical septal segment and apex are abnormal as described above.   4. Spectral Doppler shows pseudonormal pattern of left ventricular   myocardial filling (Grade II diastolic dysfunction). Elevated mean left   atrial pressure.   5. Normal left ventricular internal cavity size.   6. Normal right ventricular size and function.   7. Normal left atrial size.   8. Normal right atrial size.   9. Mild mitral annular calcification.  10. Mild thickening of the anterior and posterior mitral valve leaflets.  11. Mild mitral valve regurgitation.  12. Sclerotic aortic valve with normal opening.  13. Moderate pulmonic valve regurgitation.  14. Mildly dilated Ao root at 3.9cm.  15. There is no evidence of pericardial effusion.    < from: Cardiac Catheterization (03.28.22 @ 07:47) >  CORONARY VESSELS: The coronary circulation is right dominant.  LM:   --  Mid left main: There was a 40 % stenosis. The lesion was  moderately calcified.  LAD:   --  Proximal LAD: There was a 100 % stenosis. This lesion is a  chronic total occlusion.  --  Distal LAD: The distal vessel was supplied by collaterals from the RCA.  CX:   --  Mid circumflex: There was a 99 % stenosis.  RI:   --  Ramus intermedius: The vessel was medium to large sized. There  was a 80 % stenosis.  RCA:   --  RCA: The vessel was large sized (dominant).  --  Mid RCA: There was a 80 % stenosis.  COMPLICATIONS: No complications occurred during the cath lab visit.  DIAGNOSTIC IMPRESSIONS: Multivessel CAD ( LM 40% ?ulcerated plaque,   LAD, sutotal mid CX occlusion and severe RCA stenosis)  Mildly elevated left ventrciular filling pressure ( PCWP 15 mmhg)  DIAGNOSTIC RECOMMENDATIONS: Evaluate by CT surgery for CABG with LIMA to  LAD, SVG to OM, SVG to ramus ,SVG to distal RCA    < end of copied text >  < end of copied text >

## 2022-03-30 NOTE — PHYSICAL THERAPY INITIAL EVALUATION ADULT - CRITERIA FOR SKILLED THERAPEUTIC INTERVENTIONS
Home with Home PT/impairments found/functional limitations in following categories/rehab potential/anticipated discharge recommendation

## 2022-03-30 NOTE — PROGRESS NOTE ADULT - SUBJECTIVE AND OBJECTIVE BOX
Patient seen and eval at bedside. Patient states left knee s/p TKA feels fine without pain. Denies numbness/tingling.     PE: NAD,alert awake  Left knee: Dressing C/D/I, no bleeding or drainage noted. mild effusion   EHL/TA/GS/FHL intact  Gross SILT s/s/dp/sp/tib distrib  DP pulse2+, calf soft, NT                          9.5    15.52 )-----------( 101      ( 30 Mar 2022 02:32 )             28.3       A/P: s/p Left TKA 3/18/22 POD#12  Pain control  Swelling left knee expected and appropriate for postop period  DVT propx: as per primary team  PT/OT- WBAT, encouraged ROM flexion/extension - PT aware  Will sign off - primary team to continue care - call with questions concerns [phone]

## 2022-03-30 NOTE — PROGRESS NOTE ADULT - PROBLEM SELECTOR PLAN 1
s/p cabg doing very well  c.w asa atorvastatin, plavix and metoprolol  keep k >4 and mg >2  Oob to chair cough and deep breath exercises  b/p controlled 120-150/60-70  Cardiomyopathy euvolemic consider arb at d/c     Cardiac meds  aspirin enteric coated 325 milliGRAM(s) Oral daily  atorvastatin 80 milliGRAM(s) Oral at bedtime  clopidogrel Tablet 75 milliGRAM(s) Oral daily  metoprolol tartrate 25 milliGRAM(s) Oral every 12 hours  potassium chloride  10 mEq/50 mL IVPB 10 milliEquivalent(s) IV Intermittent every 1 hour

## 2022-03-30 NOTE — PHYSICAL THERAPY INITIAL EVALUATION ADULT - ADDITIONAL COMMENTS
Pt lives in a house with 8 steps to enter and lives with a friend. Pt has been ambulating with a straight cane and RW prior to this admission.

## 2022-03-30 NOTE — PROGRESS NOTE ADULT - PROBLEM SELECTOR PLAN 1
Wean pressor as tolerated  Beta blocker as tolerated by HR and SBP  Lipitor for chronic graft patency prophylaxis  Plavix for acute graft closure prophylaxis  Aspirin for acute graft closure prophylaxis  Encourage PO intake  Encourage OOB to chair and ambulation with PT  Encourage deep breathing exercised and coughing  Chest PT and IS use with bedside nurse

## 2022-03-30 NOTE — PHYSICAL THERAPY INITIAL EVALUATION ADULT - GENERAL OBSERVATIONS, REHAB EVAL
Pt received sitting in bedside chair Pt received sitting in chair with (+) chest tubes, (+) external pacemaker, (+) cardiac monitor, (+) 4Lmin/NC, (+) ramires

## 2022-03-30 NOTE — PROGRESS NOTE ADULT - ASSESSMENT
62 y/o M with PMH of HTN, Dyslipidemia, DM2, Prostate Cancer ( in remission) and Colon cancer (s/p colon resection dx last year, no chemo no radiation) presents s/p L knee replacement with acute shortness of breath x 1 day, secondary to acute hypoxemic resp failure secondary to acute decompensated HFrEF and NSTEMI toponin: 1.53   proBNP: 5573  Echo EF 34-40% Grade 2 dd  elevated rima left atrial pressure. Left heart cath revealed LM 50% ulcerated plaque LAD proximal  with colaterals from RCA, Ramus 75%, LCx 99%, RCA 80% mid ulcerated/complex stenosis.  and referred for Cabg.  3/29 -underwent CABG x 3

## 2022-03-31 LAB
ANION GAP SERPL CALC-SCNC: 14 MMOL/L — SIGNIFICANT CHANGE UP (ref 5–17)
ANION GAP SERPL CALC-SCNC: 15 MMOL/L — SIGNIFICANT CHANGE UP (ref 5–17)
BUN SERPL-MCNC: 23.6 MG/DL — HIGH (ref 8–20)
BUN SERPL-MCNC: 27.8 MG/DL — HIGH (ref 8–20)
CALCIUM SERPL-MCNC: 6.4 MG/DL — CRITICAL LOW (ref 8.6–10.2)
CALCIUM SERPL-MCNC: 8.2 MG/DL — LOW (ref 8.6–10.2)
CHLORIDE SERPL-SCNC: 83 MMOL/L — LOW (ref 98–107)
CHLORIDE SERPL-SCNC: 97 MMOL/L — LOW (ref 98–107)
CO2 SERPL-SCNC: 17 MMOL/L — LOW (ref 22–29)
CO2 SERPL-SCNC: 20 MMOL/L — LOW (ref 22–29)
CREAT SERPL-MCNC: 0.72 MG/DL — SIGNIFICANT CHANGE UP (ref 0.5–1.3)
CREAT SERPL-MCNC: 0.81 MG/DL — SIGNIFICANT CHANGE UP (ref 0.5–1.3)
EGFR: 100 ML/MIN/1.73M2 — SIGNIFICANT CHANGE UP
EGFR: 103 ML/MIN/1.73M2 — SIGNIFICANT CHANGE UP
GLUCOSE BLDC GLUCOMTR-MCNC: 102 MG/DL — HIGH (ref 70–99)
GLUCOSE BLDC GLUCOMTR-MCNC: 130 MG/DL — HIGH (ref 70–99)
GLUCOSE BLDC GLUCOMTR-MCNC: 133 MG/DL — HIGH (ref 70–99)
GLUCOSE BLDC GLUCOMTR-MCNC: 135 MG/DL — HIGH (ref 70–99)
GLUCOSE BLDC GLUCOMTR-MCNC: 140 MG/DL — HIGH (ref 70–99)
GLUCOSE SERPL-MCNC: 126 MG/DL — HIGH (ref 70–99)
GLUCOSE SERPL-MCNC: 682 MG/DL — CRITICAL HIGH (ref 70–99)
HCT VFR BLD CALC: 24.6 % — LOW (ref 39–50)
HCT VFR BLD CALC: 30 % — LOW (ref 39–50)
HGB BLD-MCNC: 7.9 G/DL — LOW (ref 13–17)
HGB BLD-MCNC: 9.7 G/DL — LOW (ref 13–17)
MAGNESIUM SERPL-MCNC: 1.8 MG/DL — SIGNIFICANT CHANGE UP (ref 1.6–2.6)
MAGNESIUM SERPL-MCNC: 2.3 MG/DL — SIGNIFICANT CHANGE UP (ref 1.6–2.6)
MCHC RBC-ENTMCNC: 28.7 PG — SIGNIFICANT CHANGE UP (ref 27–34)
MCHC RBC-ENTMCNC: 28.9 PG — SIGNIFICANT CHANGE UP (ref 27–34)
MCHC RBC-ENTMCNC: 32.1 GM/DL — SIGNIFICANT CHANGE UP (ref 32–36)
MCHC RBC-ENTMCNC: 32.3 GM/DL — SIGNIFICANT CHANGE UP (ref 32–36)
MCV RBC AUTO: 88.8 FL — SIGNIFICANT CHANGE UP (ref 80–100)
MCV RBC AUTO: 90.1 FL — SIGNIFICANT CHANGE UP (ref 80–100)
PLATELET # BLD AUTO: 92 K/UL — LOW (ref 150–400)
PLATELET # BLD AUTO: 94 K/UL — LOW (ref 150–400)
POTASSIUM SERPL-MCNC: 4.8 MMOL/L — SIGNIFICANT CHANGE UP (ref 3.5–5.3)
POTASSIUM SERPL-MCNC: 4.8 MMOL/L — SIGNIFICANT CHANGE UP (ref 3.5–5.3)
POTASSIUM SERPL-SCNC: 4.8 MMOL/L — SIGNIFICANT CHANGE UP (ref 3.5–5.3)
POTASSIUM SERPL-SCNC: 4.8 MMOL/L — SIGNIFICANT CHANGE UP (ref 3.5–5.3)
RBC # BLD: 2.73 M/UL — LOW (ref 4.2–5.8)
RBC # BLD: 3.38 M/UL — LOW (ref 4.2–5.8)
RBC # FLD: 17.5 % — HIGH (ref 10.3–14.5)
RBC # FLD: 17.5 % — HIGH (ref 10.3–14.5)
SODIUM SERPL-SCNC: 115 MMOL/L — CRITICAL LOW (ref 135–145)
SODIUM SERPL-SCNC: 131 MMOL/L — LOW (ref 135–145)
WBC # BLD: 13.38 K/UL — HIGH (ref 3.8–10.5)
WBC # BLD: 17.26 K/UL — HIGH (ref 3.8–10.5)
WBC # FLD AUTO: 13.38 K/UL — HIGH (ref 3.8–10.5)
WBC # FLD AUTO: 17.26 K/UL — HIGH (ref 3.8–10.5)

## 2022-03-31 PROCEDURE — 71045 X-RAY EXAM CHEST 1 VIEW: CPT | Mod: 26,77

## 2022-03-31 PROCEDURE — 99024 POSTOP FOLLOW-UP VISIT: CPT

## 2022-03-31 PROCEDURE — 93010 ELECTROCARDIOGRAM REPORT: CPT

## 2022-03-31 PROCEDURE — 71045 X-RAY EXAM CHEST 1 VIEW: CPT | Mod: 26

## 2022-03-31 RX ORDER — AMIODARONE HYDROCHLORIDE 400 MG/1
200 TABLET ORAL DAILY
Refills: 0 | Status: DISCONTINUED | OUTPATIENT
Start: 2022-04-03 | End: 2022-04-03

## 2022-03-31 RX ORDER — FUROSEMIDE 40 MG
40 TABLET ORAL DAILY
Refills: 0 | Status: DISCONTINUED | OUTPATIENT
Start: 2022-03-31 | End: 2022-04-03

## 2022-03-31 RX ORDER — MAGNESIUM SULFATE 500 MG/ML
2 VIAL (ML) INJECTION ONCE
Refills: 0 | Status: COMPLETED | OUTPATIENT
Start: 2022-03-31 | End: 2022-03-31

## 2022-03-31 RX ORDER — AMIODARONE HYDROCHLORIDE 400 MG/1
TABLET ORAL
Refills: 0 | Status: DISCONTINUED | OUTPATIENT
Start: 2022-04-03 | End: 2022-04-03

## 2022-03-31 RX ORDER — AMIODARONE HYDROCHLORIDE 400 MG/1
400 TABLET ORAL EVERY 8 HOURS
Refills: 0 | Status: COMPLETED | OUTPATIENT
Start: 2022-03-31 | End: 2022-04-03

## 2022-03-31 RX ORDER — AMIODARONE HYDROCHLORIDE 400 MG/1
150 TABLET ORAL ONCE
Refills: 0 | Status: COMPLETED | OUTPATIENT
Start: 2022-03-31 | End: 2022-03-31

## 2022-03-31 RX ADMIN — Medication 25 GRAM(S): at 06:34

## 2022-03-31 RX ADMIN — Medication 325 MILLIGRAM(S): at 11:08

## 2022-03-31 RX ADMIN — ENOXAPARIN SODIUM 40 MILLIGRAM(S): 100 INJECTION SUBCUTANEOUS at 21:21

## 2022-03-31 RX ADMIN — Medication 975 MILLIGRAM(S): at 01:30

## 2022-03-31 RX ADMIN — Medication 975 MILLIGRAM(S): at 01:00

## 2022-03-31 RX ADMIN — Medication 100 MILLIGRAM(S): at 16:07

## 2022-03-31 RX ADMIN — SODIUM CHLORIDE 3 MILLILITER(S): 9 INJECTION INTRAMUSCULAR; INTRAVENOUS; SUBCUTANEOUS at 13:21

## 2022-03-31 RX ADMIN — Medication 975 MILLIGRAM(S): at 05:24

## 2022-03-31 RX ADMIN — Medication 1 MILLIGRAM(S): at 11:08

## 2022-03-31 RX ADMIN — Medication 975 MILLIGRAM(S): at 11:10

## 2022-03-31 RX ADMIN — AMIODARONE HYDROCHLORIDE 618 MILLIGRAM(S): 400 TABLET ORAL at 01:48

## 2022-03-31 RX ADMIN — Medication 975 MILLIGRAM(S): at 11:09

## 2022-03-31 RX ADMIN — AMIODARONE HYDROCHLORIDE 400 MILLIGRAM(S): 400 TABLET ORAL at 13:24

## 2022-03-31 RX ADMIN — Medication 12.5 MILLIGRAM(S): at 01:47

## 2022-03-31 RX ADMIN — ATORVASTATIN CALCIUM 80 MILLIGRAM(S): 80 TABLET, FILM COATED ORAL at 21:22

## 2022-03-31 RX ADMIN — Medication 40 MILLIGRAM(S): at 16:07

## 2022-03-31 RX ADMIN — Medication 250 MILLIGRAM(S): at 10:00

## 2022-03-31 RX ADMIN — Medication 100 MILLIGRAM(S): at 09:33

## 2022-03-31 RX ADMIN — SODIUM CHLORIDE 3 MILLILITER(S): 9 INJECTION INTRAMUSCULAR; INTRAVENOUS; SUBCUTANEOUS at 05:21

## 2022-03-31 RX ADMIN — Medication 1 TABLET(S): at 22:03

## 2022-03-31 RX ADMIN — AMIODARONE HYDROCHLORIDE 400 MILLIGRAM(S): 400 TABLET ORAL at 05:24

## 2022-03-31 RX ADMIN — Medication 975 MILLIGRAM(S): at 17:41

## 2022-03-31 RX ADMIN — Medication 100 MILLIGRAM(S): at 01:48

## 2022-03-31 RX ADMIN — Medication 500 MILLIGRAM(S): at 11:08

## 2022-03-31 RX ADMIN — Medication 10 MILLIGRAM(S): at 22:03

## 2022-03-31 RX ADMIN — CLOPIDOGREL BISULFATE 75 MILLIGRAM(S): 75 TABLET, FILM COATED ORAL at 11:08

## 2022-03-31 RX ADMIN — Medication 975 MILLIGRAM(S): at 05:45

## 2022-03-31 RX ADMIN — Medication 12.5 MILLIGRAM(S): at 17:40

## 2022-03-31 RX ADMIN — AMIODARONE HYDROCHLORIDE 400 MILLIGRAM(S): 400 TABLET ORAL at 21:21

## 2022-03-31 RX ADMIN — Medication 975 MILLIGRAM(S): at 19:00

## 2022-03-31 RX ADMIN — CHLORHEXIDINE GLUCONATE 1 APPLICATION(S): 213 SOLUTION TOPICAL at 11:09

## 2022-03-31 RX ADMIN — SODIUM CHLORIDE 3 MILLILITER(S): 9 INJECTION INTRAMUSCULAR; INTRAVENOUS; SUBCUTANEOUS at 21:23

## 2022-03-31 RX ADMIN — SENNA PLUS 2 TABLET(S): 8.6 TABLET ORAL at 22:04

## 2022-03-31 RX ADMIN — POLYETHYLENE GLYCOL 3350 17 GRAM(S): 17 POWDER, FOR SOLUTION ORAL at 11:08

## 2022-03-31 RX ADMIN — PANTOPRAZOLE SODIUM 40 MILLIGRAM(S): 20 TABLET, DELAYED RELEASE ORAL at 11:08

## 2022-03-31 RX ADMIN — AMIODARONE HYDROCHLORIDE 400 MILLIGRAM(S): 400 TABLET ORAL at 01:49

## 2022-03-31 NOTE — DIETITIAN INITIAL EVALUATION ADULT. - ORAL INTAKE PTA/DIET HISTORY
Pt overall poor historian.  Pt reports decreased po intake at meals.  Pt with fair po intake prior to admission and reports possible wt loss over time.  Pt states he used to weigh 178 lbs; now currently 149 lbs, but unable to clarify time period and diet recall.  Attempted to discussed heart failure nutrition guidelines, however pt declined at this time; asking for nurse.  RD to remain available.

## 2022-03-31 NOTE — PROGRESS NOTE ADULT - SUBJECTIVE AND OBJECTIVE BOX
Brief summary:  61yMale POD# 2 C3 ( off pump) [place]     Overnight events:  Rapid afib RVR, amio bolused X2 with amio load now converted to NSR       PAST MEDICAL & SURGICAL HISTORY:  Hypertension    Hyperlipidemia    COVID-19 vaccine series completed    Colon cancer    Prostate cancer  Treated with Radiation 2015    Type 2 diabetes mellitus    Unilateral primary osteoarthritis, left knee    S/P colon resection    S/P hernia repair        MEDICATIONS  acetaminophen     Tablet .. 975 milliGRAM(s) Oral every 6 hours  aMIOdarone    Tablet 400 milliGRAM(s) Oral every 8 hours  aMIOdarone    Tablet   Oral   ascorbic acid 500 milliGRAM(s) Oral daily  aspirin enteric coated 325 milliGRAM(s) Oral daily  atorvastatin 80 milliGRAM(s) Oral at bedtime  cefuroxime  IVPB 1500 milliGRAM(s) IV Intermittent every 8 hours  chlorhexidine 2% Cloths 1 Application(s) Topical daily  clopidogrel Tablet 75 milliGRAM(s) Oral daily  enoxaparin Injectable 40 milliGRAM(s) SubCutaneous every 24 hours  folic acid 1 milliGRAM(s) Oral daily  HYDROmorphone  Injectable 0.5 milliGRAM(s) IV Push every 3 hours PRN  insulin lispro (ADMELOG) corrective regimen sliding scale   SubCutaneous Before meals and at bedtime  metoprolol tartrate 12.5 milliGRAM(s) Oral every 8 hours  multivitamin/minerals 1 Tablet(s) Oral daily  oxybutynin 10 milliGRAM(s) Oral at bedtime  oxyCODONE    IR 5 milliGRAM(s) Oral every 4 hours PRN  oxyCODONE    IR 10 milliGRAM(s) Oral every 4 hours PRN  pantoprazole    Tablet 40 milliGRAM(s) Oral daily  polyethylene glycol 3350 17 Gram(s) Oral daily  senna 2 Tablet(s) Oral at bedtime  sodium chloride 0.9% lock flush 3 milliLiter(s) IV Push every 8 hours  vancomycin  IVPB 1000 milliGRAM(s) IV Intermittent every 12 hours  MEDICATIONS  (PRN):  HYDROmorphone  Injectable 0.5 milliGRAM(s) IV Push every 3 hours PRN breakthrough pain  oxyCODONE    IR 5 milliGRAM(s) Oral every 4 hours PRN Moderate Pain (4 - 6)  oxyCODONE    IR 10 milliGRAM(s) Oral every 4 hours PRN Severe Pain (7 - 10)      Daily           ABG - ( 29 Mar 2022 21:32 )  pH, Arterial: 7.390 pH, Blood: x     /  pCO2: 36    /  pO2: 93    / HCO3: 22    / Base Excess: -3.2  /  SaO2: 98.1                                    7.9    13.38 )-----------( 94       ( 31 Mar 2022 02:44 )             24.6   03-30    138  |  110<H>  |  17.1  ----------------------------<  105<H>  3.9   |  17.0<L>  |  0.50    Ca    6.9<L>      30 Mar 2022 02:06  Mg     1.9     03-30    TPro  4.2<L>  /  Alb  2.8<L>  /  TBili  0.9  /  DBili  x   /  AST  25  /  ALT  16  /  AlkPhos  70  03-30    CARDIAC MARKERS ( 30 Mar 2022 02:06 )  x     / 0.24 ng/mL / 155 U/L / x     / 4.3 ng/mL  CARDIAC MARKERS ( 29 Mar 2022 14:13 )  x     / 0.50 ng/mL / 142 U/L / x     / x        PT/INR - ( 29 Mar 2022 14:13 )   PT: 17.2 sec;   INR: 1.48 ratio         PTT - ( 29 Mar 2022 14:13 )  PTT:30.9 sec      Objective:  T(C): 36.4 (03-31-22 @ 00:14), Max: 37.1 (03-30-22 @ 16:01)  HR: 123 (03-31-22 @ 02:00) (62 - 145)  BP: 92/55 (03-31-22 @ 02:00) (92/55 - 111/55)  RR: 25 (03-31-22 @ 02:00) (18 - 37)  SpO2: 98% (03-31-22 @ 02:00) (88% - 100%)  Wt(kg): --CAPILLARY BLOOD GLUCOSE      POCT Blood Glucose.: 173 mg/dL (30 Mar 2022 21:28)  POCT Blood Glucose.: 125 mg/dL (30 Mar 2022 17:09)  POCT Blood Glucose.: 158 mg/dL (30 Mar 2022 11:44)  POCT Blood Glucose.: 140 mg/dL (30 Mar 2022 07:30)  I&O's Summary    29 Mar 2022 07:01  -  30 Mar 2022 07:00  --------------------------------------------------------  IN: 2526 mL / OUT: 1445 mL / NET: 1081 mL    30 Mar 2022 07:01  -  31 Mar 2022 03:10  --------------------------------------------------------  IN: 1025 mL / OUT: 1180 mL / NET: -155 mL        Physical Exam  Neuro: A+O x 3, non-focal, speech clear and intact  HEENT: PERRL, EOMI, oral mucosa pink and moist  Neck: supple, no JVD  CV: regular rate, regular rhythm, +S1S2, no murmurs or rub  Pulm/chest: lung sounds CTA and equal bilaterally, no accessory muscle use noted  Abd: soft, NT, ND, +BS  Ext: ACEVEDO x 4, +1 pedal edema, + DP b/l   Skin: warm, well perfused, no rashes  Inc: MSI C/D/I/stable w/ dressing, Left VH C/D/I with Ace wrap  Chest tubes: Medsx2 and Left CT no air leak   +PW     Imaging:  CXR:  < from: Xray Chest 1 View- PORTABLE-Routine (Xray Chest 1 View- PORTABLE-Routine in AM.) (03.30.22 @ 04:44) >  PRESSION:  Status post cardiac surgery.  No evidence of active chest pathology.  Catheters and/or tubes in place    --- End of Report ---    < end of copied text >

## 2022-03-31 NOTE — PROGRESS NOTE ADULT - PROBLEM SELECTOR PLAN 4
Rapid A fib to the 130's   amio bolus x2 and amio loaded given now back in NSR   BB as tolerated   not ready to resume eliquis, will d/w attending when appropriate to restart

## 2022-03-31 NOTE — PROGRESS NOTE ADULT - NS ATTEND AMEND GEN_ALL_CORE FT
Patient was seen and examined at bedside, post 3V CABG, he notes  no chest pian or shortness of breath, but there is rales at the bases bilaterally    Post LHC: LVEDP: 21, LM: 50% calcified/ulcerated plaque,  LAD: Proximal  with collaterals from the RCA, Ramus: 75% stenosis, LCX: 99% mid stenosis with ZORAN 1 flow, RCA: 80% mid ulcerated/complex stenosis    CAD s/p 3V CABG: no chest pain or shortness of breath, continue with ASA and Plavix and high intensity statin, continue with BB     Ischemic cardiomyopathy (LVEF 35-40%) : mildly volume overloaded with bilateral lower extremity edema and bilateral rales at the bases : c/w Metoprolol 12.5 mg q 8 hrs, will transition to succinate, Will recommend to initiate Entresto 24/26 mg BID prior to discharge, Diuretic: start Lasix 40 mg daily, monitor I/Os, keep K ~4 and Mag ~2 and Other: May benefit form dapagliflozin upon discharge    New onset pAF: CHADS2-Vasc: 4 for HTN, DM, heart failure, and vascular disease. when cleared from CTSx standpoint would recommend Eliquis 5mg po q 12hrs c/w , Rate/Rhythm Control: Metoprolol 12.5mg po q 8hrs; started on Amiodarone; if ok with Eliquis would not recommend triple therapy and consider either stopping ASA or Plavix     HTN: blood pressure is controlled,       Ludy Woods D.O. [facility]  Cardiology/Vascular Cardiology -[facility]   Telephone # [phone].

## 2022-03-31 NOTE — PROGRESS NOTE ADULT - PROBLEM SELECTOR PLAN 3
s/p cabg doing very well  c.w Asa Atorvastatin, Plavix and Metoprolol  keep k >4 and mg >2  Oob to chair cough and deep breath exercises  b/p controlled 120-150/60-70  DASH diet  Life style modifications:  Diet /exercise /medication complaisance and cardiac rehab  Follow up with cardiology upon discharge.

## 2022-03-31 NOTE — DIETITIAN INITIAL EVALUATION ADULT. - PERTINENT LABORATORY DATA
03-31 Na131 mmol/L<L> Glu 126 mg/dL<H> K+ 4.8 mmol/L Cr  0.81 mg/dL BUN 27.8 mg/dL<H> Phos n/a   Alb n/a   PAB n/a

## 2022-03-31 NOTE — PROGRESS NOTE ADULT - PROBLEM SELECTOR PLAN 2
New onset pAF: CHADS2-Vasc: 4 for HTN, DM, heart failure, and vascular disease.   Standpoint would recommend Eliquis 5mgpo q 12hrs c/w , Rate/Rhythm Control:   Metoprolol 12.5mg po q 8hrs;   started on Amiodarone  Cotinine home medications and outpatient cardiology follow up.

## 2022-03-31 NOTE — PROGRESS NOTE ADULT - SUBJECTIVE AND OBJECTIVE BOX
[facility]                                                         CARDIOLOGY AT [place] [address]                                                         Telephone: [phone]. Fax:[phone]                                                                             PROGRESS NOTE  Reason for follow up:   CAD/NSTEMI, acute hypoxemic respiratory failure, new onset CHF  Update:   S/P Bypass surgery, followed by CTS  Sitting up in bed, RA, SR on the monitor  Review of symptoms:   Cardiac:  No chest pain. No dyspnea. No palpitations.  Respiratory: no cough. No dyspnea  Gastrointestinal: No diarrhea. No abdominal pain. No bleeding.   Neuro: No focal neuro complaints.    Vitals:  T(C): 36.6 (03-31-22 @ 09:30), Max: 37.1 (03-30-22 @ 16:01)  HR: 56 (03-31-22 @ 10:45) (56 - 145)  BP: 100/62 (03-31-22 @ 10:45) (92/55 - 116/71)  RR: 19 (03-31-22 @ 09:30) (18 - 37)  SpO2: 95% (03-31-22 @ 09:30) (88% - 100%)    I&O's Summary  30 Mar 2022 07:01  -  31 Mar 2022 07:00  --------------------------------------------------------  IN: 1025 mL / OUT: 1700 mL / NET: -675 mL  31 Mar 2022 07:01  -  31 Mar 2022 12:03  --------------------------------------------------------  IN: 240 mL / OUT: 0 mL / NET: 240 mL  Weight (kg): 68 (03-29 @ 14:05)  PHYSICAL EXAM:  Appearance: Comfortable. No acute distress  HEENT:  Atraumatic. Normocephalic.  Normal oral mucosa  Neurologic: A & O x 3, no gross focal deficits.  Cardiovascular: RRR S1 S2, No murmur, no rubs/gallops. No JVD  Respiratory: Lungs clear to auscultation, unlabored   Gastrointestinal:  Soft, Non-tender, + BS  Lower Extremities: 2+ Peripheral Pulses, No clubbing, cyanosis, or edema  Psychiatry: Patient is calm. No agitation.   Skin: warm and dry.    CURRENT CARDIAC MEDICATIONS:  aMIOdarone    Tablet 400 milliGRAM(s) Oral every 8 hours  aMIOdarone    Tablet   Oral   metoprolol tartrate 12.5 milliGRAM(s) Oral every 8 hours      CURRENT OTHER MEDICATIONS:  acetaminophen     Tablet .. 975 milliGRAM(s) Oral every 6 hours  HYDROmorphone  Injectable 0.5 milliGRAM(s) IV Push every 3 hours PRN breakthrough pain  oxyCODONE    IR 5 milliGRAM(s) Oral every 4 hours PRN Moderate Pain (4 - 6)  oxyCODONE    IR 10 milliGRAM(s) Oral every 4 hours PRN Severe Pain (7 - 10)  pantoprazole    Tablet 40 milliGRAM(s) Oral daily  polyethylene glycol 3350 17 Gram(s) Oral daily  senna 2 Tablet(s) Oral at bedtime  atorvastatin 80 milliGRAM(s) Oral at bedtime  insulin lispro (ADMELOG) corrective regimen sliding scale   SubCutaneous Before meals and at bedtime  ascorbic acid 500 milliGRAM(s) Oral daily  aspirin enteric coated 325 milliGRAM(s) Oral daily  chlorhexidine 2% Cloths 1 Application(s) Topical daily  clopidogrel Tablet 75 milliGRAM(s) Oral daily  enoxaparin Injectable 40 milliGRAM(s) SubCutaneous every 24 hours  folic acid 1 milliGRAM(s) Oral daily  multivitamin/minerals 1 Tablet(s) Oral daily  oxybutynin 10 milliGRAM(s) Oral at bedtime  sodium chloride 0.9% lock flush 3 milliLiter(s) IV Push every 8 hours      LABS:	 	  ( 30 Mar 2022 02:06 )  Troponin T  0.24<H>,  CPK  155  , CKMB  X    , BNP X        , ( 29 Mar 2022 14:13 )  Troponin T  0.50<H>,  CPK  142  , CKMB  X    , BNP X        , ( 29 Mar 2022 02:29 )  Troponin T  X    ,  CPK  X    , CKMB  X    , <H>                              9.7    17.26 )-----------( 92       ( 31 Mar 2022 03:56 )             30.0     03-31    131<L>  |  97<L>  |  27.8<H>  ----------------------------<  126<H>  4.8   |  20.0<L>  |  0.81    Ca    8.2<L>      31 Mar 2022 03:56  Mg     2.3     03-31    TPro  4.2<L>  /  Alb  2.8<L>  /  TBili  0.9  /  DBili  x   /  AST  25  /  ALT  16  /  AlkPhos  70  03-30    PT/INR/PTT ( 29 Mar 2022 14:13 )                       :                       :      17.2         :       30.9                  .        .                   .              .           .       1.48        .                                       Lipid Profile: Date: 03-29 @ 02:29  Total cholesterol 123; Direct LDL: --; HDL: 28; Triglycerides:235  Date: 03-26 @ 03:11  Total cholesterol 135; Direct LDL: --; HDL: 27; Triglycerides:161    TSH: Thyroid Stimulating Hormone, Serum: 1.20 uIU/mL    TELEMETRY: Sr, no acute alarms note   	                                                                [facility]                                                         CARDIOLOGY AT [place] [address]                                                         Telephone: [phone]. Fax:[phone]                                                                             PROGRESS NOTE  Reason for follow up:   CAD/NSTEMI, acute hypoxemic respiratory failure, new onset CHF  Update:   S/P Bypass surgery, followed by CTS  Sitting up in bed, RA, SR on the monitor  Review of symptoms:   Cardiac:  No chest pain. No dyspnea. No palpitations.  Respiratory: no cough. No dyspnea  Gastrointestinal: No diarrhea. No abdominal pain. No bleeding.   Neuro: No focal neuro complaints.    Vitals:  T(C): 36.6 (03-31-22 @ 09:30), Max: 37.1 (03-30-22 @ 16:01)  HR: 56 (03-31-22 @ 10:45) (56 - 145)  BP: 100/62 (03-31-22 @ 10:45) (92/55 - 116/71)  RR: 19 (03-31-22 @ 09:30) (18 - 37)  SpO2: 95% (03-31-22 @ 09:30) (88% - 100%)    I&O's Summary  30 Mar 2022 07:01  -  31 Mar 2022 07:00  --------------------------------------------------------  IN: 1025 mL / OUT: 1700 mL / NET: -675 mL  31 Mar 2022 07:01  -  31 Mar 2022 12:03  --------------------------------------------------------  IN: 240 mL / OUT: 0 mL / NET: 240 mL  Weight (kg): 68 (03-29 @ 14:05)  PHYSICAL EXAM:  Appearance: Comfortable. No acute distress  HEENT:  Atraumatic. Normocephalic.  Normal oral mucosa  Neurologic: A & O x 3, no gross focal deficits.  Cardiovascular: RRR S1 S2, No murmur, no rubs/gallops. No JVD  Respiratory: rales at the bases bilaterally  unlabored   Gastrointestinal:  Soft, Non-tender, + BS  Lower Extremities: 2+ Peripheral Pulses, No clubbing, cyanosis, or edema  Psychiatry: Patient is calm. No agitation.   Skin: warm and dry.    CURRENT CARDIAC MEDICATIONS:  aMIOdarone    Tablet 400 milliGRAM(s) Oral every 8 hours  aMIOdarone    Tablet   Oral   metoprolol tartrate 12.5 milliGRAM(s) Oral every 8 hours      CURRENT OTHER MEDICATIONS:  acetaminophen     Tablet .. 975 milliGRAM(s) Oral every 6 hours  HYDROmorphone  Injectable 0.5 milliGRAM(s) IV Push every 3 hours PRN breakthrough pain  oxyCODONE    IR 5 milliGRAM(s) Oral every 4 hours PRN Moderate Pain (4 - 6)  oxyCODONE    IR 10 milliGRAM(s) Oral every 4 hours PRN Severe Pain (7 - 10)  pantoprazole    Tablet 40 milliGRAM(s) Oral daily  polyethylene glycol 3350 17 Gram(s) Oral daily  senna 2 Tablet(s) Oral at bedtime  atorvastatin 80 milliGRAM(s) Oral at bedtime  insulin lispro (ADMELOG) corrective regimen sliding scale   SubCutaneous Before meals and at bedtime  ascorbic acid 500 milliGRAM(s) Oral daily  aspirin enteric coated 325 milliGRAM(s) Oral daily  chlorhexidine 2% Cloths 1 Application(s) Topical daily  clopidogrel Tablet 75 milliGRAM(s) Oral daily  enoxaparin Injectable 40 milliGRAM(s) SubCutaneous every 24 hours  folic acid 1 milliGRAM(s) Oral daily  multivitamin/minerals 1 Tablet(s) Oral daily  oxybutynin 10 milliGRAM(s) Oral at bedtime  sodium chloride 0.9% lock flush 3 milliLiter(s) IV Push every 8 hours      LABS:	 	  ( 30 Mar 2022 02:06 )  Troponin T  0.24<H>,  CPK  155  , CKMB  X    , BNP X        , ( 29 Mar 2022 14:13 )  Troponin T  0.50<H>,  CPK  142  , CKMB  X    , BNP X        , ( 29 Mar 2022 02:29 )  Troponin T  X    ,  CPK  X    , CKMB  X    , <H>                              9.7    17.26 )-----------( 92       ( 31 Mar 2022 03:56 )             30.0     03-31    131<L>  |  97<L>  |  27.8<H>  ----------------------------<  126<H>  4.8   |  20.0<L>  |  0.81    Ca    8.2<L>      31 Mar 2022 03:56  Mg     2.3     03-31    TPro  4.2<L>  /  Alb  2.8<L>  /  TBili  0.9  /  DBili  x   /  AST  25  /  ALT  16  /  AlkPhos  70  03-30    PT/INR/PTT ( 29 Mar 2022 14:13 )                       :                       :      17.2         :       30.9                  .        .                   .              .           .       1.48        .                                       Lipid Profile: Date: 03-29 @ 02:29  Total cholesterol 123; Direct LDL: --; HDL: 28; Triglycerides:235  Date: 03-26 @ 03:11  Total cholesterol 135; Direct LDL: --; HDL: 27; Triglycerides:161    TSH: Thyroid Stimulating Hormone, Serum: 1.20 uIU/mL    TELEMETRY: Sr, no acute alarms note

## 2022-03-31 NOTE — PROGRESS NOTE ADULT - PROBLEM SELECTOR PLAN 1
Ischemic cardiomyopathy (LVEF 35-40%) : mildly volume overloaded with bilateral lower extremity edema that has resolved.    c/w Metoprolol 12.5 mg BID, will transition to succinate, Will initiate Entresto 50 mg BID prior to discharge, no RAASi now with impending CT surgery, Diuretic: start Lasix 40 mg daily and Other: May benefit form dapagliflozin upon discharge  Patient breathing comfortable on RA  IN: 1025 mL / OUT: 1700 mL / NET: -675 mL  Leg with ace wrap due to CT surgery  follow up with cardiology one week outpatient.

## 2022-03-31 NOTE — PROGRESS NOTE ADULT - PROBLEM SELECTOR PLAN 1
s/p CABG x3 off pump   Beta blocker as tolerated by HR and SBP  Lipitor for chronic graft patency prophylaxis  Plavix for acute graft closure prophylaxis  Aspirin for acute graft closure prophylaxis  Encourage PO intake  Encourage OOB to chair and ambulation with PT  Encourage deep breathing exercised and coughing  Chest PT and IS use with bedside nurse

## 2022-03-31 NOTE — PROGRESS NOTE ADULT - ASSESSMENT
60 y/o M with PMH of HTN, Dyslipidemia, DM2, Prostate Cancer ( in remission) and Colon cancer (s/p colon resection dx last year, no chemo no radiation) presents s/p L knee replacement with acute shortness of breath x 1 day, secondary to acute hypoxemic resp failure secondary to acute decompensated HFrEF and NSTEMI toponin: 1.53  proBNP: 5573  Echo EF 34-40% Grade 2 dd  elevated rima left atrial pressure. Left heart cath revealed LM 50% ulcerated plaque LAD proximal  with colaterals from RCA, Ramus 75%, LCx 99%, RCA 80% mid ulcerated/complex stenosis.   Referred for Cabg.  3/29 -underwent CABG x 3

## 2022-03-31 NOTE — PROGRESS NOTE ADULT - ASSESSMENT
61M NSTEMI, pulm edema, acute HFrEF, HTN, HLD, DM s/p TKR on 3/18, now s/p off-pump CABG x 3, post op course significant for rapid afib ( now resolved after amio bouls x2 and amio load) and ABLA requiring PRBCs.

## 2022-03-31 NOTE — DIETITIAN INITIAL EVALUATION ADULT. - OBTAIN DAILY WEIGHT
yes Peng Advancement Flap Text: The defect edges were debeveled with a #15 scalpel blade.  Given the location of the defect, shape of the defect and the proximity to free margins a Peng advancement flap was deemed most appropriate.  Using a sterile surgical marker, an appropriate advancement flap was drawn incorporating the defect and placing the expected incisions within the relaxed skin tension lines where possible. The area thus outlined was incised deep to adipose tissue with a #15 scalpel blade.  The skin margins were undermined to an appropriate distance in all directions utilizing iris scissors.

## 2022-03-31 NOTE — DIETITIAN INITIAL EVALUATION ADULT. - ETIOLOGY
related to inability to consume sufficient protein energy intake with decreased appetite in setting of dyspnea/hypoxia, STEMI, s/p CABG and HF

## 2022-03-31 NOTE — DIETITIAN INITIAL EVALUATION ADULT. - OTHER INFO
Pt with h/o NSTEMI, pulm edema, acute HFrEF, HTN, HLD, DM s/p TKR on 3/18, now s/p off-pump CABG x 3, post op course significant for rapid afib ( now resolved after amio bolus x2 and amio load) and ABLA requiring PRBCs.

## 2022-04-01 LAB
ANION GAP SERPL CALC-SCNC: 13 MMOL/L — SIGNIFICANT CHANGE UP (ref 5–17)
BUN SERPL-MCNC: 20.3 MG/DL — HIGH (ref 8–20)
CALCIUM SERPL-MCNC: 8.4 MG/DL — LOW (ref 8.6–10.2)
CHLORIDE SERPL-SCNC: 99 MMOL/L — SIGNIFICANT CHANGE UP (ref 98–107)
CO2 SERPL-SCNC: 21 MMOL/L — LOW (ref 22–29)
CREAT SERPL-MCNC: 0.67 MG/DL — SIGNIFICANT CHANGE UP (ref 0.5–1.3)
EGFR: 106 ML/MIN/1.73M2 — SIGNIFICANT CHANGE UP
GLUCOSE BLDC GLUCOMTR-MCNC: 116 MG/DL — HIGH (ref 70–99)
GLUCOSE BLDC GLUCOMTR-MCNC: 187 MG/DL — HIGH (ref 70–99)
GLUCOSE SERPL-MCNC: 109 MG/DL — HIGH (ref 70–99)
HCT VFR BLD CALC: 30.9 % — LOW (ref 39–50)
HGB BLD-MCNC: 9.6 G/DL — LOW (ref 13–17)
MAGNESIUM SERPL-MCNC: 2.6 MG/DL — SIGNIFICANT CHANGE UP (ref 1.6–2.6)
MCHC RBC-ENTMCNC: 28.5 PG — SIGNIFICANT CHANGE UP (ref 27–34)
MCHC RBC-ENTMCNC: 31.1 GM/DL — LOW (ref 32–36)
MCV RBC AUTO: 91.7 FL — SIGNIFICANT CHANGE UP (ref 80–100)
PLATELET # BLD AUTO: 143 K/UL — LOW (ref 150–400)
POTASSIUM SERPL-MCNC: 4.1 MMOL/L — SIGNIFICANT CHANGE UP (ref 3.5–5.3)
POTASSIUM SERPL-SCNC: 4.1 MMOL/L — SIGNIFICANT CHANGE UP (ref 3.5–5.3)
RBC # BLD: 3.37 M/UL — LOW (ref 4.2–5.8)
RBC # FLD: 17.4 % — HIGH (ref 10.3–14.5)
SARS-COV-2 RNA SPEC QL NAA+PROBE: SIGNIFICANT CHANGE UP
SODIUM SERPL-SCNC: 132 MMOL/L — LOW (ref 135–145)
WBC # BLD: 13.28 K/UL — HIGH (ref 3.8–10.5)
WBC # FLD AUTO: 13.28 K/UL — HIGH (ref 3.8–10.5)

## 2022-04-01 PROCEDURE — 99024 POSTOP FOLLOW-UP VISIT: CPT

## 2022-04-01 PROCEDURE — 99232 SBSQ HOSP IP/OBS MODERATE 35: CPT | Mod: 24

## 2022-04-01 PROCEDURE — 71045 X-RAY EXAM CHEST 1 VIEW: CPT | Mod: 26

## 2022-04-01 RX ORDER — METOPROLOL TARTRATE 50 MG
2.5 TABLET ORAL ONCE
Refills: 0 | Status: COMPLETED | OUTPATIENT
Start: 2022-04-01 | End: 2022-04-01

## 2022-04-01 RX ORDER — METOPROLOL TARTRATE 50 MG
12.5 TABLET ORAL EVERY 8 HOURS
Refills: 0 | Status: DISCONTINUED | OUTPATIENT
Start: 2022-04-01 | End: 2022-04-01

## 2022-04-01 RX ORDER — AMIODARONE HYDROCHLORIDE 400 MG/1
150 TABLET ORAL ONCE
Refills: 0 | Status: COMPLETED | OUTPATIENT
Start: 2022-04-01 | End: 2022-04-01

## 2022-04-01 RX ORDER — METOPROLOL TARTRATE 50 MG
25 TABLET ORAL EVERY 8 HOURS
Refills: 0 | Status: DISCONTINUED | OUTPATIENT
Start: 2022-04-01 | End: 2022-04-01

## 2022-04-01 RX ORDER — MAGNESIUM SULFATE 500 MG/ML
2 VIAL (ML) INJECTION ONCE
Refills: 0 | Status: COMPLETED | OUTPATIENT
Start: 2022-04-01 | End: 2022-04-01

## 2022-04-01 RX ORDER — APIXABAN 5 MG/1
5 TABLET, FILM COATED ORAL
Qty: 60 | Refills: 0 | Status: DISCONTINUED | COMMUNITY
Start: 2022-04-01 | End: 2022-04-01

## 2022-04-01 RX ORDER — METOPROLOL TARTRATE 50 MG
25 TABLET ORAL
Refills: 0 | Status: DISCONTINUED | OUTPATIENT
Start: 2022-04-01 | End: 2022-04-03

## 2022-04-01 RX ADMIN — SODIUM CHLORIDE 3 MILLILITER(S): 9 INJECTION INTRAMUSCULAR; INTRAVENOUS; SUBCUTANEOUS at 13:08

## 2022-04-01 RX ADMIN — CLOPIDOGREL BISULFATE 75 MILLIGRAM(S): 75 TABLET, FILM COATED ORAL at 11:05

## 2022-04-01 RX ADMIN — Medication 1 TABLET(S): at 11:04

## 2022-04-01 RX ADMIN — AMIODARONE HYDROCHLORIDE 618 MILLIGRAM(S): 400 TABLET ORAL at 11:04

## 2022-04-01 RX ADMIN — SODIUM CHLORIDE 3 MILLILITER(S): 9 INJECTION INTRAMUSCULAR; INTRAVENOUS; SUBCUTANEOUS at 21:10

## 2022-04-01 RX ADMIN — ATORVASTATIN CALCIUM 80 MILLIGRAM(S): 80 TABLET, FILM COATED ORAL at 21:06

## 2022-04-01 RX ADMIN — Medication 500 MILLIGRAM(S): at 11:04

## 2022-04-01 RX ADMIN — Medication 1: at 12:39

## 2022-04-01 RX ADMIN — PANTOPRAZOLE SODIUM 40 MILLIGRAM(S): 20 TABLET, DELAYED RELEASE ORAL at 11:04

## 2022-04-01 RX ADMIN — AMIODARONE HYDROCHLORIDE 400 MILLIGRAM(S): 400 TABLET ORAL at 21:52

## 2022-04-01 RX ADMIN — OXYCODONE HYDROCHLORIDE 10 MILLIGRAM(S): 5 TABLET ORAL at 21:20

## 2022-04-01 RX ADMIN — SODIUM CHLORIDE 3 MILLILITER(S): 9 INJECTION INTRAMUSCULAR; INTRAVENOUS; SUBCUTANEOUS at 05:21

## 2022-04-01 RX ADMIN — Medication 325 MILLIGRAM(S): at 11:05

## 2022-04-01 RX ADMIN — Medication 100 MILLIGRAM(S): at 21:06

## 2022-04-01 RX ADMIN — OXYCODONE HYDROCHLORIDE 10 MILLIGRAM(S): 5 TABLET ORAL at 21:05

## 2022-04-01 RX ADMIN — AMIODARONE HYDROCHLORIDE 400 MILLIGRAM(S): 400 TABLET ORAL at 13:12

## 2022-04-01 RX ADMIN — Medication 1 MILLIGRAM(S): at 11:05

## 2022-04-01 RX ADMIN — Medication 100 MILLIGRAM(S): at 05:24

## 2022-04-01 RX ADMIN — Medication 25 GRAM(S): at 01:07

## 2022-04-01 RX ADMIN — OXYCODONE HYDROCHLORIDE 10 MILLIGRAM(S): 5 TABLET ORAL at 00:28

## 2022-04-01 RX ADMIN — AMIODARONE HYDROCHLORIDE 400 MILLIGRAM(S): 400 TABLET ORAL at 05:24

## 2022-04-01 RX ADMIN — Medication 25 MILLIGRAM(S): at 03:10

## 2022-04-01 RX ADMIN — POLYETHYLENE GLYCOL 3350 17 GRAM(S): 17 POWDER, FOR SOLUTION ORAL at 11:05

## 2022-04-01 RX ADMIN — ENOXAPARIN SODIUM 40 MILLIGRAM(S): 100 INJECTION SUBCUTANEOUS at 21:06

## 2022-04-01 RX ADMIN — Medication 25 MILLIGRAM(S): at 16:59

## 2022-04-01 RX ADMIN — AMIODARONE HYDROCHLORIDE 618 MILLIGRAM(S): 400 TABLET ORAL at 01:47

## 2022-04-01 RX ADMIN — CHLORHEXIDINE GLUCONATE 1 APPLICATION(S): 213 SOLUTION TOPICAL at 11:05

## 2022-04-01 RX ADMIN — Medication 40 MILLIGRAM(S): at 05:23

## 2022-04-01 RX ADMIN — OXYCODONE HYDROCHLORIDE 10 MILLIGRAM(S): 5 TABLET ORAL at 00:58

## 2022-04-01 RX ADMIN — Medication 2.5 MILLIGRAM(S): at 01:06

## 2022-04-01 RX ADMIN — Medication 10 MILLIGRAM(S): at 21:06

## 2022-04-01 NOTE — PROGRESS NOTE ADULT - SUBJECTIVE AND OBJECTIVE BOX
Subjective: no c/o incisional pain at this time. Denies CP, SOB, palpitations, N/V, other c/o.    T(C): 36.7 (04-01-22 @ 00:15), Max: 36.8 (03-31-22 @ 16:03)  HR: 90 (04-01-22 @ 00:15) (56 - 123)  BP: 101/66 (04-01-22 @ 00:15) (92/55 - 130/90)  ABP: --  ABP(mean): --  RR: 17 (04-01-22 @ 00:15) (17 - 32)  SpO2: 95% (04-01-22 @ 00:15) (95% - 100%)  Wt(kg): --  CVP(mm Hg): --  CO: --  CI: --  PA: --       I&O's Detail    30 Mar 2022 07:01  -  31 Mar 2022 07:00  --------------------------------------------------------  IN:    IV PiggyBack: 250 mL    IV PiggyBack: 250 mL    IV PiggyBack: 100 mL    Oral Fluid: 360 mL    sodium chloride 0.9%: 65 mL  Total IN: 1025 mL    OUT:    Chest Tube (mL): 140 mL    Chest Tube (mL): 200 mL    Indwelling Catheter - Urethral (mL): 740 mL    Voided (mL): 620 mL  Total OUT: 1700 mL    Total NET: -675 mL      31 Mar 2022 07:01  -  01 Apr 2022 01:46  --------------------------------------------------------  IN:    Oral Fluid: 960 mL  Total IN: 960 mL    OUT:    Voided (mL): 1900 mL  Total OUT: 1900 mL    Total NET: -940 mL          LABS: All Lab data reviewed and analyzed                        9.7    17.26 )-----------( 92       ( 31 Mar 2022 03:56 )             30.0     03-31    131<L>  |  97<L>  |  27.8<H>  ----------------------------<  126<H>  4.8   |  20.0<L>  |  0.81    Ca    8.2<L>      31 Mar 2022 03:56  Mg     2.3     03-31    TPro  4.2<L>  /  Alb  2.8<L>  /  TBili  0.9  /  DBili  x   /  AST  25  /  ALT  16  /  AlkPhos  70  03-30            CAPILLARY BLOOD GLUCOSE      POCT Blood Glucose.: 140 mg/dL (31 Mar 2022 21:22)  POCT Blood Glucose.: 135 mg/dL (31 Mar 2022 17:00)  POCT Blood Glucose.: 133 mg/dL (31 Mar 2022 11:51)  POCT Blood Glucose.: 102 mg/dL (31 Mar 2022 08:10)  POCT Blood Glucose.: 130 mg/dL (31 Mar 2022 03:32)           RADIOLOGY: - Reviewed and analyzed   CXR: pending    HOSPITAL MEDICATIONS: All medications reviewed and analyzed  MEDICATIONS  (STANDING):  aMIOdarone    Tablet 400 milliGRAM(s) Oral every 8 hours  aMIOdarone    Tablet   Oral   aMIOdarone IVPB 150 milliGRAM(s) IV Intermittent once  ascorbic acid 500 milliGRAM(s) Oral daily  aspirin enteric coated 325 milliGRAM(s) Oral daily  atorvastatin 80 milliGRAM(s) Oral at bedtime  chlorhexidine 2% Cloths 1 Application(s) Topical daily  clopidogrel Tablet 75 milliGRAM(s) Oral daily  enoxaparin Injectable 40 milliGRAM(s) SubCutaneous every 24 hours  folic acid 1 milliGRAM(s) Oral daily  furosemide    Tablet 40 milliGRAM(s) Oral daily  insulin lispro (ADMELOG) corrective regimen sliding scale   SubCutaneous Before meals and at bedtime  metoprolol tartrate 12.5 milliGRAM(s) Oral every 8 hours  multivitamin/minerals 1 Tablet(s) Oral daily  oxybutynin 10 milliGRAM(s) Oral at bedtime  pantoprazole    Tablet 40 milliGRAM(s) Oral daily  polyethylene glycol 3350 17 Gram(s) Oral daily  senna 2 Tablet(s) Oral at bedtime  sodium chloride 0.9% lock flush 3 milliLiter(s) IV Push every 8 hours    MEDICATIONS  (PRN):  acetaminophen     Tablet .. 975 milliGRAM(s) Oral every 6 hours PRN Mild Pain (1 - 3)  benzonatate 100 milliGRAM(s) Oral every 8 hours PRN Cough  oxyCODONE    IR 5 milliGRAM(s) Oral every 4 hours PRN Moderate Pain (4 - 6)  oxyCODONE    IR 10 milliGRAM(s) Oral every 4 hours PRN Severe Pain (7 - 10)          Lines:     Physical Exam  Neuro: A+O x 3, non-focal, speech clear and intact  HEENT: PERRL, EOMI, oral mucosa pink and moist  Neck: supple, no JVD  CV: regular rate, regular rhythm, +S1S2, no murmurs or rub  Pulm/chest: lung sounds CTA and equal bilaterally, no accessory muscle use noted  Abd: soft, NT, ND, +BS  Ext: ACEVEDO x 4  , LLE edema 1+ and bruising on medial portion of LLE, DP 2+ LLE dressing over meidal thigh and anterior knee. c/d/i.  Skin: warm, well perfused , sternal wound dressing c.d.i         Case including assessment/plan of care discussed with   CT surgery attending.  Care time:   35   minutes of noncontinuos care time spent evaluating/treating, reviewing imaging, labs, discussing case with multidisciplinary team, discussing plans/goals of care with patient/family . Non-inclusive is procedure time.       61yMale with PMH     Off-pump CABG with endoscopic vein harvesting        PAST MEDICAL & SURGICAL HISTORY:  Hypertension    Hyperlipidemia    COVID-19 vaccine series completed    Colon cancer    Prostate cancer  Treated with Radiation 2015    Type 2 diabetes mellitus    Unilateral primary osteoarthritis, left knee    S/P colon resection    S/P hernia repair

## 2022-04-01 NOTE — PROGRESS NOTE ADULT - ASSESSMENT
62 y/o M with PMH of HTN, Dyslipidemia, DM2, Prostate Cancer ( in remission) and Colon cancer (s/p colon resection dx last year, no chemo no radiation) presents s/p L knee replacement with acute shortness of breath x 1 day, secondary to acute hypoxemic resp failure secondary to acute decompensated HFrEF and NSTEMI toponin: 1.53. proBNP: 5573  Echo EF 34-40% Grade 2 diastolic dysfunction elevated mean left atrial pressure. Left heart cath revealed LM 50% ulcerated plaque LAD proximal  with colaterals from RCA, Ramus 75%, LCx 99%, RCA 80% mid ulcerated/complex stenosis. 3/29 -underwent CABG x 3, now on floor, OOB in chair, on RA, CTs removed, external PW x2 to EPM VVI.  62 y/o M with PMH of HTN, Dyslipidemia, DM2, Prostate Cancer ( in remission) and Colon cancer (s/p colon resection dx last year, no chemo no radiation) presents s/p L knee replacement with acute shortness of breath x 1 day, secondary to acute hypoxemic resp failure secondary to acute decompensated HFrEF and NSTEMI toponin: 1.53. proBNP: 5573  Echo EF 34-40% Grade 2 diastolic dysfunction elevated mean left atrial pressure. Left heart cath revealed LM 50% ulcerated plaque LAD proximal  with colaterals from RCA, Ramus 75%, LCx 99%, RCA 80% mid ulcerated/complex stenosis. 3/29 -underwent CABG x 3, now on floor, OOB in chair, on RA, CTs removed, external PW x2 to EPM VVI.     POD #3 s/p CABG

## 2022-04-01 NOTE — PROGRESS NOTE ADULT - PROBLEM SELECTOR PLAN 1
- Continue 25mg lopressor BID  - Continue 40mg lasix daily  - Strict I&O's  - Left ventricular ejection fraction, by visual estimation, is 35-40%.  - Moderately decreased global left ventricular systolic function  - pseudonormal pattern of left ventricular myocardial filling (Grade II diastolic dysfunction). Elevated mean left   atrial pressure  - Sclerotic aortic valve with normal opening

## 2022-04-01 NOTE — PROGRESS NOTE ADULT - ASSESSMENT
61M NSTEMI, pulm edema, acute HFrEF, HTN, HLD, DM s/p TKR on 3/18, now s/p off-pump CABG x 3, post op course significant for rapid afib ( now resolved after amio bouls x2 and amio load) and ABLA requiring PRBCs.     4/1 rate controlled afib on amio and po load. Given 2.5 IVP lopressor and 2 mag and 150 mg IVPB amio

## 2022-04-01 NOTE — PROGRESS NOTE ADULT - PROBLEM SELECTOR PLAN 4
- Continue 25mg lopressor BID  - BP controlled, -120's .  - Continue 25mg lopressor BID  - BP controlled, -120's

## 2022-04-01 NOTE — PROGRESS NOTE ADULT - SUBJECTIVE AND OBJECTIVE BOX
Patient seen and eval sitting in chair. Deneis pain left knee. Denies numbness/tingling. no complaints.    PE: NAD,alert awake  Left knee: Dressing C/D/I, no bleeding or drainage noted. mild effusion   + dorsi/plantarflexion  Gross SILT  DP pulse2+, calf soft, NT                                   9.6    13.28 )-----------( 143      ( 01 Apr 2022 05:53 )             30.9       A/P: s/p Left TKA 3/18/22  Pain control  DVT propx: as per primary team  PT/OT- WBAT, encouraged ROM flexion/extension  cont care primary team

## 2022-04-01 NOTE — PROGRESS NOTE ADULT - NS ATTEND AMEND GEN_ALL_CORE FT
Patient was seen and examined at bedside, post 3V CABG, he notes  no chest pian or shortness of breath, and ambulated with no complaints of any shortness of breath and rales have improved on physical examination.     Post LHC: LVEDP: 21, LM: 50% calcified/ulcerated plaque,  LAD: Proximal  with collaterals from the RCA, Ramus: 75% stenosis, LCX: 99% mid stenosis with ZORAN 1 flow, RCA: 80% mid ulcerated/complex stenosis    CAD s/p 3V CABG: no chest pain or shortness of breath, continue with ASA and Plavix and high intensity statin, continue with BB     Ischemic cardiomyopathy (LVEF 35-40%) : mildly volume overloaded with bilateral lower extremity edema and bilateral rales at the bases : c/w Metoprolol 25mg po q 12hrs, will transition to succinate on discharge, Will recommend to initiate Entresto 24/26 mg BID prior to discharge if unable to recommend to start Losartan 25mg po q daily, Diuretic: c/w Lasix 40 mg daily, monitor I/Os, keep K ~4 and Mag ~2 and Other: May benefit form dapagliflozin upon discharge    New onset pAF: CHADS2-Vasc: 4 for HTN, DM, heart failure, and vascular disease. paroxysms of AF, when cleared from CTSx standpoint would recommend Eliquis 5mg po q 12hrs c/w , Rate/Rhythm Control: Metoprolol 25 mg po q 12hrs and on Amiodarone; if ok with Eliquis would not recommend triple therapy and consider either stopping ASA or Plavix   Question to CT Sx if patient can be on Plavix and Eliquis     HTN: blood pressure is controlled,       Ludy Woods D.O. [facility]  Cardiology/Vascular Cardiology -[facility] Cardiology   Telephone # [phone]. Patient was seen and examined at bedside, post 3V CABG, he notes  no chest pian or shortness of breath, and ambulated with no complaints of any shortness of breath and rales have improved on physical examination.     Post LHC: LVEDP: 21, LM: 50% calcified/ulcerated plaque,  LAD: Proximal  with collaterals from the RCA, Ramus: 75% stenosis, LCX: 99% mid stenosis with ZORAN 1 flow, RCA: 80% mid ulcerated/complex stenosis    CAD s/p 3V CABG: no chest pain or shortness of breath, continue with ASA and Plavix and high intensity statin, continue with BB     Ischemic cardiomyopathy (LVEF 35-40%) : interval improvement in bilateral lower extremity edema and no rales at the bases : c/w Metoprolol 25mg po q 12hrs, will transition to succinate on discharge, Will recommend to initiate Entresto 24/26 mg BID prior to discharge if unable to recommend to start Losartan 25mg po q daily, Diuretic: c/w Lasix 40 mg daily, monitor I/Os, keep K ~4 and Mag ~2 and Other: May benefit form dapagliflozin upon discharge    New onset pAF: CHADS2-Vasc: 4 for HTN, DM, heart failure, and vascular disease. paroxysms of AF, when cleared from CTSx standpoint would recommend Eliquis 5mg po q 12hrs c/w , Rate/Rhythm Control: Metoprolol 25 mg po q 12hrs and on Amiodarone; if ok with Eliquis would not recommend triple therapy and consider either stopping ASA or Plavix   Question to CT Sx if patient can be on Plavix and Eliquis     HTN: blood pressure is controlled,       Ludy Woods D.O. [facility]  Cardiology/Vascular Cardiology -[facility] Cardiology   Telephone # [phone].

## 2022-04-01 NOTE — PROGRESS NOTE ADULT - PROBLEM SELECTOR PLAN 3
- Continue amiodarone load  - Continue 25mg lopressor BID, up titrate as tolerated  - Keep K>4, Mg >2  - Continuous telemetry monitoring .  - [id number] 3 (HTN, vasc dz, DM) 3.2%  - Continue amiodarone load  - Continue 25mg lopressor BID, up titrate as tolerated  - Keep K>4, Mg >2  - consider AC when stable   - Continuous telemetry monitoring

## 2022-04-01 NOTE — PROGRESS NOTE ADULT - SUBJECTIVE AND OBJECTIVE BOX
[facility]                                                         CARDIOLOGY AT [place] [address]                                                         Telephone: [phone]. Fax:[phone]                                                                             PROGRESS NOTE    Reason for follow up: s/p CABG  Update: pt doing well, OOB in chair      Review of symptoms:   General: "I am feeling well, I would like to walk"  Cardiac:  No chest pain. No dyspnea. No palpitations.  Respiratory: no cough. No dyspnea  Gastrointestinal: No diarrhea. No abdominal pain. No bleeding.   : denies dysuria  MSK: endorses mild knee pain when standing  Neuro: No focal neuro complaints.    Vitals:  T(C): 36.6 (22 @ 09:39), Max: 36.8 (22 @ 16:03)  HR: 60 (22 @ 11:33) (60 - 156)  BP: 113/72 (22 @ 11:33) (100/65 - 130/90)  RR: 18 (22 @ 11:33) (17 - 19)  SpO2: 96% (22 @ 11:33) (95% - 99%)  Wt(kg): --  I&O's Summary    31 Mar 2022 07:  -  2022 07:00  --------------------------------------------------------  IN: 1560 mL / OUT: 2200 mL / NET: -640 mL    2022 07:  -  2022 11:50  --------------------------------------------------------  IN: 0 mL / OUT: 500 mL / NET: -500 mL      Weight (kg): 68 ( @ 14:05)    PHYSICAL EXAM:  Appearance: Comfortable. No acute distress  HEENT:  Atraumatic. Normocephalic.  Normal oral mucosa  Neurologic: A & O x 3, no gross focal deficits.  Cardiovascular: RRR S1 S2, No murmur, no rubs/gallops. No JVD, PWx2 to EPM VVI  Respiratory: B/L lower lobe crackles, unlabored   Gastrointestinal:  Soft, Non-tender, + BS  Lower Extremities: 2+ Peripheral Pulses, No clubbing, cyanosis, or edema  Psychiatry: Patient is calm. No agitation.   Skin: warm and dry. MS incision dressing clean & dry, L Leg SVG clean dry & intact    CURRENT CARDIAC MEDICATIONS:  aMIOdarone    Tablet 400 milliGRAM(s) Oral every 8 hours  aMIOdarone    Tablet   Oral   furosemide    Tablet 40 milliGRAM(s) Oral daily  metoprolol tartrate 25 milliGRAM(s) Oral two times a day      CURRENT OTHER MEDICATIONS:  benzonatate 100 milliGRAM(s) Oral every 8 hours PRN Cough  acetaminophen     Tablet .. 975 milliGRAM(s) Oral every 6 hours PRN Mild Pain (1 - 3)  oxyCODONE    IR 5 milliGRAM(s) Oral every 4 hours PRN Moderate Pain (4 - 6)  oxyCODONE    IR 10 milliGRAM(s) Oral every 4 hours PRN Severe Pain (7 - 10)  pantoprazole    Tablet 40 milliGRAM(s) Oral daily  polyethylene glycol 3350 17 Gram(s) Oral daily  senna 2 Tablet(s) Oral at bedtime  atorvastatin 80 milliGRAM(s) Oral at bedtime  insulin lispro (ADMELOG) corrective regimen sliding scale   SubCutaneous Before meals and at bedtime  ascorbic acid 500 milliGRAM(s) Oral daily  aspirin enteric coated 325 milliGRAM(s) Oral daily  chlorhexidine 2% Cloths 1 Application(s) Topical daily  clopidogrel Tablet 75 milliGRAM(s) Oral daily  enoxaparin Injectable 40 milliGRAM(s) SubCutaneous every 24 hours  folic acid 1 milliGRAM(s) Oral daily  multivitamin/minerals 1 Tablet(s) Oral daily  oxybutynin 10 milliGRAM(s) Oral at bedtime  sodium chloride 0.9% lock flush 3 milliLiter(s) IV Push every 8 hours      LABS:	 	  ( 30 Mar 2022 02:06 )  Troponin T  0.24<H>,  CPK  155  , CKMB  X    , BNP X        , ( 29 Mar 2022 14:13 )  Troponin T  0.50<H>,  CPK  142  , CKMB  X    , BNP X        , ( 29 Mar 2022 02:29 )  Troponin T  X    ,  CPK  X    , CKMB  X    , <H>                              9.6    13.28 )-----------( 143      ( 2022 05:53 )             30.9     04-01    132<L>  |  99  |  20.3<H>  ----------------------------<  109<H>  4.1   |  21.0<L>  |  0.67    Ca    8.4<L>      2022 05:53  Mg     2.6     04-01      PT/INR/PTT ( 29 Mar 2022 14:13 )                       :                       :      17.2         :       30.9                  .        .                   .              .           .       1.48        .                                       Lipid Profile: Date:  @ 02:29  Total cholesterol 123; Direct LDL: --; HDL: 28; Triglycerides:235  Date:  @ 03:11  Total cholesterol 135; Direct LDL: --; HDL: 27; Triglycerides:161    HgA1c:   TSH: Thyroid Stimulating Hormone, Serum: 1.20 uIU/mL      TELEMETRY:  Sinus rhythm 60's/Afib 's    ECG: < from: 12 Lead ECG (22 @ 08:53) >  Normal sinus rhythm  Possible Left atrial enlargement  Cannot rule out Anterior infarct , age undetermined  Prolonged QT    < end of copied text >      DIAGNOSTIC TESTING:  [x ] Echocardiogram:   < from: TTE Echo Complete w/o Contrast w/ Doppler (22 @ 22:05) >  2D AND M-MODE MEASUREMENTS (normal ranges within parentheses):  Left Ventricle:                  Normal   Aorta/Left Atrium:            Normal  IVSd (2D):              0.85 cm(0.7-1.1) Aortic Root (2D):  3.90 cm   (2.4-3.7)  LVPWd (2D):             0.71 cm (0.7-1.1) Left Atrium (2D):  3.88 cm   (1.9-4.0)  LVIDd (2D):             5.02 cm (3.4-5.7)  LVIDs (2D):             4.08 cm  LV FS (2D):             18.6 %   (>25%)  Relative Wall Thickness  0.28    (<0.42)    LV SYSTOLIC FUNCTION BY 2D PLANIMETRY (MOD):  EF-Biplane: 37 %    LV DIASTOLIC FUNCTION:  MV Peak E: 1.06 m/s e', MV Mer[place]: 0.05 m/s  MV Peak A: 0.44 m/s E/e' Ratio: 22.50  E/A Ratio: 2.39     Decel Time: 148 msec    SPECTRAL DOPPLER ANALYSIS (where applicable):  Mitral Valve:  MV P1/2 Time: 42.92 msec  MV Area, PHT: 5.13 cm²    Aortic Valve: AoV Max Yonathan: 1.12 m/s AoV Peak P.0 mmHg          PHYSICIAN INTERPRETATION:  Left Ventricle: The left ventricularinternal cavity size is normal.  Global LV systolic function was moderately decreased. Left ventricular   ejection fraction, by visual estimation, is 35 to 40%. Spectral Doppler   shows pseudonormal pattern of left ventricular myocardial filling (Grade   II diastolic dysfunction). Elevated mean left atrial pressure.      LV Wall Scoring:  The apical septal segment and apex are akinetic.    Right Ventricle: Normal right ventricular size and function.  Left Atrium: Normal left atrial size.  Right Atrium: Normal right atrial size.  Pericardium: There is no evidence of pericardial effusion.  Mitral Valve: Structurally normal mitral valve, with normal leaflet   excursion. Mild thickening of the anterior and posterior mitral valve   leaflets. Thereis mild mitral annular calcification. Mild mitral valve   regurgitation is seen. The MR jet is centrally-directed.  Tricuspid Valve: The tricuspid valve is normal in structure. Trivial   tricuspid regurgitation is visualized. Adequate TR velocity wasnot   obtained to accurately assess RVSP.  Aortic Valve: The aortic valve is trileaflet. No evidence of aortic   stenosis. Sclerotic aortic valve with normal opening. No evidence of   aortic valve regurgitation is seen.  Pulmonic Valve: Moderate pulmonic valve regurgitation.  Aorta: The aortic root is normal in size and structure. Mildly dilated Ao   root at 3.9cm.  Venous: The inferior vena cava was dilated, with respiratory size   variation less than 50%.      Summary:   1. Left ventricular ejection fraction, by visual estimation, is 35 to   40%.   2. Moderately decreased global left ventricular systolic function.   3. Apical septal segment and apex are abnormal as described above.   4. Spectral Doppler shows pseudonormal pattern of left ventricular   myocardial filling (Grade II diastolic dysfunction). Elevated mean left   atrial pressure.   5. Normal left ventricular internal cavity size.   6. Normal right ventricular size and function.   7. Normal left atrial size.   8. Normal right atrial size.   9. Mild mitral annular calcification.  10. Mild thickening of the anterior and posterior mitral valve leaflets.  11. Mild mitral valve regurgitation.  12. Sclerotic aortic valve with normal opening.  13. Moderate pulmonic valve regurgitation.  14. Mildly dilated Ao root at 3.9cm.  15. There is no evidence of pericardial effusion.      < end of copied text >    [x ]  Catheterization:    < from: Cardiac Catheterization (22 @ 07:47) >  PROCEDURE:  --  Right heart catheterization.  --  Left heart catheterization.  --  Left coronary angiography.  --  Right coronary angiography.  --  Sonosite.  TECHNIQUE: The risks and alternatives of the procedures and conscious  sedation were explained to the patient and informed consent was obtained.  Cardiac catheterization performed urgently.  Local anesthetic given. Right radial artery access. Right brachial vein  access. Right heart catheterization. The procedure was performed utilizing  a catheter. Left heart catheterization. Left coronary artery angiography.  The vessel was injected utilizing a catheter. Right coronary artery  angiography. The vessel was injected utilizing a catheter. Sonosite.  RADIATION EXPOSURE: 4.8 min.  CONTRAST GIVEN: Omnipaque 41 ml.  MEDICATIONS GIVEN: Midazolam, 1 mg, IV. Fentanyl, 25 mcg, IV. Verapamil  (Isoptin, Calan, Covera), 2.5 mg, IA. Nitroglycerin, 200 mcg, IA. Heparin,  3000 units, IV. 1% Lidocaine, 10 ml, subcutaneously.  CORONARY VESSELS: The coronary circulation is right dominant.  LM:   --  Mid left main: There was a 40 % stenosis. The lesion was  moderately calcified.  LAD:   --  Proximal LAD: There was a 100 % stenosis. This lesion is a  chronic total occlusion.  --  Distal LAD: The distal vessel was supplied by collaterals from the RCA.  CX:   --  Mid circumflex: There was a 99 % stenosis.  RI:   --  Ramus intermedius: The vessel was medium to large sized. There  was a 80 % stenosis.  RCA:   --  RCA: The vessel was large sized (dominant).  --  Mid RCA: There was a 80 % stenosis.  COMPLICATIONS: No complications occurred during the cath lab visit.  DIAGNOSTIC IMPRESSIONS: Multivessel CAD ( LM 40% ?ulcerated plaque,   LAD, sutotal mid CX occlusion and severe RCA stenosis)  Mildly elevated left ventrciular filling pressure ( PCWP 15 mmhg)  DIAGNOSTIC RECOMMENDATIONS: Evaluate by CT surgery for CABG with LIMA to  LAD, SVG to OM, SVG to ramus ,SVG to distal RCA  Prepared and signed by  Dago Rodriguez MD  Signed 2022 17:15:55  HEMODYNAMIC TABLES  Pressures:  Baseline  Pressures:  - HR: 61  Pressures:  - Rhythm:  Pressures:  -- Aortic Pressure (S/D/M): 99/48/73  Pressures:  -- Left Ventricle (s/edp): 104/20/--  Pressures:  -- Pulmonary Artery (S/D/M): 31/8/19  Pressures:  -- Pulmonary Capillary Wedge: /15  Pressures:  -- Right Atrium (a/v/M): 10/6/5  Pressures:  -- Right Ventricle (s/edp): 36/11/--  O2 Sats:  Baseline  O2 Sats:  - HR: 61  O2 Sats:  - Rhythm:  O2 Sats:  -- AO: 9.4/92/11.76  O2 Sats:  -- PA: 9.4/58.2/7.44  Outputs:  Baseline  Outputs:  -- CALCULATIONS[age]  Outputs:  -- CALCULATIONS: Body Surface Area: 1.85  Outputs:  -- CALCULATIONS: Height in cm: 160.00  Outputs:  -- CALCULATIONS: Sex: Male  Outputs:  -- CALCULATIONS: Weight in k.60  Outputs:  -- OUTPUTS: CO by Sangita: 5.35  Outputs:  -- OUTPUTS: Sangita cardiac index: 2.89  Outputs:  -- OUTPUTS: O2 consumption: 231.06  Outputs:  -- OUTPUTS: Vo2 Indexed: 125.00  Outputs:  -- RESISTANCES: Left ventricular stroke work: 69.15  Outputs:  -- RESISTANCES: Left Ventricular Stroke Work index: 37.41  Outputs:  -- RESISTANCES: Pulmonary vascular index (dsc): 110.59  Outputs:  -- RESISTANCES: Pulmonary vascular index (WoodUnits): 1.38  Outputs:  -- RESISTANCES: Pulmonary vascular resistance (dsc): 59.83  Outputs:  -- RESISTANCES: Pulmonary vascular resistance (Wood Units): 0.75  Outputs:  -- RESISTANCES: PVR_SVR Ratio: 0.06  Outputs:  -- RESISTANCES: Right ventricularstroke work: 18.18  Outputs:  -- RESISTANCES: Right ventricular stroke work index: 9.84  Outputs:  -- RESISTANCES: Systemic vascular index (dsc): 1880.03  Outputs:  -- RESISTANCES: Systemic vascular index (Wood Units): 23.51  Outputs:  -- RESISTANCES: Systemic vascular resistance (dsc): 1017.05  Outputs:  -- RESISTANCES: Systemic vascular resistance (Wood Units): 12.72  Outputs:  -- RESISTANCES: Total pulmonary index (dsc): 525.30  Outputs:  -- RESISTANCES: Total pulmonary index (Wood Units): 6.57  Outputs:  -- RESISTANCES: Total pulmonary resistance (dsc): 284.18  Outputs:  -- RESISTANCES: Total pulmonary resistance (Wood Units): 3.55  Outputs:  -- RESISTANCES: Total vascular index (Wood Units): 25.23  Outputs:  -- RESISTANCES: Total vascular resistance (dsc): 1091.83  Outputs:  -- RESISTANCES: Total vascular resistance (Wood Units): 13.65  Outputs:  -- RESISTANCES: Total vascular resistance index (dsc): 2018.26  Outputs:  -- RESISTANCES: TPR_TVR Ratio: 0.26  Outputs:  -- SHUNTS: Pulmonary flow: 5.35  Outputs:  -- SHUNTS: Qp Indexed: 2.89  Outputs:  -- SHUNTS: Qs Indexed: 2.89  Outputs:  -- SHUNTS: Systemic flow: 5.35    < end of copied text >    [ ] Stress Test:      OTHER:

## 2022-04-01 NOTE — PROGRESS NOTE ADULT - PROBLEM SELECTOR PLAN 2
- p/o day 1 CABG x3, primary management from [facility]  - Continue 325mg ASA daily  - Continue 75mg plavix daily - POD day 3 CABG x3,   - Continue 325mg ASA daily  - Continue 75mg plavix daily  -primary management per [facility]

## 2022-04-02 ENCOUNTER — TRANSCRIPTION ENCOUNTER (OUTPATIENT)
Age: 62
End: 2022-04-02

## 2022-04-02 DIAGNOSIS — Z29.9 ENCOUNTER FOR PROPHYLACTIC MEASURES, UNSPECIFIED: ICD-10-CM

## 2022-04-02 LAB
ALBUMIN SERPL ELPH-MCNC: 3.4 G/DL — SIGNIFICANT CHANGE UP (ref 3.3–5.2)
ALP SERPL-CCNC: 115 U/L — SIGNIFICANT CHANGE UP (ref 40–120)
ALT FLD-CCNC: 23 U/L — SIGNIFICANT CHANGE UP
ANION GAP SERPL CALC-SCNC: 13 MMOL/L — SIGNIFICANT CHANGE UP (ref 5–17)
AST SERPL-CCNC: 33 U/L — SIGNIFICANT CHANGE UP
BILIRUB SERPL-MCNC: 1.2 MG/DL — SIGNIFICANT CHANGE UP (ref 0.4–2)
BUN SERPL-MCNC: 17.2 MG/DL — SIGNIFICANT CHANGE UP (ref 8–20)
CALCIUM SERPL-MCNC: 8.4 MG/DL — LOW (ref 8.6–10.2)
CHLORIDE SERPL-SCNC: 99 MMOL/L — SIGNIFICANT CHANGE UP (ref 98–107)
CO2 SERPL-SCNC: 23 MMOL/L — SIGNIFICANT CHANGE UP (ref 22–29)
CREAT SERPL-MCNC: 0.7 MG/DL — SIGNIFICANT CHANGE UP (ref 0.5–1.3)
EGFR: 104 ML/MIN/1.73M2 — SIGNIFICANT CHANGE UP
GLUCOSE BLDC GLUCOMTR-MCNC: 116 MG/DL — HIGH (ref 70–99)
GLUCOSE SERPL-MCNC: 107 MG/DL — HIGH (ref 70–99)
HCT VFR BLD CALC: 28.3 % — LOW (ref 39–50)
HGB BLD-MCNC: 9 G/DL — LOW (ref 13–17)
MAGNESIUM SERPL-MCNC: 2.1 MG/DL — SIGNIFICANT CHANGE UP (ref 1.6–2.6)
MCHC RBC-ENTMCNC: 28.2 PG — SIGNIFICANT CHANGE UP (ref 27–34)
MCHC RBC-ENTMCNC: 31.8 GM/DL — LOW (ref 32–36)
MCV RBC AUTO: 88.7 FL — SIGNIFICANT CHANGE UP (ref 80–100)
PLATELET # BLD AUTO: 159 K/UL — SIGNIFICANT CHANGE UP (ref 150–400)
POTASSIUM SERPL-MCNC: 3.9 MMOL/L — SIGNIFICANT CHANGE UP (ref 3.5–5.3)
POTASSIUM SERPL-SCNC: 3.9 MMOL/L — SIGNIFICANT CHANGE UP (ref 3.5–5.3)
PROT SERPL-MCNC: 5.6 G/DL — LOW (ref 6.6–8.7)
RBC # BLD: 3.19 M/UL — LOW (ref 4.2–5.8)
RBC # FLD: 17.6 % — HIGH (ref 10.3–14.5)
SODIUM SERPL-SCNC: 135 MMOL/L — SIGNIFICANT CHANGE UP (ref 135–145)
WBC # BLD: 10.48 K/UL — SIGNIFICANT CHANGE UP (ref 3.8–10.5)
WBC # FLD AUTO: 10.48 K/UL — SIGNIFICANT CHANGE UP (ref 3.8–10.5)

## 2022-04-02 PROCEDURE — 99232 SBSQ HOSP IP/OBS MODERATE 35: CPT

## 2022-04-02 PROCEDURE — 71045 X-RAY EXAM CHEST 1 VIEW: CPT | Mod: 26

## 2022-04-02 RX ORDER — INSULIN LISPRO 100/ML
VIAL (ML) SUBCUTANEOUS
Refills: 0 | Status: DISCONTINUED | OUTPATIENT
Start: 2022-04-02 | End: 2022-04-03

## 2022-04-02 RX ORDER — DEXTROSE 50 % IN WATER 50 %
15 SYRINGE (ML) INTRAVENOUS ONCE
Refills: 0 | Status: DISCONTINUED | OUTPATIENT
Start: 2022-04-02 | End: 2022-04-03

## 2022-04-02 RX ORDER — ASPIRIN/CALCIUM CARB/MAGNESIUM 324 MG
1 TABLET ORAL
Qty: 56 | Refills: 0
Start: 2022-04-02 | End: 2022-04-29

## 2022-04-02 RX ORDER — DEXTROSE 50 % IN WATER 50 %
25 SYRINGE (ML) INTRAVENOUS ONCE
Refills: 0 | Status: DISCONTINUED | OUTPATIENT
Start: 2022-04-02 | End: 2022-04-03

## 2022-04-02 RX ORDER — POTASSIUM CHLORIDE 20 MEQ
40 PACKET (EA) ORAL ONCE
Refills: 0 | Status: COMPLETED | OUTPATIENT
Start: 2022-04-02 | End: 2022-04-02

## 2022-04-02 RX ADMIN — OXYCODONE HYDROCHLORIDE 10 MILLIGRAM(S): 5 TABLET ORAL at 21:06

## 2022-04-02 RX ADMIN — SODIUM CHLORIDE 3 MILLILITER(S): 9 INJECTION INTRAMUSCULAR; INTRAVENOUS; SUBCUTANEOUS at 13:02

## 2022-04-02 RX ADMIN — Medication 325 MILLIGRAM(S): at 07:57

## 2022-04-02 RX ADMIN — CLOPIDOGREL BISULFATE 75 MILLIGRAM(S): 75 TABLET, FILM COATED ORAL at 07:57

## 2022-04-02 RX ADMIN — PANTOPRAZOLE SODIUM 40 MILLIGRAM(S): 20 TABLET, DELAYED RELEASE ORAL at 07:57

## 2022-04-02 RX ADMIN — OXYCODONE HYDROCHLORIDE 10 MILLIGRAM(S): 5 TABLET ORAL at 22:06

## 2022-04-02 RX ADMIN — AMIODARONE HYDROCHLORIDE 400 MILLIGRAM(S): 400 TABLET ORAL at 21:06

## 2022-04-02 RX ADMIN — Medication 1 TABLET(S): at 07:57

## 2022-04-02 RX ADMIN — Medication 25 MILLIGRAM(S): at 17:04

## 2022-04-02 RX ADMIN — Medication 500 MILLIGRAM(S): at 07:57

## 2022-04-02 RX ADMIN — POLYETHYLENE GLYCOL 3350 17 GRAM(S): 17 POWDER, FOR SOLUTION ORAL at 07:57

## 2022-04-02 RX ADMIN — Medication 1 MILLIGRAM(S): at 07:57

## 2022-04-02 RX ADMIN — ATORVASTATIN CALCIUM 80 MILLIGRAM(S): 80 TABLET, FILM COATED ORAL at 21:06

## 2022-04-02 RX ADMIN — Medication 10 MILLIGRAM(S): at 21:07

## 2022-04-02 RX ADMIN — SODIUM CHLORIDE 3 MILLILITER(S): 9 INJECTION INTRAMUSCULAR; INTRAVENOUS; SUBCUTANEOUS at 21:10

## 2022-04-02 RX ADMIN — ENOXAPARIN SODIUM 40 MILLIGRAM(S): 100 INJECTION SUBCUTANEOUS at 21:07

## 2022-04-02 RX ADMIN — Medication 25 MILLIGRAM(S): at 05:43

## 2022-04-02 RX ADMIN — Medication 40 MILLIEQUIVALENT(S): at 10:24

## 2022-04-02 RX ADMIN — AMIODARONE HYDROCHLORIDE 400 MILLIGRAM(S): 400 TABLET ORAL at 13:03

## 2022-04-02 RX ADMIN — Medication 40 MILLIGRAM(S): at 05:44

## 2022-04-02 RX ADMIN — SODIUM CHLORIDE 3 MILLILITER(S): 9 INJECTION INTRAMUSCULAR; INTRAVENOUS; SUBCUTANEOUS at 05:11

## 2022-04-02 RX ADMIN — AMIODARONE HYDROCHLORIDE 400 MILLIGRAM(S): 400 TABLET ORAL at 05:43

## 2022-04-02 NOTE — PROGRESS NOTE ADULT - PROBLEM SELECTOR PLAN 2
Continue Lopressor as tolerated by HR and BP.   Continue Lasix 40mg PO QD.   Monitor strict I/Os. Supplement electrolytes PRN.

## 2022-04-02 NOTE — PROGRESS NOTE ADULT - PROBLEM SELECTOR PLAN 6
HA1c 5.7 on Metformin at home.   Continue ISS with fingersticks AC/HS   Consider endo consult if BS remain high.

## 2022-04-02 NOTE — PROGRESS NOTE ADULT - SUBJECTIVE AND OBJECTIVE BOX
[facility]                                                         CARDIOLOGY AT [facility]                                                                  [address] [address]                                                         Telephone: [phone]. Fax:[phone]                                                                             PROGRESS NOTE    Reason for follow up: s/p CABG  Update: oob, feeling good      Review of symptoms:   Cardiac:  No chest pain. No dyspnea. No palpitations.  Respiratory: no cough. No dyspnea  Gastrointestinal: No diarrhea. No abdominal pain. No bleeding.   Neuro: No focal neuro complaints.    Vitals:  T(C): 36.7 (04-02-22 @ 10:00), Max: 37 (04-01-22 @ 16:00)  HR: 66 (04-02-22 @ 10:00) (59 - 66)  BP: 97/59 (04-02-22 @ 10:00) (97/59 - 113/72)  RR: 17 (04-02-22 @ 10:00) (17 - 18)  SpO2: 97% (04-02-22 @ 10:00) (95% - 99%)  Wt(kg): --  I&O's Summary    01 Apr 2022 07:01  -  02 Apr 2022 07:00  --------------------------------------------------------  IN: 240 mL / OUT: 1150 mL / NET: -910 mL      Weight (kg): 68 (03-29 @ 14:05)    PHYSICAL EXAM:  Appearance: Comfortable. No acute distress  HEENT:  Atraumatic. Normocephalic.  Normal oral mucosa  Neurologic: A & O x 3, no gross focal deficits.  Cardiovascular: RRR S1 S2, No murmur, no rubs/gallops. No JVD  Respiratory: Lungs clear to auscultation, unlabored   Gastrointestinal:  Soft, Non-tender, + BS  Lower Extremities: 2+ Peripheral Pulses, trace edema to BLE  Psychiatry: Patient is calm. No agitation.   Skin: warm and dry.    CURRENT CARDIAC MEDICATIONS:  aMIOdarone    Tablet 400 milliGRAM(s) Oral every 8 hours  aMIOdarone    Tablet   Oral   furosemide    Tablet 40 milliGRAM(s) Oral daily  metoprolol tartrate 25 milliGRAM(s) Oral two times a day      CURRENT OTHER MEDICATIONS:  acetaminophen     Tablet .. 975 milliGRAM(s) Oral every 6 hours PRN Mild Pain (1 - 3)  oxyCODONE    IR 5 milliGRAM(s) Oral every 4 hours PRN Moderate Pain (4 - 6)  oxyCODONE    IR 10 milliGRAM(s) Oral every 4 hours PRN Severe Pain (7 - 10)  pantoprazole    Tablet 40 milliGRAM(s) Oral daily  polyethylene glycol 3350 17 Gram(s) Oral daily  senna 2 Tablet(s) Oral at bedtime  atorvastatin 80 milliGRAM(s) Oral at bedtime  ascorbic acid 500 milliGRAM(s) Oral daily  aspirin enteric coated 325 milliGRAM(s) Oral daily  clopidogrel Tablet 75 milliGRAM(s) Oral daily  enoxaparin Injectable 40 milliGRAM(s) SubCutaneous every 24 hours  folic acid 1 milliGRAM(s) Oral daily  multivitamin/minerals 1 Tablet(s) Oral daily  oxybutynin 10 milliGRAM(s) Oral at bedtime  sodium chloride 0.9% lock flush 3 milliLiter(s) IV Push every 8 hours      LABS:	 	  ( 30 Mar 2022 02:06 )  Troponin T  0.24<H>,  CPK  155  , CKMB  X    , BNP X        , ( 29 Mar 2022 14:13 )  Troponin T  0.50<H>,  CPK  142  , CKMB  X    , BNP X        , ( 29 Mar 2022 02:29 )  Troponin T  X    ,  CPK  X    , CKMB  X    , <H>                              9.0    10.48 )-----------( 159      ( 02 Apr 2022 06:49 )             28.3     04-02    135  |  99  |  17.2  ----------------------------<  107<H>  3.9   |  23.0  |  0.70    Ca    8.4<L>      02 Apr 2022 06:49  Mg     2.1     04-02    TPro  5.6<L>  /  Alb  3.4  /  TBili  1.2  /  DBili  x   /  AST  33  /  ALT  23  /  AlkPhos  115  04-02    PT/INR/PTT ( 29 Mar 2022 14:13 )                       :                       :      17.2         :       30.9                  .        .                   .              .           .       1.48        .                                       Lipid Profile: Date: 03-29 @ 02:29  Total cholesterol 123; Direct LDL: --; HDL: 28; Triglycerides:235  Date: 03-26 @ 03:11  Total cholesterol 135; Direct LDL: --; HDL: 27; Triglycerides:161    HgA1c:   TSH:     TELEMETRY: NSR 60s  ECG: reviewed    DIAGNOSTIC TESTING:  [ ] Echocardiogram:   < from: TTE Echo Complete w/o Contrast w/ Doppler (03.25.22 @ 22:05) >  LV Wall Scoring:  The apical septal segment and apex are akinetic.    Right Ventricle: Normal right ventricular size and function.  Left Atrium: Normal left atrial size.  Right Atrium: Normal right atrial size.  Pericardium: There is no evidence of pericardial effusion.  Mitral Valve: Structurally normal mitral valve, with normal leaflet   excursion. Mild thickening of the anterior and posterior mitral valve   leaflets. Thereis mild mitral annular calcification. Mild mitral valve   regurgitation is seen. The MR jet is centrally-directed.  Tricuspid Valve: The tricuspid valve is normal in structure. Trivial   tricuspid regurgitation is visualized. Adequate TR velocity wasnot   obtained to accurately assess RVSP.  Aortic Valve: The aortic valve is trileaflet. No evidence of aortic   stenosis. Sclerotic aortic valve with normal opening. No evidence of   aortic valve regurgitation is seen.  Pulmonic Valve: Moderate pulmonic valve regurgitation.  Aorta: The aortic root is normal in size and structure. Mildly dilated Ao   root at 3.9cm.  Venous: The inferior vena cava was dilated, with respiratory size   variation less than 50%.      Summary:   1. Left ventricular ejection fraction, by visual estimation, is 35 to   40%.   2. Moderately decreased global left ventricular systolic function.   3. Apical septal segment and apex are abnormal as described above.   4. Spectral Doppler shows pseudonormal pattern of left ventricular   myocardial filling (Grade II diastolic dysfunction). Elevated mean left   atrial pressure.   5. Normal left ventricular internal cavity size.   6. Normal right ventricular size and function.   7. Normal left atrial size.   8. Normal right atrial size.   9. Mild mitral annular calcification.  10. Mild thickening of the anterior and posterior mitral valve leaflets.  11. Mild mitral valve regurgitation.  12. Sclerotic aortic valve with normal opening.  13. Moderate pulmonic valve regurgitation.  14. Mildly dilated Ao root at 3.9cm.  15. There is no evidence of pericardial effusion.    MD David Electronically signed on 3/26/2022 at 10:29:53 AM    < end of copied text >    [ ]  Catheterization:  [ ] Stress Test:    OTHER:

## 2022-04-02 NOTE — PROGRESS NOTE ADULT - ASSESSMENT
61 year old male patient with a medical history of NSTEMI, pulmonary edema, acute HFrEF, HTN, HLD, type 2 DM (HA1c 5.7), s/p TKR on 3/18, now s/p off-pump CABG x 3. Post op course significant for rapid afib (remains NSR after 3 brief episodes of PAF, remains on PO amio load), and ABLA (requiring PRBCs).

## 2022-04-02 NOTE — PROGRESS NOTE ADULT - NS ATTEND OPT1 GEN_ALL_CORE

## 2022-04-02 NOTE — PROGRESS NOTE ADULT - ASSESSMENT
60 y/o M with PMH of HTN, Dyslipidemia, DM2, Prostate Cancer ( in remission) and Colon cancer (s/p colon resection dx last year, no chemo no radiation) presents s/p L knee replacement with acute shortness of breath x 1 day, secondary to acute hypoxemic resp failure secondary to acute decompensated HFrEF and NSTEMI toponin: 1.53. proBNP: 5573  Echo EF 34-40% Grade 2 diastolic dysfunction elevated mean left atrial pressure. Left heart cath revealed LM 50% ulcerated plaque LAD proximal  with colaterals from RCA, Ramus 75%, LCx 99%, RCA 80% mid ulcerated/complex stenosis. 3/29 -underwent CABG x 3, now on floor, OOB in chair, on RA, CTs removed, external PW x2 to EPM VVI.     POD #3 s/p CABG

## 2022-04-02 NOTE — PROGRESS NOTE ADULT - PROBLEM SELECTOR PLAN 3
S/p amio bolus x 3 total and PO amio loaded, now back in NSR.  Continue Lopressor as tolerated by HR and BP.   May qualify for PACES Trial given postop afib. Will consent and randomize anticoagulation if meets criteria.

## 2022-04-02 NOTE — CHART NOTE - NSCHARTNOTEFT_GEN_A_CORE
CTS ACP Addendum    Briefly this is a 60 yo M POD #4 s/p CABG x 3, off pump with recent L TKR (3/18/22) and subsequent NSTEMI with postop course notable for AF with RVR, now on amiodarone load.    Labs reviewed, potassium repleted, platelet count recovering, WBC downtrending, Creatinine normal. Mild atelectasis noted on this morning's cxr. Tele review shows mild bradycardia overnight with sinus rhythm, /62, O2 Sat 95% room air. Weights show an 11 kg weight gain since admission.     Meds reviewed    PE:  AAOx3  Lungs essentially clear  RRR   Abd soft NT ND, + BS, BM today  Ext with 1+ edema left lower leg with ecchymosis around knee and thigh.   MSI with staples c/d/i  chest tube sites closed with silk sutures  left EVH site c/d/i  left tkr site with mepilex c/d/i    A/P: 61M POD4 s/p CABG, AF    - Continue amiodarone load  - Isolate PW  - Shower today per Dr. Nicholas, ok per ortho team  - Plan to d/c wires and discharge home tomorrow  - OOB/ambulate/ Incentive Spirometry   - Daily diuretics  - Aspirin and Plavix  - Pain management  - Plan to start Entresto before discharge however, blood pressure a little soft today  - Discussed with Dr. Ventura at the bedside: DAPT ok also for DVTP postop TKR  - Discussed with Dr. Nicholas in AM rounds.

## 2022-04-02 NOTE — PROGRESS NOTE ADULT - NS ATTEND BILL GEN_ALL_CORE
Attending to bill

## 2022-04-02 NOTE — PROGRESS NOTE ADULT - PROBLEM SELECTOR PLAN 3
.  - [id number] 3 (HTN, vasc dz, DM) 3.2%  - Continue amiodarone load  - Continue 25mg lopressor BID, up titrate as tolerated  - Keep K>4, Mg >2  - consider AC when stable   - Continuous telemetry monitoring.

## 2022-04-02 NOTE — PROGRESS NOTE ADULT - PROBLEM SELECTOR PLAN 2
: - Continue 25mg lopressor BID  - Continue 40mg lasix daily  - Strict I&O's  - Left ventricular ejection fraction, by visual estimation, is 35-40%.  - Moderately decreased global left ventricular systolic function  - pseudonormal pattern of left ventricular myocardial filling (Grade II diastolic dysfunction). Elevated mean left   atrial pressure  - Sclerotic aortic valve with normal opening.

## 2022-04-02 NOTE — PROGRESS NOTE ADULT - NS ATTEND AMEND GEN_ALL_CORE FT
62 y/o M with PMH of HTN, Dyslipidemia, DM2, Prostate Cancer ( in remission) and Colon cancer (s/p colon resection dx last year, no chemo no radiation) presents s/p L knee replacement with acute shortness of breath x 1 day, secondary to acute hypoxemic resp failure secondary to acute decompensated HFrEF and NSTEMI toponin: 1.53. proBNP: 5573  Echo EF 34-40% Grade 2 diastolic dysfunction elevated mean left atrial pressure. Left heart cath revealed LM 50% ulcerated plaque LAD proximal  with colaterals from RCA, Ramus 75%, LCx 99%, RCA 80% mid ulcerated/complex stenosis. 3/29 -underwent CABG x 3, now on floor, OOB in chair, on RA, CTs removed, external PW x2 to EPM VVI.     POD #3 s/p CABG.   Patient is ambulatory on the floor without any complaints    Exam: Chest- Bilateral clear BS  Cardiac-S1 and S2    Patient seen and examined by me.  I have discussed my recommendation with the PA which are outlined above.  Will follow.

## 2022-04-02 NOTE — PROGRESS NOTE ADULT - PROBLEM SELECTOR PLAN 1
S/p CABG x 3 off pump on 3/29/22.  Neurologically intact.  Hemodynamically stable.  Continue Lopressor as tolerated by HR and BP.   Continue Aspirin and Plavix for acute graft closure prophylaxis.  Continue statin for chronic graft patency prophylaxis.  Oxygenating well, coughing and deep breathing exercises/incentive spirometry encouraged.  Continue diuresis PRN, replenish electrolytes PRN to keep K>4 and Mg>2.   Continue with PT, increase activity as tolerated.  FS AC+HS with coverage for glycemic control.  Tylenol, Oxy PRN for analgesia.  Continue bowel regimen PRN constipation.   Case and plan to be reviewed / discussed with CT Surgery attending / team during AM rounds.

## 2022-04-02 NOTE — PROGRESS NOTE ADULT - SUBJECTIVE AND OBJECTIVE BOX
POD #4 s/p CABG x 3 Off Pump (LIMA to LAD and SVG to PDA and Ramus) with Left Saphenous vein harvest    PAST MEDICAL & SURGICAL HISTORY:  Hypertension  Hyperlipidemia  COVID-19 vaccine series completed  Colon cancer  Prostate cancer, Treated with Radiation 2015  Type 2 diabetes mellitus  Unilateral primary osteoarthritis, left knee  S/P colon resection  S/P hernia repair    FAMILY HISTORY:  No pertinent family history in first degree relatives    Brief Hospital Course: 61 year old male patient with a medical history of NSTEMI, pulmonary edema, acute HFrEF, HTN, HLD, type 2 DM (HA1c 5.7), s/p TKR on 3/18, now s/p off-pump CABG x 3. Post op course significant for rapid afib (remains NSR after 3 brief episodes of PAF, remains on PO amio load), and ABLA (requiring PRBCs).     Significant recent/past 24 hr events: No overnight events reported.    Subjective: Patient lying in bed in NAD. +Tolerating diet. +Passing BMs since surgery. +Pain currently controlled. Denies fevers, chills, lightheadedness, dizziness, HA, CP, palpitations, SOB, cough, abdominal pain, N/V, diarrhea, numbness/tingling in extremities, or any other acute complaints. ROS negative x 10 systems except as noted above.    MEDICATIONS  (STANDING):  aMIOdarone    Tablet 400 milliGRAM(s) Oral every 8 hours  ascorbic acid 500 milliGRAM(s) Oral daily  aspirin enteric coated 325 milliGRAM(s) Oral daily  atorvastatin 80 milliGRAM(s) Oral at bedtime  chlorhexidine 2% Cloths 1 Application(s) Topical daily  clopidogrel Tablet 75 milliGRAM(s) Oral daily  enoxaparin Injectable 40 milliGRAM(s) SubCutaneous every 24 hours  folic acid 1 milliGRAM(s) Oral daily  furosemide    Tablet 40 milliGRAM(s) Oral daily  metoprolol tartrate 25 milliGRAM(s) Oral two times a day  multivitamin/minerals 1 Tablet(s) Oral daily  oxybutynin 10 milliGRAM(s) Oral at bedtime  pantoprazole    Tablet 40 milliGRAM(s) Oral daily  polyethylene glycol 3350 17 Gram(s) Oral daily  senna 2 Tablet(s) Oral at bedtime  sodium chloride 0.9% lock flush 3 milliLiter(s) IV Push every 8 hours    MEDICATIONS  (PRN):  acetaminophen     Tablet .. 975 milliGRAM(s) Oral every 6 hours PRN Mild Pain (1 - 3)  oxyCODONE    IR 5 milliGRAM(s) Oral every 4 hours PRN Moderate Pain (4 - 6)  oxyCODONE    IR 10 milliGRAM(s) Oral every 4 hours PRN Severe Pain (7 - 10)    Allergies: No Known Allergies    Vitals   T(C): 36.3 (01 Apr 2022 20:50), Max: 37 (01 Apr 2022 16:00)  T(F): 97.4 (01 Apr 2022 20:50), Max: 98.6 (01 Apr 2022 16:00)  HR: 59 (01 Apr 2022 20:50) (59 - 156)  BP: 109/64 (01 Apr 2022 20:50) (100/65 - 123/81)  BP(mean): 77 (01 Apr 2022 05:20) (77 - 77)  RR: 18 (01 Apr 2022 20:50) (17 - 18)  SpO2: 97% (01 Apr 2022 20:50) (95% - 99%)    I&O's Detail    31 Mar 2022 07:01  -  01 Apr 2022 07:00  --------------------------------------------------------  IN:    Oral Fluid: 1560 mL  Total IN: 1560 mL    OUT:    Voided (mL): 2200 mL  Total OUT: 2200 mL    Total NET: -640 mL      01 Apr 2022 07:01  -  02 Apr 2022 02:58  --------------------------------------------------------  IN:  Total IN: 0 mL    OUT:    Voided (mL): 500 mL  Total OUT: 500 mL    Total NET: -500 mL    Physical Exam  Neuro: A+O x 3, non-focal, speech clear and intact  HEENT:  NCAT, No conjuctival edema or icterus, no thrush.    Neck:  Supple, trachea midline  Pulm: +Diminished at bases bilaterally, no accessory muscle use noted  Chest: +PW (settings: VVI, rate: 40, mA: 10, sensitivity: 0.8)  CV: regular rate, regular rhythm, +S1S2, no murmur noted  Abd: soft, NT, ND, + BS  Ext: ACEVEDO x 4, +2 LE edema b/l, no cyanosis, distal motor/neuro/circ intact  Skin: warm, dry, well perfused  Incisions: midsternal mepilex C/D/I, sternum stable, LLE C/D/I w/ dressing and Ace wrap    LABS                        9.6    13.28 )-----------( 143      ( 01 Apr 2022 05:53 )             30.9     04-01    132<L>  |  99  |  20.3<H>  ----------------------------<  109<H>  4.1   |  21.0<L>  |  0.67    Ca    8.4<L>      01 Apr 2022 05:53  Mg     2.6     04-01    POCT Blood Glucose.: 187 mg/dL (04-01-22 @ 12:14)  POCT Blood Glucose.: 116 mg/dL (04-01-22 @ 08:06)      Last CXR:  < from: Xray Chest 1 View- PORTABLE-Routine (Xray Chest 1 View- PORTABLE-Routine in AM.) (04.01.22 @ 05:17) >  FINDINGS:   3/31/2022 chest radiograph available for review.  The lungs are clear of gross airspace consolidations or effusions. Mild bibasilar linear atelectasis. No pneumothorax.  The heart and mediastinum are within normal limits following cardiac  surgery.  Visualized osseous structures are intact.  IMPRESSION:  Status post cardiac surgery.  Bibasilar linear atelectasis. No large airspace consolidation or effusion.  < end of copied text >

## 2022-04-03 ENCOUNTER — TRANSCRIPTION ENCOUNTER (OUTPATIENT)
Age: 62
End: 2022-04-03

## 2022-04-03 VITALS
OXYGEN SATURATION: 99 % | DIASTOLIC BLOOD PRESSURE: 57 MMHG | SYSTOLIC BLOOD PRESSURE: 111 MMHG | TEMPERATURE: 98 F | HEART RATE: 60 BPM | RESPIRATION RATE: 17 BRPM

## 2022-04-03 DIAGNOSIS — J96.01 ACUTE RESPIRATORY FAILURE WITH HYPOXIA: ICD-10-CM

## 2022-04-03 LAB
ANION GAP SERPL CALC-SCNC: 15 MMOL/L — SIGNIFICANT CHANGE UP (ref 5–17)
BUN SERPL-MCNC: 18.3 MG/DL — SIGNIFICANT CHANGE UP (ref 8–20)
CALCIUM SERPL-MCNC: 8.8 MG/DL — SIGNIFICANT CHANGE UP (ref 8.6–10.2)
CHLORIDE SERPL-SCNC: 100 MMOL/L — SIGNIFICANT CHANGE UP (ref 98–107)
CO2 SERPL-SCNC: 23 MMOL/L — SIGNIFICANT CHANGE UP (ref 22–29)
CREAT SERPL-MCNC: 0.87 MG/DL — SIGNIFICANT CHANGE UP (ref 0.5–1.3)
EGFR: 98 ML/MIN/1.73M2 — SIGNIFICANT CHANGE UP
GLUCOSE BLDC GLUCOMTR-MCNC: 107 MG/DL — HIGH (ref 70–99)
GLUCOSE BLDC GLUCOMTR-MCNC: 127 MG/DL — HIGH (ref 70–99)
GLUCOSE SERPL-MCNC: 108 MG/DL — HIGH (ref 70–99)
HCT VFR BLD CALC: 32.8 % — LOW (ref 39–50)
HGB BLD-MCNC: 10.1 G/DL — LOW (ref 13–17)
MAGNESIUM SERPL-MCNC: 2.2 MG/DL — SIGNIFICANT CHANGE UP (ref 1.6–2.6)
MCHC RBC-ENTMCNC: 28.1 PG — SIGNIFICANT CHANGE UP (ref 27–34)
MCHC RBC-ENTMCNC: 30.8 GM/DL — LOW (ref 32–36)
MCV RBC AUTO: 91.1 FL — SIGNIFICANT CHANGE UP (ref 80–100)
PLATELET # BLD AUTO: 203 K/UL — SIGNIFICANT CHANGE UP (ref 150–400)
POTASSIUM SERPL-MCNC: 4.3 MMOL/L — SIGNIFICANT CHANGE UP (ref 3.5–5.3)
POTASSIUM SERPL-SCNC: 4.3 MMOL/L — SIGNIFICANT CHANGE UP (ref 3.5–5.3)
RBC # BLD: 3.6 M/UL — LOW (ref 4.2–5.8)
RBC # FLD: 17.9 % — HIGH (ref 10.3–14.5)
SODIUM SERPL-SCNC: 138 MMOL/L — SIGNIFICANT CHANGE UP (ref 135–145)
WBC # BLD: 10.62 K/UL — HIGH (ref 3.8–10.5)
WBC # FLD AUTO: 10.62 K/UL — HIGH (ref 3.8–10.5)

## 2022-04-03 PROCEDURE — 83880 ASSAY OF NATRIURETIC PEPTIDE: CPT

## 2022-04-03 PROCEDURE — 85027 COMPLETE CBC AUTOMATED: CPT

## 2022-04-03 PROCEDURE — 94010 BREATHING CAPACITY TEST: CPT

## 2022-04-03 PROCEDURE — 84100 ASSAY OF PHOSPHORUS: CPT

## 2022-04-03 PROCEDURE — 82947 ASSAY GLUCOSE BLOOD QUANT: CPT

## 2022-04-03 PROCEDURE — U0003: CPT

## 2022-04-03 PROCEDURE — 83036 HEMOGLOBIN GLYCOSYLATED A1C: CPT

## 2022-04-03 PROCEDURE — 36415 COLL VENOUS BLD VENIPUNCTURE: CPT

## 2022-04-03 PROCEDURE — 93306 TTE W/DOPPLER COMPLETE: CPT

## 2022-04-03 PROCEDURE — 85014 HEMATOCRIT: CPT

## 2022-04-03 PROCEDURE — C1894: CPT

## 2022-04-03 PROCEDURE — 71045 X-RAY EXAM CHEST 1 VIEW: CPT

## 2022-04-03 PROCEDURE — 86803 HEPATITIS C AB TEST: CPT

## 2022-04-03 PROCEDURE — 71045 X-RAY EXAM CHEST 1 VIEW: CPT | Mod: 26

## 2022-04-03 PROCEDURE — 85379 FIBRIN DEGRADATION QUANT: CPT

## 2022-04-03 PROCEDURE — 82803 BLOOD GASES ANY COMBINATION: CPT

## 2022-04-03 PROCEDURE — 93460 R&L HRT ART/VENTRICLE ANGIO: CPT

## 2022-04-03 PROCEDURE — 84484 ASSAY OF TROPONIN QUANT: CPT

## 2022-04-03 PROCEDURE — 80048 BASIC METABOLIC PNL TOTAL CA: CPT

## 2022-04-03 PROCEDURE — 85025 COMPLETE CBC W/AUTO DIFF WBC: CPT

## 2022-04-03 PROCEDURE — 36430 TRANSFUSION BLD/BLD COMPNT: CPT

## 2022-04-03 PROCEDURE — 73562 X-RAY EXAM OF KNEE 3: CPT

## 2022-04-03 PROCEDURE — 93971 EXTREMITY STUDY: CPT

## 2022-04-03 PROCEDURE — 84134 ASSAY OF PREALBUMIN: CPT

## 2022-04-03 PROCEDURE — 84295 ASSAY OF SERUM SODIUM: CPT

## 2022-04-03 PROCEDURE — 97163 PT EVAL HIGH COMPLEX 45 MIN: CPT

## 2022-04-03 PROCEDURE — 86923 COMPATIBILITY TEST ELECTRIC: CPT

## 2022-04-03 PROCEDURE — 80053 COMPREHEN METABOLIC PANEL: CPT

## 2022-04-03 PROCEDURE — 83605 ASSAY OF LACTIC ACID: CPT

## 2022-04-03 PROCEDURE — 85730 THROMBOPLASTIN TIME PARTIAL: CPT

## 2022-04-03 PROCEDURE — C1887: CPT

## 2022-04-03 PROCEDURE — 94760 N-INVAS EAR/PLS OXIMETRY 1: CPT

## 2022-04-03 PROCEDURE — 82550 ASSAY OF CK (CPK): CPT

## 2022-04-03 PROCEDURE — 85610 PROTHROMBIN TIME: CPT

## 2022-04-03 PROCEDURE — 99153 MOD SED SAME PHYS/QHP EA: CPT

## 2022-04-03 PROCEDURE — 84132 ASSAY OF SERUM POTASSIUM: CPT

## 2022-04-03 PROCEDURE — 82553 CREATINE MB FRACTION: CPT

## 2022-04-03 PROCEDURE — 82962 GLUCOSE BLOOD TEST: CPT

## 2022-04-03 PROCEDURE — C1751: CPT

## 2022-04-03 PROCEDURE — 0225U NFCT DS DNA&RNA 21 SARSCOV2: CPT

## 2022-04-03 PROCEDURE — 84443 ASSAY THYROID STIM HORMONE: CPT

## 2022-04-03 PROCEDURE — 86900 BLOOD TYPING SEROLOGIC ABO: CPT

## 2022-04-03 PROCEDURE — 93880 EXTRACRANIAL BILAT STUDY: CPT

## 2022-04-03 PROCEDURE — 93005 ELECTROCARDIOGRAM TRACING: CPT

## 2022-04-03 PROCEDURE — 71275 CT ANGIOGRAPHY CHEST: CPT | Mod: MA

## 2022-04-03 PROCEDURE — 82330 ASSAY OF CALCIUM: CPT

## 2022-04-03 PROCEDURE — 85018 HEMOGLOBIN: CPT

## 2022-04-03 PROCEDURE — 99152 MOD SED SAME PHYS/QHP 5/>YRS: CPT

## 2022-04-03 PROCEDURE — 99285 EMERGENCY DEPT VISIT HI MDM: CPT

## 2022-04-03 PROCEDURE — 82435 ASSAY OF BLOOD CHLORIDE: CPT

## 2022-04-03 PROCEDURE — 86850 RBC ANTIBODY SCREEN: CPT

## 2022-04-03 PROCEDURE — P9045: CPT

## 2022-04-03 PROCEDURE — 85576 BLOOD PLATELET AGGREGATION: CPT

## 2022-04-03 PROCEDURE — U0005: CPT

## 2022-04-03 PROCEDURE — 83735 ASSAY OF MAGNESIUM: CPT

## 2022-04-03 PROCEDURE — 80061 LIPID PANEL: CPT

## 2022-04-03 PROCEDURE — 94002 VENT MGMT INPAT INIT DAY: CPT

## 2022-04-03 PROCEDURE — 86901 BLOOD TYPING SEROLOGIC RH(D): CPT

## 2022-04-03 PROCEDURE — P9016: CPT

## 2022-04-03 PROCEDURE — C1769: CPT

## 2022-04-03 PROCEDURE — C1889: CPT

## 2022-04-03 RX ORDER — FOLIC ACID 0.8 MG
1 TABLET ORAL
Qty: 0 | Refills: 0 | DISCHARGE

## 2022-04-03 RX ORDER — ACETAMINOPHEN 500 MG
3 TABLET ORAL
Qty: 0 | Refills: 0 | DISCHARGE
Start: 2022-04-03

## 2022-04-03 RX ORDER — MULTIVIT-MIN/FERROUS GLUCONATE 9 MG/15 ML
1 LIQUID (ML) ORAL
Qty: 0 | Refills: 0 | DISCHARGE
Start: 2022-04-03

## 2022-04-03 RX ORDER — FUROSEMIDE 40 MG
1 TABLET ORAL
Qty: 10 | Refills: 0
Start: 2022-04-03 | End: 2022-04-12

## 2022-04-03 RX ORDER — NEBIVOLOL HYDROCHLORIDE 5 MG/1
1 TABLET ORAL
Qty: 0 | Refills: 0 | DISCHARGE

## 2022-04-03 RX ORDER — ASPIRIN/CALCIUM CARB/MAGNESIUM 324 MG
1 TABLET ORAL
Qty: 30 | Refills: 1
Start: 2022-04-03 | End: 2022-06-01

## 2022-04-03 RX ORDER — AMIODARONE HYDROCHLORIDE 400 MG/1
1 TABLET ORAL
Qty: 30 | Refills: 1
Start: 2022-04-03 | End: 2022-06-01

## 2022-04-03 RX ORDER — METOPROLOL TARTRATE 50 MG
1 TABLET ORAL
Qty: 60 | Refills: 1
Start: 2022-04-03 | End: 2022-06-01

## 2022-04-03 RX ADMIN — OXYCODONE HYDROCHLORIDE 10 MILLIGRAM(S): 5 TABLET ORAL at 01:30

## 2022-04-03 RX ADMIN — OXYCODONE HYDROCHLORIDE 10 MILLIGRAM(S): 5 TABLET ORAL at 02:30

## 2022-04-03 RX ADMIN — Medication 25 MILLIGRAM(S): at 05:41

## 2022-04-03 RX ADMIN — Medication 325 MILLIGRAM(S): at 08:10

## 2022-04-03 RX ADMIN — AMIODARONE HYDROCHLORIDE 400 MILLIGRAM(S): 400 TABLET ORAL at 05:41

## 2022-04-03 RX ADMIN — PANTOPRAZOLE SODIUM 40 MILLIGRAM(S): 20 TABLET, DELAYED RELEASE ORAL at 08:10

## 2022-04-03 RX ADMIN — CLOPIDOGREL BISULFATE 75 MILLIGRAM(S): 75 TABLET, FILM COATED ORAL at 08:10

## 2022-04-03 RX ADMIN — Medication 500 MILLIGRAM(S): at 08:10

## 2022-04-03 RX ADMIN — SODIUM CHLORIDE 3 MILLILITER(S): 9 INJECTION INTRAMUSCULAR; INTRAVENOUS; SUBCUTANEOUS at 13:04

## 2022-04-03 RX ADMIN — Medication 1 MILLIGRAM(S): at 08:10

## 2022-04-03 RX ADMIN — Medication 40 MILLIGRAM(S): at 05:41

## 2022-04-03 RX ADMIN — Medication 1 TABLET(S): at 08:10

## 2022-04-03 RX ADMIN — AMIODARONE HYDROCHLORIDE 400 MILLIGRAM(S): 400 TABLET ORAL at 13:08

## 2022-04-03 RX ADMIN — SODIUM CHLORIDE 3 MILLILITER(S): 9 INJECTION INTRAMUSCULAR; INTRAVENOUS; SUBCUTANEOUS at 05:40

## 2022-04-03 NOTE — DISCHARGE NOTE NURSING/CASE MANAGEMENT/SOCIAL WORK - NSSCNAMETXT_GEN_ALL_CORE
[facility] (formerly [facility])

## 2022-04-03 NOTE — DISCHARGE NOTE PROVIDER - NSDCMRMEDTOKEN_GEN_ALL_CORE_FT
acetaminophen 325 mg oral tablet: 2 tab(s) orally every 8 hours  Aspirin Enteric Coated 325 mg oral delayed release tablet: 1 tab(s) orally 2 times a day MDD:2  Bystolic 5 mg oral tablet: 1 tab(s) orally once a day  CeleBREX 200 mg oral capsule: 1 cap(s) orally 2 times a day MDD:2  Eliquis 2.5 mg oral tablet: 1 tab(s) orally 2 times a day MDD:2  folic acid 1 mg oral tablet: 1 tab(s) orally once a day  metFORMIN 1000 mg oral tablet, extended release: 1 tab(s) orally once a day  omeprazole 20 mg oral delayed release capsule: 1 cap(s) orally once a day before breakfast MDD:1  oxybutynin 5 mg oral tablet: 2  orally once a day (at bedtime)  oxyCODONE 5 mg oral tablet: 1 tab(s) orally every 6 hours, As Needed -for severe pain MDD:4  pravastatin 40 mg oral tablet: 1 tab(s) orally once a day  Senna S 50 mg-8.6 mg oral tablet: 2 tab(s) orally once a day (at bedtime) MDD:2   acetaminophen 325 mg oral tablet: 3 tab(s) orally every 6 hours, As needed, Mild Pain (1 - 3)  amiodarone 200 mg oral tablet: 1 tab(s) orally once a day   aspirin 81 mg oral delayed release tablet: 1 tab(s) orally once a day  Eliquis 2.5 mg oral tablet: 1 tab(s) orally 2 times a day MDD:2  furosemide 40 mg oral tablet: 1 tab(s) orally once a day  metFORMIN 1000 mg oral tablet, extended release: 1 tab(s) orally once a day  metoprolol tartrate 25 mg oral tablet: 1 tab(s) orally 2 times a day  Multiple Vitamins with Minerals oral tablet: 1 tab(s) orally once a day  omeprazole 20 mg oral delayed release capsule: 1 cap(s) orally once a day before breakfast MDD:1  oxybutynin 5 mg oral tablet: 2  orally once a day (at bedtime)  oxyCODONE 5 mg oral tablet: 1 tab(s) orally every 6 hours, As Needed -for severe pain MDD:4  pravastatin 40 mg oral tablet: 1 tab(s) orally once a day  Senna S 50 mg-8.6 mg oral tablet: 2 tab(s) orally once a day (at bedtime) MDD:2

## 2022-04-03 NOTE — PROGRESS NOTE ADULT - SUBJECTIVE AND OBJECTIVE BOX
POD #5 s/p CABG x 3 Off Pump (LIMA to LAD and SVG to PDA and Ramus) with Left Saphenous vein harvest    PAST MEDICAL & SURGICAL HISTORY:  Hypertension  Hyperlipidemia  COVID-19 vaccine series completed  Colon cancer  Prostate cancer, Treated with Radiation 2015  Type 2 diabetes mellitus  Unilateral primary osteoarthritis, left knee  S/P colon resection  S/P hernia repair    FAMILY HISTORY:  No pertinent family history in first degree relatives    Brief Hospital Course: 61 year old male patient with a medical history of NSTEMI, pulmonary edema, acute HFrEF, HTN, HLD, type 2 DM (HA1c 5.7), s/p TKR on 3/18, now s/p off-pump CABG x 3. Post op course significant for rapid afib (remains NSR after 3 brief episodes of PAF, remains on PO amio load), and ABLA (requiring PRBCs).     Significant recent/past 24 hr events: No overnight events reported.    Subjective: Patient lying in bed in NAD. +Tolerating diet. +Passing BMs since surgery. +Pain currently controlled. Denies fevers, chills, lightheadedness, dizziness, HA, CP, palpitations, SOB, cough, abdominal pain, N/V, diarrhea, numbness/tingling in extremities, or any other acute complaints. ROS negative x 10 systems except as noted above.    MEDICATIONS  (STANDING):  aMIOdarone    Tablet 400 milliGRAM(s) Oral every 8 hours  aMIOdarone    Tablet 200 milliGRAM(s) Oral daily  ascorbic acid 500 milliGRAM(s) Oral daily  aspirin enteric coated 325 milliGRAM(s) Oral daily  atorvastatin 80 milliGRAM(s) Oral at bedtime  clopidogrel Tablet 75 milliGRAM(s) Oral daily  enoxaparin Injectable 40 milliGRAM(s) SubCutaneous every 24 hours  folic acid 1 milliGRAM(s) Oral daily  furosemide    Tablet 40 milliGRAM(s) Oral daily  insulin lispro (ADMELOG) corrective regimen sliding scale   SubCutaneous three times a day before meals  metoprolol tartrate 25 milliGRAM(s) Oral two times a day  multivitamin/minerals 1 Tablet(s) Oral daily  oxybutynin 10 milliGRAM(s) Oral at bedtime  pantoprazole    Tablet 40 milliGRAM(s) Oral daily  polyethylene glycol 3350 17 Gram(s) Oral daily  senna 2 Tablet(s) Oral at bedtime  sodium chloride 0.9% lock flush 3 milliLiter(s) IV Push every 8 hours    MEDICATIONS  (PRN):  acetaminophen     Tablet .. 975 milliGRAM(s) Oral every 6 hours PRN Mild Pain (1 - 3)  dextrose Oral Gel 15 Gram(s) Oral once PRN Blood Glucose LESS THAN 70 milliGRAM(s)/deciliter  oxyCODONE    IR 5 milliGRAM(s) Oral every 4 hours PRN Moderate Pain (4 - 6)  oxyCODONE    IR 10 milliGRAM(s) Oral every 4 hours PRN Severe Pain (7 - 10)    Allergies: No Known Allergies    Vitals   T(C): 37.2 (02 Apr 2022 20:58), Max: 37.2 (02 Apr 2022 20:58)  T(F): 98.9 (02 Apr 2022 20:58), Max: 98.9 (02 Apr 2022 20:58)  HR: 60 (02 Apr 2022 20:58) (60 - 67)  BP: 108/63 (02 Apr 2022 20:58) (97/59 - 121/74)  RR: 18 (02 Apr 2022 20:58) (17 - 18)  SpO2: 96% (02 Apr 2022 20:58) (95% - 97%)    I&O's Detail    01 Apr 2022 07:01  -  02 Apr 2022 07:00  --------------------------------------------------------  IN:    Oral Fluid: 240 mL  Total IN: 240 mL    OUT:    Voided (mL): 1150 mL  Total OUT: 1150 mL    Total NET: -910 mL    Physical Exam  Neuro: A+O x 3, non-focal, speech clear and intact  HEENT:  NCAT, No conjuctival edema or icterus, no thrush.    Neck:  Supple, trachea midline  Pulm: +Diminished at bases bilaterally, no accessory muscle use noted  Chest: +PW (isolated)  CV: regular rate, regular rhythm, +S1S2, no murmur noted  Abd: soft, NT, ND, + BS  Ext: ACEVEDO x 4, +2 LE edema b/l, no cyanosis, distal motor/neuro/circ intact  Skin: warm, dry, well perfused  Incisions: midsternal incision open to air C/D/I, +staples, sternum stable, LLE harvest site C/D/I w/ dressing, +mild ecchymosis, Mepilex over L knee incision from prior L TKR (3/18/22)    LABS                        9.0    10.48 )-----------( 159      ( 02 Apr 2022 06:49 )             28.3     04-02    135  |  99  |  17.2  ----------------------------<  107<H>  3.9   |  23.0  |  0.70    Ca    8.4<L>      02 Apr 2022 06:49  Mg     2.1     04-02    TPro  5.6<L>  /  Alb  3.4  /  TBili  1.2  /  DBili  x   /  AST  33  /  ALT  23  /  AlkPhos  115  04-02    POCT Blood Glucose.: 116 mg/dL (04-02-22 @ 21:15)      Last CXR:  < from: Xray Chest 1 View- PORTABLE-Routine (Xray Chest 1 View- PORTABLE-Routine in AM.) (04.02.22 @ 05:23) >  IMPRESSION:  HEART:  Enlarged  LUNGS: Bibasilar atelectasis.  BONES: sternotomy wires  < end of copied text >

## 2022-04-03 NOTE — DISCHARGE NOTE PROVIDER - NSDCFUSCHEDAPPT_GEN_ALL_CORE_FT
RENATO LEVI ; 04/06/2022 ; [facility] [address] ; 05/10/2022 ; [facility] [address] ; 06/08/2022 ; [facility] [address]

## 2022-04-03 NOTE — PROGRESS NOTE ADULT - PROBLEM SELECTOR PLAN 2
Continue Lopressor as tolerated by HR and BP.   Continue Lasix 40mg PO QD.   Monitor strict I/Os. Supplement electrolytes PRN.  Plan to start Entresto before discharge however, blood pressure a little soft today

## 2022-04-03 NOTE — PROGRESS NOTE ADULT - PROBLEM SELECTOR PROBLEM 5
Primary hypertension
Primary hypertension
Hyperlipidemia, unspecified hyperlipidemia type
Primary hypertension
Hyperlipidemia, unspecified hyperlipidemia type

## 2022-04-03 NOTE — DISCHARGE NOTE NURSING/CASE MANAGEMENT/SOCIAL WORK - NSDCFUADDAPPT_GEN_ALL_CORE_FT
Please follow up with Dr. Nicholas by calling the CT Surgery office at [phone] on the next open business day to make an appointment.  The cardiac surgery office is located at [facility], first floor. Take a left at the end of the lobby until the end of that andrews (past the elevator bank). Make a left and the office is on your right across from the elevators.    Your Care Navigator Nurse Practitioner will be in touch to see you in your home within a few days from discharge. The Follow Your Heart program can help ensure you understand your medications, discharge instructions and answer any questions you may have at that time. They are also a great source to address concerns during the day and may be reached at [phone].    Please follow up with your Cardiologist and Primary Care Physician 2-4 weeks from discharge.

## 2022-04-03 NOTE — PROGRESS NOTE ADULT - PROBLEM SELECTOR PROBLEM 4
Paroxysmal atrial fibrillation
Primary hypertension
Paroxysmal atrial fibrillation
Primary hypertension
Paroxysmal atrial fibrillation
Primary hypertension

## 2022-04-03 NOTE — DISCHARGE NOTE NURSING/CASE MANAGEMENT/SOCIAL WORK - PATIENT PORTAL LINK FT
You can access the FollowMyHealth Patient Portal offered by [facility] by registering at the following website: [email or web address]. By joining [facility]’s FollowMyHealth portal, you will also be able to view your health information using other applications (apps) compatible with our system.

## 2022-04-03 NOTE — DISCHARGE NOTE PROVIDER - DETAILS OF MALNUTRITION DIAGNOSIS/DIAGNOSES
This patient has been assessed with a concern for Malnutrition and was treated during this hospitalization for the following Nutrition diagnosis/diagnoses:     -  03/31/2022: Moderate protein-calorie malnutrition

## 2022-04-03 NOTE — DISCHARGE NOTE PROVIDER - NSDCCPCAREPLAN_GEN_ALL_CORE_FT
PRINCIPAL DISCHARGE DIAGNOSIS  Diagnosis: Coronary artery disease involving native coronary artery of native heart with unstable angina pector  Assessment and Plan of Treatment: 1. Take ALL of your medications as ordered. Fill your prescriptions the day you are discharged and take according to the schedule you were given. Continue to take a stool softener if you are taking narcotic pain medications. AVOID medications such as ibuprofen or naproxen if you have had bypass surgery. If you have any questions or are unable to fill the prescriptions, please call the office right away at [phone].  2. Shower daily. Clean all incisions daily while showering with warm water and mild soap, pat dry with a clean towel and do not cover with any dressings unless instructed to. No bathing, swimming in a pool or the ocean until instructed by MD.  DO NOT use creams or lotions on the wound.  3. We advise that you do not drive until instructed by MD.   4. You may not return to work until instructed by MD.   5. Please eat a low fat, low cholesterol, low salt diet. (No added/extra salt)  6. Weigh yourself every day in the morning and record it in the weight log in your red folder. Notify the office of any weight gain more than 2-3 pounds in 24 hours.  7. Continue breathing exercises several times a day. Continue to use your heart pillow.  8. No heavy lifting nothing greater than 5 pounds until cleared by MD.   9. Call / Notify MD any fever greater than 101.0, any drainage from incisions or if they become red, hot or very tender to the touch.  10. Increase activity as tolerated. Walk indoors and/or outdoors at least 3 times a day.      SECONDARY DISCHARGE DIAGNOSES  Diagnosis: Paroxysmal atrial fibrillation  Assessment and Plan of Treatment: - While you were in the hospital, you had an arrhythmia called atrial fibrillation. This is a common arrhythmia seen after heart surgery but it is important to take your medications as ordered to reduce the risk of it recurring or continuing in the future. You may be on a blood thinner to reduce your risk of a stroke with this arrhythmia. If you are experiencing chest pain or palpitations, please call us right away.    Diagnosis: HFrEF (heart failure with reduced ejection fraction)  Assessment and Plan of Treatment: - Take all medications as prescribed and listed on your discharge paperwork.  - Please follow up with your Cardiologist and Primary Care Physician 2-4 weeks from discharge.    Diagnosis: Primary hypertension  Assessment and Plan of Treatment: - Take all medications as prescribed and listed on your discharge paperwork.  - Please follow up with your Cardiologist and Primary Care Physician 2-4 weeks from discharge.    Diagnosis: Hyperlipidemia, unspecified hyperlipidemia type  Assessment and Plan of Treatment: - Take all medications as prescribed and listed on your discharge paperwork.  - Please follow up with your Cardiologist and Primary Care Physician 2-4 weeks from discharge.    Diagnosis: Type 2 diabetes mellitus with other circulatory complication, without long-term current use of insul  Assessment and Plan of Treatment: It is extremely important to follow a diabetic (consistent carbohydrates, LOW/NO ADDED SUGAR) diet and monitor your glucose (sugar) levels. You have an increased risk of complications, including wound infections, due to the diabetes.  1. Check your glucoses 3-4 times daily before meals and bedtime.   2. Keep a log of your glucose levels every day.   3. Make an appointment to meet with your primary care provider or endocrinologist within a week.   4. Take ALL of your medications as prescribed. Only hold medications for low glucose levels (< 80) or if you are symptomatic (dizzy, sweating, lightheaded).  5. Ideally, we would like all of the glucose levels to be between .   You may have been discharged on different medications than you were taking before. Your body reacts differently to the medications after surgery. Please continue to take the medications as prescribed and notify the office, nurse practitioner or your PCP if you have any concerns right away.

## 2022-04-03 NOTE — DISCHARGE NOTE PROVIDER - NSDCFUADDINST_GEN_ALL_CORE_FT
Please call the Cardiothoracic Surgery office at [phone] if you are experiencing any shortness of breath, chest pain, fevers or chills, drainage from the incisions, persistent nausea, vomiting or if you have any questions about your medications. If the symptoms are severe, call 911 and go to the nearest hospital. You can also call [phone] for an emergency [facility] ambulance, which will take you to the closest [facility].    If you need any assistance for making any appointments for a new consult or referral in any specialty, please call our [facility] Clinical Coordination Center at [phone].

## 2022-04-03 NOTE — PROGRESS NOTE ADULT - NUTRITIONAL ASSESSMENT
This patient has been assessed with a concern for Malnutrition and has been determined to have a diagnosis/diagnoses of Moderate protein-calorie malnutrition.    This patient is being managed with:   Diet Consistent Carbohydrate/No Snacks-  DASH/TLC {Sodium & Cholesterol Restricted} (DASH)  Entered: Mar 30 2022  8:40AM    

## 2022-04-03 NOTE — PROGRESS NOTE ADULT - PROBLEM SELECTOR PROBLEM 3
HFrEF (heart failure with reduced ejection fraction)
Coronary artery disease involving native coronary artery of native heart with unstable angina pector
Paroxysmal atrial fibrillation
HFrEF (heart failure with reduced ejection fraction)
Paroxysmal atrial fibrillation
Paroxysmal atrial fibrillation
HFrEF (heart failure with reduced ejection fraction)

## 2022-04-03 NOTE — PROGRESS NOTE ADULT - PROBLEM SELECTOR PLAN 3
S/p amio bolus x 3 total and PO amio loaded, now back in NSR.  Continue Lopressor as tolerated by HR and BP.   Patient not a candidate for the PACES Trial given need for DAPT or ASA/Eliquis regimen post TKR as per Ortho for prolonged DVT prophylaxis. Will discuss options with Dr. Nicholas in AM rounds.

## 2022-04-03 NOTE — PROGRESS NOTE ADULT - PROBLEM SELECTOR PROBLEM 7
Type 2 diabetes mellitus with other circulatory complication, without long-term current use of insul
Type 2 diabetes mellitus with other circulatory complication, without long-term current use of insul
Need for prophylactic measure
Need for prophylactic measure
Type 2 diabetes mellitus with other circulatory complication, without long-term current use of insul

## 2022-04-03 NOTE — DISCHARGE NOTE PROVIDER - NSDCFUADDAPPT_GEN_ALL_CORE_FT
Please follow up with Dr. Nicholas by calling the CT Surgery office at [phone] on the next open business day to make an appointment.  The cardiac surgery office is located at [facility], first floor. Take a left at the end of the lobby until the end of that andrews (past the elevator bank). Make a left and the office is on your right across from the elevators.    Your Care Navigator Nurse Practitioner will be in touch to see you in your home within a few days from discharge. The Follow Your Heart program can help ensure you understand your medications, discharge instructions and answer any questions you may have at that time. They are also a great source to address concerns during the day and may be reached at [phone].    Please follow up with your Cardiologist and Primary Care Physician 2-4 weeks from discharge.

## 2022-04-03 NOTE — PROGRESS NOTE ADULT - PROBLEM SELECTOR PROBLEM 6
Type 2 diabetes mellitus with other circulatory complication, without long-term current use of insul
Hyperlipidemia, unspecified hyperlipidemia type
Type 2 diabetes mellitus with other circulatory complication, without long-term current use of insul
Hyperlipidemia, unspecified hyperlipidemia type
Type 2 diabetes mellitus with other circulatory complication, without long-term current use of insul
Hyperlipidemia, unspecified hyperlipidemia type
Type 2 diabetes mellitus with other circulatory complication, without long-term current use of insul

## 2022-04-03 NOTE — PROGRESS NOTE ADULT - PROVIDER SPECIALTY LIST ADULT
Orthopedics
Cardiology
Cardiology
Hospitalist
Cardiology
Cardiology
CT Surgery
Cardiology
CT Surgery
Cardiology
CT Surgery
Cardiology
CT Surgery

## 2022-04-03 NOTE — DISCHARGE NOTE PROVIDER - NSDCCPTREATMENT_GEN_ALL_CORE_FT
PRINCIPAL PROCEDURE  Procedure: Off-pump CABG with endoscopic vein harvesting  Findings and Treatment:

## 2022-04-03 NOTE — DISCHARGE NOTE PROVIDER - CARE PROVIDER_API CALL
Adryan Nicholas)  Surgery; Thoracic and Cardiac Surgery  [address] [place] [address]  Phone: [phone]  Fax: [phone]  Follow Up Time:     Ludy Woods ()  Cardiology; Internal Medicine  [address] [place] [address]  Phone: [phone]  Fax: [phone]  Follow Up Time:

## 2022-04-03 NOTE — PROGRESS NOTE ADULT - PROBLEM SELECTOR PLAN 7
Lovenox and SCDs for DVT prophylaxis.  Protonix for GI prophylaxis.  Continue with bowel regimen PRN constipation.     Plan to be discussed / reviewed with CT Surgery attending / team during AM rounds.
Lovenox and SCDs for DVT prophylaxis.  Protonix for GI prophylaxis.  Continue with bowel regimen PRN constipation.     Plan to be discussed / reviewed with CT Surgery attending / team during AM rounds.
continue ISS AC/HS   consider endo consult if BS remain high     Plan to be discussed with Dr. Nicholas and CTS team during AM rounds.
currently on insulin gtt, Transition off using CTS protocol
continue ISS AC/HS   consider endo consult if BS remain high     Plan to be discussed with Dr. Nicholas and CTS team during AM rounds.

## 2022-04-03 NOTE — PROGRESS NOTE ADULT - REASON FOR ADMISSION
NSTEMI, acute hypoxemic respiratory failure, new onset CHF

## 2022-04-03 NOTE — DISCHARGE NOTE PROVIDER - HOSPITAL COURSE
61 year old male patient with a medical history of NSTEMI, pulmonary edema, acute HFrEF, HTN, HLD, type 2 DM (HA1c 5.7), s/p TKR on 3/18, now s/p off-pump CABG x 3. Post op course significant for rapid afib (remains NSR after 3 brief episodes of PAF, remains on PO amio load), and ABLA (requiring PRBCs). Patient remains hemodynamically stable and stable from a respiratory standpoint at this time. Plan for discharge home as per Dr. Nicholas.

## 2022-04-03 NOTE — DISCHARGE NOTE NURSING/CASE MANAGEMENT/SOCIAL WORK - NSDCPEFALRISK_GEN_ALL_CORE
For information on Fall & Injury Prevention, visit: [email or web address] OR  [email or web address] OR  https://www.cdc.gov/steadi/patient.html

## 2022-04-04 ENCOUNTER — NON-APPOINTMENT (OUTPATIENT)
Age: 62
End: 2022-04-04

## 2022-04-05 ENCOUNTER — APPOINTMENT (OUTPATIENT)
Dept: CARDIOTHORACIC SURGERY | Facility: CLINIC | Age: 62
End: 2022-04-05
Payer: COMMERCIAL

## 2022-04-05 VITALS
OXYGEN SATURATION: 96 % | DIASTOLIC BLOOD PRESSURE: 80 MMHG | RESPIRATION RATE: 18 BRPM | HEART RATE: 127 BPM | SYSTOLIC BLOOD PRESSURE: 115 MMHG

## 2022-04-05 PROCEDURE — 99024 POSTOP FOLLOW-UP VISIT: CPT

## 2022-04-06 ENCOUNTER — APPOINTMENT (OUTPATIENT)
Dept: ORTHOPEDIC SURGERY | Facility: CLINIC | Age: 62
End: 2022-04-06
Payer: COMMERCIAL

## 2022-04-06 ENCOUNTER — APPOINTMENT (OUTPATIENT)
Dept: CARE COORDINATION | Facility: HOME HEALTH | Age: 62
End: 2022-04-06

## 2022-04-06 VITALS
HEART RATE: 68 BPM | SYSTOLIC BLOOD PRESSURE: 94 MMHG | HEIGHT: 62 IN | WEIGHT: 180 LBS | DIASTOLIC BLOOD PRESSURE: 59 MMHG | BODY MASS INDEX: 33.13 KG/M2

## 2022-04-06 PROCEDURE — 73562 X-RAY EXAM OF KNEE 3: CPT | Mod: LT

## 2022-04-06 PROCEDURE — 99024 POSTOP FOLLOW-UP VISIT: CPT

## 2022-04-06 NOTE — HISTORY OF PRESENT ILLNESS
[Clean/Dry/Intact] : clean, dry and intact [Healed] : healed [Swelling] : swollen [Neuro Intact] : an unremarkable neurological exam [Vascular Intact] : ~T peripheral vascular exam normal [Negative Angel's] : maneuvers demonstrated a negative Angel's sign [Xray (Date:___)] : [unfilled] Xray -  [Doing Well] : is doing well [No Sign of Infection] : is showing no signs of infection [Adequate Pain Control] : has adequate pain control [Erythema] : erythematous [Chills] : no chills [Constipation] : no constipation [Diarrhea] : no diarrhea [Dysuria] : no dysuria [Fever] : no fever [Nausea] : no nausea [Vomiting] : no vomiting [Discharge] : absent of discharge [Dehiscence] : not dehisced [de-identified] : s/p Left total knee arthroplasty.\par  Computer-assisted tibial resection, OrthAlign procedure DOS:03/18/22\par \par  [de-identified] : Pt is almost 3 weeks post-op from the left TKA. He went to [facility] last week for NSTEMI, acute hypoxemic respiratory failure, new onset CHF. Pt had cardiac bypass on 3/29/22. Pt was able stop the oxycodone. He is on Eliquis daily and aspirin 81mg. [de-identified] : left knee TKA incision clean, dry, no erythema\par ROM is 10-95 degrees\par medial knee incision - for bypass, mild erythema [de-identified] : 3V xray of the left knee done in the office today and reviewed by Dr. Marcos Ventura demonstrates s/p implants in good positioning with no evidence of wear, loosening, or subsidence.  [de-identified] : We will restart home PT. Pt understands the importance of prophylaxis for invasive dental procedures. F/u with us in 3 weeks.

## 2022-04-06 NOTE — END OF VISIT
[FreeTextEntry3] : I, Tera Tucker, acted solely as a scribe for Dr. Marcos Ventura on this date 04/06/2022.

## 2022-04-07 ENCOUNTER — APPOINTMENT (OUTPATIENT)
Dept: CARE COORDINATION | Facility: HOME HEALTH | Age: 62
End: 2022-04-07
Payer: COMMERCIAL

## 2022-04-07 VITALS
OXYGEN SATURATION: 98 % | HEIGHT: 62 IN | RESPIRATION RATE: 18 BRPM | SYSTOLIC BLOOD PRESSURE: 102 MMHG | WEIGHT: 164 LBS | HEART RATE: 102 BPM | DIASTOLIC BLOOD PRESSURE: 64 MMHG | BODY MASS INDEX: 30.18 KG/M2

## 2022-04-07 PROCEDURE — 99024 POSTOP FOLLOW-UP VISIT: CPT

## 2022-04-07 RX ORDER — NEBIVOLOL HYDROCHLORIDE 20 MG/1
TABLET ORAL
Refills: 0 | Status: DISCONTINUED | COMMUNITY
End: 2022-04-07

## 2022-04-07 NOTE — REVIEW OF SYSTEMS
[Heart Rate Is Fast] : fast heart rate [Lower Ext Edema] : lower extremity edema [Constipation] : constipation [Limb Pain] : limb pain [Limb Swelling] : limb swelling [Skin Lesions] : skin lesion [Negative] : Integumentary [Heart Rate Is Slow] : the heart rate was not slow [Chest Pain] : no chest pain [Palpitations] : no palpitations [Leg Claudication] : no intermittent leg claudication [Abdominal Pain] : no abdominal pain [Vomiting] : no vomiting [Diarrhea] : no diarrhea [Heartburn] : no heartburn [Melena] : no melena [Arthralgias] : no arthralgias [Joint Swelling] : no joint swelling [Joint Stiffness] : no joint stiffness [Change In A Mole] : no change in a mole [Dry Skin] : no dry skin [An Unusual Growth] : no unusual growth on the skin [FreeTextEntry7] : LB 4/6 [de-identified] : L knee surgical incision

## 2022-04-07 NOTE — PHYSICAL EXAM
[Sclera] : the sclera and conjunctiva were normal [PERRL With Normal Accommodation] : pupils were equal in size, round, and reactive to light [Neck Appearance] : the appearance of the neck was normal [Neck Cervical Mass (___cm)] : no neck mass was observed [1+] : left 1+ [Bowel Sounds] : normal bowel sounds [Abdomen Tenderness] : non-tender [Abnormal Walk] : normal gait [Skin Color & Pigmentation] : normal skin color and pigmentation [Oriented To Time, Place, And Person] : oriented to person, place, and time [Affect] : the affect was normal [] : no respiratory distress [Respiration, Rhythm And Depth] : normal respiratory rhythm and effort [Exaggerated Use Of Accessory Muscles For Inspiration] : no accessory muscle use [Murmurs] : no murmurs [Chest Visual Inspection Thoracic Asymmetry] : no chest asymmetry [Impaired Insight] : insight and judgment were intact [FreeTextEntry2] : LLE with edema +3 to shin, calves soft and nontender [FreeTextEntry1] : LLE shin surgical incision s/p TKR without erythema, drainage and is well approximated

## 2022-04-07 NOTE — HISTORY OF PRESENT ILLNESS
[FreeTextEntry1] : 61 year old male patient with a medical history of NSTEMI, pulmonary edema, \par acute HFrEF, HTN, HLD, type 2 DM (HA1c 5.7), s/p TKR on 3/18, now s/p off-pump \par CABG x 3. Post op course significant for rapid afib (remains NSR after 3 brief \par episodes of PAF, remains on PO amio load), and ABLA (requiring PRBCs). Patient \par remains hemodynamically stable and stable from a respiratory standpoint and is discharged to home. pt. has home care nurse setup and is being followed by [facility] team. Initial visit completed at home.\par CC "I am doing ok but my LLE is swollen"\par \par

## 2022-04-26 ENCOUNTER — APPOINTMENT (OUTPATIENT)
Dept: CARDIOTHORACIC SURGERY | Facility: CLINIC | Age: 62
End: 2022-04-26
Payer: COMMERCIAL

## 2022-04-26 VITALS
HEART RATE: 68 BPM | TEMPERATURE: 97.3 F | HEIGHT: 62 IN | BODY MASS INDEX: 28.71 KG/M2 | OXYGEN SATURATION: 98 % | DIASTOLIC BLOOD PRESSURE: 69 MMHG | RESPIRATION RATE: 18 BRPM | SYSTOLIC BLOOD PRESSURE: 102 MMHG | WEIGHT: 156 LBS

## 2022-04-26 PROCEDURE — 99024 POSTOP FOLLOW-UP VISIT: CPT

## 2022-04-26 RX ORDER — OXYBUTYNIN CHLORIDE 5 MG/1
5 TABLET ORAL
Refills: 0 | Status: COMPLETED | COMMUNITY
End: 2022-04-26

## 2022-05-02 ENCOUNTER — TRANSCRIPTION ENCOUNTER (OUTPATIENT)
Age: 62
End: 2022-05-02

## 2022-05-06 RX ORDER — METFORMIN HYDROCHLORIDE 1000 MG/1
1000 TABLET, COATED ORAL
Refills: 0 | Status: DISCONTINUED | COMMUNITY
End: 2022-05-06

## 2022-05-06 RX ORDER — OXYCODONE 5 MG/1
5 TABLET ORAL
Refills: 0 | Status: DISCONTINUED | COMMUNITY
End: 2022-05-06

## 2022-05-09 ENCOUNTER — APPOINTMENT (OUTPATIENT)
Dept: CARDIOLOGY | Facility: CLINIC | Age: 62
End: 2022-05-09
Payer: COMMERCIAL

## 2022-05-09 ENCOUNTER — APPOINTMENT (OUTPATIENT)
Dept: CARDIOLOGY | Facility: CLINIC | Age: 62
End: 2022-05-09

## 2022-05-09 ENCOUNTER — NON-APPOINTMENT (OUTPATIENT)
Age: 62
End: 2022-05-09

## 2022-05-09 VITALS
BODY MASS INDEX: 29.44 KG/M2 | SYSTOLIC BLOOD PRESSURE: 98 MMHG | TEMPERATURE: 99.1 F | WEIGHT: 160 LBS | HEIGHT: 62 IN | HEART RATE: 115 BPM | DIASTOLIC BLOOD PRESSURE: 60 MMHG | OXYGEN SATURATION: 98 %

## 2022-05-09 PROCEDURE — 93000 ELECTROCARDIOGRAM COMPLETE: CPT

## 2022-05-09 PROCEDURE — 99215 OFFICE O/P EST HI 40 MIN: CPT

## 2022-05-09 RX ORDER — FUROSEMIDE 40 MG/1
40 TABLET ORAL DAILY
Refills: 0 | Status: DISCONTINUED | COMMUNITY
End: 2022-05-09

## 2022-05-10 ENCOUNTER — APPOINTMENT (OUTPATIENT)
Dept: ORTHOPEDIC SURGERY | Facility: CLINIC | Age: 62
End: 2022-05-10
Payer: COMMERCIAL

## 2022-05-10 VITALS
HEIGHT: 62 IN | HEART RATE: 68 BPM | BODY MASS INDEX: 29.44 KG/M2 | SYSTOLIC BLOOD PRESSURE: 120 MMHG | DIASTOLIC BLOOD PRESSURE: 78 MMHG | WEIGHT: 160 LBS

## 2022-05-10 PROCEDURE — 73562 X-RAY EXAM OF KNEE 3: CPT | Mod: LT

## 2022-05-10 PROCEDURE — 99024 POSTOP FOLLOW-UP VISIT: CPT

## 2022-05-10 RX ORDER — APIXABAN 2.5 MG/1
2.5 TABLET, FILM COATED ORAL
Qty: 60 | Refills: 0 | Status: COMPLETED | COMMUNITY
Start: 2022-04-13 | End: 2022-05-10

## 2022-05-10 NOTE — HISTORY OF PRESENT ILLNESS
[Doing Well] : is doing well [No Sign of Infection] : is showing no signs of infection [Adequate Pain Control] : has adequate pain control [de-identified] : s/p Left total knee arthroplasty.\par  Computer-assisted tibial resection, OrthAlign procedure DOS: 03/18/22\par  [de-identified] : pt is 7 weeks from left TKA\par he completed home PT and currently doing home exercise 2 times per day.\par he report minor pain and not using pain medication\par pt is walking about a block . he is able to walk without cane.\par he as seen by cardiology yesterday.\par he happy with progress [de-identified] : left knee TKA incision clean, dry, no erythema\par ROM is 0-120 degrees\par medial knee incision - for bypass, mild erythema. The surgical incision site(s) erythematous and swollen, but clean, dry and intact, healed, absent of discharge and not dehisced. Additional findings included an unremarkable neurological exam, peripheral vascular exam normal and maneuvers demonstrated a negative Angel's sign. \par  [de-identified] : . 3V xray of the left knee done in the office today and demonstrates s/p implants in good positioning with no evidence of wear, loosening, or subsidence. \par \par  [de-identified] : We will continue home exercise. Pt understands the importance of prophylaxis for invasive dental procedures. F/u with us in 3 weeks.

## 2022-05-10 NOTE — HISTORY OF PRESENT ILLNESS
[FreeTextEntry1] : 62 y/o M with PMH of HTN, DM 2, CAD s/p CABG x 3 (LIMA-LAD, SVG-Ramus, SVG-PDA), Ischemic Cardiomyopathy / HFrEF (EF 35-40%), Dyslipidemia, s/p TKR on 3/18, presents post hospital discharge for NSTEMI,  with subsequent 3V CABG, ischemic cardiomyopathy and AF \par \par Patient has seen Dr. Nicholas \par Patient notes that he has been doing well with no complaints of any chest pain or shortness of breath but notes incisional pain of the chest wall and R knee pain. \par Patient has been complaint with medications and notes no evidence of bleeding or hematuria; of note was on Eliquis 2.5mg po q 12hrs and due to evidence of AF in the hospital and here in the office in AF with PVC vs Aflutter, discussed to increase the Elqiuis to full dose of 5mg po q 12hrs \par \par \par \par \par

## 2022-05-10 NOTE — PHYSICAL EXAM

## 2022-05-10 NOTE — CARDIOLOGY SUMMARY
[de-identified] : Possible atrial fibrillation @ 135 bpm   Low voltage in limb leads.  Poor R-wave progression -may be secondary to pulmonary disease   consider old anterior infarct.  Nonspecific ST depression   +   Diffuse nonspecific T-abnormality  -Nondiagnostic. \par  [de-identified] : 3/26/2022: Summary:\par  1. Left ventricular ejection fraction, by visual estimation, is 35 to 40%.\par  2. Moderately decreased global left ventricular systolic function.\par  3. Apical septal segment and apex are abnormal as described above.\par  4. Spectral Doppler shows pseudonormal pattern of left ventricular myocardial filling (Grade II diastolic dysfunction). Elevated mean left atrial pressure.\par  5. Normal left ventricular internal cavity size.\par  6. Normal right ventricular size and function.\par  7. Normal left atrial size.\par  8. Normal right atrial size.\par  9. Mild mitral annular calcification.\par 10. Mild thickening of the anterior and posterior mitral valve leaflets.\par 11. Mild mitral valve regurgitation.\par 12. Sclerotic aortic valve with normal opening.\par 13. Moderate pulmonic valve regurgitation.\par 14. Mildly dilated Ao root at 3.9cm.\par 15. There is no evidence of pericardial effusion.\par  [de-identified] : CORONARY VESSELS: The coronary circulation is right dominant.\par  \par LM:   --  Mid left main: There was a 40 % stenosis. The lesion was\par moderately calcified.\par  \par LAD:   --  Proximal LAD: There was a 100 % stenosis. This lesion is a\par chronic total occlusion.\par --  Distal LAD: The distal vessel was supplied by collaterals from the RCA.\par  \par CX:   --  Mid circumflex: There was a 99 % stenosis.\par  \par RI:   --  Ramus intermedius: The vessel was medium to large sized. There\par was a 80 % stenosis.\par  \par RCA:   --  RCA: The vessel was large sized (dominant).\par --  Mid RCA: There was a 80 % stenosis.\par  \par COMPLICATIONS: No complications occurred during the cath lab visit.\par DIAGNOSTIC IMPRESSIONS: Multivessel CAD ( LM 40% ?ulcerated plaque, \par LAD, sutotal mid CX occlusion and severe RCA stenosis)\par Mildly elevated left ventrciular filling pressure ( PCWP 15 mmhg)\par  \par DIAGNOSTIC RECOMMENDATIONS: Evaluate by CT surgery for CABG with LIMA to\par LAD, SVG to OM, SVG to ramus ,SVG to distal RCA\par  \par Prepared and signed by\par  \par Dago Rodriguez MD\par Signed 03/28/2022 17:15:55\par

## 2022-05-10 NOTE — ASSESSMENT
[FreeTextEntry1] : 62 y/o M with PMH of HTN, DM 2, CAD s/p CABG x 3 (LIMA-LAD, SVG-Ramus, SVG-PDA), Ischemic Cardiomyopathy / HFrEF (EF 35-40%), Dyslipidemia, s/p TKR on 3/18, presents post hospital discharge for NSTEMI,  with subsequent 3V CABG, ischemic cardiomyopathy and AF \par \par 1) Arrhythmia: Aflutter vs AF with PVCs, pAF in the hospital (CHADS-VASC: 4) \par - patient is in RVR on EKG and now on physical exam rate controlled, he isn't symptomatic with no complaints of any lightheadedness or dizziness \par - on Amiodarone 200mg po q daily and Toprol XL 25mg MG po q BID (was not able to increase BB due to hypotension in the office) \par - increased Eliquis form 2.5mg po q 12hrs to 5mg po q daily \par - will need to see EP \par - will fit with Holter monitor for 1 week to assess if AF and frequency of burden \par \par 2) CAD s/p CABG x 3 (LIMA-LAD, SVG-Ramus, SVG-PDA), Ischemic Cardiomyopathy / HFrEF (EF 35-40%),\par - patient only complains of incisional pain no chest pain / pressure or shortness of breath and appears euvolemic at bedside \par - patient was evaluated by Dr. Nicholas \par - on ASA 81mg po q daily, Pravastatin 40mg po q daily \par - will repeat TTE in 2 months \par \par 3) Dyslipidemia \par - on Pravastatin 40mg po \par \par follow up in 1 month \par next visit will order TTE \par Ordered blood work due to patient being on blood thinners and ischemic cardiomyopathy \par \par Ludy Woods D.O. FACMARIA ISABEL\par Cardiology/Vascular Cardiology -[facility]\par Telephone # [phone]\par \par \par

## 2022-05-31 ENCOUNTER — RX CHANGE (OUTPATIENT)
Age: 62
End: 2022-05-31

## 2022-06-01 ENCOUNTER — APPOINTMENT (OUTPATIENT)
Dept: ELECTROPHYSIOLOGY | Facility: CLINIC | Age: 62
End: 2022-06-01

## 2022-06-01 VITALS
DIASTOLIC BLOOD PRESSURE: 75 MMHG | SYSTOLIC BLOOD PRESSURE: 116 MMHG | BODY MASS INDEX: 29.08 KG/M2 | OXYGEN SATURATION: 97 % | HEART RATE: 68 BPM | TEMPERATURE: 97.5 F | WEIGHT: 158 LBS | HEIGHT: 62 IN

## 2022-06-01 VITALS — DIASTOLIC BLOOD PRESSURE: 69 MMHG | SYSTOLIC BLOOD PRESSURE: 110 MMHG

## 2022-06-01 PROCEDURE — 99203 OFFICE O/P NEW LOW 30 MIN: CPT | Mod: 25

## 2022-06-01 PROCEDURE — 93000 ELECTROCARDIOGRAM COMPLETE: CPT

## 2022-06-01 RX ORDER — SENNOSIDES 8.6 MG TABLETS 8.6 MG/1
8.6 TABLET ORAL AT BEDTIME
Refills: 0 | Status: DISCONTINUED | COMMUNITY
End: 2022-06-01

## 2022-06-01 RX ORDER — PNV NO.95/FERROUS FUM/FOLIC AC 28MG-0.8MG
TABLET ORAL
Refills: 0 | Status: DISCONTINUED | COMMUNITY
End: 2022-06-01

## 2022-06-08 ENCOUNTER — APPOINTMENT (OUTPATIENT)
Dept: ORTHOPEDIC SURGERY | Facility: CLINIC | Age: 62
End: 2022-06-08
Payer: COMMERCIAL

## 2022-06-08 VITALS
BODY MASS INDEX: 29.08 KG/M2 | DIASTOLIC BLOOD PRESSURE: 81 MMHG | SYSTOLIC BLOOD PRESSURE: 125 MMHG | HEART RATE: 78 BPM | WEIGHT: 158 LBS | HEIGHT: 62 IN

## 2022-06-08 PROCEDURE — 20610 DRAIN/INJ JOINT/BURSA W/O US: CPT | Mod: 79,RT

## 2022-06-08 PROCEDURE — 99214 OFFICE O/P EST MOD 30 MIN: CPT | Mod: 24,25

## 2022-06-08 PROCEDURE — 73562 X-RAY EXAM OF KNEE 3: CPT | Mod: LT

## 2022-06-08 NOTE — DISCUSSION/SUMMARY
[Medication Risks Reviewed] : Medication risks reviewed [de-identified] : 62 y/o M with mild tricompartmental osteoarthritis of the right knee. The patient is not a candidate for a right TKA. He is experiencing worsening pain in the right knee and opted for a right knee cortisone injection. If his right knee pain worsens, he will get a right knee MRI to rule out a meniscus tear. He is s/p left TKA 3/18/2022. He is doing well with the left knee. We reassured the pt that his hardware is in good positioning with no evidence of wear, loosening, or subsidence. He should continue to do low impact exercises. Pt understands the importance of prophylaxis for invasive dental procedures. F/u with us a year from surgery. He is also having right shoulder pain and we provided a referral to Dr. Zavaleta.

## 2022-06-08 NOTE — PROCEDURE
[de-identified] : I injected the patient's right knee today with cortisone for primary osteoarthritis.\par \par I discussed at length with the patient the planned steroid and lidocaine injection. The risks, benefits, convalescence and alternatives were reviewed. The possible side effects discussed included but were not limited to: pain, swelling, heat, bleeding, and redness. Symptoms are generally mild but if they are extensive then contact the office. Giving pain relievers by mouth such as NSAIDs or Tylenol can generally treat the reactions to steroid and lidocaine. Rare cases of infection have been noted. Rash, hives and itching may occur post injection. If you have muscle pain or cramps, flushing and or swelling of the face, rapid heart beat, nausea, dizziness, fever, chills, headache, difficulty breathing, swelling in the arms or legs, or have a prickly feeling of your skin, contact a health care provider immediately. Following this discussion, the knee was prepped with Alcohol and under sterile condition the 80 mg Depo-Medrol and 6 cc Lidocaine injection was performed with a 20 gauge needle through a superolateral injection site. The needle was introduced into the joint, aspiration was performed to ensure intra-articular placement and the medication was injected. Upon withdrawal of the needle the site was cleaned with alcohol and a band aid applied. The patient tolerated the injection well and there were no adverse effects. Post injection instructions included no strenuous activity for 24 hours, cryotherapy and if there are any adverse effects to contact the office.

## 2022-06-08 NOTE — HISTORY OF PRESENT ILLNESS
[Pain Location] : pain [5] : a current pain level of 5/10 [2] : a minimum pain level of 2/10 [7] : a maximum pain level of 7/10 [Walking] : worsened by walking [Rest] : relieved by rest [de-identified] : 60 y/o M presents with right knee pain. He has a known hx of mild tricompartmental osteoarthritis of the right knee. He is having worsening right knee pain which is causing him to have difficulty with stairs and walking. He hasn't had any injections in the right knee. He is also s/p left TKA 3/18/2022. He is almost 12 weeks post-op. He finished PT and is happy with surgical progress. He has no pain in the left knee. He is also having right shoulder pain. He has a hx of a MI.  [de-identified] : stairs

## 2022-06-08 NOTE — PROCEDURE
[de-identified] : I injected the patient's right knee today with cortisone for primary osteoarthritis.\par \par I discussed at length with the patient the planned steroid and lidocaine injection. The risks, benefits, convalescence and alternatives were reviewed. The possible side effects discussed included but were not limited to: pain, swelling, heat, bleeding, and redness. Symptoms are generally mild but if they are extensive then contact the office. Giving pain relievers by mouth such as NSAIDs or Tylenol can generally treat the reactions to steroid and lidocaine. Rare cases of infection have been noted. Rash, hives and itching may occur post injection. If you have muscle pain or cramps, flushing and or swelling of the face, rapid heart beat, nausea, dizziness, fever, chills, headache, difficulty breathing, swelling in the arms or legs, or have a prickly feeling of your skin, contact a health care provider immediately. Following this discussion, the knee was prepped with Alcohol and under sterile condition the 80 mg Depo-Medrol and 6 cc Lidocaine injection was performed with a 20 gauge needle through a superolateral injection site. The needle was introduced into the joint, aspiration was performed to ensure intra-articular placement and the medication was injected. Upon withdrawal of the needle the site was cleaned with alcohol and a band aid applied. The patient tolerated the injection well and there were no adverse effects. Post injection instructions included no strenuous activity for 24 hours, cryotherapy and if there are any adverse effects to contact the office.

## 2022-06-08 NOTE — DISCUSSION/SUMMARY
[Medication Risks Reviewed] : Medication risks reviewed [de-identified] : 62 y/o M with mild tricompartmental osteoarthritis of the right knee. The patient is not a candidate for a right TKA. He is experiencing worsening pain in the right knee and opted for a right knee cortisone injection. If his right knee pain worsens, he will get a right knee MRI to rule out a meniscus tear. He is s/p left TKA 3/18/2022. He is doing well with the left knee. We reassured the pt that his hardware is in good positioning with no evidence of wear, loosening, or subsidence. He should continue to do low impact exercises. Pt understands the importance of prophylaxis for invasive dental procedures. F/u with us a year from surgery. He is also having right shoulder pain and we provided a referral to Dr. Zavaleta.

## 2022-06-08 NOTE — PHYSICAL EXAM
[Cane] : ambulates with cane [LE] : Sensory: Intact in bilateral lower extremities [ALL] : dorsalis pedis, posterior tibial, femoral, popliteal, and radial 2+ and symmetric bilaterally [Normal] : Alert and in no acute distress [Poor Appearance] : well-appearing [de-identified] : GENERAL APPEARANCE: Well nourished and hydrated, pleasant, alert, and oriented x 3. Appears their stated age. \par HEENT: Normocephalic, extraocular eye motion intact. Nasal septum midline. Oral cavity clear. External auditory canal clear. \par RESPIRATORY: Breath sounds clear and audible in all lobes. No wheezing, No accessory muscle use.\par CARDIOVASCULAR: No apparent abnormalities. No lower leg edema. No varicosities. Pedal pulses are palpable.\par NEUROLOGIC: Sensation is normal, no muscle weakness in the upper or lower extremities.\par DERMATOLOGIC: No apparent skin lesions, moist, warm, no rash.\par SPINE: Cervical spine appears normal and moves freely; thoracic spine appears normal and moves freely; lumbosacral spine appears normal and moves freely, normal, nontender.\par MUSCULOSKELETAL: Hands, wrists, and elbows are normal and move freely, shoulders are normal and move freely.  [de-identified] : Right knee exam shows no effusion, ROM 0-120 degrees.\par Left knee exam shows healed incision with no sign of infection. ROM 0-120 degrees. Normal swelling for 12 weeks post-op.  [de-identified] : 3V xray of the left knee done in the office today and reviewed by Dr. Marcos Ventura demonstrates s/p implants in good positioning with no evidence of wear, loosening, or subsidence.

## 2022-06-08 NOTE — END OF VISIT
[FreeTextEntry3] : I, Tera Tucker, acted solely as a scribe for Dr. Marcos Ventura on this date 06/08/2022.

## 2022-06-08 NOTE — HISTORY OF PRESENT ILLNESS
[Pain Location] : pain [5] : a current pain level of 5/10 [2] : a minimum pain level of 2/10 [7] : a maximum pain level of 7/10 [Walking] : worsened by walking [Rest] : relieved by rest [de-identified] : 60 y/o M presents with right knee pain. He has a known hx of mild tricompartmental osteoarthritis of the right knee. He is having worsening right knee pain which is causing him to have difficulty with stairs and walking. He hasn't had any injections in the right knee. He is also s/p left TKA 3/18/2022. He is almost 12 weeks post-op. He finished PT and is happy with surgical progress. He has no pain in the left knee. He is also having right shoulder pain. He has a hx of a MI.  [de-identified] : stairs

## 2022-06-13 ENCOUNTER — RESULT CHARGE (OUTPATIENT)
Age: 62
End: 2022-06-13

## 2022-06-14 ENCOUNTER — APPOINTMENT (OUTPATIENT)
Dept: CARDIOLOGY | Facility: CLINIC | Age: 62
End: 2022-06-14
Payer: COMMERCIAL

## 2022-06-14 VITALS
HEIGHT: 62 IN | DIASTOLIC BLOOD PRESSURE: 64 MMHG | HEART RATE: 87 BPM | OXYGEN SATURATION: 97 % | SYSTOLIC BLOOD PRESSURE: 102 MMHG | BODY MASS INDEX: 57.97 KG/M2 | WEIGHT: 315 LBS | TEMPERATURE: 97.8 F

## 2022-06-14 PROCEDURE — 93000 ELECTROCARDIOGRAM COMPLETE: CPT

## 2022-06-14 PROCEDURE — 99214 OFFICE O/P EST MOD 30 MIN: CPT

## 2022-06-14 RX ORDER — AMIODARONE HYDROCHLORIDE 200 MG/1
200 TABLET ORAL DAILY
Qty: 30 | Refills: 0 | Status: DISCONTINUED | COMMUNITY
End: 2022-06-14

## 2022-06-14 RX ORDER — PRAVASTATIN SODIUM 40 MG/1
40 TABLET ORAL
Refills: 0 | Status: DISCONTINUED | COMMUNITY
End: 2022-06-14

## 2022-06-14 RX ORDER — NEBIVOLOL 5 MG/1
5 TABLET ORAL DAILY
Refills: 0 | Status: DISCONTINUED | COMMUNITY
Start: 2021-08-17 | End: 2022-06-14

## 2022-06-15 ENCOUNTER — APPOINTMENT (OUTPATIENT)
Dept: ORTHOPEDIC SURGERY | Facility: CLINIC | Age: 62
End: 2022-06-15

## 2022-06-15 VITALS
BODY MASS INDEX: 28.89 KG/M2 | DIASTOLIC BLOOD PRESSURE: 90 MMHG | WEIGHT: 157 LBS | SYSTOLIC BLOOD PRESSURE: 148 MMHG | HEIGHT: 62 IN | HEART RATE: 99 BPM

## 2022-06-15 PROCEDURE — 99213 OFFICE O/P EST LOW 20 MIN: CPT | Mod: 24

## 2022-07-06 ENCOUNTER — APPOINTMENT (OUTPATIENT)
Dept: CARDIOLOGY | Facility: CLINIC | Age: 62
End: 2022-07-06

## 2022-07-06 PROCEDURE — 93306 TTE W/DOPPLER COMPLETE: CPT

## 2022-07-11 ENCOUNTER — NON-APPOINTMENT (OUTPATIENT)
Age: 62
End: 2022-07-11

## 2022-07-12 ENCOUNTER — NON-APPOINTMENT (OUTPATIENT)
Age: 62
End: 2022-07-12

## 2022-07-15 NOTE — HISTORY OF PRESENT ILLNESS
[FreeTextEntry1] : Mr. Mercedes is a 61 year old male with referred for arrhythmia management. He has a history of CAD s/p recent CABG (3v), ischemic cardiomyopathy with an EF of 35-40%, HTN, DM, TKR. The patient was recently hospitalized for NSTEMI and subsequently underwent CABG. Post CABG the patient developed atrial fibrillation/flutter. He was discharged on amiodarone and eliquis (2.5 mg bid at discharge now on 5 mg bid). He was recently seen by Dr. Woods and was in atrial fibrillation at the time. He denies any shortness of breath or palpitations. He has no prior history of atrial fibrillation or flutter. \par \par

## 2022-07-15 NOTE — DISCUSSION/SUMMARY
[FreeTextEntry1] : In summary, Mr. Mercedes is a 61 year old male with a history of CAD s/p recent CABG and ischemic cardiomyopathy (EF 35-40%). Post-op course was complicated by atrial fibrillation/flutter. He was also noted to be in AF recently as outpatient. He has no prior history of AF or flutter. ECG today shows sinus rhythm. He has been on amiodarone and eliquis. I will discontinue amiodarone. He will continue to take eliquis. He is also on metoprolol which should be continued. He will return for follow up in 2 months. If he has recurrent AF/AFL off amiodarone would consider catheter ablation. \par

## 2022-07-15 NOTE — CARDIOLOGY SUMMARY
[de-identified] : 6/1/22: Normal sinus rhythm\par  [de-identified] : 3/25/22: EF 35-40%, normal LA size

## 2022-08-03 ENCOUNTER — NON-APPOINTMENT (OUTPATIENT)
Age: 62
End: 2022-08-03

## 2022-08-03 ENCOUNTER — APPOINTMENT (OUTPATIENT)
Dept: ELECTROPHYSIOLOGY | Facility: CLINIC | Age: 62
End: 2022-08-03

## 2022-08-03 VITALS
OXYGEN SATURATION: 99 % | RESPIRATION RATE: 16 BRPM | TEMPERATURE: 98.2 F | WEIGHT: 150 LBS | HEART RATE: 82 BPM | DIASTOLIC BLOOD PRESSURE: 80 MMHG | HEIGHT: 62 IN | BODY MASS INDEX: 27.6 KG/M2 | SYSTOLIC BLOOD PRESSURE: 130 MMHG

## 2022-08-03 PROCEDURE — 93000 ELECTROCARDIOGRAM COMPLETE: CPT

## 2022-08-03 PROCEDURE — 99213 OFFICE O/P EST LOW 20 MIN: CPT | Mod: 25

## 2022-08-03 RX ORDER — CELECOXIB 200 MG/1
200 CAPSULE ORAL
Qty: 42 | Refills: 0 | Status: DISCONTINUED | COMMUNITY
Start: 2022-03-20

## 2022-08-03 RX ORDER — BENZONATATE 200 MG/1
200 CAPSULE ORAL
Qty: 28 | Refills: 0 | Status: DISCONTINUED | COMMUNITY
Start: 2022-06-22

## 2022-08-04 ENCOUNTER — NON-APPOINTMENT (OUTPATIENT)
Age: 62
End: 2022-08-04

## 2022-08-08 ENCOUNTER — APPOINTMENT (OUTPATIENT)
Dept: CARDIOLOGY | Facility: CLINIC | Age: 62
End: 2022-08-08

## 2022-08-08 VITALS
SYSTOLIC BLOOD PRESSURE: 122 MMHG | TEMPERATURE: 98 F | HEART RATE: 65 BPM | BODY MASS INDEX: 28.89 KG/M2 | HEIGHT: 62 IN | DIASTOLIC BLOOD PRESSURE: 62 MMHG | WEIGHT: 157 LBS | OXYGEN SATURATION: 98 %

## 2022-08-08 PROCEDURE — 99214 OFFICE O/P EST MOD 30 MIN: CPT

## 2022-08-08 RX ORDER — ACETAMINOPHEN 325 MG/1
325 TABLET ORAL EVERY 6 HOURS
Refills: 0 | Status: DISCONTINUED | COMMUNITY
End: 2022-08-08

## 2022-08-08 RX ORDER — CHROMIUM 200 MCG
TABLET ORAL
Refills: 0 | Status: DISCONTINUED | COMMUNITY
End: 2022-08-08

## 2022-08-10 ENCOUNTER — APPOINTMENT (OUTPATIENT)
Dept: ORTHOPEDIC SURGERY | Facility: CLINIC | Age: 62
End: 2022-08-10

## 2022-08-10 VITALS
SYSTOLIC BLOOD PRESSURE: 138 MMHG | BODY MASS INDEX: 28.89 KG/M2 | DIASTOLIC BLOOD PRESSURE: 82 MMHG | WEIGHT: 157 LBS | HEIGHT: 62 IN | HEART RATE: 67 BPM

## 2022-08-10 PROCEDURE — 99214 OFFICE O/P EST MOD 30 MIN: CPT | Mod: 25

## 2022-08-10 PROCEDURE — 20610 DRAIN/INJ JOINT/BURSA W/O US: CPT | Mod: RT

## 2022-08-10 NOTE — HISTORY OF PRESENT ILLNESS
[de-identified] : 08/10/2022 : RENATO LEVI  is a 61 year year old male who presents to the office for follow-up evaluation of his right shoulder.  He states he had an MRI and is here to discuss the results today.  He states he still has pain over the right shoulder that is sharp and worse with certain movements and activities and alleviated with rest.  He states he has had cortisone injections in the past 4 to 5 years ago that gave good relief.  He states he would like to avoid surgery because he recently had an open heart surgery as well as an abdominal surgery for colon cancer in the knee arthroplasty.  He is not interested in surgical invention currently.  He denies any numbness or tingling distally.  He has no other complaints today.\par \par 06/15/2022 : RENATO LEVI  is a 61 year year old male who presents to the office for evaluation of his right shoulder.  He states his had right shoulder pain has been going on for 4 years or so.  He states he had a couple cortisone injections but none recently.  He states his last 1 was a year or so ago.  He states the pain is diffusely about the shoulder and is worse with certain lifting movements and alleviated with rest.  He states it feels weak with certain movements as well.  He states the pain can be sharp at times.  He does not take any medications because he has heart issues and is concerned about taking medications.  He states he has pain in multiple other joints in the elbow and fingers and hands as well. The patient had a knee replacement surgery approximately 3 months ago with Dr. Ventura.  In the postoperative period he developed a cardiac issue which required open heart surgery and CABG.  He has seen Dr. Nicholas for this.  Patient presents for a specialist opinion regarding his shoulder.  He denies any fevers, chills, sweats, nose, tingling, or pain elsewhere.

## 2022-08-10 NOTE — PHYSICAL EXAM
[de-identified] : General:\par Awake, alert, no acute distress, Patient was cooperative and appropriate during the examination.\par \par The patient is of normal weight for height and age.\par \par Walks without an antalgic gait.\par \par Full, painless range of motion of the neck and back.\par \par Exam of the bilateral lower extremities is intact and symmetric with regards to dermatologic, vascular, and neurologic exam. Bilateral lower extremity sensation is grossly intact to light touch in the DP/SP/T/S/S nerve distributions. Intact DF/PF/EHL. BIlateral lower extremities warm and well-perfused with brisk capillary refill.\par \par \par Pulmonary:\par Regular, nonlabored breathing\par \par Abdomen:\par Soft, nontender, nondistended.\par \par Lymphatic:\par No evidence of axillary lymphadenopathy\par \par Right shoulder Exam:\par Physical exam of the shoulder demonstrates normal skin without signs of skin changes or abnormalities. No erythema, warmth, or joint effusion appreciated.  \par  \par Sensation intact light touch C5-T1\par Palpable radial pulse\par Radial/ulnar/median/axillary/musculocutaneous/AIN/PIN nerves grossly intact\par  \par Range of motion:\par Forward Flexion: 170\par Abduction: 120\par External Rotation: 30\par Internal Rotation: Lower lumbar level\par  \par Palpation:\par Mildly tender to palpation over the glenohumeral joint\par Mildly tender palpation over the rotator cuff insertion on the greater tuberosity\par Not tender to palpation over the AC joint\par Moderately tender to palpation of the biceps tendon/bicipital groove\par  \par Strength testing:\par Supraspinatus: 4+/5\par Infraspinatus: 4/5\par Subscapularis: 5/5\par  \par Special test:\par Empty can test positive\par [place] impingement test negative\par Speeds test positive\par Prentiss's test negative\par Lift-off test negative\par Bell-press test negative\par Cross-arm adduction test mildly positive\par  [de-identified] : MRI of the right shoulder today taken at  radiology on 7/14/2022 revealed communicating joint and bursal effusions as visualized.  There is rotator cuff tendinosis with chronic complete full-thickness tear of the supraspinatus and infraspinatus tendons with associated muscle atrophy.  There is partial-thickness tearing of the subscapularis with chronic complete tear of the long head of the biceps with moderate AC joint arthrosis and mild superior labrum degeneration.\par \par \par  x-rays 4 views of the right shoulder taken the office today on 6/15/2022 shows mild degenerative changes with no other acute findings.

## 2022-08-10 NOTE — PROCEDURE
[de-identified] : I injected the patient's right shoulder today with cortisone.\par  \par I discussed at length with the patient the planned steroid and lidocaine injection. The risks, benefits, convalescence and alternatives were reviewed. The possible side effects discussed included but were not limited to: pain, swelling, heat, bleeding, and redness. Symptoms are generally mild but if they are extensive then contact the office. Giving pain relievers by mouth such as NSAIDs or Tylenol can generally treat the reactions to steroid and lidocaine. Rare cases of infection have been noted. Rash, hives and itching may occur post injection. If you have muscle pain or cramps, flushing and or swelling of the face, rapid heart beat, nausea, dizziness, fever, chills, headache, difficulty breathing, swelling in the arms or legs, or have a prickly feeling of your skin, contact a health care provider immediately. Following this discussion, the shoulder was prepped with Chlorhexidine and Alcohol and under sterile conditions the 80 mg Depo-Medrol and 4 cc Lidocaine injection was performed with a 22 gauge needle through a posterolateral injection site. The needle was introduced into the subacromial space and the medication was injected. Upon withdrawal of the needle the site was cleaned with alcohol and a band aid was applied. The patient tolerated the injection well and there were no adverse effects. Post injection instructions included no strenuous activity for 24 hours, cryotherapy and if there are any adverse effects to contact the office.

## 2022-08-10 NOTE — DISCUSSION/SUMMARY
[de-identified] : Assessment: Patient is 61-year-old male with right shoulder rotator cuff arthropathy\par \par Plan: I had a long discussion with the patient today regarding the nature of their diagnosis and treatment plan. We discussed the risks and benefits of no treatment as well as nonoperative and operative treatments.  I reviewed the patient's x-rays which revealed a chronic full-thickness rotator cuff tear as described above.   we discussed surgical and nonsurgical options at length today.  Given his medical history and recent CABG he would like to hold off on surgery which I am in agreement with.  We will continue with conservative treatment including ice, heat, rest, over-the-counter medication, avoiding exacerbate activities, physical therapy on his own for symptomatic relief and to maximize his range of motion and function.  I am also recommending a cortisone injection of the right shoulder be administered in the office today.  He tolerated treatment well with no adverse effects.  He will follow-up in 2 months as needed for repeat evaluation.  Mobic 15 mg to be taken once daily with food for pain and inflammation.  Patient discussed and reviewed with Dr. Zavaleta today.\par  \par The patient verbalizes their understanding and agrees with the plan.  All questions were answered to their satisfaction.

## 2022-08-13 NOTE — ED ADULT NURSE NOTE - CAS TRG GENERAL AIRWAY, MLM
[facility] ALERT TEAM DISCHARGE PLANNING NOTE    Date:  8/13/22  Patient Name:  Mora Gonzales y.o. - Discharge Planning  MRN:  [id number]   YOB: 1953  ADMISSION DATE:  8/10/2022      Writer forwarded transfer packet for inpatient psychiatric care to the following community providers:  [facility], [facility], [facility]   Items  included in the transfer packet:   __x___Face Sheet   __x___Pages 1 and 2 of completed legal hold   __x___Alert Team/Psych Assessment   __x___H&P   __x___UDS   _____Blood Alcohol   __x___Vital signs   __n/a___Pregnancy Test (if applicable)   __x___Medications List   _____Covid Screen       Patent

## 2022-08-15 NOTE — CARDIOLOGY SUMMARY
[de-identified] : 8/3/2022: Sinus  Rhythm  Low voltage in precordial leads. Old anterior infarct.\par 06/01/2022 NSR \par 5/9/2022: Possible atrial fibrillation @ 135 bpm Low voltage in limb leads. Poor R-wave progression -may be secondary to pulmonary disease consider old anterior infarct. Nonspecific ST depression + Diffuse nonspecific T-abnormality -Nondiagnostic. \par  \par  [de-identified] : 05/17/22 7 d HM sinus, min HR 48, max 146 average 72, PAF: AF burden 0.46%, longest 5 min. 3 beat NSVT, total PVC burden <0.01%  [de-identified] : 7/8/2022: Summary:\par TTE: 7/8/2022: Left ventricular ejection fraction, by visual estimation, is 40 to 45%. Mildly to moderately decreased global left ventricular systolic function. Basal and mid inferolateral wall, apical septal segment, and basal inferior segment are abnormal as described above. Abnormal septal motion consistent with post-operative status. Normal right ventricular size and function. There is mild septal left ventricular hypertrophy. Spectral Doppler shows impaired relaxation pattern of left ventricular myocardial filling (Grade I diastolic dysfunction). Dilation of the sinuses of valsalva measuring 4.3 cm. There is no evidence of pericardial effusion. Compared to prior inpatient TTE done on 3/25/2022, the left ventricular function has increased from 35-40% to 40-45%.\par \par 3/26/2022: Left ventricular ejection fraction, by visual estimation, is 35 to 40%. Moderately decreased global left ventricular systolic function. Apical septal segment and apex are abnormal as described above. Spectral Doppler shows pseudonormal pattern of left ventricular myocardial filling (Grade II diastolic dysfunction). Elevated mean left atrial pressure. Normal left ventricular internal cavity size. Normal right ventricular size and function. Normal left atrial size. Normal right atrial size. Mild mitral annular calcification. Mild thickening of the anterior and posterior mitral valve leaflets. Mild mitral valve regurgitation. Sclerotic aortic valve with normal opening. Moderate pulmonic valve regurgitation. Mildly dilated Ao root at 3.9cm. There is no evidence of pericardial effusion.\par  [de-identified] : CORONARY VESSELS: The coronary circulation is right dominant.\par  \par LM: -- Mid left main: There was a 40 % stenosis. The lesion was\par moderately calcified.\par  \par LAD: -- Proximal LAD: There was a 100 % stenosis. This lesion is a\par chronic total occlusion.\par -- Distal LAD: The distal vessel was supplied by collaterals from the RCA.\par  \par CX: -- Mid circumflex: There was a 99 % stenosis.\par  \par RI: -- Ramus intermedius: The vessel was medium to large sized. There\par was a 80 % stenosis.\par  \par RCA: -- RCA: The vessel was large sized (dominant).\par -- Mid RCA: There was a 80 % stenosis.\par  \par COMPLICATIONS: No complications occurred during the cath lab visit.\par DIAGNOSTIC IMPRESSIONS: Multivessel CAD ( LM 40% ?ulcerated plaque, \par LAD, sutotal mid CX occlusion and severe RCA stenosis)\par Mildly elevated left ventrciular filling pressure ( PCWP 15 mmhg)\par  \par DIAGNOSTIC RECOMMENDATIONS: Evaluate by CT surgery for CABG with LIMA to\par LAD, SVG to OM, SVG to ramus ,SVG to distal RCA\par  \par  \par

## 2022-08-15 NOTE — REASON FOR VISIT
[Symptom and Test Evaluation] : symptom and test evaluation [Coronary Artery Disease] : coronary artery disease [FreeTextEntry1] : 60 y/o M with PMH of HTN, DM 2, NSTEMI, CAD s/p CABG x 3 on 3/29/2022 (LIMA-LAD, SVG-Ramus, SVG-PDA), post op AFib, Ischemic Cardiomyopathy / HFrEF (EF 35-40% -> 40-45%), Dyslipidemia, s/p TKR on 3/18, who presents for routine follow-up\par \par Patient notes that he has been doing well with no complaints of chest pain or shortness of breath but complains of lower extremity edema bilaterally, with + cough productive with no associated shortness of breath at rest or upon exertion with no orthopnea or PND. Plan to start Lasix 20mg po q daily and reassess lower extremity edema and cough, \par

## 2022-08-15 NOTE — ASSESSMENT
[FreeTextEntry1] : 60 y/o M with PMH of HTN, DM 2, NSTEMI, CAD s/p CABG x 3 on 3/29/2022 (LIMA-LAD, SVG-Ramus, SVG-PDA), post op AFib, Ischemic Cardiomyopathy / HFrEF (EF 35-40% -> 40-45%), Dyslipidemia, s/p TKR on 3/18, who presents for routine follow-up\par \par 1) Arrhythmia: AF with PVCs, pAF in the hospital (CHADS-VASC: 4) \par -EKG reviewed and shows NSR \par - followed with EP and has follow up appointment \par - Holter monitor showed AF burden \par - on Toprol XL 25mg MG po q BID \par - on Eliquis 5mg po q daily and no evidence of bleeding/ melena or hematuria \par \par 2) CAD s/p CABG x 3 (LIMA-LAD, SVG-Ramus, SVG-PDA), Ischemic Cardiomyopathy / HFrEF (EF 35-40%),\par - patient only complains of lower extremity edema with + cough \par - on ASA 81mg po q daily,Lipitor 20 mg po q daily and start Losartan 12.5mg po q daily \par - TTE done shows LVEF 40-45%, with grade I diastolic dysfunction with normal RV function and size previous LVEF of 35-40% \par - will start Lasix 20 mg po q daily and reassess symptoms \par \par 3) Dyslipidemia \par - on Lipitor 20mg po q daily \par \par assured patient that documents sent to social security \par Ordered blood work due to patient being on blood thinners and ischemic cardiomyopathy \par \par Ludy Woods D.O. FACMARIA ISABEL\par Cardiology/Vascular Cardiology -[facility]\par Telephone # [phone]\par

## 2022-09-01 ENCOUNTER — NON-APPOINTMENT (OUTPATIENT)
Age: 62
End: 2022-09-01

## 2022-09-04 ENCOUNTER — NON-APPOINTMENT (OUTPATIENT)
Age: 62
End: 2022-09-04

## 2022-09-06 NOTE — HISTORY OF PRESENT ILLNESS
[FreeTextEntry1] : Mr. Mercedes is a 61 year old male here for follow up today. He has a history of CAD s/p CABG (3v), ischemic cardiomyopathy with an EF of 35-40%, HTN, DM, TKR. \par \par To summarize, the patient was recently hospitalized for NSTEMI and subsequently underwent CABG. Post CABG the patient developed atrial fibrillation/flutter. He was discharged on amiodarone and eliquis (2.5 mg bid at discharge later changed to 5 mg bid). He was seen by Dr. Woods and was in atrial fibrillation at the time. He denied any symptoms such as shortness of breath or palpitations. He has no prior history of atrial fibrillation or flutter. When seen in my office he had converted back to sinus rhythm. I discontinued amiodarone. He is here for follow up today. He reports intermittent palpitations about 5-6 episodes total lasting a few minutes. He is not taking metoprolol.

## 2022-09-06 NOTE — DISCUSSION/SUMMARY
[FreeTextEntry1] : In summary, Mr. Mercedes is a 61 year old male with a history of CAD s/p CABG and ischemic cardiomyopathy (EF 35-40% intially, now 40-45%). Post-op course was complicated by atrial fibrillation/flutter. Shortly after he was also noted to be in AF as an outpatient with spontaneous conversion to sinus rhythm. I stopped amiodarone at his last visit. He has been experiencing intermittent palpitations lasting up to a few minutes. He is not taking metoprolol which I will renew. I will order an MCOT for one month to better define the etiology and burden of atrial arrhythmia. Would consider catheter ablation if recurrent atrial fibrillation or flutter is seen. He will continue eliquis 5 mg bid.

## 2022-09-09 ENCOUNTER — APPOINTMENT (OUTPATIENT)
Dept: ORTHOPEDIC SURGERY | Facility: CLINIC | Age: 62
End: 2022-09-09

## 2022-09-09 VITALS
BODY MASS INDEX: 28.89 KG/M2 | SYSTOLIC BLOOD PRESSURE: 115 MMHG | HEART RATE: 68 BPM | DIASTOLIC BLOOD PRESSURE: 74 MMHG | WEIGHT: 157 LBS | HEIGHT: 62 IN

## 2022-09-09 PROCEDURE — 99214 OFFICE O/P EST MOD 30 MIN: CPT | Mod: 25

## 2022-09-09 PROCEDURE — 20610 DRAIN/INJ JOINT/BURSA W/O US: CPT | Mod: RT

## 2022-09-09 NOTE — PHYSICAL EXAM
[Cane] : ambulates with cane [LE] : Sensory: Intact in bilateral lower extremities [ALL] : dorsalis pedis, posterior tibial, femoral, popliteal, and radial 2+ and symmetric bilaterally [Normal] : Alert and in no acute distress [Poor Appearance] : well-appearing [de-identified] : GENERAL APPEARANCE: Well nourished and hydrated, pleasant, alert, and oriented x 3. Appears their stated age. \par HEENT: Normocephalic, extraocular eye motion intact. Nasal septum midline. Oral cavity clear. External auditory canal clear. \par RESPIRATORY: Breath sounds clear and audible in all lobes. No wheezing, No accessory muscle use.\par CARDIOVASCULAR: No apparent abnormalities. No lower leg edema. No varicosities. Pedal pulses are palpable.\par NEUROLOGIC: Sensation is normal, no muscle weakness in the upper or lower extremities.\par DERMATOLOGIC: No apparent skin lesions, moist, warm, no rash.\par SPINE: Cervical spine appears normal and moves freely; thoracic spine appears normal and moves freely; lumbosacral spine appears normal and moves freely, normal, nontender.\par MUSCULOSKELETAL: Hands, wrists, and elbows are normal and move freely, shoulders are normal and move freely.  [de-identified] : 1+ edema bilaterally \par Right knee exam shows medial joint line tenderness, ROM 0-120 degrees. \par Left knee exam shows healed incision with no sign of infection. ROM 0-125 degrees.  [de-identified] : No new imaging was obtained during today’s visit. Prior obtained imaging was once again reviewed.

## 2022-09-09 NOTE — DISCUSSION/SUMMARY
[Medication Risks Reviewed] : Medication risks reviewed [de-identified] : 60 y/o M with mild tricompartmental osteoarthritis of the right knee. The patient is not a candidate for a right TKA. Pt still has persisting pain in the right knee and opted for a right knee cortisone injection today. He is s/p left TKA 3/18/2022. He is doing well with the left knee. He should continue to do low impact exercises. Pt understands the importance of prophylaxis for invasive dental procedures. Recommended pt to wear compression stockings to control edema in foot. F/u in 1-2 months before his move to [facility]. Leaving Nov 10. He received a right knee cortizone injection today\par \par I, Chalino Sheffield, acted solely as a scribe for Dr. Marcos Ventura on this date 09/09/2022

## 2022-09-09 NOTE — REASON FOR VISIT
[Follow-Up Visit] : a follow-up visit for [Other: ____] : [unfilled] [FreeTextEntry2] : s/p Left total knee arthroplasty. Computer-assisted tibial resection, OrthAlign procedure DOS: 03/18/22. \par

## 2022-09-09 NOTE — HISTORY OF PRESENT ILLNESS
[Walking] : worsened by walking [Rest] : relieved by rest [4] : a current pain level of 4/10 [de-identified] : 60 y/o M presents with bilateral knee pain with R>L. Right knee pain has been tolerable since last visit. Pt to receive right knee cortisone injections during today's visit. He has a known hx of mild tricompartmental osteoarthritis of the right knee. Right knee pain worsens which is causing him to have difficulty with stairs and walking. Pt was last seen 6/15/2022 for his first right knee cortisone injection. He is also s/p left TKA 3/18/2022. He is almost 6 months post-op. Pt has made significant surgical progress. He has no pain in the left knee. Pt is also experiencing a burning sensation in his right foot. He has a hx of a MI.  [de-identified] : stairs

## 2022-09-09 NOTE — PROCEDURE
[de-identified] : I injected the patient's right knee today with cortisone for primary osteoarthritis.\par \par I discussed at length with the patient the planned steroid and lidocaine injection. The risks, benefits, convalescence and alternatives were reviewed. The possible side effects discussed included but were not limited to: pain, swelling, heat, bleeding, and redness. Symptoms are generally mild but if they are extensive then contact the office. Giving pain relievers by mouth such as NSAIDs or Tylenol can generally treat the reactions to steroid and lidocaine. Rare cases of infection have been noted. Rash, hives and itching may occur post injection. If you have muscle pain or cramps, flushing and or swelling of the face, rapid heart beat, nausea, dizziness, fever, chills, headache, difficulty breathing, swelling in the arms or legs, or have a prickly feeling of your skin, contact a health care provider immediately. Following this discussion, the knee was prepped with Alcohol and under sterile condition the 80 mg Depo-Medrol and 6 cc Lidocaine injection was performed with a 20 gauge needle through a superolateral injection site. The needle was introduced into the joint, aspiration was performed to ensure intra-articular placement and the medication was injected. Upon withdrawal of the needle the site was cleaned with alcohol and a band aid applied. The patient tolerated the injection well and there were no adverse effects. Post injection instructions included no strenuous activity for 24 hours, cryotherapy and if there are any adverse effects to contact the office.

## 2022-09-15 NOTE — ED PROVIDER NOTE - WR ORDER NAME 2
[facility] Nurse Msg Pool  Scheduled COLONOSCOPY and EGD on 9.27.2022 at [facility] / [facility] instructions Given in clinic 9.15.2022 /  maritza ricks       [facility] meds sent to [facility] pharmacy   
Xray Knee 3 Views, Left

## 2022-09-21 ENCOUNTER — APPOINTMENT (OUTPATIENT)
Dept: ELECTROPHYSIOLOGY | Facility: CLINIC | Age: 62
End: 2022-09-21

## 2022-09-21 ENCOUNTER — NON-APPOINTMENT (OUTPATIENT)
Age: 62
End: 2022-09-21

## 2022-09-21 VITALS
BODY MASS INDEX: 27.97 KG/M2 | DIASTOLIC BLOOD PRESSURE: 71 MMHG | SYSTOLIC BLOOD PRESSURE: 116 MMHG | TEMPERATURE: 97.5 F | HEART RATE: 59 BPM | OXYGEN SATURATION: 98 % | WEIGHT: 152 LBS | HEIGHT: 62 IN

## 2022-09-21 PROCEDURE — 93000 ELECTROCARDIOGRAM COMPLETE: CPT

## 2022-09-21 PROCEDURE — 99214 OFFICE O/P EST MOD 30 MIN: CPT | Mod: 25

## 2022-09-21 NOTE — HISTORY OF PRESENT ILLNESS
[FreeTextEntry1] : Mr. Mercedes is a 61 year old male here for follow up today. He has a history of atrial fibrillation, CAD s/p CABG (3v), ischemic cardiomyopathy with an EF of 35-40%, HTN, DM, TKR. \par \par To summarize, the patient was hospitalized earlier this year for NSTEMI and subsequently underwent CABG. Post CABG the patient developed atrial fibrillation/flutter. He was discharged on amiodarone and eliquis (2.5 mg bid at discharge later changed to 5 mg bid). On outpatient follow up he was seen by Dr. Woods and was noted to be in atrial fibrillation. He denied any symptoms such as shortness of breath or palpitations. He had no prior history of atrial fibrillation or flutter. When seen in my office he had converted back to sinus rhythm. I discontinued amiodarone. He reported intermittent palpitations at the time. He was started on metoprolol and I asked him to wear an outpatient event monitor. He was noted to have episodes of atrial fibrillation with RVR on outpatient monitoring. He is here to discuss this further. \par

## 2022-09-21 NOTE — DISCUSSION/SUMMARY
[EKG obtained to assist in diagnosis and management of assessed problem(s)] : EKG obtained to assist in diagnosis and management of assessed problem(s) [FreeTextEntry1] : In summary, Mr. Mercedes is a 61 year old male with a history of CAD s/p CABG, ischemic cardiomyopathy (EF 35-40% intially, now 40-45%) and atrial fibrillation/flutter. Post-CABG course was complicated by atrial fibrillation/flutter. Shortly after he was also noted to be in AF as an outpatient with spontaneous conversion to sinus rhythm. Amiodarone was stopped but he continued to experience palpitations with outpatient monitoring showing paroxysmal atrial fibrillation with RVR. We discussed the various therapeutic options including medical therapy and catheter ablation. Given presence of LV dysfunction a catheter ablation procedure is preferable as intial therapy. He has also demonstrated failure of antiarrhythmic drug therapy. We discussed the details of the procedure including potential complications which include but are not limited to bleeding, hematoma, infection, damage to blood vessels, cardiac perforation, damage to the conduction system requiring pacemaker, MI, stroke and DVT. He expressed understanding and all his questions were answered. He wishes to proceed. Hold eliquis on morning of procedure. Will increase metoprolol to 50 mg bid until the procedure. \par

## 2022-10-03 ENCOUNTER — APPOINTMENT (OUTPATIENT)
Dept: CARDIOLOGY | Facility: CLINIC | Age: 62
End: 2022-10-03

## 2022-10-03 ENCOUNTER — NON-APPOINTMENT (OUTPATIENT)
Age: 62
End: 2022-10-03

## 2022-10-03 VITALS
WEIGHT: 152.2 LBS | TEMPERATURE: 97.9 F | HEIGHT: 62 IN | SYSTOLIC BLOOD PRESSURE: 106 MMHG | HEART RATE: 60 BPM | OXYGEN SATURATION: 98 % | BODY MASS INDEX: 28.01 KG/M2 | DIASTOLIC BLOOD PRESSURE: 62 MMHG

## 2022-10-03 PROCEDURE — 99214 OFFICE O/P EST MOD 30 MIN: CPT

## 2022-10-03 RX ORDER — ASPIRIN ENTERIC COATED TABLETS 81 MG 81 MG/1
81 TABLET, DELAYED RELEASE ORAL
Qty: 90 | Refills: 3 | Status: ACTIVE | COMMUNITY
Start: 1900-01-01 | End: 1900-01-01

## 2022-10-03 RX ORDER — LOSARTAN POTASSIUM 25 MG/1
25 TABLET, FILM COATED ORAL DAILY
Qty: 1 | Refills: 1 | Status: DISCONTINUED | COMMUNITY
Start: 2022-08-08 | End: 2022-10-03

## 2022-10-03 RX ORDER — BENZONATATE 200 MG/1
200 CAPSULE ORAL
Refills: 0 | Status: DISCONTINUED | COMMUNITY
End: 2022-10-03

## 2022-10-10 ENCOUNTER — OUTPATIENT (OUTPATIENT)
Dept: OUTPATIENT SERVICES | Facility: HOSPITAL | Age: 62
LOS: 1 days | End: 2022-10-10
Payer: COMMERCIAL

## 2022-10-10 VITALS
HEIGHT: 62 IN | OXYGEN SATURATION: 97 % | HEART RATE: 68 BPM | RESPIRATION RATE: 17 BRPM | SYSTOLIC BLOOD PRESSURE: 120 MMHG | TEMPERATURE: 98 F | DIASTOLIC BLOOD PRESSURE: 85 MMHG | WEIGHT: 156.53 LBS

## 2022-10-10 DIAGNOSIS — I48.0 PAROXYSMAL ATRIAL FIBRILLATION: ICD-10-CM

## 2022-10-10 DIAGNOSIS — Z91.89 OTHER SPECIFIED PERSONAL RISK FACTORS, NOT ELSEWHERE CLASSIFIED: ICD-10-CM

## 2022-10-10 DIAGNOSIS — Z90.49 ACQUIRED ABSENCE OF OTHER SPECIFIED PARTS OF DIGESTIVE TRACT: Chronic | ICD-10-CM

## 2022-10-10 DIAGNOSIS — Z96.652 PRESENCE OF LEFT ARTIFICIAL KNEE JOINT: Chronic | ICD-10-CM

## 2022-10-10 DIAGNOSIS — Z01.818 ENCOUNTER FOR OTHER PREPROCEDURAL EXAMINATION: ICD-10-CM

## 2022-10-10 DIAGNOSIS — Z98.890 OTHER SPECIFIED POSTPROCEDURAL STATES: Chronic | ICD-10-CM

## 2022-10-10 DIAGNOSIS — G47.33 OBSTRUCTIVE SLEEP APNEA (ADULT) (PEDIATRIC): ICD-10-CM

## 2022-10-10 DIAGNOSIS — I10 ESSENTIAL (PRIMARY) HYPERTENSION: ICD-10-CM

## 2022-10-10 LAB
ANION GAP SERPL CALC-SCNC: 12 MMOL/L — SIGNIFICANT CHANGE UP (ref 5–17)
APTT BLD: 32.8 SEC — SIGNIFICANT CHANGE UP (ref 27.5–35.5)
BASOPHILS # BLD AUTO: 0.04 K/UL — SIGNIFICANT CHANGE UP (ref 0–0.2)
BASOPHILS NFR BLD AUTO: 0.5 % — SIGNIFICANT CHANGE UP (ref 0–2)
BLD GP AB SCN SERPL QL: SIGNIFICANT CHANGE UP
BUN SERPL-MCNC: 22 MG/DL — HIGH (ref 8–20)
CALCIUM SERPL-MCNC: 9.5 MG/DL — SIGNIFICANT CHANGE UP (ref 8.4–10.5)
CHLORIDE SERPL-SCNC: 105 MMOL/L — SIGNIFICANT CHANGE UP (ref 98–107)
CO2 SERPL-SCNC: 25 MMOL/L — SIGNIFICANT CHANGE UP (ref 22–29)
CREAT SERPL-MCNC: 1.1 MG/DL — SIGNIFICANT CHANGE UP (ref 0.5–1.3)
EGFR: 76 ML/MIN/1.73M2 — SIGNIFICANT CHANGE UP
EOSINOPHIL # BLD AUTO: 0.38 K/UL — SIGNIFICANT CHANGE UP (ref 0–0.5)
EOSINOPHIL NFR BLD AUTO: 4.4 % — SIGNIFICANT CHANGE UP (ref 0–6)
GLUCOSE SERPL-MCNC: 142 MG/DL — HIGH (ref 70–99)
HCT VFR BLD CALC: 34.3 % — LOW (ref 39–50)
HGB BLD-MCNC: 10.9 G/DL — LOW (ref 13–17)
IMM GRANULOCYTES NFR BLD AUTO: 0.9 % — SIGNIFICANT CHANGE UP (ref 0–0.9)
INR BLD: 1.7 RATIO — HIGH (ref 0.88–1.16)
LYMPHOCYTES # BLD AUTO: 1.3 K/UL — SIGNIFICANT CHANGE UP (ref 1–3.3)
LYMPHOCYTES # BLD AUTO: 15 % — SIGNIFICANT CHANGE UP (ref 13–44)
MAGNESIUM SERPL-MCNC: 1.5 MG/DL — LOW (ref 1.6–2.6)
MCHC RBC-ENTMCNC: 27 PG — SIGNIFICANT CHANGE UP (ref 27–34)
MCHC RBC-ENTMCNC: 31.8 GM/DL — LOW (ref 32–36)
MCV RBC AUTO: 84.9 FL — SIGNIFICANT CHANGE UP (ref 80–100)
MONOCYTES # BLD AUTO: 0.72 K/UL — SIGNIFICANT CHANGE UP (ref 0–0.9)
MONOCYTES NFR BLD AUTO: 8.3 % — SIGNIFICANT CHANGE UP (ref 2–14)
NEUTROPHILS # BLD AUTO: 6.14 K/UL — SIGNIFICANT CHANGE UP (ref 1.8–7.4)
NEUTROPHILS NFR BLD AUTO: 70.9 % — SIGNIFICANT CHANGE UP (ref 43–77)
PLATELET # BLD AUTO: 141 K/UL — LOW (ref 150–400)
POTASSIUM SERPL-MCNC: 4.1 MMOL/L — SIGNIFICANT CHANGE UP (ref 3.5–5.3)
POTASSIUM SERPL-SCNC: 4.1 MMOL/L — SIGNIFICANT CHANGE UP (ref 3.5–5.3)
PROTHROM AB SERPL-ACNC: 19.8 SEC — HIGH (ref 10.5–13.4)
RBC # BLD: 4.04 M/UL — LOW (ref 4.2–5.8)
RBC # FLD: 16.8 % — HIGH (ref 10.3–14.5)
SODIUM SERPL-SCNC: 142 MMOL/L — SIGNIFICANT CHANGE UP (ref 135–145)
WBC # BLD: 8.66 K/UL — SIGNIFICANT CHANGE UP (ref 3.8–10.5)
WBC # FLD AUTO: 8.66 K/UL — SIGNIFICANT CHANGE UP (ref 3.8–10.5)

## 2022-10-10 PROCEDURE — 93005 ELECTROCARDIOGRAM TRACING: CPT

## 2022-10-10 PROCEDURE — 93010 ELECTROCARDIOGRAM REPORT: CPT

## 2022-10-10 PROCEDURE — G0463: CPT

## 2022-10-10 RX ORDER — OXYBUTYNIN CHLORIDE 5 MG
2 TABLET ORAL
Qty: 0 | Refills: 0 | DISCHARGE

## 2022-10-10 NOTE — H&P PST ADULT - NSICDXPASTSURGICALHX_GEN_ALL_CORE_FT
PAST SURGICAL HISTORY:  S/P colon resection     S/P hernia repair      PAST SURGICAL HISTORY:  S/P colon resection     S/P hernia repair     S/P total knee replacement, left

## 2022-10-10 NOTE — H&P PST ADULT - NSICDXPASTMEDICALHX_GEN_ALL_CORE_FT
PAST MEDICAL HISTORY:  Colon cancer     COVID-19 vaccine series completed     Hyperlipidemia     Hypertension     Prostate cancer Treated with Radiation 2015    Type 2 diabetes mellitus     Unilateral primary osteoarthritis, left knee      PAST MEDICAL HISTORY:  CAD (coronary atherosclerotic disease)     Colon cancer     COVID-19 vaccine series completed     Hyperlipidemia     Hypertension     Paroxysmal atrial fibrillation     Prostate cancer Treated with Radiation 2015    Type 2 diabetes mellitus     Unilateral primary osteoarthritis, left knee

## 2022-10-10 NOTE — H&P PST ADULT - ATTENDING COMMENTS
Patient with symptomatic paroxysmal atrial fibrillation refractory to anti-arrhythmic therapy in the setting of LV dysfunction. For elective catheter ablation today. ICE to rule to ALANNA thrombus.

## 2022-10-10 NOTE — H&P PST ADULT - MUSCULOSKELETAL
normal/ROM intact/no joint swelling/no joint erythema/no joint warmth/no calf tenderness/decreased ROM/decreased ROM due to pain details… ROM intact/no joint swelling/no joint erythema/no joint warmth/no calf tenderness/normal gait/strength 5/5 bilateral upper extremities/strength 5/5 bilateral lower extremities

## 2022-10-10 NOTE — H&P PST ADULT - ASSESSMENT
Plan:   Labs pending.   Pre-procedure instructions provided (verbal & written) as follows:  Ablation 10/13/2022  - Last dose Eliquis 10/12/2022 PM (NO a/c day of ablation).    - NPO after midnight prior except meds w/ sips of water.    - Hold the following medications the morning of the procedure: Eliquis   - Outpatient medication record reviewed with patient  - Covid-19 PCR done at [facility]       OPIOID RISK TOOL    JOVANNY EACH BOX THAT APPLIES AND ADD TOTALS AT THE END    FAMILY HISTORY OF SUBSTANCE ABUSE                 FEMALE         MALE                                                Alcohol                             [  ]1 pt          [  ]3pts                                               Illegal Durgs                     [  ]2 pts        [  ]3pts                                               Rx Drugs                           [  ]4 pts        [  ]4 pts    PERSONAL HISTORY OF SUBSTANCE ABUSE                                                                                          Alcohol                             [  ]3 pts       [  ]3 pts                                               Illegal Drugs                     [  ]4 pts        [  ]4 pts                                               Rx Drugs                           [  ]5 pts        [  ]5 pts    AGE BETWEEN 16-45 YEARS                                      [  ]1 pt         [  ]1 pt    HISTORY OF PREADOLESCENT   SEXUAL ABUSE                                                             [  ]3 pts        [  ]0pts    PSYCHOLOGICAL DISEASE                     ADD, OCD, Bipolar, Schizophrenia        [  ]2 pts         [  ]2 pts                      Depression                                               [  ]1 pt           [  ]1 pt           SCORING TOTAL   (add numbers and type here)              (0)                                     A score of 3 or lower indicated LOW risk for future opioid abuse  A score of 4 to 7 indicated moderate risk for future opioid abuse  A score of 8 or higher indicates a high risk for opioid abuse    CAPRINI SCORE    AGE RELATED RISK FACTORS                                                             [ ] Age 41-60 years                                            (1 Point)  [x ] Age: 61-74 years                                           (2 Points)                 [ ] Age= 75 years                                                (3 Points)             DISEASE RELATED RISK FACTORS                                                       [ ] Edema in the lower extremities                 (1 Point)                     [ ] Varicose veins                                               (1 Point)                                 [x ] BMI > 25 Kg/m2                                            (1 Point)                                  [ ] Serious infection (ie PNA)                            (1 Point)                     [ ] Lung disease ( COPD, Emphysema)            (1 Point)                                                                          [ ] Acute myocardial infarction                         (1 Point)                  [ ] Congestive heart failure (in the previous month)  (1 Point)         [ ] Inflammatory bowel disease                            (1 Point)                  [ ] Central venous access, PICC or Port               (2 points)       (within the last month)                                                                [ ] Stroke (in the previous month)                        (5 Points)    [x ] Previous or present malignancy                       (2 points)                                                                                                                                                         HEMATOLOGY RELATED FACTORS                                                         [ ] Prior episodes of VTE                                     (3 Points)                     [ ] Positive family history for VTE                      (3 Points)                  [ ] Prothrombin 15496 A                                     (3 Points)                     [ ] Factor V Leiden                                                (3 Points)                        [ ] Lupus anticoagulants                                      (3 Points)                                                           [ ] Anticardiolipin antibodies                              (3 Points)                                                       [ ] High homocysteine in the blood                   (3 Points)                                             [ ] Other congenital or acquired thrombophilia      (3 Points)                                                [ ] Heparin induced thrombocytopenia                  (3 Points)                                        MOBILITY RELATED FACTORS  [ ] Bed rest                                                         (1 Point)  [ ] Plaster cast                                                    (2 points)  [ ] Bed bound for more than 72 hours           (2 Points)    GENDER SPECIFIC FACTORS  [ ] Pregnancy or had a baby within the last month   (1 Point)  [ ] Post-partum < 6 weeks                                   (1 Point)  [ ] Hormonal therapy  or oral contraception   (1 Point)  [ ] History of pregnancy complications              (1 point)  [ ] Unexplained or recurrent              (1 Point)    OTHER RISK FACTORS                                           (1 Point)  [ ] BMI >40, smoking, diabetes requiring insulin, chemotherapy  blood transfusions and length of surgery over 2 hours    SURGERY RELATED RISK FACTORS  [ ]  Section within the last month     (1 Point)  [ ] Minor surgery                                                  (1 Point)  [ ] Arthroscopic surgery                                       (2 Points)  [ ] Planned major surgery lasting more            (2 Points)      than 45 minutes     [x ] Elective hip or knee joint replacement       (5 points)       surgery                                                TRAUMA RELATED RISK FACTORS  [ ] Fracture of the hip, pelvis, or leg                       (5 Points)  [ ] Spinal cord injury resulting in paralysis             (5 points)       (in the previous month)    [ ] Paralysis  (less than 1 month)                             (5 Points)  [ ] Multiple Trauma within 1 month                        (5 Points)    Total Score [     10   ]    Caprini Score 0-2: Low Risk, NO VTE prophylaxis required for most patients, encourage ambulation  Caprini Score 3-6: Moderate Risk , pharmacologic VTE prophylaxis is indicated for most patients (in the absence of contraindications)  Caprini Score Greater than or =7: High risk, pharmocologic VTE prophylaxis indicated for most patients (in the absence of contraindications)   61y Male with history of type 2 DM, HTN, HLD, colon cancer s/p colon resection, prostate cancer  s/p radiation therapy, CAD s/p CABG (3v), ischemic cardiomyopathy with an EF of 35-40% and symptomatic paroxysmal atrial fibrillation. States he is s/p left knee replacement 3/18/2022, after discharge he was found to have hypoxia while at home by the visiting nurse, was sent to ER and found to have NSTEMI, and subsequently underwent CABG. Following the CABG procedure the patient developed atrial fibrillation/flutter. He was discharged home on amiodarone and eliquis. While following up he has converted back to sinus rhythm, amiodarone was discontinued. States he wore a event holter monitor at home was found to have episodes of atrial fibrillation. Denies chest pain, shortness of breath, palpitations, dizziness or near syncope. Patient is scheduled for Afib ablation on 10/13/2022 with Dr. López.     Plan:   Labs pending.   Pre-procedure instructions provided (verbal & written) as follows:  Ablation 10/13/2022   - Last dose Eliquis 10/12/2022 PM (NO a/c day of ablation).    - Continue aspirin without interruption    - NPO after midnight prior except meds w/ sips of water.    - Hold the following medications the morning of the procedure: Eliquis, metformin   - Outpatient medication record reviewed with patient   - Covid-19 PCR done at [facility]     OPIOID RISK TOOL    JOVANNY EACH BOX THAT APPLIES AND ADD TOTALS AT THE END    FAMILY HISTORY OF SUBSTANCE ABUSE                 FEMALE         MALE                                                Alcohol                             [  ]1 pt          [  ]3pts                                               Illegal Durgs                     [  ]2 pts        [  ]3pts                                               Rx Drugs                           [  ]4 pts        [  ]4 pts    PERSONAL HISTORY OF SUBSTANCE ABUSE                                                                                          Alcohol                             [  ]3 pts       [  ]3 pts                                               Illegal Drugs                     [  ]4 pts        [  ]4 pts                                               Rx Drugs                           [  ]5 pts        [  ]5 pts    AGE BETWEEN 16-45 YEARS                                      [  ]1 pt         [  ]1 pt    HISTORY OF PREADOLESCENT   SEXUAL ABUSE                                                             [  ]3 pts        [  ]0pts    PSYCHOLOGICAL DISEASE                     ADD, OCD, Bipolar, Schizophrenia        [  ]2 pts         [  ]2 pts                      Depression                                               [  ]1 pt           [  ]1 pt           SCORING TOTAL   (add numbers and type here)              (0)                                     A score of 3 or lower indicated LOW risk for future opioid abuse  A score of 4 to 7 indicated moderate risk for future opioid abuse  A score of 8 or higher indicates a high risk for opioid abuse    CAPRINI SCORE    AGE RELATED RISK FACTORS                                                             [ ] Age 41-60 years                                            (1 Point)  [x ] Age: 61-74 years                                           (2 Points)                 [ ] Age= 75 years                                                (3 Points)             DISEASE RELATED RISK FACTORS                                                       [x ] Edema in the lower extremities                 (1 Point)                     [ ] Varicose veins                                               (1 Point)                                 [x ] BMI > 25 Kg/m2                                            (1 Point)                                  [ ] Serious infection (ie PNA)                            (1 Point)                     [ ] Lung disease ( COPD, Emphysema)            (1 Point)                                                                          [ ] Acute myocardial infarction                         (1 Point)                  [ ] Congestive heart failure (in the previous month)  (1 Point)         [ ] Inflammatory bowel disease                            (1 Point)                  [ ] Central venous access, PICC or Port               (2 points)       (within the last month)                                                                [ ] Stroke (in the previous month)                        (5 Points)    [x ] Previous or present malignancy                       (2 points)                                                                                                                                                         HEMATOLOGY RELATED FACTORS                                                         [ ] Prior episodes of VTE                                     (3 Points)                     [ ] Positive family history for VTE                      (3 Points)                  [ ] Prothrombin 41063 A                                     (3 Points)                     [ ] Factor V Leiden                                                (3 Points)                        [ ] Lupus anticoagulants                                      (3 Points)                                                           [ ] Anticardiolipin antibodies                              (3 Points)                                                       [ ] High homocysteine in the blood                   (3 Points)                                             [ ] Other congenital or acquired thrombophilia      (3 Points)                                                [ ] Heparin induced thrombocytopenia                  (3 Points)                                        MOBILITY RELATED FACTORS  [ ] Bed rest                                                         (1 Point)  [ ] Plaster cast                                                    (2 points)  [ ] Bed bound for more than 72 hours           (2 Points)    GENDER SPECIFIC FACTORS  [ ] Pregnancy or had a baby within the last month   (1 Point)  [ ] Post-partum < 6 weeks                                   (1 Point)  [ ] Hormonal therapy  or oral contraception   (1 Point)  [ ] History of pregnancy complications              (1 point)  [ ] Unexplained or recurrent              (1 Point)    OTHER RISK FACTORS                                           (1 Point)  [x ] BMI >40, smoking, diabetes requiring insulin, chemotherapy  blood transfusions and length of surgery over 2 hours    SURGERY RELATED RISK FACTORS  [ ]  Section within the last month     (1 Point)  [ ] Minor surgery                                                  (1 Point)  [ ] Arthroscopic surgery                                       (2 Points)  [x ] Planned major surgery lasting more            (2 Points)      than 45 minutes     [ ] Elective hip or knee joint replacement       (5 points)       surgery                                                TRAUMA RELATED RISK FACTORS  [ ] Fracture of the hip, pelvis, or leg                       (5 Points)  [ ] Spinal cord injury resulting in paralysis             (5 points)       (in the previous month)    [ ] Paralysis  (less than 1 month)                             (5 Points)  [ ] Multiple Trauma within 1 month                        (5 Points)    Total Score [    9   ]    Caprini Score 0-2: Low Risk, NO VTE prophylaxis required for most patients, encourage ambulation  Caprini Score 3-6: Moderate Risk , pharmacologic VTE prophylaxis is indicated for most patients (in the absence of contraindications)  Caprini Score Greater than or =7: High risk, pharmocologic VTE prophylaxis indicated for most patients (in the absence of contraindications)

## 2022-10-10 NOTE — H&P PST ADULT - CARDIOVASCULAR
negative details… normal/regular rate and rhythm/S1 S2 present/no gallops/no rub/no murmur/no JVD/pedal edema/vascular

## 2022-10-10 NOTE — H&P PST ADULT - LAB RESULTS AND INTERPRETATION
labs pending  2/25/22 15:05 All available labs noted as documented. All abnormal labs noted as documented and have been faxed to PCP with whom medical clearance, as well as emailed to Np Yeon Elebiary of Dr. Marcos Ventura. Yeon Elebiary of Dr. Marcos Ventura also made aware of oral Thrush and recent HGBA1C level and present prescription for metformin in setting of type 2 DM. Lena MS, FNP-BC Labs pending

## 2022-10-10 NOTE — H&P PST ADULT - EKG AND INTERPRETATION
EKG sinus bradycardia 54 BPM pending medical clearance and final cardiac EKG interpretation NSR, nonspecific ST abnormality VR 63 BPM

## 2022-10-10 NOTE — H&P PST ADULT - NSICDXFAMHXNEG_GEN_ALL
congestive heart failure/COPD/coronary disease/diabetes/heart disease denies all/congestive heart failure/COPD/coronary disease/diabetes/heart disease

## 2022-10-10 NOTE — H&P PST ADULT - DENTITION
Has braces on bottom, denies loose or broken teeth, denies bridge or removable dentures/normal denies loose or broken teeth, denies bridge or removable dentures/normal

## 2022-10-11 LAB — SARS-COV-2 RNA SPEC QL NAA+PROBE: SIGNIFICANT CHANGE UP

## 2022-10-13 ENCOUNTER — TRANSCRIPTION ENCOUNTER (OUTPATIENT)
Age: 62
End: 2022-10-13

## 2022-10-13 ENCOUNTER — INPATIENT (INPATIENT)
Facility: HOSPITAL | Age: 62
LOS: 0 days | Discharge: HOME | DRG: 274 | End: 2022-10-14
Attending: INTERNAL MEDICINE | Admitting: INTERNAL MEDICINE
Payer: COMMERCIAL

## 2022-10-13 VITALS
RESPIRATION RATE: 16 BRPM | OXYGEN SATURATION: 98 % | HEART RATE: 56 BPM | SYSTOLIC BLOOD PRESSURE: 162 MMHG | TEMPERATURE: 98 F | DIASTOLIC BLOOD PRESSURE: 69 MMHG

## 2022-10-13 DIAGNOSIS — I48.0 PAROXYSMAL ATRIAL FIBRILLATION: ICD-10-CM

## 2022-10-13 DIAGNOSIS — Z90.49 ACQUIRED ABSENCE OF OTHER SPECIFIED PARTS OF DIGESTIVE TRACT: Chronic | ICD-10-CM

## 2022-10-13 DIAGNOSIS — Z98.890 OTHER SPECIFIED POSTPROCEDURAL STATES: Chronic | ICD-10-CM

## 2022-10-13 DIAGNOSIS — Z96.652 PRESENCE OF LEFT ARTIFICIAL KNEE JOINT: Chronic | ICD-10-CM

## 2022-10-13 LAB
GLUCOSE BLDC GLUCOMTR-MCNC: 112 MG/DL — HIGH (ref 70–99)
GLUCOSE BLDC GLUCOMTR-MCNC: 156 MG/DL — HIGH (ref 70–99)
MAGNESIUM SERPL-MCNC: 1.6 MG/DL — SIGNIFICANT CHANGE UP (ref 1.6–2.6)

## 2022-10-13 PROCEDURE — 93010 ELECTROCARDIOGRAM REPORT: CPT

## 2022-10-13 PROCEDURE — 93623 PRGRMD STIMJ&PACG IV RX NFS: CPT | Mod: 26

## 2022-10-13 PROCEDURE — 93656 COMPRE EP EVAL ABLTJ ATR FIB: CPT

## 2022-10-13 PROCEDURE — 93655 ICAR CATH ABLTJ DSCRT ARRHYT: CPT

## 2022-10-13 RX ORDER — OXYCODONE AND ACETAMINOPHEN 5; 325 MG/1; MG/1
1 TABLET ORAL EVERY 6 HOURS
Refills: 0 | Status: DISCONTINUED | OUTPATIENT
Start: 2022-10-13 | End: 2022-10-14

## 2022-10-13 RX ORDER — APIXABAN 2.5 MG/1
5 TABLET, FILM COATED ORAL
Refills: 0 | Status: DISCONTINUED | OUTPATIENT
Start: 2022-10-13 | End: 2022-10-14

## 2022-10-13 RX ORDER — BENZOCAINE AND MENTHOL 5; 1 G/100ML; G/100ML
1 LIQUID ORAL
Refills: 0 | Status: DISCONTINUED | OUTPATIENT
Start: 2022-10-13 | End: 2022-10-14

## 2022-10-13 RX ORDER — SUCRALFATE 1 G
1 TABLET ORAL
Refills: 0 | Status: DISCONTINUED | OUTPATIENT
Start: 2022-10-13 | End: 2022-10-14

## 2022-10-13 RX ORDER — FUROSEMIDE 40 MG
40 TABLET ORAL DAILY
Refills: 0 | Status: DISCONTINUED | OUTPATIENT
Start: 2022-10-14 | End: 2022-10-14

## 2022-10-13 RX ORDER — METOPROLOL TARTRATE 50 MG
50 TABLET ORAL
Refills: 0 | Status: DISCONTINUED | OUTPATIENT
Start: 2022-10-13 | End: 2022-10-14

## 2022-10-13 RX ORDER — ACETAMINOPHEN 500 MG
650 TABLET ORAL EVERY 6 HOURS
Refills: 0 | Status: DISCONTINUED | OUTPATIENT
Start: 2022-10-13 | End: 2022-10-14

## 2022-10-13 RX ORDER — ONDANSETRON 8 MG/1
4 TABLET, FILM COATED ORAL
Refills: 0 | Status: DISCONTINUED | OUTPATIENT
Start: 2022-10-13 | End: 2022-10-14

## 2022-10-13 RX ORDER — LOSARTAN POTASSIUM 100 MG/1
25 TABLET, FILM COATED ORAL DAILY
Refills: 0 | Status: DISCONTINUED | OUTPATIENT
Start: 2022-10-13 | End: 2022-10-14

## 2022-10-13 RX ORDER — ATORVASTATIN CALCIUM 80 MG/1
20 TABLET, FILM COATED ORAL AT BEDTIME
Refills: 0 | Status: DISCONTINUED | OUTPATIENT
Start: 2022-10-13 | End: 2022-10-14

## 2022-10-13 RX ORDER — METFORMIN HYDROCHLORIDE 850 MG/1
1000 TABLET ORAL DAILY
Refills: 0 | Status: DISCONTINUED | OUTPATIENT
Start: 2022-10-14 | End: 2022-10-14

## 2022-10-13 RX ORDER — FUROSEMIDE 40 MG
20 TABLET ORAL ONCE
Refills: 0 | Status: COMPLETED | OUTPATIENT
Start: 2022-10-13 | End: 2022-10-13

## 2022-10-13 RX ORDER — ASPIRIN/CALCIUM CARB/MAGNESIUM 324 MG
81 TABLET ORAL DAILY
Refills: 0 | Status: DISCONTINUED | OUTPATIENT
Start: 2022-10-14 | End: 2022-10-14

## 2022-10-13 RX ORDER — FUROSEMIDE 40 MG
20 TABLET ORAL ONCE
Refills: 0 | Status: COMPLETED | OUTPATIENT
Start: 2022-10-14 | End: 2022-10-14

## 2022-10-13 RX ORDER — PANTOPRAZOLE SODIUM 20 MG/1
40 TABLET, DELAYED RELEASE ORAL
Refills: 0 | Status: DISCONTINUED | OUTPATIENT
Start: 2022-10-13 | End: 2022-10-14

## 2022-10-13 RX ORDER — MAGNESIUM SULFATE 500 MG/ML
2 VIAL (ML) INJECTION ONCE
Refills: 0 | Status: COMPLETED | OUTPATIENT
Start: 2022-10-13 | End: 2022-10-13

## 2022-10-13 RX ADMIN — Medication 20 MILLIGRAM(S): at 17:21

## 2022-10-13 RX ADMIN — ATORVASTATIN CALCIUM 20 MILLIGRAM(S): 80 TABLET, FILM COATED ORAL at 21:53

## 2022-10-13 RX ADMIN — Medication 1 GRAM(S): at 17:20

## 2022-10-13 RX ADMIN — ONDANSETRON 4 MILLIGRAM(S): 8 TABLET, FILM COATED ORAL at 15:17

## 2022-10-13 RX ADMIN — Medication 25 GRAM(S): at 13:13

## 2022-10-13 RX ADMIN — Medication 50 MILLIGRAM(S): at 17:20

## 2022-10-13 RX ADMIN — PANTOPRAZOLE SODIUM 40 MILLIGRAM(S): 20 TABLET, DELAYED RELEASE ORAL at 17:21

## 2022-10-13 RX ADMIN — APIXABAN 5 MILLIGRAM(S): 2.5 TABLET, FILM COATED ORAL at 17:21

## 2022-10-13 NOTE — DISCHARGE NOTE PROVIDER - NSDCFUADDINST_GEN_ALL_CORE_FT
Follow up with Dr. López in 3-4 weeks. Our office will contact you in 3-5 days to schedule this appointment. Please call your doctor with any questions or concerns.     Continue Eliquis 5mg twice a day.   Take all medications as prescribed.   Increase omeprazole to twice a day  for two weeks, then resume once a day dose.

## 2022-10-13 NOTE — DISCHARGE NOTE PROVIDER - CARE PROVIDER_API CALL
Ludy Woods ()  Cardiology; Internal Medicine  [address] [place] [address]  Phone: [phone]  Fax: [phone]  Follow Up Time:     Stephon López)  Cardiac Electrophysiology; Cardiovascular Disease; Internal Medicine  [address] [facility] [address]  [place] [address]  Phone: [phone]  Fax: [phone]  Follow Up Time:

## 2022-10-13 NOTE — DISCHARGE NOTE PROVIDER - HOSPITAL COURSE
61 year old male patient with a history of type 2 DM, HTN, HLD, colon cancer s/p colon resection, prostate cancer 2015 s/p radiation therapy, CAD s/p CABG 3/2022 (3v), ischemic cardiomyopathy EF 40-45% (TTE 7/2022- previously 35-40% 3/2022) and symptomatic paroxysmal atrial fibrillation who presented today 10/13/22 for and is now  status post uncomplicated radiofrequency ablation of atrial fibrillation (WACA/PVI & CTI) via b/l FV (FO8 sutures in place). ICE was negative for ALANNA thrombus.      61 year old male patient with a history of anemia,  type 2 DM, HTN, HLD, colon cancer s/p colon resection, prostate cancer 2015 s/p radiation therapy, CAD s/p CABG 3/2022 (3v), ischemic cardiomyopathy EF 40-45% (TTE 7/2022- previously 35-40% 3/2022) and symptomatic paroxysmal atrial fibrillation. Now POD#1 s/p radiofrequency ablation of atrial fibrillation (WACA/PVI & CTI). The patient was observed overnight without event and was discharged home the following morning with a plan for outpatient follow up.

## 2022-10-13 NOTE — CHART NOTE - NSCHARTNOTEFT_GEN_A_CORE
Patient seen today in chair. Patient arrived in fasting state. Last dose Eliquis on 6PM on 10/12/22  61 year old male patient with a history of type 2 DM, HTN, HLD, colon cancer s/p colon resection, prostate cancer 2015 s/p radiation therapy, CAD s/p CABG (3v), ischemic cardiomyopathy with an EF of 35-40% and symptomatic paroxysmal atrial fibrillation who presents fo elective YURY/RFA.

## 2022-10-13 NOTE — PROGRESS NOTE ADULT - SUBJECTIVE AND OBJECTIVE BOX
Admission Criteria  Please admit the patient to the following service:  Major Criteria:    - LV dysfunction EF 35-40%    Major Criteria:  - Continuous EKG monitoring is required for condition causing arrhythmia (hyperkalemia, etc) s/p AF ablation   - Intraop hypotension during procedure requiring pressor support   - Significant volume load > 200 ml      Admit to: (1 Major ciriteria/2 or more minor criteria) Patient is being admitted to the inpatient service due to high risk characteristics and need for further management/monitoring and is considered to be at a significantly increased risk of major adverse cardiac and vascular events if discharged.      Admission Criteria  Please admit the patient to the following service:  Major Criteria:    - LV dysfunction EF 40-45%    Major Criteria:  - Continuous EKG monitoring is required for condition causing arrhythmia (hyperkalemia, etc) s/p AF ablation   - Intraop hypotension during procedure requiring pressor support   - Significant volume load > 200 ml      Admit to: (1 Major ciriteria/2 or more minor criteria) Patient is being admitted to the inpatient service due to high risk characteristics and need for further management/monitoring and is considered to be at a significantly increased risk of major adverse cardiac and vascular events if discharged.

## 2022-10-13 NOTE — DISCHARGE NOTE PROVIDER - NSDCCPTREATMENT_GEN_ALL_CORE_FT
PRINCIPAL PROCEDURE  Procedure: Catheter ablation, cardiac, for SVT  Findings and Treatment: [facility] will call you to schedule a 2 week post op follow up, please call [phone] if you don't hear from them or need to be seen sooner.  -please continue uninterrupted Eliquis  - Bruising at the groin, sometimes extending down the leg, and/or a small lump under the skin at the groin access site is normal and will resolve within 2 – 3 weeks.   - Occasional skipped beats or palpitations that last for a few beats are common and generally resolve within 1-2 months.   - You make walk and take stairs at a regular pace.   - Do not perform any exercise more strenuous than walking for 1 week.   - Do not strain or lift heavy objects for 1 week.  - You may shower the day after the procedure.  - Do not soak in water (such as tub baths, hot tubs, swimming, etc.) for 1 week.   - You may resume all other activities the day after the procedure.  Call your doctor if:   - you notice bleeding, redness, drainage, swelling, increased tenderness or a hot sensation around the catheter insertion site.   - your temperature is greater than 100 degrees F for more than 24 hours.  - your rapid heart rhythm returns.  - you have any questions or concerns regarding the procedure.  If significant bleeding and/or a large lump (the size of a golf ball or bigger) occurs:  - Lie flat and apply continuous direct pressure just above the puncture site for at least 10 minutes  - If the issue resolves, notify your physician immediately.    - If the bleeding cannot be controlled, please seek immediate medical attention.  If you experience increased difficulty breathing or chest pain, or if you faint or have dizzy spells, please seek immediate medical attention.

## 2022-10-13 NOTE — PROGRESS NOTE ADULT - SUBJECTIVE AND OBJECTIVE BOX
PROCEDURE(S): Radiofrequency Ablation of Atrial Fibrillation    ELECTROPHYSIOLOGIST(S): Stephon López MD         COMPLICATIONS:  none        DISPOSITION:  observation     CONDITION: stable    Pt doing well s/p atrial fibrillation ablation (WACA/PVI & CTI) via b/l FV (FO8 sutures in place). ICE was negative for ALANNA thrombus.   Resting comfortably, currently in sinus rhythm.  Denies complaint.   I/O: 1600cc/0cc    MEDICATIONS  (STANDING):  apixaban 5 milliGRAM(s) Oral <User Schedule>  atorvastatin 20 milliGRAM(s) Oral at bedtime  furosemide   Injectable 20 milliGRAM(s) IV Push once  losartan 25 milliGRAM(s) Oral daily  magnesium sulfate  IVPB 2 Gram(s) IV Intermittent once  metoprolol tartrate 50 milliGRAM(s) Oral two times a day  pantoprazole    Tablet 40 milliGRAM(s) Oral two times a day  sucralfate suspension 1 Gram(s) Oral two times a day    MEDICATIONS  (PRN):  acetaminophen     Tablet .. 650 milliGRAM(s) Oral every 6 hours PRN Mild Pain (1 - 3)  aluminum hydroxide/magnesium hydroxide/simethicone Suspension 30 milliLiter(s) Oral every 6 hours PRN Dyspepsia  benzocaine 15 mG/menthol 3.6 mG Lozenge 1 Lozenge Oral five times a day PRN Sore Throat  ondansetron Injectable 4 milliGRAM(s) IV Push four times a day PRN Nausea and/or Vomiting  oxycodone    5 mG/acetaminophen 325 mG 1 Tablet(s) Oral every 6 hours PRN Moderate Pain (4 - 6)    Exam:  T(C): 36.7 (10-13-22 @ 07:59), Max: 36.7 (10-13-22 @ 07:59)  HR: 64 (10-13-22 @ 12:15) (56 - 64)  BP: 149/92 (10-13-22 @ 12:15) (149/92 - 162/69)  RR: 15 (10-13-22 @ 12:15) (15 - 16)  SpO2: 97% (10-13-22 @ 12:15) (97% - 98%)    VSS  Constitutional: NAD, resting comfortably,  Card: S1/S2, RRR, no m/g/r  Resp: lungs CTA b/l  Abd: S/NT/ND  Groins: hemostatic sutures in place, dsg C/D/I; no bleeding, hematoma, erythema, exudate or edema  Ext: no edema; distal pulses intact    ECG:    Assessment: 61 year old male patient with a history of type 2 DM, HTN, HLD, colon cancer s/p colon resection, prostate cancer 2015 s/p radiation therapy, CAD s/p CABG (3v), ischemic cardiomyopathy with an EF of 35-40% and symptomatic paroxysmal atrial fibrillation who presented today 10/13/22 for and is now  status post uncomplicated radiofrequency ablation of atrial fibrillation (WACA/PVI & CTI) via b/l FV (FO8 sutures in place). ICE was negative for ALANNA thrombus.     Plan:   Admit to telemetry/ONU  AM labs and ECG  Supplement magnesium  Bedrest, groin protocol/immobility x 4 hours and then OOB to chair and progress to ambulate as tolerated with post ambulation  groin checks  Groin sutures to be removed by EP service in AM.   Radial art line to be removed once pt fully awake with stable vitals >1 hour post op.   Pending groin status: Eliquis 5mg PO Q12hrs to resume at 5pm tonight.   Continue GDMT with metoprolol, losartan and lasix for ICM   PO pain analgesics prn  Continue other home medications.   Diabetic protocol   Start Protonix 40mg po BID x 14 days and then decrease to daily x 45 days. If pt prefers can double outpt omeprazole dosing instead   Carafate 1gm BID x 2 weeks.   Lasix 20mg IV x 1 tonight at 5pm, then lasix 20mg po x 1 in am as well as lasix 40mg po at 8pm and then continue 40mg po daily   Strict I/Os.  Please encourage incentive spirometry and ambulation once able.  Observation and monitoring on telemetry overnight with anticipated discharge in the AM and outpt follow up in 1 month, sooner if needed.                       PROCEDURE(S): Radiofrequency Ablation of Atrial Fibrillation    ELECTROPHYSIOLOGIST(S): Stephon López MD         COMPLICATIONS:  none        DISPOSITION:  observation     CONDITION: stable    Pt doing well s/p atrial fibrillation ablation (WACA/PVI & CTI) via b/l FV (FO8 sutures in place). ICE was negative for ALANNA thrombus.   Resting comfortably, currently in sinus rhythm.  Denies complaint.   I/O: 1600cc/0cc    MEDICATIONS  (STANDING):  apixaban 5 milliGRAM(s) Oral <User Schedule>  atorvastatin 20 milliGRAM(s) Oral at bedtime  furosemide   Injectable 20 milliGRAM(s) IV Push once  losartan 25 milliGRAM(s) Oral daily  magnesium sulfate  IVPB 2 Gram(s) IV Intermittent once  metoprolol tartrate 50 milliGRAM(s) Oral two times a day  pantoprazole    Tablet 40 milliGRAM(s) Oral two times a day  sucralfate suspension 1 Gram(s) Oral two times a day    MEDICATIONS  (PRN):  acetaminophen     Tablet .. 650 milliGRAM(s) Oral every 6 hours PRN Mild Pain (1 - 3)  aluminum hydroxide/magnesium hydroxide/simethicone Suspension 30 milliLiter(s) Oral every 6 hours PRN Dyspepsia  benzocaine 15 mG/menthol 3.6 mG Lozenge 1 Lozenge Oral five times a day PRN Sore Throat  ondansetron Injectable 4 milliGRAM(s) IV Push four times a day PRN Nausea and/or Vomiting  oxycodone    5 mG/acetaminophen 325 mG 1 Tablet(s) Oral every 6 hours PRN Moderate Pain (4 - 6)    Exam:  T(C): 36.7 (10-13-22 @ 07:59), Max: 36.7 (10-13-22 @ 07:59)  HR: 64 (10-13-22 @ 12:15) (56 - 64)  BP: 149/92 (10-13-22 @ 12:15) (149/92 - 162/69)  RR: 15 (10-13-22 @ 12:15) (15 - 16)  SpO2: 97% (10-13-22 @ 12:15) (97% - 98%)    VSS  Constitutional: NAD, resting comfortably,  Card: S1/S2, RRR, no m/g/r  Resp: lungs CTA b/l  Abd: S/NT/ND  Groins: hemostatic sutures in place, dsg C/D/I; no bleeding, hematoma, erythema, exudate or edema  Ext: no edema; distal pulses intact    ECG:    TTE 3/25/22: EF 35-40%, normal LA size  TTE 7/8/22: EF 40 to 45%, grade I DD  Cardiac Cath (date): 3/28/2022 multivessel CAD   Cardiac surgery (date): 03/2022 CABG    Assessment: 61 year old male patient with a history of type 2 DM, HTN, HLD, colon cancer s/p colon resection, prostate cancer 2015 s/p radiation therapy, CAD s/p CABG 3/2022 (3v), ischemic cardiomyopathy EF 40-45% (TTE 7/2022- previously 35-40% 3/2022) and symptomatic paroxysmal atrial fibrillation who presented today 10/13/22 for and is now  status post uncomplicated radiofrequency ablation of atrial fibrillation (WACA/PVI & CTI) via b/l FV (FO8 sutures in place). ICE was negative for ALANNA thrombus.     Plan:   Admit to telemetry/ONU  AM labs and ECG  Supplement magnesium  Bedrest, groin protocol/immobility x 4 hours and then OOB to chair and progress to ambulate as tolerated with post ambulation  groin checks  Groin sutures to be removed by EP service in AM.   Pending groin status: Eliquis 5mg PO Q12hrs to resume at 5pm tonight.   Continue GDMT with metoprolol, losartan and lasix for ICM   PO pain analgesics prn  Continue other home medications.   Diabetic protocol   Start Protonix 40mg po BID x 14 days and then decrease to daily x 45 days. If pt prefers can double outpt omeprazole dosing instead   Carafate 1gm BID x 2 weeks.   Lasix 20mg IV x 1 tonight at 5pm, then lasix 20mg po x 1 in am as well as lasix 40mg po at 8pm and then continue 40mg po daily   Strict I/Os.  Please encourage incentive spirometry and ambulation once able.  Observation and monitoring on telemetry overnight with anticipated discharge in the AM and outpt follow up in 1 month, sooner if needed.                       PROCEDURE(S): Radiofrequency Ablation of Atrial Fibrillation    ELECTROPHYSIOLOGIST(S): Stephon López MD         COMPLICATIONS:  none        DISPOSITION:  observation     CONDITION: stable    Pt doing well s/p atrial fibrillation ablation (WACA/PVI & CTI) via b/l FV (FO8 sutures in place). ICE was negative for ALANNA thrombus.   Resting comfortably, currently in sinus rhythm.  Denies complaint.   I/O: 1600cc/0cc    MEDICATIONS  (STANDING):  apixaban 5 milliGRAM(s) Oral <User Schedule>  atorvastatin 20 milliGRAM(s) Oral at bedtime  furosemide   Injectable 20 milliGRAM(s) IV Push once  losartan 25 milliGRAM(s) Oral daily  magnesium sulfate  IVPB 2 Gram(s) IV Intermittent once  metoprolol tartrate 50 milliGRAM(s) Oral two times a day  pantoprazole    Tablet 40 milliGRAM(s) Oral two times a day  sucralfate suspension 1 Gram(s) Oral two times a day    MEDICATIONS  (PRN):  acetaminophen     Tablet .. 650 milliGRAM(s) Oral every 6 hours PRN Mild Pain (1 - 3)  aluminum hydroxide/magnesium hydroxide/simethicone Suspension 30 milliLiter(s) Oral every 6 hours PRN Dyspepsia  benzocaine 15 mG/menthol 3.6 mG Lozenge 1 Lozenge Oral five times a day PRN Sore Throat  ondansetron Injectable 4 milliGRAM(s) IV Push four times a day PRN Nausea and/or Vomiting  oxycodone    5 mG/acetaminophen 325 mG 1 Tablet(s) Oral every 6 hours PRN Moderate Pain (4 - 6)    Exam:  T(C): 36.7 (10-13-22 @ 07:59), Max: 36.7 (10-13-22 @ 07:59)  HR: 64 (10-13-22 @ 12:15) (56 - 64)  BP: 149/92 (10-13-22 @ 12:15) (149/92 - 162/69)  RR: 15 (10-13-22 @ 12:15) (15 - 16)  SpO2: 97% (10-13-22 @ 12:15) (97% - 98%)    VSS  Constitutional: NAD, resting comfortably,  Card: S1/S2, RRR, no m/g/r  Resp: lungs CTA b/l  Abd: S/NT/ND  Groins: hemostatic sutures in place, dsg C/D/I; no bleeding, hematoma, erythema, exudate or edema  Ext: no edema; distal pulses intact    ECG: sinus rhythm w/ intact conduction at 60bpm, nml axis, nml intervals     TTE 3/25/22: EF 35-40%, normal LA size  TTE 7/8/22: EF 40 to 45%, grade I DD  Cardiac Cath (date): 3/28/2022 multivessel CAD   Cardiac surgery (date): 03/2022 CABG    Assessment: 61 year old male patient with a history of anemia,  type 2 DM, HTN, HLD, colon cancer s/p colon resection, prostate cancer 2015 s/p radiation therapy, CAD s/p CABG 3/2022 (3v), ischemic cardiomyopathy EF 40-45% (TTE 7/2022- previously 35-40% 3/2022) and symptomatic paroxysmal atrial fibrillation who presented today 10/13/22 for and is now  status post uncomplicated radiofrequency ablation of atrial fibrillation (WACA/PVI & CTI) via b/l FV (FO8 sutures in place). ICE was negative for ALANNA thrombus.     Plan:   Admit to telemetry/ONU  AM labs and ECG  Supplement magnesium  Bedrest, groin protocol/immobility x 4 hours and then OOB to chair and progress to ambulate as tolerated with post ambulation  groin checks  Groin sutures to be removed by EP service in AM.   Pending groin status: Eliquis 5mg PO Q12hrs to resume at 5pm tonight.   Continue GDMT with metoprolol, losartan and lasix for ICM   PO pain analgesics prn  Continue other home medications.   Diabetic protocol   Start Protonix 40mg po BID x 14 days and then decrease to daily x 45 days. If pt prefers can double outpt omeprazole dosing instead   Carafate 1gm BID x 2 weeks.   Lasix 20mg IV x 1 tonight at 5pm, then lasix 20mg po x 1 in am as well as lasix 40mg po at 8pm and then continue 40mg po daily   Strict I/Os.  Please encourage incentive spirometry and ambulation once able.  Observation and monitoring on telemetry overnight with anticipated discharge in the AM and outpt follow up in 1 month, sooner if needed.

## 2022-10-13 NOTE — DISCHARGE NOTE PROVIDER - NSDCMRMEDTOKEN_GEN_ALL_CORE_FT
aspirin 81 mg oral delayed release tablet: 1 tab(s) orally once a day  atorvastatin 20 mg oral tablet: 1 tab(s) orally once a day  azelastine 137 mcg/inh (0.1%) nasal spray: 2 spray(s) nasal 2 times a day, As Needed  Eliquis 5 mg oral tablet: 1 tab(s) orally 2 times a day  furosemide 40 mg oral tablet: 1 tab(s) orally once a day  losartan 25 mg oral tablet: 1 tab(s) orally once a day  metFORMIN 1000 mg oral tablet, extended release: 1 tab(s) orally once a day  Metoprolol Tartrate 50 mg oral tablet: 1 tab(s) orally 2 times a day  omeprazole 20 mg oral delayed release capsule: 1 cap(s) orally once a day before breakfast MDD:1   aspirin 81 mg oral delayed release tablet: 1 tab(s) orally once a day  atorvastatin 20 mg oral tablet: 1 tab(s) orally once a day  azelastine 137 mcg/inh (0.1%) nasal spray: 2 spray(s) nasal 2 times a day, As Needed  Eliquis 5 mg oral tablet: 1 tab(s) orally 2 times a day  furosemide 40 mg oral tablet: 1 tab(s) orally once a day  losartan 25 mg oral tablet: 1 tab(s) orally once a day  metFORMIN 1000 mg oral tablet, extended release: 1 tab(s) orally once a day

## 2022-10-13 NOTE — DISCHARGE NOTE PROVIDER - NSDCFUSCHEDAPPT_GEN_ALL_CORE_FT
Lorenzo, Marcos MURO  [facility] Physician [facility]  ORTHOSURG [address]  Scheduled Appointment: 11/02/2022

## 2022-10-14 ENCOUNTER — TRANSCRIPTION ENCOUNTER (OUTPATIENT)
Age: 62
End: 2022-10-14

## 2022-10-14 VITALS
DIASTOLIC BLOOD PRESSURE: 62 MMHG | SYSTOLIC BLOOD PRESSURE: 118 MMHG | HEART RATE: 57 BPM | OXYGEN SATURATION: 96 % | RESPIRATION RATE: 17 BRPM

## 2022-10-14 LAB
ANION GAP SERPL CALC-SCNC: 15 MMOL/L — SIGNIFICANT CHANGE UP (ref 5–17)
BUN SERPL-MCNC: 22.5 MG/DL — HIGH (ref 8–20)
CALCIUM SERPL-MCNC: 9.5 MG/DL — SIGNIFICANT CHANGE UP (ref 8.4–10.5)
CHLORIDE SERPL-SCNC: 101 MMOL/L — SIGNIFICANT CHANGE UP (ref 98–107)
CO2 SERPL-SCNC: 23 MMOL/L — SIGNIFICANT CHANGE UP (ref 22–29)
CREAT SERPL-MCNC: 0.91 MG/DL — SIGNIFICANT CHANGE UP (ref 0.5–1.3)
EGFR: 95 ML/MIN/1.73M2 — SIGNIFICANT CHANGE UP
GLUCOSE SERPL-MCNC: 123 MG/DL — HIGH (ref 70–99)
HCT VFR BLD CALC: 34.7 % — LOW (ref 39–50)
HGB BLD-MCNC: 11.5 G/DL — LOW (ref 13–17)
MAGNESIUM SERPL-MCNC: 1.8 MG/DL — SIGNIFICANT CHANGE UP (ref 1.6–2.6)
MCHC RBC-ENTMCNC: 27.5 PG — SIGNIFICANT CHANGE UP (ref 27–34)
MCHC RBC-ENTMCNC: 33.1 GM/DL — SIGNIFICANT CHANGE UP (ref 32–36)
MCV RBC AUTO: 83 FL — SIGNIFICANT CHANGE UP (ref 80–100)
PLATELET # BLD AUTO: 150 K/UL — SIGNIFICANT CHANGE UP (ref 150–400)
POTASSIUM SERPL-MCNC: 4.2 MMOL/L — SIGNIFICANT CHANGE UP (ref 3.5–5.3)
POTASSIUM SERPL-SCNC: 4.2 MMOL/L — SIGNIFICANT CHANGE UP (ref 3.5–5.3)
RBC # BLD: 4.18 M/UL — LOW (ref 4.2–5.8)
RBC # FLD: 17.1 % — HIGH (ref 10.3–14.5)
SODIUM SERPL-SCNC: 139 MMOL/L — SIGNIFICANT CHANGE UP (ref 135–145)
WBC # BLD: 9.58 K/UL — SIGNIFICANT CHANGE UP (ref 3.8–10.5)
WBC # FLD AUTO: 9.58 K/UL — SIGNIFICANT CHANGE UP (ref 3.8–10.5)

## 2022-10-14 PROCEDURE — 99231 SBSQ HOSP IP/OBS SF/LOW 25: CPT

## 2022-10-14 PROCEDURE — C1766: CPT

## 2022-10-14 PROCEDURE — 93623 PRGRMD STIMJ&PACG IV RX NFS: CPT

## 2022-10-14 PROCEDURE — 36415 COLL VENOUS BLD VENIPUNCTURE: CPT

## 2022-10-14 PROCEDURE — C1732: CPT

## 2022-10-14 PROCEDURE — C1759: CPT

## 2022-10-14 PROCEDURE — 83735 ASSAY OF MAGNESIUM: CPT

## 2022-10-14 PROCEDURE — 80048 BASIC METABOLIC PNL TOTAL CA: CPT

## 2022-10-14 PROCEDURE — 93010 ELECTROCARDIOGRAM REPORT: CPT

## 2022-10-14 PROCEDURE — C1889: CPT

## 2022-10-14 PROCEDURE — 85027 COMPLETE CBC AUTOMATED: CPT

## 2022-10-14 PROCEDURE — 93005 ELECTROCARDIOGRAM TRACING: CPT

## 2022-10-14 PROCEDURE — C1730: CPT

## 2022-10-14 PROCEDURE — C1893: CPT

## 2022-10-14 PROCEDURE — 93656 COMPRE EP EVAL ABLTJ ATR FIB: CPT

## 2022-10-14 PROCEDURE — 93655 ICAR CATH ABLTJ DSCRT ARRHYT: CPT

## 2022-10-14 PROCEDURE — C1894: CPT

## 2022-10-14 PROCEDURE — 82962 GLUCOSE BLOOD TEST: CPT

## 2022-10-14 RX ORDER — SUCRALFATE 1 G
10 TABLET ORAL
Qty: 280 | Refills: 0
Start: 2022-10-14 | End: 2022-10-27

## 2022-10-14 RX ORDER — MAGNESIUM SULFATE 500 MG/ML
2 VIAL (ML) INJECTION ONCE
Refills: 0 | Status: COMPLETED | OUTPATIENT
Start: 2022-10-14 | End: 2022-10-14

## 2022-10-14 RX ORDER — METOPROLOL TARTRATE 50 MG
1 TABLET ORAL
Qty: 0 | Refills: 1 | DISCHARGE

## 2022-10-14 RX ORDER — APIXABAN 2.5 MG/1
1 TABLET, FILM COATED ORAL
Qty: 0 | Refills: 0 | DISCHARGE

## 2022-10-14 RX ORDER — METOPROLOL TARTRATE 50 MG
1 TABLET ORAL
Qty: 60 | Refills: 2
Start: 2022-10-14 | End: 2023-01-11

## 2022-10-14 RX ORDER — OMEPRAZOLE 10 MG/1
1 CAPSULE, DELAYED RELEASE ORAL
Qty: 42 | Refills: 0
Start: 2022-10-14 | End: 2022-11-24

## 2022-10-14 RX ADMIN — APIXABAN 5 MILLIGRAM(S): 2.5 TABLET, FILM COATED ORAL at 06:11

## 2022-10-14 RX ADMIN — LOSARTAN POTASSIUM 25 MILLIGRAM(S): 100 TABLET, FILM COATED ORAL at 06:11

## 2022-10-14 RX ADMIN — Medication 1 GRAM(S): at 06:11

## 2022-10-14 RX ADMIN — Medication 50 MILLIGRAM(S): at 07:47

## 2022-10-14 RX ADMIN — Medication 20 MILLIGRAM(S): at 07:47

## 2022-10-14 RX ADMIN — Medication 25 GRAM(S): at 07:46

## 2022-10-14 RX ADMIN — PANTOPRAZOLE SODIUM 40 MILLIGRAM(S): 20 TABLET, DELAYED RELEASE ORAL at 06:11

## 2022-10-14 NOTE — DISCHARGE NOTE NURSING/CASE MANAGEMENT/SOCIAL WORK - PATIENT PORTAL LINK FT
You can access the FollowMyHealth Patient Portal offered by [facility] by registering at the following website: [email or web address]. By joining [facility]’s FollowMyHealth portal, you will also be able to view your health information using other applications (apps) compatible with our system.

## 2022-10-14 NOTE — PROGRESS NOTE ADULT - SUBJECTIVE AND OBJECTIVE BOX
Pt doing well POD #1 s/p radiofrequency ablation of atrial fibrillation. No overnight events noted, pt denies complaint today. Bilateral groin sutures removed without difficulty, pt tolerated well.      EKG:  TELE:    PAST MEDICAL & SURGICAL HISTORY:  Hypertension  Hyperlipidemia  COVID-19 vaccine series completed  Colon cancer  Prostate cancer Treated with Radiation 2015  Type 2 diabetes mellitus  Unilateral primary osteoarthritis, left knee  Paroxysmal atrial fibrillation  CAD (coronary atherosclerotic disease)  S/P colon resection  S/P hernia repair  S/P total knee replacement, left    MEDICATIONS  (STANDING):  apixaban 5 milliGRAM(s) Oral <User Schedule>  aspirin enteric coated 81 milliGRAM(s) Oral daily  atorvastatin 20 milliGRAM(s) Oral at bedtime  furosemide    Tablet 40 milliGRAM(s) Oral daily  losartan 25 milliGRAM(s) Oral daily  metFORMIN 1000 milliGRAM(s) Oral daily  metoprolol tartrate 50 milliGRAM(s) Oral two times a day  pantoprazole    Tablet 40 milliGRAM(s) Oral two times a day  sucralfate suspension 1 Gram(s) Oral two times a day    MEDICATIONS  (PRN):  acetaminophen     Tablet .. 650 milliGRAM(s) Oral every 6 hours PRN Mild Pain (1 - 3)  aluminum hydroxide/magnesium hydroxide/simethicone Suspension 30 milliLiter(s) Oral every 6 hours PRN Dyspepsia  benzocaine 15 mG/menthol 3.6 mG Lozenge 1 Lozenge Oral five times a day PRN Sore Throat  ondansetron Injectable 4 milliGRAM(s) IV Push four times a day PRN Nausea and/or Vomiting  oxycodone    5 mG/acetaminophen 325 mG 1 Tablet(s) Oral every 6 hours PRN Moderate Pain (4 - 6)    Allergies:  No Known Allergies    Vital Signs Last 24 Hrs  T(C): 36.7 (14 Oct 2022 06:00), Max: 36.7 (13 Oct 2022 07:59)  T(F): 98.1 (14 Oct 2022 06:00), Max: 98.1 (14 Oct 2022 06:00)  HR: 59 (14 Oct 2022 06:00) (51 - 64)  BP: 102/63 (14 Oct 2022 06:00) (102/63 - 162/69)  BP(mean): 79 (14 Oct 2022 06:00) (73 - 88)  RR: 17 (14 Oct 2022 06:00) (14 - 17)  SpO2: 95% (13 Oct 2022 18:03) (95% - 98%)    Parameters below as of 14 Oct 2022 06:00  Patient On (Oxygen Delivery Method): room air    Physical Exam:  Constitutional: NAD, AAOx3  Cardiovascular: +S1S2 RRR  Pulmonary: CTA b/l, unlabored  Abd: soft NTND +BS  Groins: C/D/I bilaterally; no bleeding, hematoma, edema  Extremities: no pedal edema, +distal pulses b/l  Neuro: non focal, ACEVEDO x4    LABS:                        11.5   9.58  )-----------( 150      ( 14 Oct 2022 05:54 )             34.7     10-14    139  |  101  |  22.5<H>  ----------------------------<  123<H>  4.2   |  23.0  |  0.91    Ca    9.5      14 Oct 2022 05:54  Mg     1.8     10-14    Assessment:   61 year old male patient with a history of anemia,  type 2 DM, HTN, HLD, colon cancer s/p colon resection, prostate cancer 2015 s/p radiation therapy, CAD s/p CABG 3/2022 (3v), ischemic cardiomyopathy EF 40-45% (TTE 7/2022- previously 35-40% 3/2022) and symptomatic paroxysmal atrial fibrillation. Now POD#1 s/p radiofrequency ablation of atrial fibrillation (WACA/PVI & CTI). Resting comfortably.     Plan:   Eliquis 5mg PO Q12hrs   Continue GDMT with metoprolol, losartan and lasix for ICM   PO pain analgesics prn  Continue other home medications.   Diabetic protocol   Increase omeprazole to twice a day for 2 weeks, then reduce to once daily.    Carafate 1gm BID x 2 weeks.   Lasix 20mg po x 1 in am as well as lasix 40mg po at 8pm and then continue 40mg po daily   Access site care and activity limitations reviewed w/ pt.   Outpt f/up in 2-4 weeks.  Pt doing well POD #1 s/p radiofrequency ablation of atrial fibrillation. No overnight events noted, pt denies complaint today. Bilateral groin sutures removed without difficulty, pt tolerated well.      EKG: sinus bradycardia 56 bpm   TELE: sinus rhyth, no events     PAST MEDICAL & SURGICAL HISTORY:  Hypertension  Hyperlipidemia  COVID-19 vaccine series completed  Colon cancer  Prostate cancer Treated with Radiation 2015  Type 2 diabetes mellitus  Unilateral primary osteoarthritis, left knee  Paroxysmal atrial fibrillation  CAD (coronary atherosclerotic disease)  S/P colon resection  S/P hernia repair  S/P total knee replacement, left    MEDICATIONS  (STANDING):  apixaban 5 milliGRAM(s) Oral <User Schedule>  aspirin enteric coated 81 milliGRAM(s) Oral daily  atorvastatin 20 milliGRAM(s) Oral at bedtime  furosemide    Tablet 40 milliGRAM(s) Oral daily  losartan 25 milliGRAM(s) Oral daily  metFORMIN 1000 milliGRAM(s) Oral daily  metoprolol tartrate 50 milliGRAM(s) Oral two times a day  pantoprazole    Tablet 40 milliGRAM(s) Oral two times a day  sucralfate suspension 1 Gram(s) Oral two times a day    MEDICATIONS  (PRN):  acetaminophen     Tablet .. 650 milliGRAM(s) Oral every 6 hours PRN Mild Pain (1 - 3)  aluminum hydroxide/magnesium hydroxide/simethicone Suspension 30 milliLiter(s) Oral every 6 hours PRN Dyspepsia  benzocaine 15 mG/menthol 3.6 mG Lozenge 1 Lozenge Oral five times a day PRN Sore Throat  ondansetron Injectable 4 milliGRAM(s) IV Push four times a day PRN Nausea and/or Vomiting  oxycodone    5 mG/acetaminophen 325 mG 1 Tablet(s) Oral every 6 hours PRN Moderate Pain (4 - 6)    Allergies:  No Known Allergies    Vital Signs Last 24 Hrs  T(C): 36.7 (14 Oct 2022 06:00), Max: 36.7 (13 Oct 2022 07:59)  T(F): 98.1 (14 Oct 2022 06:00), Max: 98.1 (14 Oct 2022 06:00)  HR: 59 (14 Oct 2022 06:00) (51 - 64)  BP: 102/63 (14 Oct 2022 06:00) (102/63 - 162/69)  BP(mean): 79 (14 Oct 2022 06:00) (73 - 88)  RR: 17 (14 Oct 2022 06:00) (14 - 17)  SpO2: 95% (13 Oct 2022 18:03) (95% - 98%)    Parameters below as of 14 Oct 2022 06:00  Patient On (Oxygen Delivery Method): room air    Physical Exam:  Constitutional: NAD, AAOx3  Cardiovascular: +S1S2 RRR  Pulmonary: CTA b/l, unlabored  Abd: soft NTND +BS  Groins: C/D/I bilaterally; no bleeding, hematoma, edema  Extremities: no pedal edema, +distal pulses b/l  Neuro: non focal, ACEVEDO x4    LABS:                        11.5   9.58  )-----------( 150      ( 14 Oct 2022 05:54 )             34.7     10-14    139  |  101  |  22.5<H>  ----------------------------<  123<H>  4.2   |  23.0  |  0.91    Ca    9.5      14 Oct 2022 05:54  Mg     1.8     10-14    Assessment:   61 year old male patient with a history of anemia,  type 2 DM, HTN, HLD, colon cancer s/p colon resection, prostate cancer 2015 s/p radiation therapy, CAD s/p CABG 3/2022 (3v), ischemic cardiomyopathy EF 40-45% (TTE 7/2022- previously 35-40% 3/2022) and symptomatic paroxysmal atrial fibrillation. Now POD#1 s/p radiofrequency ablation of atrial fibrillation (WACA/PVI & CTI). Resting comfortably.     Plan:   Eliquis 5mg PO Q12hrs   Continue GDMT with metoprolol, losartan and lasix for ICM   PO pain analgesics prn  Continue other home medications.   Diabetic protocol   Increase omeprazole to twice a day for 2 weeks, then reduce to once daily.    Carafate 1gm BID x 2 weeks.   Lasix 20mg po x 1 in am as well as lasix 40mg po at 8pm and then continue 40mg po daily   Access site care and activity limitations reviewed w/ pt.   Outpt f/up in 2-4 weeks.

## 2022-11-01 PROBLEM — I48.0 PAROXYSMAL ATRIAL FIBRILLATION: Chronic | Status: ACTIVE | Noted: 2022-10-10

## 2022-11-01 PROBLEM — I25.10 ATHEROSCLEROTIC HEART DISEASE OF NATIVE CORONARY ARTERY WITHOUT ANGINA PECTORIS: Chronic | Status: ACTIVE | Noted: 2022-10-10

## 2022-11-02 ENCOUNTER — APPOINTMENT (OUTPATIENT)
Dept: ORTHOPEDIC SURGERY | Facility: CLINIC | Age: 62
End: 2022-11-02

## 2022-11-02 VITALS
HEART RATE: 66 BPM | DIASTOLIC BLOOD PRESSURE: 76 MMHG | SYSTOLIC BLOOD PRESSURE: 116 MMHG | WEIGHT: 155 LBS | HEIGHT: 62 IN | BODY MASS INDEX: 28.52 KG/M2

## 2022-11-02 DIAGNOSIS — Z96.652 PRESENCE OF LEFT ARTIFICIAL KNEE JOINT: ICD-10-CM

## 2022-11-02 PROCEDURE — 20610 DRAIN/INJ JOINT/BURSA W/O US: CPT | Mod: RT

## 2022-11-02 PROCEDURE — 99214 OFFICE O/P EST MOD 30 MIN: CPT | Mod: 25

## 2022-11-02 NOTE — DISCUSSION/SUMMARY
[Medication Risks Reviewed] : Medication risks reviewed [de-identified] : 62 y/o M presents with mild tricompartmental osteoarthritis of the right knee. We reassured the patient is not a candidate for a right TKA. Last appointment, the pt had a right knee cortisone injection which he found very helpful. Today he opted for a right knee cortisone injection today. He is s/p left TKA 3/18/2022. He is doing well with the left knee. He should continue to do low impact exercises. Pt understands the importance of prophylaxis for invasive dental procedures. Recommended pt to wear compression stockings to control edema in foot. He plans to visit [facility] in 8 days. He will F/u in 3 months for repeat injection if needed. \par \par

## 2022-11-02 NOTE — PROCEDURE
[de-identified] : I injected the patient's right knee today with cortisone for primary osteoarthritis.\par \par I discussed at length with the patient the planned steroid and lidocaine injection. The risks, benefits, convalescence and alternatives were reviewed. The possible side effects discussed included but were not limited to: pain, swelling, heat, bleeding, and redness. Symptoms are generally mild but if they are extensive then contact the office. Giving pain relievers by mouth such as NSAIDs or Tylenol can generally treat the reactions to steroid and lidocaine. Rare cases of infection have been noted. Rash, hives and itching may occur post injection. If you have muscle pain or cramps, flushing and or swelling of the face, rapid heart beat, nausea, dizziness, fever, chills, headache, difficulty breathing, swelling in the arms or legs, or have a prickly feeling of your skin, contact a health care provider immediately. Following this discussion, the knee was prepped with Alcohol and under sterile condition the 80 mg Depo-Medrol and 6 cc Lidocaine injection was performed with a 20 gauge needle through a superolateral injection site. The needle was introduced into the joint, aspiration was performed to ensure intra-articular placement and the medication was injected. Upon withdrawal of the needle the site was cleaned with alcohol and a band aid applied. The patient tolerated the injection well and there were no adverse effects. Post injection instructions included no strenuous activity for 24 hours, cryotherapy and if there are any adverse effects to contact the office.

## 2022-11-02 NOTE — HISTORY OF PRESENT ILLNESS
[Improving] : improving [4] : a current pain level of 4/10 [Walking] : worsened by walking [Rest] : relieved by rest [de-identified] : 62 y/o M presents with bilateral knee pain with R>L. Right knee pain has been tolerable since last visit. The pt had a right knee cortisone injection last appointment which gave her lots of help. He has a known hx of mild tricompartmental osteoarthritis of the right knee. Right knee pain worsens which is causing him to have difficulty with stairs and walking. Pt was last seen 6/15/2022 for his first right knee cortisone injection. He is also s/p left TKA 3/18/2022.  Pt has made significant surgical progress. He has no pain in the left knee. Pt is also experiencing a burning sensation in his right foot. He is happy with the surgical outcome. He has a hx of a MI. Today he is here for repeat injections of the right knee. He had a recent CABG.  [de-identified] : stairs

## 2022-11-02 NOTE — PHYSICAL EXAM
[Cane] : ambulates with cane [LE] : Sensory: Intact in bilateral lower extremities [ALL] : dorsalis pedis, posterior tibial, femoral, popliteal, and radial 2+ and symmetric bilaterally [Normal] : Alert and in no acute distress [Poor Appearance] : well-appearing [de-identified] : GENERAL APPEARANCE: Well nourished and hydrated, pleasant, alert, and oriented x 3. Appears their stated age. \par HEENT: Normocephalic, extraocular eye motion intact. Nasal septum midline. Oral cavity clear. External auditory canal clear. \par RESPIRATORY: Breath sounds clear and audible in all lobes. No wheezing, No accessory muscle use.\par CARDIOVASCULAR: No apparent abnormalities. No lower leg edema. No varicosities. Pedal pulses are palpable.\par NEUROLOGIC: Sensation is normal, no muscle weakness in the upper or lower extremities.\par DERMATOLOGIC: No apparent skin lesions, moist, warm, no rash.\par SPINE: Cervical spine appears normal and moves freely; thoracic spine appears normal and moves freely; lumbosacral spine appears normal and moves freely, normal, nontender.\par MUSCULOSKELETAL: Hands, wrists, and elbows are normal and move freely, shoulders are normal and move freely.  [de-identified] : 1+ edema bilaterally \par Right knee exam shows medial joint line tenderness, ROM 0-120 degrees. \par Left knee exam shows healed incision with no sign of infection. ROM 0-125 degrees.  [de-identified] : No new imaging was obtained during today’s visit. Prior obtained imaging was once again reviewed.

## 2022-11-08 ENCOUNTER — APPOINTMENT (OUTPATIENT)
Dept: ORTHOPEDIC SURGERY | Facility: CLINIC | Age: 62
End: 2022-11-08

## 2022-11-08 VITALS — DIASTOLIC BLOOD PRESSURE: 80 MMHG | SYSTOLIC BLOOD PRESSURE: 122 MMHG | HEART RATE: 73 BPM

## 2022-11-08 DIAGNOSIS — M25.511 PAIN IN RIGHT SHOULDER: ICD-10-CM

## 2022-11-08 PROCEDURE — 20610 DRAIN/INJ JOINT/BURSA W/O US: CPT | Mod: RT

## 2022-11-08 PROCEDURE — 99213 OFFICE O/P EST LOW 20 MIN: CPT | Mod: 25

## 2022-11-08 NOTE — HISTORY OF PRESENT ILLNESS
[de-identified] : 11/8/2022: Renato is a 61-year-old male who returns to the office today for reassessment of his right shoulder pain secondary to a chronic, retracted, atrophied rotator cuff tear.  At his previous office visit he received a cortisone injection which provided him with significant relief for several weeks.  The cortisone injection has since worn off he returns today to discuss further treatment options.  He is going to [facility] for the winter in 2 days and is requesting another injection.  He will not be able to do therapy in [place] but he may do home exercises on his own.  He denies any fevers, chills, sweats, numbness, tingling, or pain elsewhere\par \par 08/10/2022 : RENATO LEVI  is a 61 year year old male who presents to the office for follow-up evaluation of his right shoulder.  He states he had an MRI and is here to discuss the results today.  He states he still has pain over the right shoulder that is sharp and worse with certain movements and activities and alleviated with rest.  He states he has had cortisone injections in the past 4 to 5 years ago that gave good relief.  He states he would like to avoid surgery because he recently had an open heart surgery as well as an abdominal surgery for colon cancer in the knee arthroplasty.  He is not interested in surgical invention currently.  He denies any numbness or tingling distally.  He has no other complaints today.\par \par 06/15/2022 : RENATO LEVI  is a 61 year year old male who presents to the office for evaluation of his right shoulder.  He states his had right shoulder pain has been going on for 4 years or so.  He states he had a couple cortisone injections but none recently.  He states his last 1 was a year or so ago.  He states the pain is diffusely about the shoulder and is worse with certain lifting movements and alleviated with rest.  He states it feels weak with certain movements as well.  He states the pain can be sharp at times.  He does not take any medications because he has heart issues and is concerned about taking medications.  He states he has pain in multiple other joints in the elbow and fingers and hands as well. The patient had a knee replacement surgery approximately 3 months ago with Dr. Ventura.  In the postoperative period he developed a cardiac issue which required open heart surgery and CABG.  He has seen Dr. Nicholas for this.  Patient presents for a specialist opinion regarding his shoulder.  He denies any fevers, chills, sweats, nose, tingling, or pain elsewhere.

## 2022-11-08 NOTE — DISCUSSION/SUMMARY
[de-identified] : Assessment: 61-year-old male with developing right shoulder rotator cuff arthropathy\par \par Plan: I had a long discussion with the patient today regarding the nature of their diagnosis and treatment plan.  I reviewed the patient's MRI today with him in the office which demonstrates a large, retracted, and atrophied rotator cuff tear with a high riding humeral head consistent with developing rotator cuff arthropathy.  We discussed the risks and benefits of nonsurgical versus surgical treatment at length today in the office.  Given the patient's medical history and recent CABG he would like to avoid any surgery on her shoulder at this time.  We will continue with conservative treatment options for the shoulder including resting, icing, heating and stretching of anti-inflammatory medicines as needed maintain medications compliance to medications, home exercises.  The patient may take Tylenol as needed for pain.  Patient is due to go to [facility] in 2 days for the winter and will do home exercises on his own.  Additionally, a second cortisone injection was administered to the patient's right shoulder today in the office under sterile conditions.  The patient tolerated this well and there are no adverse events.  Recommend follow-up when he returns from [facility] in March for repeat clinical assessment.\par \par The patient verbalizes their understanding and agrees with the plan.  All questions were answered to their satisfaction.

## 2022-11-08 NOTE — PROCEDURE
[de-identified] : I injected the patient's right shoulder today with cortisone.\par  \par I discussed at length with the patient the planned steroid and lidocaine injection. The risks, benefits, convalescence and alternatives were reviewed. The possible side effects discussed included but were not limited to: pain, swelling, heat, bleeding, and redness. Symptoms are generally mild but if they are extensive then contact the office. Giving pain relievers by mouth such as NSAIDs or Tylenol can generally treat the reactions to steroid and lidocaine. Rare cases of infection have been noted. Rash, hives and itching may occur post injection. If you have muscle pain or cramps, flushing and or swelling of the face, rapid heart beat, nausea, dizziness, fever, chills, headache, difficulty breathing, swelling in the arms or legs, or have a prickly feeling of your skin, contact a health care provider immediately. Following this discussion, the shoulder was prepped with Chlorhexidine and Alcohol and under sterile conditions the 80 mg Depo-Medrol and 4 cc Lidocaine injection was performed with a 22 gauge needle through a posterolateral injection site. The needle was introduced into the subacromial space and the medication was injected. Upon withdrawal of the needle the site was cleaned with alcohol and a band aid was applied. The patient tolerated the injection well and there were no adverse effects. Post injection instructions included no strenuous activity for 24 hours, cryotherapy and if there are any adverse effects to contact the office.

## 2022-11-08 NOTE — PHYSICAL EXAM
[de-identified] : General:\par Awake, alert, no acute distress, Patient was cooperative and appropriate during the examination.\par \par The patient is of normal weight for height and age.\par \par Walks without an antalgic gait.\par \par Full, painless range of motion of the neck and back.\par \par Exam of the bilateral lower extremities is intact and symmetric with regards to dermatologic, vascular, and neurologic exam. Bilateral lower extremity sensation is grossly intact to light touch in the DP/SP/T/S/S nerve distributions. Intact DF/PF/EHL. BIlateral lower extremities warm and well-perfused with brisk capillary refill.\par \par \par Pulmonary:\par Regular, nonlabored breathing\par \par Abdomen:\par Soft, nontender, nondistended.\par \par Lymphatic:\par No evidence of axillary lymphadenopathy\par \par Right shoulder Exam:\par Physical exam of the shoulder demonstrates normal skin without signs of skin changes or abnormalities. No erythema, warmth, or joint effusion appreciated.  \par  \par Sensation intact light touch C5-T1\par Palpable radial pulse\par Radial/ulnar/median/axillary/musculocutaneous/AIN/PIN nerves grossly intact\par  \par Range of motion:\par Forward Flexion: 170\par Abduction: 120\par External Rotation: 30\par Internal Rotation: Lower lumbar level\par  \par Palpation:\par Mildly tender to palpation over the glenohumeral joint\par Mildly tender palpation over the rotator cuff insertion on the greater tuberosity\par Not tender to palpation over the AC joint\par Moderately tender to palpation of the biceps tendon/bicipital groove\par  \par Strength testing:\par Supraspinatus: 4+/5\par Infraspinatus: 4/5\par Subscapularis: 5/5\par  \par Special test:\par Empty can test positive\par De Leon impingement test negative\par Speeds test positive\par Kane's test negative\par Lift-off test negative\par Bell-press test negative\par Cross-arm adduction test mildly positive\par  [de-identified] : MRI of the right shoulder today taken at  radiology on 7/14/2022 revealed communicating joint and bursal effusions as visualized.  There is rotator cuff tendinosis with chronic complete full-thickness tear of the supraspinatus and infraspinatus tendons with associated muscle atrophy.  There is partial-thickness tearing of the subscapularis with chronic complete tear of the long head of the biceps with moderate AC joint arthrosis and mild superior labrum degeneration.\par \par \par  x-rays 4 views of the right shoulder taken the office today on 6/15/2022 shows mild degenerative changes with no other acute findings.

## 2022-11-09 ENCOUNTER — APPOINTMENT (OUTPATIENT)
Dept: ELECTROPHYSIOLOGY | Facility: CLINIC | Age: 62
End: 2022-11-09

## 2022-11-09 VITALS
SYSTOLIC BLOOD PRESSURE: 112 MMHG | WEIGHT: 159 LBS | HEART RATE: 71 BPM | TEMPERATURE: 97.9 F | HEIGHT: 62 IN | DIASTOLIC BLOOD PRESSURE: 65 MMHG | OXYGEN SATURATION: 96 % | BODY MASS INDEX: 29.26 KG/M2

## 2022-11-09 PROCEDURE — 93000 ELECTROCARDIOGRAM COMPLETE: CPT

## 2022-11-09 PROCEDURE — 99214 OFFICE O/P EST MOD 30 MIN: CPT | Mod: 25

## 2022-11-09 RX ORDER — METOPROLOL TARTRATE 50 MG/1
50 TABLET, FILM COATED ORAL
Qty: 180 | Refills: 3 | Status: DISCONTINUED | COMMUNITY
Start: 1900-01-01 | End: 2022-11-09

## 2022-11-09 NOTE — HISTORY OF PRESENT ILLNESS
[FreeTextEntry1] : The patient is a 61 year old male with a history of CAD s/p CABG, ischemic cardiomyopathy (EF 35-40%\par initially, now 40-45%) and atrial fibrillation/flutter. Post-CABG course was complicated by atrial\par fibrillation/flutter. Shortly after he was also noted to be in AF as an outpatient with spontaneous conversion to\par sinus rhythm (while on amiodarone). Amiodarone was stopped but he continued to experience palpitations\par with outpatient monitoring showing paroxysmal atrial fibrillation with RVR. Given failure of anti-arrhythmics and\par associated LV dysfunction, catheter ablation was recommended.\par \par He is now s/p ablation for atrial fibrillation ( PVI, CTI line) on 10/13/22. \par \par Today, he reports he has been feeling well since procedure. Did experience chest discomfort the first week post procedure which has resolved. Denies any symptomatic arrhythmia recurrence. ECG reveals SR. Denies pain, edema, drainage from b/l femoral access sites. Tolerating Eliquis without c/o easy bruising, bleeding, or falls.

## 2022-11-09 NOTE — DISCUSSION/SUMMARY
[FreeTextEntry1] : The patient is a 61 year old male with a history of CAD s/p CABG, ischemic cardiomyopathy (EF 35-40%\par initially, now 40-45%) and atrial fibrillation/flutter. Post-CABG course was complicated by atrial\par fibrillation/flutter. Shortly after he was also noted to be in AF as an outpatient with spontaneous conversion to\par sinus rhythm (while on amiodarone). Amiodarone was stopped but he continued to experience palpitations\par with outpatient monitoring showing paroxysmal atrial fibrillation with RVR. Given failure of anti-arrhythmics and\par associated LV dysfunction, catheter ablation was recommended.\par \par He is now s/p ablation for atrial fibrillation ( PVI, CTI line) on 10/13/22. \par \par Today, he reports he has been feeling well since procedure. Did experience chest discomfort the first week post procedure which has resolved. Denies any symptomatic arrhythmia recurrence. ECG reveals SR. Denies pain, edema, drainage from b/l femoral access sites. Tolerating Eliquis without c/o easy bruising, bleeding, or falls. \par \par Recommendation: \par \par Mr. Mercedes has been doing well s/p AF ablation 10/13/22. Denies symptomatic arrhythmia recurrence, maintained in SR on ECG. He will be traveling to [place] tomorrow, returning March 2023. Advised to continue Eliquis 5mg BID and metoprolol 25mg BID. To arrange 1 week MCOT when he returns to [place] followed by EP follow up in April with Dr. López. Urged him to contact us sooner as needed.\par \par Miriam MCCANN [EKG obtained to assist in diagnosis and management of assessed problem(s)] : EKG obtained to assist in diagnosis and management of assessed problem(s)

## 2022-12-14 NOTE — H&P PST ADULT - PROBLEM SELECTOR PLAN 2
Patient with Paroxysmal (<7 days) atrial fibrillation which is controlled currently with Beta Blocker. Patient is currently in sinus rhythm.[id number] Score 0: The patient doesn't have any registry metric data available. . Anticoagulation not indicated due to likely 0 chadvasc. per them cardiology never started him on it saying he doesnt need it. outpatient cardiology follow up. .       Caprini Score 9, at high risk, surgical team to order appropriate VTE prophylaxis

## 2023-01-20 NOTE — PROGRESS NOTE ADULT - PROBLEM SELECTOR PLAN 1
Date: 01/20/23  Procedure: Labor Epidural         Relevant Problems   No relevant active problems       Physical Exam    Airway   Mallampati: II  TM distance: >3 FB  Neck ROM: full       Cardiovascular - normal exam  Rhythm: regular  Rate: normal  (-) murmur     Dental - normal exam           Pulmonary - normal exam  Breath sounds clear to auscultation     Abdominal    Neurological - normal exam                 Anesthesia Plan    ASA 2       Plan - epidural   Neuraxial block will be labor analgesia                  Pertinent diagnostic labs and testing reviewed    Informed Consent:    Anesthetic plan and risks discussed with patient.         S/p CABG x 3 off pump on 3/29/22.  Neurologically intact.  Hemodynamically stable.  Continue Lopressor as tolerated by HR and BP.   Continue Aspirin and Plavix for acute graft closure prophylaxis.  Continue statin for chronic graft patency prophylaxis.  Oxygenating well, coughing and deep breathing exercises/incentive spirometry encouraged.  Continue diuresis PRN, replenish electrolytes PRN to keep K>4 and Mg>2.   Continue with PT, increase activity as tolerated.  FS AC+HS with coverage for glycemic control.  Tylenol, Oxy PRN for analgesia.  Continue bowel regimen PRN constipation.   Case and plan to be reviewed / discussed with CT Surgery attending / team during AM rounds.

## 2023-02-18 NOTE — H&P PST ADULT - NS PRO FEM  PAP SMEARS 3YRS
Pt reports pain in chest is better, but headache getting worse, pain in chest now 3-4     Carolann Mo, RN  02/18/23 6217
Update called to pt granddaughter , [employer].       Kala Sanchez RN  02/18/23 0590
not applicable (Male)

## 2023-04-03 ENCOUNTER — APPOINTMENT (OUTPATIENT)
Dept: CARDIOLOGY | Facility: CLINIC | Age: 63
End: 2023-04-03
Payer: COMMERCIAL

## 2023-04-03 VITALS
HEIGHT: 62 IN | TEMPERATURE: 98.2 F | WEIGHT: 158 LBS | DIASTOLIC BLOOD PRESSURE: 62 MMHG | OXYGEN SATURATION: 98 % | SYSTOLIC BLOOD PRESSURE: 110 MMHG | HEART RATE: 86 BPM | BODY MASS INDEX: 29.08 KG/M2

## 2023-04-03 DIAGNOSIS — I25.5 ISCHEMIC CARDIOMYOPATHY: ICD-10-CM

## 2023-04-03 PROCEDURE — 99214 OFFICE O/P EST MOD 30 MIN: CPT | Mod: 25

## 2023-04-03 PROCEDURE — 93000 ELECTROCARDIOGRAM COMPLETE: CPT

## 2023-04-03 RX ORDER — FUROSEMIDE 20 MG/1
20 TABLET ORAL DAILY
Qty: 90 | Refills: 1 | Status: DISCONTINUED | COMMUNITY
Start: 2022-08-08 | End: 2023-04-03

## 2023-04-03 RX ORDER — METFORMIN HYDROCHLORIDE 1000 MG/1
1000 TABLET, FILM COATED, EXTENDED RELEASE ORAL
Refills: 0 | Status: DISCONTINUED | COMMUNITY
End: 2023-04-03

## 2023-04-03 RX ORDER — METHOTREXATE 2.5 MG/1
2.5 TABLET ORAL
Refills: 0 | Status: DISCONTINUED | COMMUNITY
End: 2023-04-03

## 2023-04-14 ENCOUNTER — APPOINTMENT (OUTPATIENT)
Dept: CARDIOLOGY | Facility: CLINIC | Age: 63
End: 2023-04-14
Payer: COMMERCIAL

## 2023-04-14 PROCEDURE — 93306 TTE W/DOPPLER COMPLETE: CPT

## 2023-04-25 ENCOUNTER — NON-APPOINTMENT (OUTPATIENT)
Age: 63
End: 2023-04-25

## 2023-05-19 ENCOUNTER — NON-APPOINTMENT (OUTPATIENT)
Age: 63
End: 2023-05-19

## 2023-05-19 ENCOUNTER — APPOINTMENT (OUTPATIENT)
Dept: ELECTROPHYSIOLOGY | Facility: CLINIC | Age: 63
End: 2023-05-19
Payer: COMMERCIAL

## 2023-05-19 VITALS
BODY MASS INDEX: 30.55 KG/M2 | WEIGHT: 166 LBS | HEART RATE: 75 BPM | DIASTOLIC BLOOD PRESSURE: 60 MMHG | OXYGEN SATURATION: 96 % | HEIGHT: 62 IN | SYSTOLIC BLOOD PRESSURE: 120 MMHG

## 2023-05-19 PROCEDURE — 99214 OFFICE O/P EST MOD 30 MIN: CPT | Mod: 25

## 2023-05-19 PROCEDURE — 93000 ELECTROCARDIOGRAM COMPLETE: CPT

## 2023-05-19 NOTE — HISTORY OF PRESENT ILLNESS
[FreeTextEntry1] : The patient is a 62 year old male with a history of CAD s/p CABG,HTN, DM,  ischemic cardiomyopathy (EF 35-40%\par initially, now 40-45%) and atrial fibrillation/flutter. Post-CABG course was complicated by atrial\par fibrillation/flutter. Shortly after he was also noted to be in AF as an outpatient with spontaneous conversion to\par sinus rhythm (while on amiodarone). Amiodarone was stopped but he continued to experience palpitations\par with outpatient monitoring showing paroxysmal atrial fibrillation with RVR. Given failure of anti-arrhythmics and\par associated LV dysfunction, catheter ablation was recommended.\par \par He is now s/p ablation for atrial fibrillation ( PVI, CTI line) on 10/13/22. \par \par Event monitor worn 4/14/23-4/20/23 revealed SR, no significant arrhythmias. TTE performed 4/25/23 revealed EF improved to 50-55%. \par \par Today, reports he has been doing well except for recent diagnosis of bronchitis- following with pulmonary.\par \par Denies palpitations, chest pain, ESPINOZA, dizziness, near syncope, syncope. Tolerating Eliquis without c/o easy bruising, bleeding, or falls. ECG reveals SR with single APC. \par \par \par

## 2023-05-19 NOTE — REVIEW OF SYSTEMS
[Dyspnea on exertion] : not dyspnea during exertion [Chest Discomfort] : no chest discomfort [Palpitations] : no palpitations [Dizziness] : no dizziness [Easy Bleeding] : no tendency for easy bleeding

## 2023-05-19 NOTE — DISCUSSION/SUMMARY
[FreeTextEntry1] : The patient is a 62 year old male with a history of CAD s/p CABG, ischemic cardiomyopathy (EF 35-40%\par initially, now 40-45%) and atrial fibrillation/flutter. Post-CABG course was complicated by atrial\par fibrillation/flutter. Shortly after he was also noted to be in AF as an outpatient with spontaneous conversion to\par sinus rhythm (while on amiodarone). Amiodarone was stopped but he continued to experience palpitations\par with outpatient monitoring showing paroxysmal atrial fibrillation with RVR. Given failure of anti-arrhythmics and\par associated LV dysfunction, catheter ablation was recommended.\par \par He is now s/p ablation for atrial fibrillation ( PVI, CTI line) on 10/13/22. \par \par Event monitor worn 4/14/23-4/20/23 revealed SR, no significant arrhythmias. TTE performed 4/25/23 revealed EF improved to 50-55%. \par \par Today, reports he has been doing well except for recent diagnosis of bronchitis- following with pulmonary.\par \par Denies palpitations, chest pain, ESPINOZA, dizziness, near syncope, syncope. Tolerating Eliquis without c/o easy bruising, bleeding, or falls. ECG reveals SR with single APC. \par \par Recommendation: \par \par Mr. Mercedes has been doing well s/p AF ablation 10/13/22 maintaining SR on ECG and recent external monitoring with recover of EF to now 50-55%. [id number] 4, to continue Eliquis 5mg BID. Continue GDMT per cardiology. Discussed the importance of arrhythmia surveillance. WIll arrange 1 week CAM patch in 5 months, followed by EP follow up in office once resulted.\par \par Miriam RED-C  [EKG obtained to assist in diagnosis and management of assessed problem(s)] : EKG obtained to assist in diagnosis and management of assessed problem(s)

## 2023-05-19 NOTE — PHYSICAL EXAM
[No Acute Distress] : no acute distress [Normal S1, S2] : normal S1, S2 [Normal Gait] : normal gait [No Edema] : no edema [Moves all extremities] : moves all extremities [Alert and Oriented] : alert and oriented

## 2023-07-05 ENCOUNTER — APPOINTMENT (OUTPATIENT)
Dept: ORTHOPEDIC SURGERY | Facility: CLINIC | Age: 63
End: 2023-07-05
Payer: COMMERCIAL

## 2023-07-05 VITALS — HEART RATE: 87 BPM | DIASTOLIC BLOOD PRESSURE: 69 MMHG | SYSTOLIC BLOOD PRESSURE: 113 MMHG

## 2023-07-05 DIAGNOSIS — Z96.652 AFTERCARE FOLLOWING JOINT REPLACEMENT SURGERY: ICD-10-CM

## 2023-07-05 DIAGNOSIS — Z47.1 AFTERCARE FOLLOWING JOINT REPLACEMENT SURGERY: ICD-10-CM

## 2023-07-05 DIAGNOSIS — M16.12 UNILATERAL PRIMARY OSTEOARTHRITIS, LEFT HIP: ICD-10-CM

## 2023-07-05 PROCEDURE — 73501 X-RAY EXAM HIP UNI 1 VIEW: CPT | Mod: LT

## 2023-07-05 PROCEDURE — 73562 X-RAY EXAM OF KNEE 3: CPT | Mod: LT

## 2023-07-05 PROCEDURE — 99214 OFFICE O/P EST MOD 30 MIN: CPT

## 2023-07-07 NOTE — PHYSICAL EXAM
[Antalgic] : antalgic [de-identified] : GENERAL APPEARANCE: Well nourished and hydrated, pleasant, alert, and oriented x 3. Appears their stated age. \par HEENT: Normocephalic, extraocular eye motion intact. Nasal septum midline. Oral cavity clear. External auditory canal clear. \par RESPIRATORY: Breath sounds clear and audible in all lobes. No wheezing, No accessory muscle use.\par CARDIOVASCULAR: No apparent abnormalities. No lower leg edema. No varicosities. Pedal pulses are palpable.\par NEUROLOGIC: Sensation is normal, no muscle weakness in the upper or lower extremities.\par DERMATOLOGIC: No apparent skin lesions, moist, warm, no rash.\par SPINE: Cervical spine appears normal and moves freely; thoracic spine appears normal and moves freely; lumbosacral spine appears normal and moves freely, normal, nontender.\par MUSCULOSKELETAL: Hands, wrists, and elbows are normal and move freely, shoulders are normal and move freely. \par Musculoskeletal\par 5/5 motor strength in bilateral lower extremities. Sensory: Intact in bilateral lower extremities. DTRs: Biceps, brachioradialis, triceps, patellar, ankle and plantar 2+ and symmetric bilaterally. Pulses: dorsalis pedis, posterior tibial, femoral, popliteal, and radial 2+ and symmetric bilaterally. \par Constitutional: Alert and in no acute distress, but well-appearing. \par  [de-identified] : Left hip examination shows left groin tenderness he has difficulty straight leg raise flexion at 100 degree external rotation , 30 degree -5 internal rotation he has mild left lower extremity swelling. \par The left knee he has range of motion of 0 to 110 degree without the pain.\par Incision is healed no left knee effusion no erythema.  [de-identified] : 1 view pelvic x-ray shows normal left hip \par 3 views of the left knee x-ray shows total knee implants in good position no sign of subsidence or loosening or wear

## 2023-07-07 NOTE — DISCUSSION/SUMMARY
[de-identified] : This is a 62-year-old male with severe left groin pain and acute onset without trauma or injury.  His x-ray of the left hip is normal. \par He also has left total knee arthroplasty done last year and mild pain I do not see any abnormality on his x-ray clinically his knee does not appear to be infected nor has any mechanical failure .  I am unsure about the etiology of his left groin pain I ordered left hip MRI.  to rule out the cause for his severe left hip pain.  Patient will call our office when the study is completed.

## 2023-08-17 ENCOUNTER — APPOINTMENT (OUTPATIENT)
Dept: CARDIOLOGY | Facility: CLINIC | Age: 63
End: 2023-08-17
Payer: COMMERCIAL

## 2023-08-17 VITALS
SYSTOLIC BLOOD PRESSURE: 116 MMHG | OXYGEN SATURATION: 95 % | BODY MASS INDEX: 32.76 KG/M2 | WEIGHT: 178 LBS | TEMPERATURE: 99 F | HEIGHT: 62 IN | HEART RATE: 76 BPM | DIASTOLIC BLOOD PRESSURE: 58 MMHG

## 2023-08-17 PROCEDURE — 93000 ELECTROCARDIOGRAM COMPLETE: CPT

## 2023-08-17 PROCEDURE — 99215 OFFICE O/P EST HI 40 MIN: CPT | Mod: 25

## 2023-08-17 RX ORDER — AZELASTINE HYDROCHLORIDE 137 UG/1
0.1 SPRAY, METERED NASAL TWICE DAILY
Refills: 0 | Status: DISCONTINUED | COMMUNITY
End: 2023-08-17

## 2023-08-17 RX ORDER — FOLIC ACID 1 MG/1
1 TABLET ORAL DAILY
Refills: 0 | Status: DISCONTINUED | COMMUNITY
End: 2023-08-17

## 2023-08-17 RX ORDER — ATORVASTATIN CALCIUM 40 MG/1
40 TABLET, FILM COATED ORAL
Qty: 30 | Refills: 11 | Status: ACTIVE | COMMUNITY
Start: 2022-06-14 | End: 1900-01-01

## 2023-08-17 NOTE — HISTORY OF PRESENT ILLNESS
[FreeTextEntry1] : 63 y/o M with PMH of HTN, DM 2, NSTEMI, CAD s/p CABG x 3 on 3/29/2022 (LIMA-LAD, SVG-Ramus, SVG-PDA), post op AFib, Ischemic Cardiomyopathy / HFrEF (EF 35-40%  increased to 40-45% on last echo 7/2022), hyperlipidemia, s/p TKR on 3/18/22, HM showed PAF with RVR, NSVT, s/p AF ablation with Dr López on 10/13/2022, TTE performed 4/25/2023 with LVEF 50-55% ,  who presents today  for routine follow-up    He reports  LE edema and cough/ wheeze , states he had pneumonia which was treated in [place], had a cough which worsened.  Pt states he had a chest CT 3-4 months ago which was clear and reports he saw his PCP 1 month ago- told he had bronchitis Pt denies dyspnea or chest pain, but he reports nocturnal cough ,sleeping on 2 pillows , denies PND, orthopnea but states the cough wakes him reports soreness in the chest when he coughs but denies ches pain, is using Albuterol nebulizer once daily , has appt with pulmonologist on 8/28/2023  Pt reports a history of recurrent pneumonia ( 3 episodes) , denies fever chills  taking Lasix 20 mg once daily with edema of the LE x 2 months Non complaint with Losartan 25 mg daily He is not weighing himself at home , today 178 pounds was 166 pounds in May    Prior visit 4/3/2023  Pt reports feeling well from a cardiac perspective reports being active in [place] despite not liking the warm temperatures, no cardiac symptoms to report  Currently has URI symptoms, afebrile, non productive cough, seen by PCP, negative for Flu/ COVID, supportive care Denies chest pain, SOB/ESPINOZA, PND, orthopnea, palpitations, lightheadedness, syncope Pt has h/o intermittent LE edema which resolves with leg elevation   Prior visit 10/3/2022  At last visit he reported LE edema and cough. Furosemide 20 mg daily and Losartan 12.5 mg daily were started. He is taking Furosemide but reports he never started Losartan, unclear why- states he was not aware of this medication. He has no c/o cough, SOB. or ESPINOZA. He is active walks all day - reports LE edema resolves overnight with leg elevation and increases as the day progresses. He denies any chest pain, palpitations, lightheadedness, near syncope, syncope. He has a BP monitor at home, checks BP at times - always in normal range He follows with Dr López. pt wore a HM which showed PAF with RVR, NSVT, he is scheduled for PAF ablation with Dr López on 10/13/22, VERONICA's on 10/10. Metoprolol was temporarily increased to 50 mg BID

## 2023-08-17 NOTE — DISCUSSION/SUMMARY
[FreeTextEntry1] :  A/P  61 y/o M with PMH of HTN, DM 2, NSTEMI, CAD s/p CABG x 3 on 3/29/2022 (LIMA-LAD, SVG-Ramus, SVG-PDA), post op AFib, Ischemic Cardiomyopathy / HFrEF (EF 35-40%  increased to 40-45% on last echo 7/2022), hyperlipidemia, s/p TKR on 3/18/22, HM showed PAF with RVR, NSVT, s/p AF ablation with Dr López on 10/13/2022, TTE performed 4/25/2023 with LVEF 50-55% ,  who presents today  for routine follow-up    1. LE edema, with wheeze, fluid overload, pt denies dyspnea , recommend going to [facility] for diuresis and treatment however pt declines. Increase Lasix to 40 mg BID x 2 days , then 40 mg daily, Kcl 20 meq daily, check BMP in 1 week , BNP , continue albuterol, advised to conatct our office, or PCP if symtoms worsen to go to urgent care , has f/u appt with pulmonology  on 8/28 2. CAD s/p CABG x 3  3/2022, HFrEF ischemic CM EF 50-55%, EF improved on last TTE, ekg shows Rbbb which is new for pt, - reviewed ekg with Dr mora who was present in the office during the visit,   pt is denying dyspnea or chest pain, will order pharm nuclear stress test for assessment.  He will hold off on restarting losaratn until next visit, unclear why he is not taking.   3. Hyperlipidemia:  LDL 86, increase  Atorvastatin 40 mg daily  4.  PAF s/p AF ablation with Dr López 10/13//22, (Chads- vasc 4). In sinus rhythm. Continue Metoprolol tartrate 25 mg BID, Eliquis 5 mg BID, denies any bleeding episodes. MCOT and follow-up as per EP Pt verbalizes understanding of all instructions  Follow-up with Dr. Woods in 2 weeks  Alexandra Carter PA-C [EKG obtained to assist in diagnosis and management of assessed problem(s)] : EKG obtained to assist in diagnosis and management of assessed problem(s)

## 2023-08-17 NOTE — CARDIOLOGY SUMMARY
[de-identified] : 8/17/2023 Sinus rhythm 73 rbbb  04/03/2023 Sinus rhythm 75 bpm, WNL, no acute ST T changes, QTc 418 10/03/2022 Sinus bradycardia 56 bpm 8/3/2022: Sinus  Rhythm  Low voltage in precordial leads. Old anterior infarct. 06/01/2022 NSR  5/9/2022: Possible atrial fibrillation @ 135 bpm Low voltage in limb leads. Poor R-wave progression -may be secondary to pulmonary disease consider old anterior infarct. Nonspecific ST depression + Diffuse nonspecific T-abnormality -Nondiagnostic.     [de-identified] : 05/17/22 7 d HM sinus, min HR 48, max 146 average 72, PAF: AF burden 0.46%, longest 5 min. 3 beat NSVT, total PVC burden <0.01%  [de-identified] : 7/8/2022: Summary:\par  TTE: 7/8/2022: Left ventricular ejection fraction, by visual estimation, is 40 to 45%. Mildly to moderately decreased global left ventricular systolic function. Basal and mid inferolateral wall, apical septal segment, and basal inferior segment are abnormal as described above. Abnormal septal motion consistent with post-operative status. Normal right ventricular size and function. There is mild septal left ventricular hypertrophy. Spectral Doppler shows impaired relaxation pattern of left ventricular myocardial filling (Grade I diastolic dysfunction). Dilation of the sinuses of valsalva measuring 4.3 cm. There is no evidence of pericardial effusion. Compared to prior inpatient TTE done on 3/25/2022, the left ventricular function has increased from 35-40% to 40-45%.\par  \par  3/26/2022: Left ventricular ejection fraction, by visual estimation, is 35 to 40%. Moderately decreased global left ventricular systolic function. Apical septal segment and apex are abnormal as described above. Spectral Doppler shows pseudonormal pattern of left ventricular myocardial filling (Grade II diastolic dysfunction). Elevated mean left atrial pressure. Normal left ventricular internal cavity size. Normal right ventricular size and function. Normal left atrial size. Normal right atrial size. Mild mitral annular calcification. Mild thickening of the anterior and posterior mitral valve leaflets. Mild mitral valve regurgitation. Sclerotic aortic valve with normal opening. Moderate pulmonic valve regurgitation. Mildly dilated Ao root at 3.9cm. There is no evidence of pericardial effusion.\par   [de-identified] : CORONARY VESSELS: The coronary circulation is right dominant.\par   \par  LM: -- Mid left main: There was a 40 % stenosis. The lesion was\par  moderately calcified.\par   \par  LAD: -- Proximal LAD: There was a 100 % stenosis. This lesion is a\par  chronic total occlusion.\par  -- Distal LAD: The distal vessel was supplied by collaterals from the RCA.\par   \par  CX: -- Mid circumflex: There was a 99 % stenosis.\par   \par  RI: -- Ramus intermedius: The vessel was medium to large sized. There\par  was a 80 % stenosis.\par   \par  RCA: -- RCA: The vessel was large sized (dominant).\par  -- Mid RCA: There was a 80 % stenosis.\par   \par  COMPLICATIONS: No complications occurred during the cath lab visit.\par  DIAGNOSTIC IMPRESSIONS: Multivessel CAD ( LM 40% ?ulcerated plaque, \par  LAD, sutotal mid CX occlusion and severe RCA stenosis)\par  Mildly elevated left ventrciular filling pressure ( PCWP 15 mmhg)\par   \par  DIAGNOSTIC RECOMMENDATIONS: Evaluate by CT surgery for CABG with LIMA to\par  LAD, SVG to OM, SVG to ramus ,SVG to distal RCA\par   \par   \par

## 2023-08-17 NOTE — CARDIOLOGY SUMMARY
[de-identified] : 8/17/2023 Sinus rhythm 73 rbbb  04/03/2023 Sinus rhythm 75 bpm, WNL, no acute ST T changes, QTc 418 10/03/2022 Sinus bradycardia 56 bpm 8/3/2022: Sinus  Rhythm  Low voltage in precordial leads. Old anterior infarct. 06/01/2022 NSR  5/9/2022: Possible atrial fibrillation @ 135 bpm Low voltage in limb leads. Poor R-wave progression -may be secondary to pulmonary disease consider old anterior infarct. Nonspecific ST depression + Diffuse nonspecific T-abnormality -Nondiagnostic.     [de-identified] : 05/17/22 7 d HM sinus, min HR 48, max 146 average 72, PAF: AF burden 0.46%, longest 5 min. 3 beat NSVT, total PVC burden <0.01%  [de-identified] : 7/8/2022: Summary:\par  TTE: 7/8/2022: Left ventricular ejection fraction, by visual estimation, is 40 to 45%. Mildly to moderately decreased global left ventricular systolic function. Basal and mid inferolateral wall, apical septal segment, and basal inferior segment are abnormal as described above. Abnormal septal motion consistent with post-operative status. Normal right ventricular size and function. There is mild septal left ventricular hypertrophy. Spectral Doppler shows impaired relaxation pattern of left ventricular myocardial filling (Grade I diastolic dysfunction). Dilation of the sinuses of valsalva measuring 4.3 cm. There is no evidence of pericardial effusion. Compared to prior inpatient TTE done on 3/25/2022, the left ventricular function has increased from 35-40% to 40-45%.\par  \par  3/26/2022: Left ventricular ejection fraction, by visual estimation, is 35 to 40%. Moderately decreased global left ventricular systolic function. Apical septal segment and apex are abnormal as described above. Spectral Doppler shows pseudonormal pattern of left ventricular myocardial filling (Grade II diastolic dysfunction). Elevated mean left atrial pressure. Normal left ventricular internal cavity size. Normal right ventricular size and function. Normal left atrial size. Normal right atrial size. Mild mitral annular calcification. Mild thickening of the anterior and posterior mitral valve leaflets. Mild mitral valve regurgitation. Sclerotic aortic valve with normal opening. Moderate pulmonic valve regurgitation. Mildly dilated Ao root at 3.9cm. There is no evidence of pericardial effusion.\par   [de-identified] : CORONARY VESSELS: The coronary circulation is right dominant.\par   \par  LM: -- Mid left main: There was a 40 % stenosis. The lesion was\par  moderately calcified.\par   \par  LAD: -- Proximal LAD: There was a 100 % stenosis. This lesion is a\par  chronic total occlusion.\par  -- Distal LAD: The distal vessel was supplied by collaterals from the RCA.\par   \par  CX: -- Mid circumflex: There was a 99 % stenosis.\par   \par  RI: -- Ramus intermedius: The vessel was medium to large sized. There\par  was a 80 % stenosis.\par   \par  RCA: -- RCA: The vessel was large sized (dominant).\par  -- Mid RCA: There was a 80 % stenosis.\par   \par  COMPLICATIONS: No complications occurred during the cath lab visit.\par  DIAGNOSTIC IMPRESSIONS: Multivessel CAD ( LM 40% ?ulcerated plaque, \par  LAD, sutotal mid CX occlusion and severe RCA stenosis)\par  Mildly elevated left ventrciular filling pressure ( PCWP 15 mmhg)\par   \par  DIAGNOSTIC RECOMMENDATIONS: Evaluate by CT surgery for CABG with LIMA to\par  LAD, SVG to OM, SVG to ramus ,SVG to distal RCA\par   \par   \par

## 2023-08-17 NOTE — REVIEW OF SYSTEMS
[Lower Ext Edema] : lower extremity edema [Cough] : cough [Wheezing] : wheezing [Negative] : Psychiatric [Fever] : no fever [Chills] : no chills [SOB] : no shortness of breath [Dyspnea on exertion] : not dyspnea during exertion [Chest Discomfort] : no chest discomfort [Leg Claudication] : no intermittent leg claudication [Palpitations] : no palpitations [Orthopnea] : no orthopnea [PND] : no PND [Syncope] : no syncope [Coughing Up Blood] : no hemoptysis [Blood in stool] : no blood in stoo [Hematuria] : no hematuria [Easy Bleeding] : no tendency for easy bleeding

## 2023-08-17 NOTE — HISTORY OF PRESENT ILLNESS
[FreeTextEntry1] : 61 y/o M with PMH of HTN, DM 2, NSTEMI, CAD s/p CABG x 3 on 3/29/2022 (LIMA-LAD, SVG-Ramus, SVG-PDA), post op AFib, Ischemic Cardiomyopathy / HFrEF (EF 35-40%  increased to 40-45% on last echo 7/2022), hyperlipidemia, s/p TKR on 3/18/22, HM showed PAF with RVR, NSVT, s/p AF ablation with Dr López on 10/13/2022, TTE performed 4/25/2023 with LVEF 50-55% ,  who presents today  for routine follow-up    He reports  LE edema and cough/ wheeze , states he had pneumonia which was treated in [place], had a cough which worsened.  Pt states he had a chest CT 3-4 months ago which was clear and reports he saw his PCP 1 month ago- told he had bronchitis Pt denies dyspnea or chest pain, but he reports nocturnal cough ,sleeping on 2 pillows , denies PND, orthopnea but states the cough wakes him reports soreness in the chest when he coughs but denies ches pain, is using Albuterol nebulizer once daily , has appt with pulmonologist on 8/28/2023  Pt reports a history of recurrent pneumonia ( 3 episodes) , denies fever chills  taking Lasix 20 mg once daily with edema of the LE x 2 months Non complaint with Losartan 25 mg daily He is not weighing himself at home , today 178 pounds was 166 pounds in May    Prior visit 4/3/2023  Pt reports feeling well from a cardiac perspective reports being active in [place] despite not liking the warm temperatures, no cardiac symptoms to report  Currently has URI symptoms, afebrile, non productive cough, seen by PCP, negative for Flu/ COVID, supportive care Denies chest pain, SOB/ESPNIOZA, PND, orthopnea, palpitations, lightheadedness, syncope Pt has h/o intermittent LE edema which resolves with leg elevation   Prior visit 10/3/2022  At last visit he reported LE edema and cough. Furosemide 20 mg daily and Losartan 12.5 mg daily were started. He is taking Furosemide but reports he never started Losartan, unclear why- states he was not aware of this medication. He has no c/o cough, SOB. or ESPINOZA. He is active walks all day - reports LE edema resolves overnight with leg elevation and increases as the day progresses. He denies any chest pain, palpitations, lightheadedness, near syncope, syncope. He has a BP monitor at home, checks BP at times - always in normal range He follows with Dr López. pt wore a HM which showed PAF with RVR, NSVT, he is scheduled for PAF ablation with Dr López on 10/13/22, VERONICA's on 10/10. Metoprolol was temporarily increased to 50 mg BID

## 2023-08-17 NOTE — DISCUSSION/SUMMARY
[FreeTextEntry1] :  A/P  63 y/o M with PMH of HTN, DM 2, NSTEMI, CAD s/p CABG x 3 on 3/29/2022 (LIMA-LAD, SVG-Ramus, SVG-PDA), post op AFib, Ischemic Cardiomyopathy / HFrEF (EF 35-40%  increased to 40-45% on last echo 7/2022), hyperlipidemia, s/p TKR on 3/18/22, HM showed PAF with RVR, NSVT, s/p AF ablation with Dr López on 10/13/2022, TTE performed 4/25/2023 with LVEF 50-55% ,  who presents today  for routine follow-up    1. LE edema, with wheeze, fluid overload, pt denies dyspnea , recommend going to [facility] for diuresis and treatment however pt declines. Increase Lasix to 40 mg BID x 2 days , then 40 mg daily, Kcl 20 meq daily, check BMP in 1 week , BNP , continue albuterol, advised to conatct our office, or PCP if symtoms worsen to go to urgent care , has f/u appt with pulmonology  on 8/28 2. CAD s/p CABG x 3  3/2022, HFrEF ischemic CM EF 50-55%, EF improved on last TTE, ekg shows Rbbb which is new for pt, - reviewed ekg with Dr mora who was present in the office during the visit,   pt is denying dyspnea or chest pain, will order pharm nuclear stress test for assessment.  He will hold off on restarting losaratn until next visit, unclear why he is not taking.   3. Hyperlipidemia:  LDL 86, increase  Atorvastatin 40 mg daily  4.  PAF s/p AF ablation with Dr López 10/13//22, (Chads- vasc 4). In sinus rhythm. Continue Metoprolol tartrate 25 mg BID, Eliquis 5 mg BID, denies any bleeding episodes. MCOT and follow-up as per EP Pt verbalizes understanding of all instructions  Follow-up with Dr. Woods in 2 weeks  Alexandra Carter PA-C [EKG obtained to assist in diagnosis and management of assessed problem(s)] : EKG obtained to assist in diagnosis and management of assessed problem(s)

## 2023-08-17 NOTE — PHYSICAL EXAM
[No Edema] : no edema [Edema ___] : edema [unfilled] [de-identified] : deferred-wearing mask [Normal] : normal S1, S2, no murmur, no rub, no gallop [Soft] : abdomen soft [Non Tender] : non-tender [Normal Bowel Sounds] : normal bowel sounds [Normal Gait] : normal gait [No Cyanosis] : no cyanosis [No Clubbing] : no clubbing [No Varicosities] : no varicosities [Moves all extremities] : moves all extremities [No Focal Deficits] : no focal deficits [Normal Speech] : normal speech [Alert and Oriented] : alert and oriented [Normal memory] : normal memory [Good Air Entry] : good air entry [No Respiratory Distress] : no respiratory distress  [Wheeze ____] : wheeze [unfilled] [de-identified] : B/L scattered  wheeze  [de-identified] : 2- 3+ :LE edema to knees

## 2023-08-17 NOTE — PHYSICAL EXAM
[No Edema] : no edema [Edema ___] : edema [unfilled] [de-identified] : deferred-wearing mask [Normal] : normal S1, S2, no murmur, no rub, no gallop [Soft] : abdomen soft [Non Tender] : non-tender [Normal Bowel Sounds] : normal bowel sounds [Normal Gait] : normal gait [No Cyanosis] : no cyanosis [No Clubbing] : no clubbing [No Varicosities] : no varicosities [Moves all extremities] : moves all extremities [No Focal Deficits] : no focal deficits [Normal Speech] : normal speech [Alert and Oriented] : alert and oriented [Normal memory] : normal memory [Good Air Entry] : good air entry [No Respiratory Distress] : no respiratory distress  [Wheeze ____] : wheeze [unfilled] [de-identified] : B/L scattered  wheeze  [de-identified] : 2- 3+ :LE edema to knees

## 2023-08-24 ENCOUNTER — NON-APPOINTMENT (OUTPATIENT)
Age: 63
End: 2023-08-24

## 2023-08-24 LAB
ANION GAP SERPL CALC-SCNC: 18 MMOL/L
BUN SERPL-MCNC: 18 MG/DL
CALCIUM SERPL-MCNC: 9.3 MG/DL
CHLORIDE SERPL-SCNC: 103 MMOL/L
CO2 SERPL-SCNC: 20 MMOL/L
CREAT SERPL-MCNC: 0.92 MG/DL
EGFR: 93 ML/MIN/1.73M2
GLUCOSE SERPL-MCNC: 121 MG/DL
NT-PROBNP SERPL-MCNC: 678 PG/ML
POTASSIUM SERPL-SCNC: 4.3 MMOL/L
SODIUM SERPL-SCNC: 141 MMOL/L

## 2023-08-29 ENCOUNTER — APPOINTMENT (OUTPATIENT)
Dept: CARDIOLOGY | Facility: CLINIC | Age: 63
End: 2023-08-29
Payer: COMMERCIAL

## 2023-08-29 VITALS
SYSTOLIC BLOOD PRESSURE: 118 MMHG | WEIGHT: 170.25 LBS | DIASTOLIC BLOOD PRESSURE: 72 MMHG | OXYGEN SATURATION: 95 % | HEIGHT: 62 IN | HEART RATE: 75 BPM | BODY MASS INDEX: 31.33 KG/M2

## 2023-08-29 PROCEDURE — 99215 OFFICE O/P EST HI 40 MIN: CPT

## 2023-08-29 NOTE — HISTORY OF PRESENT ILLNESS
[FreeTextEntry1] : 61 y/o M with PMH of HTN,  HLD, DM 2, NSTEMI, CAD s/p CABG x 3 on 3/29/2022 (LIMA-LAD, SVG-Ramus, SVG-PDA), post op AFib, Ischemic Cardiomyopathy / HFrEF (EF 35-40%  increased to 40-45% on last echo 7/2022),  with EF 50-55% on most rectn echo 4/25/2023,  s/p TKR on 3/18/22, HM showed PAF with RVR, NSVT, s/p AF ablation with Dr López on 10/13/2022 , seen 8/17/2023 with cough and LE edema, Lasix dose increased, pBNP 678, who presents today routine follow-up.  Pt declines  services  Pt reports he is feeling better, PCP started cough medication which has helped to resolve his cough   saw his pulmonologist yesterday, no new treatment plan, using "inhaler" and has a nebulizer  has lost 7-8 pounds since last visit, diuresing well States the LE edema is better, mostly resolves over night Denies chest pain, SOB/ESPINOZA, PND, orthopnea, LE edema, palpitations, lightheadedness, syncope He reports compliance with med  He states he is taking Losartan    Prior visit 8/17/2023 He reports  LE edema and cough/ wheeze , states he had pneumonia which was treated in [place], had a cough which worsened.  Pt states he had a chest CT 3-4 months ago which was clear and reports he saw his PCP 1 month ago- told he had bronchitis Pt denies dyspnea or chest pain, but he reports nocturnal cough ,sleeping on 2 pillows , denies PND, orthopnea but states the cough wakes him reports soreness in the chest when he coughs but denies ches pain, is using Albuterol nebulizer once daily , has appt with pulmonologist on 8/28/2023  Pt reports a history of recurrent pneumonia ( 3 episodes) , denies fever chills  taking Lasix 20 mg once daily with edema of the LE x 2 months Non complaint with Losartan 25 mg daily He is not weighing himself at home , today 178 pounds was 166 pounds in May    Prior visit 4/3/2023  Pt reports feeling well from a cardiac perspective reports being active in [place] despite not liking the warm temperatures, no cardiac symptoms to report  Currently has URI symptoms, afebrile, non productive cough, seen by PCP, negative for Flu/ COVID, supportive care Denies chest pain, SOB/ESPINOZA, PND, orthopnea, palpitations, lightheadedness, syncope Pt has h/o intermittent LE edema which resolves with leg elevation   Prior visit 10/3/2022  At last visit he reported LE edema and cough. Furosemide 20 mg daily and Losartan 12.5 mg daily were started. He is taking Furosemide but reports he never started Losartan, unclear why- states he was not aware of this medication. He has no c/o cough, SOB. or ESPINOZA. He is active walks all day - reports LE edema resolves overnight with leg elevation and increases as the day progresses. He denies any chest pain, palpitations, lightheadedness, near syncope, syncope. He has a BP monitor at home, checks BP at times - always in normal range He follows with Dr López. pt wore a HM which showed PAF with RVR, NSVT, he is scheduled for PAF ablation with Dr López on 10/13/22, VERONICA's on 10/10. Metoprolol was temporarily increased to 50 mg BID

## 2023-08-29 NOTE — REVIEW OF SYSTEMS
[Lower Ext Edema] : lower extremity edema [Negative] : Neurological [SOB] : no shortness of breath [Dyspnea on exertion] : not dyspnea during exertion [Chest Discomfort] : no chest discomfort [Leg Claudication] : no intermittent leg claudication [Palpitations] : no palpitations [Orthopnea] : no orthopnea [PND] : no PND [Syncope] : no syncope [Abdominal Pain] : no abdominal pain [Blood in stool] : no blood in stoo [Hematuria] : no hematuria [Easy Bleeding] : no tendency for easy bleeding

## 2023-08-29 NOTE — PHYSICAL EXAM
[Normal] : normal S1, S2, no murmur, no rub, no gallop [Good Air Entry] : good air entry [No Respiratory Distress] : no respiratory distress  [Wheeze ____] : wheeze [unfilled] [Soft] : abdomen soft [Non Tender] : non-tender [Normal Bowel Sounds] : normal bowel sounds [Normal Gait] : normal gait [No Cyanosis] : no cyanosis [No Clubbing] : no clubbing [No Varicosities] : no varicosities [Moves all extremities] : moves all extremities [No Focal Deficits] : no focal deficits [Normal Speech] : normal speech [Alert and Oriented] : alert and oriented [Normal memory] : normal memory [de-identified] : few scattered rhonchi, no wheeze and no rales  [de-identified] : 1- 2+  pitting LE edema bilaterally

## 2023-08-29 NOTE — CARDIOLOGY SUMMARY
[de-identified] : 8/17/2023 Sinus rhythm 73 rbbb  04/03/2023 Sinus rhythm 75 bpm, WNL, no acute ST T changes, QTc 418 10/03/2022 Sinus bradycardia 56 bpm 8/3/2022: Sinus  Rhythm  Low voltage in precordial leads. Old anterior infarct. 06/01/2022 NSR  5/9/2022: Possible atrial fibrillation @ 135 bpm Low voltage in limb leads. Poor R-wave progression -may be secondary to pulmonary disease consider old anterior infarct. Nonspecific ST depression + Diffuse nonspecific T-abnormality -Nondiagnostic.     [de-identified] : 05/17/22 7 d HM sinus, min HR 48, max 146 average 72, PAF: AF burden 0.46%, longest 5 min. 3 beat NSVT, total PVC burden <0.01%  [de-identified] : 4/25/2023 TTE  LVEF 50-55%, normal diastolic function, trace MR, asc aorta normal in size, aortic root at sinuses of valsalva  mildly dilated 4. 0 cm  7/8/2022: Summary: TTE: 7/8/2022: Left ventricular ejection fraction, by visual estimation, is 40 to 45%. Mildly to moderately decreased global left ventricular systolic function. Basal and mid inferolateral wall, apical septal segment, and basal inferior segment are abnormal as described above. Abnormal septal motion consistent with post-operative status. Normal right ventricular size and function. There is mild septal left ventricular hypertrophy. Spectral Doppler shows impaired relaxation pattern of left ventricular myocardial filling (Grade I diastolic dysfunction). Dilation of the sinuses of valsalva measuring 4.3 cm. There is no evidence of pericardial effusion. Compared to prior inpatient TTE done on 3/25/2022, the left ventricular function has increased from 35-40% to 40-45%.  3/26/2022: Left ventricular ejection fraction, by visual estimation, is 35 to 40%. Moderately decreased global left ventricular systolic function. Apical septal segment and apex are abnormal as described above. Spectral Doppler shows pseudonormal pattern of left ventricular myocardial filling (Grade II diastolic dysfunction). Elevated mean left atrial pressure. Normal left ventricular internal cavity size. Normal right ventricular size and function. Normal left atrial size. Normal right atrial size. Mild mitral annular calcification. Mild thickening of the anterior and posterior mitral valve leaflets. Mild mitral valve regurgitation. Sclerotic aortic valve with normal opening. Moderate pulmonic valve regurgitation. Mildly dilated Ao root at 3.9cm. There is no evidence of pericardial effusion.  [de-identified] : CORONARY VESSELS: The coronary circulation is right dominant.\par   \par  LM: -- Mid left main: There was a 40 % stenosis. The lesion was\par  moderately calcified.\par   \par  LAD: -- Proximal LAD: There was a 100 % stenosis. This lesion is a\par  chronic total occlusion.\par  -- Distal LAD: The distal vessel was supplied by collaterals from the RCA.\par   \par  CX: -- Mid circumflex: There was a 99 % stenosis.\par   \par  RI: -- Ramus intermedius: The vessel was medium to large sized. There\par  was a 80 % stenosis.\par   \par  RCA: -- RCA: The vessel was large sized (dominant).\par  -- Mid RCA: There was a 80 % stenosis.\par   \par  COMPLICATIONS: No complications occurred during the cath lab visit.\par  DIAGNOSTIC IMPRESSIONS: Multivessel CAD ( LM 40% ?ulcerated plaque, \par  LAD, sutotal mid CX occlusion and severe RCA stenosis)\par  Mildly elevated left ventrciular filling pressure ( PCWP 15 mmhg)\par   \par  DIAGNOSTIC RECOMMENDATIONS: Evaluate by CT surgery for CABG with LIMA to\par  LAD, SVG to OM, SVG to ramus ,SVG to distal RCA\par   \par   \par

## 2023-08-29 NOTE — ADDENDUM
[FreeTextEntry1] : Overall no significant fluid overload but still with trace pedal edema, need records of outside CXR, pBNP 670s and prior Echo no significant diastolic dysfunction, obtain nuclear stress test for ischemic eval.

## 2023-08-29 NOTE — DISCUSSION/SUMMARY
[FreeTextEntry1] : A/P 63 y/o M with PMH of HTN,  HLD, DM 2, NSTEMI, CAD s/p CABG x 3 on 3/29/2022 (LIMA-LAD, SVG-Ramus, SVG-PDA), post op AFib, Ischemic Cardiomyopathy / HFrEF (EF 35-40%  increased to 40-45% on last echo 7/2022),  with EF 50-55% on most rectn echo 4/25/2023,  s/p TKR on 3/18/22, HM showed PAF with RVR, NSVT, s/p AF ablation with Dr López on 10/13/2022 , seen 8/17/2023 with cough and LE edema, Lasix dose increased, pBNP 678, who presents today routine follow-up.  1. LE edema, low BNP ( 678) , slowly improving LE edema, no rales on exam and no sig JVD, recent TTE with normal diastolic function and EF 50-55% improved from prior,  have requested CXR and CT from pulmonology office reportedly to be faxed today. O2 sat 95%, pt in no respiratory distress, denies dyspnea or chest pain,  continue Furosemide 40 mg daily reports compliance with Eliquis. Nuclear stress test was ordered at last visit not yet scheduled- needs to be scheduled today. Pt reports he is now taking losartan- will recheck BMP next week, may be able to come off K supplementation. 2. CAD s/p CABG x 3  3/2022, HFrEF ischemic CM EF 50-55%, EF improved on last TTE, ekg showed new RBBB, continues to deny dyspnea and chest pain,  pharm nuclear stress test for assessment of ischemis is ordered.  3. Hyperlipidemia:  LDL 86, continue Atorvastatin 40 mg daily , recheck lipids next visit  4.  PAF s/p AF ablation with Dr López 10/13//22, (Chads- vasc 4). In sinus rhythm. Continue Metoprolol tartrate 25 mg BID, Eliquis 5 mg BID, denies any bleeding episodes. MCOT planned as per EP     Follow-up with Dr. Woods in 1 month  Pt was discussed with , seen and examined by Dr. Parker Carter, JUAN J

## 2023-09-08 ENCOUNTER — NON-APPOINTMENT (OUTPATIENT)
Age: 63
End: 2023-09-08

## 2023-09-08 LAB
ANION GAP SERPL CALC-SCNC: 11 MMOL/L
BUN SERPL-MCNC: 19 MG/DL
CALCIUM SERPL-MCNC: 9.1 MG/DL
CHLORIDE SERPL-SCNC: 103 MMOL/L
CO2 SERPL-SCNC: 24 MMOL/L
CREAT SERPL-MCNC: 0.85 MG/DL
EGFR: 98 ML/MIN/1.73M2
GLUCOSE SERPL-MCNC: 117 MG/DL
POTASSIUM SERPL-SCNC: 4.3 MMOL/L
SODIUM SERPL-SCNC: 138 MMOL/L

## 2023-09-19 ENCOUNTER — APPOINTMENT (OUTPATIENT)
Dept: CARDIOLOGY | Facility: CLINIC | Age: 63
End: 2023-09-19
Payer: COMMERCIAL

## 2023-09-19 ENCOUNTER — APPOINTMENT (OUTPATIENT)
Dept: CARDIOLOGY | Facility: CLINIC | Age: 63
End: 2023-09-19

## 2023-09-19 PROCEDURE — A9500: CPT

## 2023-09-19 PROCEDURE — 93015 CV STRESS TEST SUPVJ I&R: CPT

## 2023-09-19 PROCEDURE — 78452 HT MUSCLE IMAGE SPECT MULT: CPT

## 2023-09-20 ENCOUNTER — NON-APPOINTMENT (OUTPATIENT)
Age: 63
End: 2023-09-20

## 2023-10-07 NOTE — DISCHARGE NOTE NURSING/CASE MANAGEMENT/SOCIAL WORK - 
ADDITIONAL INFORMATION
36.5
pre-op covid swab appt info for 3/14. Pt. states he does not have card, requested that he fax copy of card PST fax number provided and pt. agreed. Denies desire for booster, advised to f/u with PCP and pt. agreed. 3/1/22 10:54 Received  COVID vaccine card copy and placed on chart. Pawan MS, FNP-BC

## 2023-10-23 ENCOUNTER — NON-APPOINTMENT (OUTPATIENT)
Age: 63
End: 2023-10-23

## 2023-11-09 ENCOUNTER — NON-APPOINTMENT (OUTPATIENT)
Age: 63
End: 2023-11-09

## 2023-11-09 ENCOUNTER — APPOINTMENT (OUTPATIENT)
Dept: CARDIOLOGY | Facility: CLINIC | Age: 63
End: 2023-11-09
Payer: COMMERCIAL

## 2023-11-09 VITALS — DIASTOLIC BLOOD PRESSURE: 58 MMHG | SYSTOLIC BLOOD PRESSURE: 116 MMHG

## 2023-11-09 VITALS
DIASTOLIC BLOOD PRESSURE: 52 MMHG | BODY MASS INDEX: 32.39 KG/M2 | HEIGHT: 62 IN | HEART RATE: 76 BPM | OXYGEN SATURATION: 99 % | WEIGHT: 176 LBS | SYSTOLIC BLOOD PRESSURE: 90 MMHG

## 2023-11-09 DIAGNOSIS — E78.5 HYPERLIPIDEMIA, UNSPECIFIED: ICD-10-CM

## 2023-11-09 DIAGNOSIS — R60.0 LOCALIZED EDEMA: ICD-10-CM

## 2023-11-09 PROCEDURE — 99214 OFFICE O/P EST MOD 30 MIN: CPT | Mod: 25

## 2023-11-09 PROCEDURE — 93000 ELECTROCARDIOGRAM COMPLETE: CPT

## 2023-11-13 ENCOUNTER — NON-APPOINTMENT (OUTPATIENT)
Age: 63
End: 2023-11-13

## 2023-11-13 LAB
ALBUMIN SERPL ELPH-MCNC: 4.2 G/DL
ALP BLD-CCNC: 139 U/L
ALT SERPL-CCNC: 12 U/L
ANION GAP SERPL CALC-SCNC: 12 MMOL/L
AST SERPL-CCNC: 26 U/L
BILIRUB SERPL-MCNC: 0.6 MG/DL
BUN SERPL-MCNC: 19 MG/DL
CALCIUM SERPL-MCNC: 9.3 MG/DL
CHLORIDE SERPL-SCNC: 106 MMOL/L
CHOLEST SERPL-MCNC: 111 MG/DL
CO2 SERPL-SCNC: 23 MMOL/L
CREAT SERPL-MCNC: 0.84 MG/DL
EGFR: 98 ML/MIN/1.73M2
GLUCOSE SERPL-MCNC: 140 MG/DL
HDLC SERPL-MCNC: 32 MG/DL
LDLC SERPL CALC-MCNC: 57 MG/DL
NONHDLC SERPL-MCNC: 79 MG/DL
POTASSIUM SERPL-SCNC: 4.4 MMOL/L
PROT SERPL-MCNC: 5.9 G/DL
SODIUM SERPL-SCNC: 142 MMOL/L
TRIGL SERPL-MCNC: 126 MG/DL

## 2023-11-22 ENCOUNTER — APPOINTMENT (OUTPATIENT)
Dept: ELECTROPHYSIOLOGY | Facility: CLINIC | Age: 63
End: 2023-11-22

## 2023-12-04 RX ORDER — APIXABAN 5 MG/1
5 TABLET, FILM COATED ORAL
Qty: 180 | Refills: 3 | Status: ACTIVE | COMMUNITY
Start: 1900-01-01 | End: 1900-01-01

## 2024-01-31 ENCOUNTER — NON-APPOINTMENT (OUTPATIENT)
Age: 64
End: 2024-01-31

## 2024-01-31 ENCOUNTER — APPOINTMENT (OUTPATIENT)
Dept: ELECTROPHYSIOLOGY | Facility: CLINIC | Age: 64
End: 2024-01-31
Payer: COMMERCIAL

## 2024-01-31 VITALS
SYSTOLIC BLOOD PRESSURE: 116 MMHG | OXYGEN SATURATION: 96 % | HEIGHT: 62 IN | BODY MASS INDEX: 32.2 KG/M2 | HEART RATE: 72 BPM | DIASTOLIC BLOOD PRESSURE: 72 MMHG | WEIGHT: 175 LBS

## 2024-01-31 PROCEDURE — 93000 ELECTROCARDIOGRAM COMPLETE: CPT

## 2024-01-31 PROCEDURE — 99214 OFFICE O/P EST MOD 30 MIN: CPT | Mod: 25

## 2024-01-31 NOTE — DISCUSSION/SUMMARY
[FreeTextEntry1] : In summary, the patient is a 63-year-old male with a history of CAD s/p CABG, HTN, DM, LV dysfunction (now normalized) and paroxysmal atrial fibrillation s/p ablation in 10/2022. He has done well post-ablation with no recurrence since. He will continue eliquis 5 mg bid and metoprolol. f/u in one year.  [EKG obtained to assist in diagnosis and management of assessed problem(s)] : EKG obtained to assist in diagnosis and management of assessed problem(s)

## 2024-01-31 NOTE — HISTORY OF PRESENT ILLNESS
[FreeTextEntry1] : The patient is a 63 year old male here for follow up today. The patient has a history of CAD s/p CABG, HTN, DM, cardiomyopathy, and atrial fibrillation/flutter s/p ablation.   To summarize, the patient's post-CABG course was complicated by atrial fibrillation/flutter. Shortly after he was also noted to be in AF as an outpatient with spontaneous conversion to sinus rhythm (while on amiodarone). Amiodarone was stopped but he continued to experience palpitations with outpatient monitoring showing paroxysmal atrial fibrillation with RVR. Given failure of anti-arrhythmics and associated LV dysfunction (EF 35-40%), he underwent ablation for atrial fibrillation in, no significant arrhythmias. TTE performed 4/25/23 revealed EF improved to 50-55%. 10/2022 (PVI, CTI ablation). He wore an event monitor between 4/14/23-4/20/23 which revealed SR and no AF. TTE in 4/2023 reported an improved EF of 50-55%.   The patient presents for follow up today. The patient denies any symptoms of palpitations, shortness of breath, dizziness, chest pain, syncope or extremity edema. He is tolerating eliquis well.

## 2024-02-15 ENCOUNTER — APPOINTMENT (OUTPATIENT)
Dept: CARDIOLOGY | Facility: CLINIC | Age: 64
End: 2024-02-15
Payer: COMMERCIAL

## 2024-02-15 VITALS
DIASTOLIC BLOOD PRESSURE: 60 MMHG | HEART RATE: 75 BPM | WEIGHT: 173 LBS | SYSTOLIC BLOOD PRESSURE: 110 MMHG | OXYGEN SATURATION: 96 % | BODY MASS INDEX: 31.83 KG/M2 | HEIGHT: 62 IN

## 2024-02-15 DIAGNOSIS — I48.0 PAROXYSMAL ATRIAL FIBRILLATION: ICD-10-CM

## 2024-02-15 DIAGNOSIS — I50.20 UNSPECIFIED SYSTOLIC (CONGESTIVE) HEART FAILURE: ICD-10-CM

## 2024-02-15 DIAGNOSIS — I25.10 ATHEROSCLEROTIC HEART DISEASE OF NATIVE CORONARY ARTERY W/OUT ANGINA PECTORIS: ICD-10-CM

## 2024-02-15 DIAGNOSIS — I10 ESSENTIAL (PRIMARY) HYPERTENSION: ICD-10-CM

## 2024-02-15 DIAGNOSIS — M17.11 UNILATERAL PRIMARY OSTEOARTHRITIS, RIGHT KNEE: ICD-10-CM

## 2024-02-15 DIAGNOSIS — Z95.1 PRESENCE OF AORTOCORONARY BYPASS GRAFT: ICD-10-CM

## 2024-02-15 DIAGNOSIS — Z86.79 OTHER SPECIFIED POSTPROCEDURAL STATES: ICD-10-CM

## 2024-02-15 DIAGNOSIS — Z98.890 OTHER SPECIFIED POSTPROCEDURAL STATES: ICD-10-CM

## 2024-02-15 PROCEDURE — 93000 ELECTROCARDIOGRAM COMPLETE: CPT

## 2024-02-15 PROCEDURE — 99214 OFFICE O/P EST MOD 30 MIN: CPT | Mod: 25

## 2024-02-15 RX ORDER — METOPROLOL TARTRATE 25 MG/1
25 TABLET, FILM COATED ORAL TWICE DAILY
Qty: 180 | Refills: 1 | Status: ACTIVE | COMMUNITY
Start: 2022-10-25 | End: 1900-01-01

## 2024-02-15 RX ORDER — POTASSIUM CHLORIDE 1500 MG/1
20 TABLET, FILM COATED, EXTENDED RELEASE ORAL
Qty: 90 | Refills: 1 | Status: ACTIVE | COMMUNITY
Start: 2023-08-17 | End: 1900-01-01

## 2024-02-15 RX ORDER — PHENYLEPHRINE/HYDROCODONE/CPM 10-2.5-4
SYRUP ORAL
Refills: 0 | Status: DISCONTINUED | COMMUNITY
End: 2024-02-15

## 2024-02-15 RX ORDER — OMEPRAZOLE 20 MG/1
20 CAPSULE, DELAYED RELEASE ORAL DAILY
Qty: 90 | Refills: 1 | Status: ACTIVE | COMMUNITY
Start: 1900-01-01 | End: 1900-01-01

## 2024-02-15 RX ORDER — LOSARTAN POTASSIUM 25 MG/1
25 TABLET, FILM COATED ORAL
Qty: 90 | Refills: 1 | Status: DISCONTINUED | COMMUNITY
Start: 2022-10-03 | End: 2024-02-15

## 2024-02-15 RX ORDER — ADHESIVE TAPE 3"X 2.3 YD
50 MCG TAPE, NON-MEDICATED TOPICAL DAILY
Refills: 0 | Status: ACTIVE | COMMUNITY

## 2024-02-15 RX ORDER — FUROSEMIDE 40 MG/1
40 TABLET ORAL
Qty: 90 | Refills: 1 | Status: ACTIVE | COMMUNITY
Start: 2023-08-17 | End: 1900-01-01

## 2024-02-15 NOTE — PHYSICAL EXAM
Pt seen for ICU admission [Cane] : ambulates with cane [LE] : Sensory: Intact in bilateral lower extremities [ALL] : dorsalis pedis, posterior tibial, femoral, popliteal, and radial 2+ and symmetric bilaterally [Normal] : Alert and in no acute distress [Poor Appearance] : well-appearing [de-identified] : GENERAL APPEARANCE: Well nourished and hydrated, pleasant, alert, and oriented x 3. Appears their stated age. \par HEENT: Normocephalic, extraocular eye motion intact. Nasal septum midline. Oral cavity clear. External auditory canal clear. \par RESPIRATORY: Breath sounds clear and audible in all lobes. No wheezing, No accessory muscle use.\par CARDIOVASCULAR: No apparent abnormalities. No lower leg edema. No varicosities. Pedal pulses are palpable.\par NEUROLOGIC: Sensation is normal, no muscle weakness in the upper or lower extremities.\par DERMATOLOGIC: No apparent skin lesions, moist, warm, no rash.\par SPINE: Cervical spine appears normal and moves freely; thoracic spine appears normal and moves freely; lumbosacral spine appears normal and moves freely, normal, nontender.\par MUSCULOSKELETAL: Hands, wrists, and elbows are normal and move freely, shoulders are normal and move freely.  [de-identified] : Right knee exam shows no effusion, ROM 0-120 degrees.\par Left knee exam shows healed incision with no sign of infection. ROM 0-120 degrees. Normal swelling for 12 weeks post-op.  [de-identified] : 3V xray of the left knee done in the office today and reviewed by Dr. Marcos Ventura demonstrates s/p implants in good positioning with no evidence of wear, loosening, or subsidence.

## 2024-02-16 PROBLEM — M17.11 LOCALIZED PRIMARY OSTEOARTHRITIS OF LOWER LEG, RIGHT: Status: ACTIVE | Noted: 2022-06-08

## 2024-02-16 PROBLEM — Z98.890 S/P ABLATION OF ATRIAL FIBRILLATION: Status: ACTIVE | Noted: 2022-11-09

## 2024-02-16 PROBLEM — Z95.1 S/P CABG X 3: Status: ACTIVE | Noted: 2022-04-05

## 2024-02-16 PROBLEM — I25.10 3-VESSEL CAD: Status: ACTIVE | Noted: 2022-04-05

## 2024-02-16 PROBLEM — I48.0 PAROXYSMAL ATRIAL FIBRILLATION: Status: ACTIVE | Noted: 2022-04-01

## 2024-03-04 ENCOUNTER — NON-APPOINTMENT (OUTPATIENT)
Age: 64
End: 2024-03-04

## 2024-03-04 LAB
ANION GAP SERPL CALC-SCNC: 15 MMOL/L
BUN SERPL-MCNC: 16 MG/DL
CALCIUM SERPL-MCNC: 9.3 MG/DL
CHLORIDE SERPL-SCNC: 103 MMOL/L
CO2 SERPL-SCNC: 24 MMOL/L
CREAT SERPL-MCNC: 0.98 MG/DL
EGFR: 86 ML/MIN/1.73M2
GLUCOSE SERPL-MCNC: 144 MG/DL
NT-PROBNP SERPL-MCNC: 558 PG/ML
POTASSIUM SERPL-SCNC: 4.2 MMOL/L
SODIUM SERPL-SCNC: 143 MMOL/L

## 2024-03-05 NOTE — PHYSICAL THERAPY INITIAL EVALUATION ADULT - REHAB POTENTIAL, PT EVAL
No we usually do a urine test for pregnancy.  She can come in here and have 1 or she can get 1 at the drugstore   good, to achieve stated therapy goals

## 2024-03-06 RX ORDER — LOSARTAN POTASSIUM 25 MG/1
25 TABLET, FILM COATED ORAL
Qty: 45 | Refills: 1 | Status: ACTIVE | COMMUNITY
Start: 2024-03-06 | End: 1900-01-01

## 2024-05-13 ENCOUNTER — INPATIENT (INPATIENT)
Facility: HOSPITAL | Age: 64
LOS: 8 days | Discharge: HOME HEALTH | DRG: 189 | End: 2024-05-22
Attending: STUDENT IN AN ORGANIZED HEALTH CARE EDUCATION/TRAINING PROGRAM | Admitting: STUDENT IN AN ORGANIZED HEALTH CARE EDUCATION/TRAINING PROGRAM
Payer: SELF-PAY

## 2024-05-13 ENCOUNTER — APPOINTMENT (OUTPATIENT)
Dept: CARDIOLOGY | Facility: CLINIC | Age: 64
End: 2024-05-13

## 2024-05-13 VITALS
OXYGEN SATURATION: 94 % | SYSTOLIC BLOOD PRESSURE: 134 MMHG | RESPIRATION RATE: 18 BRPM | TEMPERATURE: 98 F | WEIGHT: 171.96 LBS | HEART RATE: 69 BPM | DIASTOLIC BLOOD PRESSURE: 73 MMHG

## 2024-05-13 DIAGNOSIS — Z96.652 PRESENCE OF LEFT ARTIFICIAL KNEE JOINT: Chronic | ICD-10-CM

## 2024-05-13 DIAGNOSIS — Z90.49 ACQUIRED ABSENCE OF OTHER SPECIFIED PARTS OF DIGESTIVE TRACT: Chronic | ICD-10-CM

## 2024-05-13 DIAGNOSIS — R06.02 SHORTNESS OF BREATH: ICD-10-CM

## 2024-05-13 DIAGNOSIS — I50.20 UNSPECIFIED SYSTOLIC (CONGESTIVE) HEART FAILURE: ICD-10-CM

## 2024-05-13 DIAGNOSIS — J96.01 ACUTE RESPIRATORY FAILURE WITH HYPOXIA: ICD-10-CM

## 2024-05-13 DIAGNOSIS — Z98.890 OTHER SPECIFIED POSTPROCEDURAL STATES: Chronic | ICD-10-CM

## 2024-05-13 LAB
ALBUMIN SERPL ELPH-MCNC: 3.4 G/DL — SIGNIFICANT CHANGE UP (ref 3.3–5.2)
ALP SERPL-CCNC: 110 U/L — SIGNIFICANT CHANGE UP (ref 40–120)
ALT FLD-CCNC: 11 U/L — SIGNIFICANT CHANGE UP
ANION GAP SERPL CALC-SCNC: 13 MMOL/L — SIGNIFICANT CHANGE UP (ref 5–17)
ANISOCYTOSIS BLD QL: SLIGHT — SIGNIFICANT CHANGE UP
APTT BLD: 34.6 SEC — SIGNIFICANT CHANGE UP (ref 24.5–35.6)
AST SERPL-CCNC: 26 U/L — SIGNIFICANT CHANGE UP
BASOPHILS # BLD AUTO: 0 K/UL — SIGNIFICANT CHANGE UP (ref 0–0.2)
BASOPHILS NFR BLD AUTO: 0 % — SIGNIFICANT CHANGE UP (ref 0–2)
BILIRUB SERPL-MCNC: 1 MG/DL — SIGNIFICANT CHANGE UP (ref 0.4–2)
BUN SERPL-MCNC: 20.2 MG/DL — HIGH (ref 8–20)
BURR CELLS BLD QL SMEAR: PRESENT — SIGNIFICANT CHANGE UP
CALCIUM SERPL-MCNC: 8.7 MG/DL — SIGNIFICANT CHANGE UP (ref 8.4–10.5)
CHLORIDE SERPL-SCNC: 103 MMOL/L — SIGNIFICANT CHANGE UP (ref 96–108)
CO2 SERPL-SCNC: 21 MMOL/L — LOW (ref 22–29)
CREAT SERPL-MCNC: 0.78 MG/DL — SIGNIFICANT CHANGE UP (ref 0.5–1.3)
EGFR: 100 ML/MIN/1.73M2 — SIGNIFICANT CHANGE UP
ELLIPTOCYTES BLD QL SMEAR: SLIGHT — SIGNIFICANT CHANGE UP
EOSINOPHIL # BLD AUTO: 0.23 K/UL — SIGNIFICANT CHANGE UP (ref 0–0.5)
EOSINOPHIL NFR BLD AUTO: 2.6 % — SIGNIFICANT CHANGE UP (ref 0–6)
FLUAV AG NPH QL: SIGNIFICANT CHANGE UP
FLUBV AG NPH QL: SIGNIFICANT CHANGE UP
GAS PNL BLDA: SIGNIFICANT CHANGE UP
GIANT PLATELETS BLD QL SMEAR: PRESENT — SIGNIFICANT CHANGE UP
GLUCOSE BLDC GLUCOMTR-MCNC: 166 MG/DL — HIGH (ref 70–99)
GLUCOSE SERPL-MCNC: 100 MG/DL — HIGH (ref 70–99)
HCT VFR BLD CALC: 30.6 % — LOW (ref 39–50)
HGB BLD-MCNC: 8.3 G/DL — LOW (ref 13–17)
HYPOCHROMIA BLD QL: SLIGHT — SIGNIFICANT CHANGE UP
INR BLD: 1.36 RATIO — HIGH (ref 0.85–1.18)
LYMPHOCYTES # BLD AUTO: 0.61 K/UL — LOW (ref 1–3.3)
LYMPHOCYTES # BLD AUTO: 7 % — LOW (ref 13–44)
MACROCYTES BLD QL: SLIGHT — SIGNIFICANT CHANGE UP
MANUAL SMEAR VERIFICATION: SIGNIFICANT CHANGE UP
MCHC RBC-ENTMCNC: 19.9 PG — LOW (ref 27–34)
MCHC RBC-ENTMCNC: 27.1 GM/DL — LOW (ref 32–36)
MCV RBC AUTO: 73.2 FL — LOW (ref 80–100)
MICROCYTES BLD QL: SIGNIFICANT CHANGE UP
MONOCYTES # BLD AUTO: 0.53 K/UL — SIGNIFICANT CHANGE UP (ref 0–0.9)
MONOCYTES NFR BLD AUTO: 6.1 % — SIGNIFICANT CHANGE UP (ref 2–14)
NEUTROPHILS # BLD AUTO: 7.34 K/UL — SIGNIFICANT CHANGE UP (ref 1.8–7.4)
NEUTROPHILS NFR BLD AUTO: 84.3 % — HIGH (ref 43–77)
NT-PROBNP SERPL-SCNC: 946 PG/ML — HIGH (ref 0–300)
PLAT MORPH BLD: NORMAL — SIGNIFICANT CHANGE UP
PLATELET # BLD AUTO: 155 K/UL — SIGNIFICANT CHANGE UP (ref 150–400)
POIKILOCYTOSIS BLD QL AUTO: SLIGHT — SIGNIFICANT CHANGE UP
POLYCHROMASIA BLD QL SMEAR: SLIGHT — SIGNIFICANT CHANGE UP
POTASSIUM SERPL-MCNC: 4.1 MMOL/L — SIGNIFICANT CHANGE UP (ref 3.5–5.3)
POTASSIUM SERPL-SCNC: 4.1 MMOL/L — SIGNIFICANT CHANGE UP (ref 3.5–5.3)
PROT SERPL-MCNC: 5.6 G/DL — LOW (ref 6.6–8.7)
PROTHROM AB SERPL-ACNC: 15 SEC — HIGH (ref 9.5–13)
RBC # BLD: 4.18 M/UL — LOW (ref 4.2–5.8)
RBC # FLD: 20.3 % — HIGH (ref 10.3–14.5)
RBC BLD AUTO: ABNORMAL
RSV RNA NPH QL NAA+NON-PROBE: SIGNIFICANT CHANGE UP
SARS-COV-2 RNA SPEC QL NAA+PROBE: SIGNIFICANT CHANGE UP
SODIUM SERPL-SCNC: 137 MMOL/L — SIGNIFICANT CHANGE UP (ref 135–145)
TROPONIN T, HIGH SENSITIVITY RESULT: 22 NG/L — SIGNIFICANT CHANGE UP (ref 0–51)
TROPONIN T, HIGH SENSITIVITY RESULT: 23 NG/L — SIGNIFICANT CHANGE UP (ref 0–51)
WBC # BLD: 8.71 K/UL — SIGNIFICANT CHANGE UP (ref 3.8–10.5)
WBC # FLD AUTO: 8.71 K/UL — SIGNIFICANT CHANGE UP (ref 3.8–10.5)

## 2024-05-13 PROCEDURE — 99223 1ST HOSP IP/OBS HIGH 75: CPT

## 2024-05-13 PROCEDURE — 99223 1ST HOSP IP/OBS HIGH 75: CPT | Mod: GC

## 2024-05-13 PROCEDURE — 99285 EMERGENCY DEPT VISIT HI MDM: CPT

## 2024-05-13 PROCEDURE — 71045 X-RAY EXAM CHEST 1 VIEW: CPT | Mod: 26

## 2024-05-13 RX ORDER — APIXABAN 2.5 MG/1
5 TABLET, FILM COATED ORAL EVERY 12 HOURS
Refills: 0 | Status: DISCONTINUED | OUTPATIENT
Start: 2024-05-13 | End: 2024-05-16

## 2024-05-13 RX ORDER — MAGNESIUM SULFATE 500 MG/ML
2 VIAL (ML) INJECTION ONCE
Refills: 0 | Status: COMPLETED | OUTPATIENT
Start: 2024-05-13 | End: 2024-05-13

## 2024-05-13 RX ORDER — AZELASTINE 137 UG/1
2 SPRAY, METERED NASAL
Qty: 0 | Refills: 0 | DISCHARGE

## 2024-05-13 RX ORDER — ONDANSETRON 8 MG/1
4 TABLET, FILM COATED ORAL EVERY 8 HOURS
Refills: 0 | Status: DISCONTINUED | OUTPATIENT
Start: 2024-05-13 | End: 2024-05-22

## 2024-05-13 RX ORDER — ACETAMINOPHEN 500 MG
650 TABLET ORAL EVERY 6 HOURS
Refills: 0 | Status: DISCONTINUED | OUTPATIENT
Start: 2024-05-13 | End: 2024-05-22

## 2024-05-13 RX ORDER — FUROSEMIDE 40 MG
40 TABLET ORAL
Refills: 0 | Status: DISCONTINUED | OUTPATIENT
Start: 2024-05-13 | End: 2024-05-17

## 2024-05-13 RX ORDER — CEFTRIAXONE 500 MG/1
1000 INJECTION, POWDER, FOR SOLUTION INTRAMUSCULAR; INTRAVENOUS ONCE
Refills: 0 | Status: DISCONTINUED | OUTPATIENT
Start: 2024-05-13 | End: 2024-05-13

## 2024-05-13 RX ORDER — METFORMIN HYDROCHLORIDE 850 MG/1
1 TABLET ORAL
Qty: 0 | Refills: 0 | DISCHARGE

## 2024-05-13 RX ORDER — PANTOPRAZOLE SODIUM 20 MG/1
40 TABLET, DELAYED RELEASE ORAL
Refills: 0 | Status: DISCONTINUED | OUTPATIENT
Start: 2024-05-13 | End: 2024-05-22

## 2024-05-13 RX ORDER — AZITHROMYCIN 500 MG/1
500 TABLET, FILM COATED ORAL EVERY 24 HOURS
Refills: 0 | Status: DISCONTINUED | OUTPATIENT
Start: 2024-05-13 | End: 2024-05-21

## 2024-05-13 RX ORDER — IPRATROPIUM/ALBUTEROL SULFATE 18-103MCG
3 AEROSOL WITH ADAPTER (GRAM) INHALATION
Refills: 0 | Status: COMPLETED | OUTPATIENT
Start: 2024-05-13 | End: 2024-05-13

## 2024-05-13 RX ORDER — METOPROLOL TARTRATE 50 MG
1 TABLET ORAL
Refills: 0 | DISCHARGE

## 2024-05-13 RX ORDER — ATORVASTATIN CALCIUM 80 MG/1
40 TABLET, FILM COATED ORAL AT BEDTIME
Refills: 0 | Status: DISCONTINUED | OUTPATIENT
Start: 2024-05-13 | End: 2024-05-22

## 2024-05-13 RX ORDER — FUROSEMIDE 40 MG
80 TABLET ORAL ONCE
Refills: 0 | Status: COMPLETED | OUTPATIENT
Start: 2024-05-13 | End: 2024-05-13

## 2024-05-13 RX ORDER — ASPIRIN/CALCIUM CARB/MAGNESIUM 324 MG
81 TABLET ORAL DAILY
Refills: 0 | Status: DISCONTINUED | OUTPATIENT
Start: 2024-05-13 | End: 2024-05-22

## 2024-05-13 RX ORDER — CHOLECALCIFEROL (VITAMIN D3) 125 MCG
1 CAPSULE ORAL
Refills: 0 | DISCHARGE

## 2024-05-13 RX ORDER — CHOLECALCIFEROL (VITAMIN D3) 125 MCG
2000 CAPSULE ORAL DAILY
Refills: 0 | Status: DISCONTINUED | OUTPATIENT
Start: 2024-05-13 | End: 2024-05-22

## 2024-05-13 RX ORDER — LANOLIN ALCOHOL/MO/W.PET/CERES
3 CREAM (GRAM) TOPICAL AT BEDTIME
Refills: 0 | Status: DISCONTINUED | OUTPATIENT
Start: 2024-05-13 | End: 2024-05-22

## 2024-05-13 RX ORDER — ATORVASTATIN CALCIUM 80 MG/1
1 TABLET, FILM COATED ORAL
Qty: 0 | Refills: 0 | DISCHARGE

## 2024-05-13 RX ORDER — ATORVASTATIN CALCIUM 80 MG/1
1 TABLET, FILM COATED ORAL
Refills: 0 | DISCHARGE

## 2024-05-13 RX ORDER — CEFTRIAXONE 500 MG/1
1000 INJECTION, POWDER, FOR SOLUTION INTRAMUSCULAR; INTRAVENOUS ONCE
Refills: 0 | Status: COMPLETED | OUTPATIENT
Start: 2024-05-13 | End: 2024-05-13

## 2024-05-13 RX ORDER — METOPROLOL TARTRATE 50 MG
25 TABLET ORAL
Refills: 0 | Status: DISCONTINUED | OUTPATIENT
Start: 2024-05-13 | End: 2024-05-22

## 2024-05-13 RX ORDER — FLUTICASONE FUROATE, UMECLIDINIUM BROMIDE AND VILANTEROL TRIFENATATE 200; 62.5; 25 UG/1; UG/1; UG/1
1 POWDER RESPIRATORY (INHALATION)
Refills: 0 | DISCHARGE

## 2024-05-13 RX ADMIN — Medication 3 MILLILITER(S): at 13:20

## 2024-05-13 RX ADMIN — AZITHROMYCIN 255 MILLIGRAM(S): 500 TABLET, FILM COATED ORAL at 21:32

## 2024-05-13 RX ADMIN — Medication 100 MILLIGRAM(S): at 21:52

## 2024-05-13 RX ADMIN — Medication 3 MILLILITER(S): at 14:11

## 2024-05-13 RX ADMIN — Medication 3 MILLILITER(S): at 13:23

## 2024-05-13 RX ADMIN — CEFTRIAXONE 1000 MILLIGRAM(S): 500 INJECTION, POWDER, FOR SOLUTION INTRAMUSCULAR; INTRAVENOUS at 21:32

## 2024-05-13 RX ADMIN — ATORVASTATIN CALCIUM 40 MILLIGRAM(S): 80 TABLET, FILM COATED ORAL at 21:52

## 2024-05-13 RX ADMIN — Medication 125 MILLIGRAM(S): at 13:21

## 2024-05-13 RX ADMIN — Medication 80 MILLIGRAM(S): at 14:10

## 2024-05-13 NOTE — ED PROVIDER NOTE - OBJECTIVE STATEMENT
64 y/o male w/PMHx of CAD s/p CABG, Ischemic Cardiomyopathy, pAfib on Eliquis, Type 2DM, HTN, HLD, Colon CA s/p resection, Prostate CA presenting to the ER w/one month of cough and shortness of breath. Pt was at Dr. López's office (sees Maribel for EP and Chuck for cardiology) and sent here as he was hypoxic into the 80s. On arrival he is c/o productive (white phlegm) cough and some difficulty breathing. Denies fever/chills, CP, abd pain, leg pain. Does have swelling in his legs that is unchanged from his baseline, takes furosemide 40mg.

## 2024-05-13 NOTE — ED PROVIDER NOTE - PHYSICAL EXAMINATION
General: tachypneic, slightly ill appearing  Head: NC, AT  EENT: EOMI, no scleral icterus  Cardiac: RRR, no apparent murmurs, 1+ pitting edema b/l  Respiratory: diffuse wheezes b/l on exam, tachypneic, 90% on 2L NC  Abdomen: soft, ND, NT, nonperitonitic  MSK/Vascular: full ROM, soft compartments, warm extremities, 2+ peripheral pulses b/l  Neuro: AAOx3, sensation to light touch intact  Psych: calm, cooperative

## 2024-05-13 NOTE — ED ADULT TRIAGE NOTE - CHIEF COMPLAINT QUOTE
CARLYLE HOLLOWAY    Patient Age: 53 year old   Refill request by: Phone.  Caller informed to check with the pharmacy later for their refill.  If problems arise, we will contact patient.  Refill to be: ePrescribed to  [id number] - [place], [place] - [address] AT [facility]    Medication requested to be refilled:   sertraline (ZOLOFT) 50 MG tablet     Patient states that she has been out of medication for the last 3-4 days.    Before refilling the requested medication an update is needed per the covering provider-  Do you feel like the medication is controlling your symptoms? YES  Have you been taking the medication as instructed? YES patient states that she has been out of medication for about 4 days since she ran out of medication  Are you experiencing any side effects? NO  Do you have any active suicidal, homicidal, violent ideations or plan? no  Do you feel safe? yes      Due to Dr. Nicole no longer being a provider at [facility], do you have an appointment scheduled to establish care with a new psychiatrist? YES  If yes, when and with who is your appointment scheduled for?  3/23/20  Dr. Donato  If no, do you have a plan to get future refills from PCP? no    If patient does not have an appointment scheduled and is not planning on getting medication from PCP, notify patient of the following:  • You will need to establish care with a new psychiatry provider or request that your PCP take over management of your psychiatric medications for future refills.    Patient notified that this refill request will be reviewed by the covering provider YES no: YES      Please route patient response to Dr. Shahram Ervin for review.      Patient notified refills can take 72 hours to process: yes    Patient's next appointment is scheduled for 3/23/20 Dr. Donato    Patient's last appointment with this Provider was 2/28/19 Dr. Nicole         WEIGHT AND HEIGHT:   Wt Readings from Last 1  Encounters:   09/04/19 112 kg (247 lb)     Ht Readings from Last 1 Encounters:   09/04/19 5' 7\" (1.702 m)     BMI Readings from Last 1 Encounters:   09/04/19 38.69 kg/m²       ALLERGIES:  Guaifenesin; Irbesartan; and Lisinopril  Current Outpatient Medications   Medication   • rosuvastatin (CRESTOR) 40 MG tablet   • Naltrexone-buPROPion HCl ER (CONTRAVE) 8-90 MG TABLET SR 12 HR   • levothyroxine (SYNTHROID, LEVOTHROID) 75 MCG tablet   • sertraline (ZOLOFT) 50 MG tablet   • trazodone (DESYREL) 150 MG tablet   • albuterol 108 (90 Base) MCG/ACT inhaler   • aspirin (ECOTRIN) 81 MG EC tablet   • carvedilol (COREG) 25 MG tablet   • Cholecalciferol (VITAMIN D3) 1000 units capsule   • nitroGLYcerin (NITROSTAT) 0.4 MG sublingual tablet   • losartan (COZAAR) 25 MG tablet     No current facility-administered medications for this visit.        ROUTING: Patient's physician/staff        PCP: Ramses Sidhu MD         INS: Payor: [employer] / Plan: [employer] / Product Type: [employer] Risk   PATIENT ADDRESS:  [address] [place] [address] [place] [address]     C/O SOB at doctors office today when he was found to have low Spo2 in the 80s, placed on 4L NC. PT had chest xray showing infiltrates. +cough and chest pain with coughing. HX of CABG 1 year ago.

## 2024-05-13 NOTE — ED ADULT NURSE NOTE - OBJECTIVE STATEMENT
a&ox4 c/o worsening SOB/ wet productive hacking cough x 1 month  went to MD office  today and sent for "low oxygen number on room air"; room aire sat 84%, hx COPD, CHF, EF 35%, CABG x 1 yr ago,.   on eliquis. + deep wet productive cough, white sputum, 3+ BLE upper and lower extremity edema.  + wheeze + rales + RR > 30 on arrival

## 2024-05-13 NOTE — CONSULT NOTE ADULT - PROBLEM SELECTOR RECOMMENDATION 9
62yo with coughing and worsening sob x 1 month.   Sent to [facility] for hypoxic into the 80's, and CXR with opacities, infiltrates and congestion - pending official read.    Placed on bipap - given Lasix 80mg IV and seen by ICU attending.  [facility] 3/28/2022 - INDICATIONS: Initial NSTEMI. Acute systolic congestive heart failure - with MVD CAD noted - CABG x 3 3/29/22  Patient recently restarted on losartan - in which is was stopped due to coughing which may have contributed to present coughing   CXR with opacities and congestion noted - pending official read     Given 80 Lasix stat in ED with very little urine  Bladder scan  Ct chest  Ct monitor BNP,   Trend - Troponin and ekg  EKG with no acute change noted  ABD with respiratory acidosis noted  PH 7.48, cof 32, Pa 02 148, bicar 24, and Sats 99%. 64yo with coughing and worsening sob x 1 month.   Sent to [facility] for hypoxic into the 80's, and CXR with opacities, infiltrates and congestion - pending official read.    Placed on bipap - given Lasix 80mg IV and seen by ICU attending.  [facility] 3/28/2022 - INDICATIONS: Initial NSTEMI. Acute systolic congestive heart failure - with MVD CAD noted - CABG x 3 3/29/22  Coughing and sob most likely multiple etiologies.    Patient recently restarted on losartan - in which is was stopped due to coughing which may have contributed to present coughing   CXR with opacities and congestion noted - pending official read  patient also hx of PN 2023     Given 80 Lasix stat in ED with very little urine  Bladder scan  Ct chest  Ct monitor BNP,   Trend - Troponin and ekg  EKG with no acute change noted  ABG with respiratory acidosis noted  PH 7.48, cof 32, Pa 02 148, bicar 24, and Sats 99%.  Currently on bipap - seen by ICU for consult 62yo with coughing and worsening sob x 1 month.   Sent to [facility] for hypoxic into the 80's, and CXR with opacities, infiltrates and congestion - pending official read.    Placed on bipap - given Lasix 80mg IV and seen by ICU attending.  [facility] 3/28/2022 - INDICATIONS: Initial NSTEMI. Acute systolic congestive heart failure - with MVD CAD noted - CABG x 3 3/29/22  Coughing and sob most likely multiple etiologies.    Patient recently restarted on losartan - in which is was stopped due to coughing which may have contributed to present coughing   Patient also eat chineses food last night  CXR with opacities and congestion noted - pending official read  patient also hx of PN 2023     Given 80 Lasix stat in ED with very little urine  Bladder scan  Ct chest  Ct monitor BNP,   Trend - Troponin and ekg  EKG with no acute change noted  ABG with respiratory acidosis noted  PH 7.48, cof 32, Pa 02 148, bicar 24, and Sats 99%.  Currently on bipap - seen by ICU for consult 64yo with coughing and worsening sob x 1 month.   Sent to [facility] for hypoxic into the 80's, and CXR with opacities, infiltrates and congestion - pending official read.    Placed on bipap - given Lasix 80mg IV and seen by ICU attending.  [facility] 3/28/2022 - INDICATIONS: Initial NSTEMI. Acute systolic congestive heart failure - with MVD CAD noted - CABG x 3 3/29/22  Coughing and sob most likely multiple etiologies.    Patient recently restarted on losartan - in which is was stopped due to coughing which may have contributed to present coughing   Patient also eat chineses food last night  CXR with opacities and congestion noted - pending official read  patient also hx of PN 2023     Given 80 Lasix stat in ED with very little urine  Bladder scan  Ct chest  Ct monitor BNP,   Trend - Troponin and ekg  EKG with no acute change noted  ABG with respiratory acidosis noted  PH 7.48, cof 32, Pa 02 148, bicar 24, and Sats 99%.  Currently on bipap - seen by ICU for consult  NST before discharge when stable.

## 2024-05-13 NOTE — CONSULT NOTE ADULT - NS ATTEND AMEND GEN_ALL_CORE FT
congestive heart failure .  ate chinese food last night.  complaitn with meds. taking diuresis.   congestive heart failure requiring NIPPV.    lasix 40 IV BID.   will follow up tomorrow . Transthoracic echocardiogram congestive heart failure .  ate chinese food last night.  complaitn with meds. taking diuresis.   congestive heart failure requiring NIPPV.    lasix 40 IV BID.   will follow up tomorrow . Transthoracic echocardiogram   ischemic work up when optimized

## 2024-05-13 NOTE — ED ADULT NURSE NOTE - CHPI ED NUR SYMPTOMS POS
CHEST CONGESTION/CHEST PAIN/COUGH/DIFFICULTY BREATHING/DYSPNEA ON EXERTION/NASAL CONGESTION/SHORTNESS OF BREATH/WHEEZING

## 2024-05-13 NOTE — CONSULT NOTE ADULT - SUBJECTIVE AND OBJECTIVE BOX
Patient is a 63y old  Male who presents with a chief complaint of     HPI: 62 y/o male w/PMHx of CAD s/p CABG, Ischemic Cardiomyopathy, pAfib on Eliquis, Type 2DM, HTN, HLD, Colon CA s/p resection, Prostate CA presenting to the ER w/one month of cough and shortness of breath. Pt was at Dr. López's office (sees Maribel for EP and Chuck for cardiology) and sent here as he was hypoxic into the 80s. On arrival he is c/o productive (white phlegm) cough and some difficulty breathing. Denies fever/chills, CP, abd pain, leg pain. Does have swelling in his legs that is unchanged from his baseline, takes furosemide 40mg.      PAST MEDICAL & SURGICAL HISTORY:  Hypertension      Hyperlipidemia      COVID-19 vaccine series completed      Colon cancer      Prostate cancer  Treated with Radiation 2015      Type 2 diabetes mellitus      Unilateral primary osteoarthritis, left knee      Paroxysmal atrial fibrillation      CAD (coronary atherosclerotic disease)      S/P colon resection      S/P hernia repair      S/P total knee replacement, left        Medications:        ICU Vital Signs Last 24 Hrs  T(C): 36.8 (13 May 2024 11:51), Max: 36.8 (13 May 2024 11:51)  T(F): 98.2 (13 May 2024 11:51), Max: 98.2 (13 May 2024 11:51)  HR: 78 (13 May 2024 14:33) (69 - 78)  BP: 136/79 (13 May 2024 14:33) (134/73 - 136/79)  BP(mean): --  ABP: --  ABP(mean): --  RR: 25 (13 May 2024 14:33) (18 - 25)  SpO2: 100% (13 May 2024 14:33) (94% - 100%)    O2 Parameters below as of 13 May 2024 14:33  Patient On (Oxygen Delivery Method): BiPAP/CPAP        ABG - ( 13 May 2024 14:25 )  pH, Arterial: 7.480 pH, Blood: x     /  pCO2: 32    /  pO2: 143   / HCO3: 24    / Base Excess: 0.3   /  SaO2: 99.2          I&O's Detail        LABS:                        8.3    8.71  )-----------( 155      ( 13 May 2024 13:26 )             30.6     05-13    137  |  103  |  20.2<H>  ----------------------------<  100<H>  4.1   |  21.0<L>  |  0.78    Ca    8.7      13 May 2024 13:26    TPro  5.6<L>  /  Alb  3.4  /  TBili  1.0  /  DBili  x   /  AST  26  /  ALT  11  /  AlkPhos  110  05-13          CAPILLARY BLOOD GLUCOSE        PT/INR - ( 13 May 2024 13:27 )   PT: 15.0 sec;   INR: 1.36 ratio         PTT - ( 13 May 2024 13:27 )  PTT:34.6 sec  Urinalysis Basic - ( 13 May 2024 13:26 )    Color: x / Appearance: x / SG: x / pH: x  Gluc: 100 mg/dL / Ketone: x  / Bili: x / Urobili: x   Blood: x / Protein: x / Nitrite: x   Leuk Esterase: x / RBC: x / WBC x   Sq Epi: x / Non Sq Epi: x / Bacteria: x      CULTURES:      Physical Examination:    General: No acute distress.      HEENT: Pupils equal, reactive to light.  Symmetric.    PULM: Diffuse wheezing bilaterally    NECK: Supple, no lymphadenopathy, trachea midline    CVS: Regular rate and rhythm, no murmurs, rubs, or gallops, 1+Pedal edema bilaterally    ABD: Soft, nondistended, nontender, normoactive bowel sounds, no masses    EXT: No edema, nontender    SKIN: Warm and well perfused, no rashes noted.    NEURO: Alert, oriented, interactive, nonfocal    DEVICES: BiPAP Patient is a 63y old  Male who presents with a chief complaint of     HPI: 64 y/o male w/PMHx of CAD s/p CABG, Ischemic Cardiomyopathy, pAfib on Eliquis, Type 2DM, HTN, HLD, Colon CA s/p resection, Prostate CA presenting to the ER w/one month of cough and shortness of breath. Pt was at Dr. López's office (sees Maribel for EP and Chuck for cardiology) and sent here as he was hypoxic into the 80s. On arrival he is c/o productive (white phlegm) cough and some difficulty breathing. Denies fever/chills, CP, abd pain, leg pain. Does have swelling in his legs that is unchanged from his baseline, takes furosemide 40mg.      PAST MEDICAL & SURGICAL HISTORY:  Hypertension      Hyperlipidemia      COVID-19 vaccine series completed      Colon cancer      Prostate cancer  Treated with Radiation 2015      Type 2 diabetes mellitus      Unilateral primary osteoarthritis, left knee      Paroxysmal atrial fibrillation      CAD (coronary atherosclerotic disease)      S/P colon resection      S/P hernia repair      S/P total knee replacement, left        Medications:        ICU Vital Signs Last 24 Hrs  T(C): 36.8 (13 May 2024 11:51), Max: 36.8 (13 May 2024 11:51)  T(F): 98.2 (13 May 2024 11:51), Max: 98.2 (13 May 2024 11:51)  HR: 78 (13 May 2024 14:33) (69 - 78)  BP: 136/79 (13 May 2024 14:33) (134/73 - 136/79)  BP(mean): --  ABP: --  ABP(mean): --  RR: 25 (13 May 2024 14:33) (18 - 25)  SpO2: 100% (13 May 2024 14:33) (94% - 100%)    O2 Parameters below as of 13 May 2024 14:33  Patient On (Oxygen Delivery Method): BiPAP/CPAP        ABG - ( 13 May 2024 14:25 )  pH, Arterial: 7.480 pH, Blood: x     /  pCO2: 32    /  pO2: 143   / HCO3: 24    / Base Excess: 0.3   /  SaO2: 99.2          I&O's Detail        LABS:                        8.3    8.71  )-----------( 155      ( 13 May 2024 13:26 )             30.6     05-13    137  |  103  |  20.2<H>  ----------------------------<  100<H>  4.1   |  21.0<L>  |  0.78    Ca    8.7      13 May 2024 13:26    TPro  5.6<L>  /  Alb  3.4  /  TBili  1.0  /  DBili  x   /  AST  26  /  ALT  11  /  AlkPhos  110  05-13          CAPILLARY BLOOD GLUCOSE        PT/INR - ( 13 May 2024 13:27 )   PT: 15.0 sec;   INR: 1.36 ratio         PTT - ( 13 May 2024 13:27 )  PTT:34.6 sec  Urinalysis Basic - ( 13 May 2024 13:26 )    Color: x / Appearance: x / SG: x / pH: x  Gluc: 100 mg/dL / Ketone: x  / Bili: x / Urobili: x   Blood: x / Protein: x / Nitrite: x   Leuk Esterase: x / RBC: x / WBC x   Sq Epi: x / Non Sq Epi: x / Bacteria: x      CULTURES:      Physical Examination:    General: No acute distress.      HEENT: Pupils equal, reactive to light.  Symmetric.    PULM: Diffuse wheezing bilaterally, Coarse crackle in lower lungs    NECK: Supple, no lymphadenopathy, trachea midline    CVS: Regular rate and rhythm, no murmurs, rubs, or gallops, 1+Pedal edema bilaterally    ABD: Soft, nondistended, nontender, normoactive bowel sounds, no masses    EXT: No edema, nontender    SKIN: Warm and well perfused, no rashes noted.    NEURO: Alert, oriented, interactive, nonfocal    DEVICES: BiPAP Patient is a 63y old  Male who presents with a chief complaint of progressive dyspnea, cough      HPI: 64 y/o male w/PMHx of CAD s/p CABG, Ischemic Cardiomyopathy, pAfib on Eliquis, Type 2DM, HTN, HLD, Colon CA s/p resection, Prostate CA presenting to the ER w/one month of cough and shortness of breath. Pt was at Dr. López's office (sees Maribel for EP and Chuck for cardiology) and sent here as he was hypoxic into the 80s. On arrival he is c/o productive (white phlegm) cough and some difficulty breathing. Denies fever/chills, CP, abd pain, leg pain. Does have swelling in his legs that is unchanged from his baseline, takes furosemide 40mg.      PAST MEDICAL & SURGICAL HISTORY:  Hypertension      Hyperlipidemia      COVID-19 vaccine series completed      Colon cancer      Prostate cancer  Treated with Radiation 2015      Type 2 diabetes mellitus      Unilateral primary osteoarthritis, left knee      Paroxysmal atrial fibrillation      CAD (coronary atherosclerotic disease)      S/P colon resection      S/P hernia repair      S/P total knee replacement, left        Medications:        ICU Vital Signs Last 24 Hrs  T(C): 36.8 (13 May 2024 11:51), Max: 36.8 (13 May 2024 11:51)  T(F): 98.2 (13 May 2024 11:51), Max: 98.2 (13 May 2024 11:51)  HR: 78 (13 May 2024 14:33) (69 - 78)  BP: 136/79 (13 May 2024 14:33) (134/73 - 136/79)  BP(mean): --  ABP: --  ABP(mean): --  RR: 25 (13 May 2024 14:33) (18 - 25)  SpO2: 100% (13 May 2024 14:33) (94% - 100%)    O2 Parameters below as of 13 May 2024 14:33  Patient On (Oxygen Delivery Method): BiPAP/CPAP        ABG - ( 13 May 2024 14:25 )  pH, Arterial: 7.480 pH, Blood: x     /  pCO2: 32    /  pO2: 143   / HCO3: 24    / Base Excess: 0.3   /  SaO2: 99.2          I&O's Detail        LABS:                        8.3    8.71  )-----------( 155      ( 13 May 2024 13:26 )             30.6     05-13    137  |  103  |  20.2<H>  ----------------------------<  100<H>  4.1   |  21.0<L>  |  0.78    Ca    8.7      13 May 2024 13:26    TPro  5.6<L>  /  Alb  3.4  /  TBili  1.0  /  DBili  x   /  AST  26  /  ALT  11  /  AlkPhos  110  05-13          CAPILLARY BLOOD GLUCOSE        PT/INR - ( 13 May 2024 13:27 )   PT: 15.0 sec;   INR: 1.36 ratio         PTT - ( 13 May 2024 13:27 )  PTT:34.6 sec  Urinalysis Basic - ( 13 May 2024 13:26 )    Color: x / Appearance: x / SG: x / pH: x  Gluc: 100 mg/dL / Ketone: x  / Bili: x / Urobili: x   Blood: x / Protein: x / Nitrite: x   Leuk Esterase: x / RBC: x / WBC x   Sq Epi: x / Non Sq Epi: x / Bacteria: x      Physical Examination:    General: awake, alert, interactive, frequent coughing episodes       HEENT: NC/AT, anicteric, MMM    PULM: rales b/l lower lobes, on BIPAP with intermittent tachypnea     NECK: Supple, trachea midline    CVS: Regular rate and rhythm, no murmurs, rubs, or gallops, 1+Pedal edema bilaterally    ABD: Soft, nondistended, nontender, normoactive bowel sounds, no masses    EXT: No edema, nontender    SKIN: Warm and well perfused, no rashes noted.    NEURO: Alert, oriented, interactive, nonfocal    DEVICES: BiPAP

## 2024-05-13 NOTE — H&P ADULT - NSHPSOCIALHISTORY_GEN_ALL_CORE
Remote hx of cigarette use, stopped over 30 years ago. Drinks alcohol socially, no illicit drug use. Lives with a room mate.

## 2024-05-13 NOTE — CONSULT NOTE ADULT - PROBLEM SELECTOR RECOMMENDATION 2
Hx of Ischemic cardiomypathy and systolic heart failure  TTE 3/26/2022 EF 35-40%, TTE with improvement noted 7/8/22 to 40-45% now 4/23 EF50-55%  Patient sob on exam, bpn [phone] Hx of Ischemic cardiomyopathy and systolic heart failure  TTE 3/26/2022 EF 35-40%, TTE with improvement noted 7/8/22 to 40-45% now 4/23 EF50-55%  Patient sob on exam, bpn [phone]  GDMT:    Ct with Lasix 40mg IV bid  Ct with losartan  Ct lopressor 25mg po bid  Strict I and o's  Monitor kidney function daily

## 2024-05-13 NOTE — ED PROVIDER NOTE - NSICDXPASTSURGICALHX_GEN_ALL_CORE_FT
PAST SURGICAL HISTORY:  S/P colon resection     S/P hernia repair     S/P total knee replacement, left

## 2024-05-13 NOTE — ED PROVIDER NOTE - ATTENDING CONTRIBUTION TO CARE
I personally saw the patient with the resident, and completed the key components of the history and physical exam. I then discussed the management plan with the resident.    64 y/o M with PMH CAD s/p CABG, ischemic cardiomyopathy, A fib on Eliquis, DM2, HTN, HFrEF, colon ca s/p resection and prostate ca presents for SOB and hypoxia. Patient notes cough productive of white sputum x 2 months with LE edema. Has been compliant with medications at home. Denies chest pain. Poor historian.    NAD, tachypneic with poor inspiratory effort, RRR, diffuse rales with diminished breath sounds b/l bases, 2+ b/l LE pitting edema, no calf TTP, 2+ symmetrical distal pulses, abd soft NT/ND.    EKG without ischemia, patient with increased work of breathing, but satting 93% on 4L NC - will increase to BiPAP, obtain ABG, give 80 IV lasix, as well as neb and IV solu-medrol as this is likely a mixed picture. Patient will require admission.

## 2024-05-13 NOTE — ED ADULT NURSE NOTE - CHIEF COMPLAINT QUOTE
C/O SOB at doctors office today when he was found to have low Spo2 in the 80s, placed on 4L NC. PT had chest xray showing infiltrates. +cough and chest pain with coughing. HX of CABG 1 year ago.

## 2024-05-13 NOTE — ED PROVIDER NOTE - CARE PLAN
1 Principal Discharge DX:	Acute hypoxic respiratory failure  Secondary Diagnosis:	CHF exacerbation

## 2024-05-13 NOTE — CONSULT NOTE ADULT - SUBJECTIVE AND OBJECTIVE BOX
[facility]                                              CARDIOLOGY AT [place] [address]                                             Telephone: [phone]. Fax:[phone]                                                       CARDIOLOGY CONSULTATION NOTE                                                                                             History obtained by: Patient and medical record  Community Cardiologist:   Chuck for general cards and Maribel for EP   obtained: Yes [  ] No [  ]  Reason for Consultation:   Available out pt records reviewed: Yes [x  ] No [  ]    Chief complaint:    Patient is a 63y old  Male who presents with a chief complaint of "coughing"    HPI:  62 y/o male w/PMHx of Nstemi/CAD s/p CABG, Ischemic Cardiomyopathy, HFrEF (3. EF 35-40%), pAfib on Eliquis, s/p radiofrequency ablation , Type 2DM, HTN, HLD, Colon CA s/p resection, Prostate CA presenting to the ER w/one month of cough and shortness of breath.   Pt was at Dr. López's office (sees Maribel for EP and Chuck for cardiology) and sent here as he was hypoxic into the 80s.     On arrival he is c/o productive (white phlegm) cough and some difficulty breathing, placed on bipapa and seen ICU attending.    CXR with opacities, infiltrates and congestion - pending an official read.   Denies recent travel, sick contacts, headache dizziness, chest pain, pnd, orthopnea hemoptysis, n/v/d/c/abd pain, fever, chills, syncope, presnycope, cramps, numging or tingling, frequency, dysuria, or any other discomfort.    CARDIAC TESTING   ECHO:    STRESS:    CATH:   3/28/2022  [facility]  Department of Cardiology  [address] [place] [address]  [phone]  Cath Lab Report -- Comprehensive Report  Patient: RENATO LEVI  Study date: 2022  Account number: [id number]  MR number: [id number]  : 1960  Gender: Male  Race:   Case Physician(s):  Dago Rodriguez MD  Referring Physician:  Ludy Woods MD  INDICATIONS: Initial NSTEMI. Acute systolic congestive heart failure.  HISTORY: The patient has hypertension, insulin-controlled diabetes, and  medication-treated dyslipidemia.  PROCEDURE:  --  Right heart catheterization.  --  Left heart catheterization.  --  Left coronary angiography.  --  Right coronary angiography.  --  Sonosite.  TECHNIQUE: The risks and alternatives of the procedures and conscious  sedation were explained to the patient and informed consent was obtained.  Cardiac catheterization performed urgently.  Local anesthetic given. Right radial artery access. Right brachial vein  access. Right heart catheterization. The procedure was performed utilizing  a catheter. Left heart catheterization. Left coronary artery angiography.  The vessel was injected utilizing a catheter. Right coronary artery  angiography. The vessel was injected utilizing a catheter. Sonosite.  RADIATION EXPOSURE: 4.8 min.  CONTRAST GIVEN: Omnipaque 41 ml.  MEDICATIONS GIVEN: Midazolam, 1 mg, IV. Fentanyl, 25 mcg, IV. Verapamil  (Isoptin, Calan, Covera), 2.5 mg, IA. Nitroglycerin, 200 mcg, IA. Heparin,  3000 units, IV. 1% Lidocaine, 10 ml, subcutaneously.  CORONARY VESSELS: The coronary circulation is right dominant.  LM:   --  Mid left main: There was a 40 % stenosis. The lesion was  moderately calcified.  LAD:   --  Proximal LAD: There was a 100 % stenosis. This lesion is a  chronic total occlusion.  --  Distal LAD: The distal vessel was supplied by collaterals from the RCA.  CX:   --  Mid circumflex: There was a 99 % stenosis.  RI:   --  Ramus intermedius: The vessel was medium to large sized. There  was a 80 % stenosis.  RCA:   --  RCA: The vessel was large sized (dominant).  --  Mid RCA: There was a 80 % stenosis.  COMPLICATIONS: No complications occurred during the cath lab visit.  DIAGNOSTIC IMPRESSIONS: Multivessel CAD ( LM 40% ?ulcerated plaque,   LAD, sutotal mid CX occlusion and severe RCA stenosis)  Mildly elevated left ventrciular filling pressure ( PCWP 15 mmhg)  DIAGNOSTIC RECOMMENDATIONS: Evaluate by CT surgery for CABG with LIMA to  LAD, SVG to OM, SVG to ramus ,SVG to distal RCA  Prepared and signed by  Dago Rodriguez MD  Signed 2022 17:15:55  HEMODYNAMIC TABLES  Pressures:  Baseline  Pressures:  - HR: 61  Pressures:  - Rhythm:  Pressures:  -- Aortic Pressure (S/D/M): 99/48/73  Pressures:  -- Left Ventricle (s/edp): 104/20/--  Pressures:  -- Pulmonary Artery (S/D/M): 31/8/19  Pressures:  -- Pulmonary Capillary Wedge: /24/15  Pressures:  -- Right Atrium (a/v/M): 10/6/5  Pressures:  -- Right Ventricle (s/edp): 36/11/--  O2 Sats:  Baseline  O2 Sats:  - HR: 61  O2 Sats:  - Rhythm:  O2 Sats:  -- AO: 9.4/92/11.76  O2 Sats:  -- PA: 9.4/58.2/7.44  Outputs:  Baseline  Outputs:  -- CALCULATIONS[age]  Outputs:  -- CALCULATIONS: Body Surface Area: 1.85  Outputs:  -- CALCULATIONS: Height in cm: 160.00  Outputs:  -- CALCULATIONS: Sex: Male  Outputs:  -- CALCULATIONS: Weight in k.60  Outputs:  -- OUTPUTS: CO by Sangita: 5.35  Outputs:  -- OUTPUTS: Sangita cardiac index: 2.89  Outputs:  -- OUTPUTS: O2 consumption: 231.06  Outputs:  -- OUTPUTS: Vo2 Indexed: 125.00  Outputs:  -- RESISTANCES: Left ventricular stroke work: 69.15  Outputs:  -- RESISTANCES: Left Ventricular Stroke Work index: 37.41  Outputs:  -- RESISTANCES: Pulmonary vascular index (dsc): 110.59  Outputs:  -- RESISTANCES: Pulmonary vascular index (Wood Units): 1.38  Outputs:  -- RESISTANCES: Pulmonary vascular resistance (dsc): 59.83  Outputs:  -- RESISTANCES: Pulmonary vascular resistance (Wood Units): 0.75  Outputs:  -- RESISTANCES: PVR_SVR Ratio: 0.06  Outputs:  -- RESISTANCES: Right ventricular stroke work: 18.18  Outputs:  -- RESISTANCES: Right ventricular stroke work index: 9.84  Outputs:  -- RESISTANCES: Systemic vascular index (dsc): 1880.03  Outputs:  -- RESISTANCES: Systemic vascular index (Wood Units): 23.51  Outputs:  -- RESISTANCES: Systemic vascular resistance (dsc): 1017.05  Outputs:  -- RESISTANCES: Systemic vascular resistance (Wood Units): 12.72  Outputs:  -- RESISTANCES: Total pulmonary index (dsc): 525.30  Outputs:  -- RESISTANCES: Total pulmonary index (Wood Units): 6.57  Outputs:  -- RESISTANCES: Total pulmonary resistance (dsc): 284.18  Outputs:  -- RESISTANCES: Total pulmonary resistance (Wood Units): 3.55  Outputs:  -- RESISTANCES: Total vascular index (Wood Units): 25.23  Outputs:  -- RESISTANCES: Total vascular resistance (dsc): 1091.83  Outputs:  -- RESISTANCES: Total vascular resistance (Wood Units): 13.65  Outputs:  -- RESISTANCES: Total vascular resistance index (dsc): 2018.26  Outputs:  -- RESISTANCES: TPR_TVR Ratio: 0.26  Outputs:  -- SHUNTS: Pulmonary flow: 5.35  Outputs:  -- SHUNTS: Qp Indexed: 2.89  Outputs:  -- SHUNTS: Qs Indexed: 2.89    ELECTROPHYSIOLOGY:     PAST MEDICAL HISTORY  Hypertension  Hyperlipidemia  COVID-19 vaccine series completed  Colon cancer  Prostate cancer  BPH (benign prostatic hyperplasia)  Type 2 diabetes mellitus  Unilateral primary osteoarthritis, left knee  Paroxysmal atrial fibrillation  CAD (coronary atherosclerotic disease)    PAST SURGICAL HISTORY  S/P colon resection  S/P hernia repair  S/P total knee replacement, left    SOCIAL HISTORY:  Denies smoking/alcohol/drugs  CIGARETTES:   + smoker < 5 years.    ALCOHOL:  DRUGS:    FAMILY HISTORY:  No pertinent family history in first degree relatives  Family History of Cardiovascular Disease:  Yes [  ] No [ x ]  Coronary Artery Disease in first degree relative: Yes [  ] No [ x ]  Sudden Cardiac Death in First degree relative: Yes [  ] No [x  ]    HOME MEDICATIONS:  atorvastatin 20 mg oral tablet: 1 tab(s) orally once a day (14 Oct 2022 06:14)  azelastine 137 mcg/inh (0.1%) nasal spray: 2 spray(s) nasal 2 times a day, As Needed (13 Oct 2022 07:52)  Eliquis 5 mg oral tablet: 1 tab(s) orally 2 times a day (14 Oct 2022 06:14)  losartan 25 mg oral tablet: 1 tab(s) orally once a day (14 Oct 2022 06:14)  metFORMIN 1000 mg oral tablet, extended release: 1 tab(s) orally once a day (14 Oct 2022 06:14)  Lopressor 25mg po bid  Lasix 40mg po daily  Asprin 81mg po daily    ALLERGIES:   No Known Allergies    REVIEW OF SYMPTOMS:   CONSTITUTIONAL: No fever, no chills, no weight loss, no weight gain, no fatigue   ENMT:  No vertigo; No sinus or throat pain  NECK: No pain or stiffness  CARDIOVASCULAR: No chest pain, no dyspnea, no syncope/presyncope, no palpitations, no dizziness, no Orthopnea, no Paroxsymal nocturnal dyspnea  RESPIRATORY: no Shortness of breath, no cough, no wheezing  : No dysuria, no hematuria   GI: No dark color stool, no nausea, no diarrhea, no constipation, no abdominal pain   NEURO: No headache, no slurred speech   MUSCULOSKELETAL: No joint pain or swelling; No muscle, back, or extremity pain  PSYCH: No agitation, no anxiety.    ALL OTHER REVIEW OF SYSTEMS ARE NEGATIVE.    VITAL SIGNS:  T(C): 36.8 (24 @ 11:51), Max: 36.8 (24 @ 11:51)  T(F): 98.2 (24 @ 11:51), Max: 98.2 (24 @ 11:51)  HR: 78 (24 @ 14:33) (69 - 78)  BP: 136/79 (24 @ 14:33) (134/73 - 136/79)  RR: 25 (24 @ 14:33) (18 - 25)  SpO2: 100% (24 @ 14:33) (94% - 100%)        PHYSICAL EXAM:  Constitutional:  Currently sob noted air hungry on bipap  HEENT: Atraumatic and normocephalic , neck is supple . no JVD. No carotid bruit.  CNS: A&Ox3. No focal deficits.   Respiratory:   Basilar crackles bilaterally and noted wheezing throughout all lobes.    Cardiovascular: RRR normal s1 s2. No murmur. No rubs or gallop.  Gastrointestinal: Soft, non-tender. +Bowel sounds.   Extremities: + Peripheral Pulses, No clubbing, cyanosis, or edema  Psychiatric: Calm . no agitation.   Skin: Warm and dry, no ulcers on extremities     LABS:                        8.3    8.71  )-----------( 155      ( 13 May 2024 13:26 )             30.6         137  |  103  |  20.2<H>  ----------------------------<  100<H>  4.1   |  21.0<L>  |  0.78    Ca    8.7      13 May 2024 13:26    TPro  5.6<L>  /  Alb  3.4  /  TBili  1.0  /  DBili  x   /  AST  26  /  ALT  11  /  AlkPhos  110  05-13    PT/INR - ( 13 May 2024 13:27 )   PT: 15.0 sec;   INR: 1.36 ratio       PTT - ( 13 May 2024 13:27 )  PTT:34.6 sec  Urinalysis Basic - ( 13 May 2024 13:26 )    Color: x / Appearance: x / SG: x / pH: x  Gluc: 100 mg/dL / Ketone: x  / Bili: x / Urobili: x   Blood: x / Protein: x / Nitrite: x   Leuk Esterase: x / RBC: x / WBC x   Sq Epi: x / Non Sq Epi: x / Bacteria: x    INTERPRETATION OF TELEMETRY:   Sr 72, no acute change noted.      ECG:  Sr 71, nonspecific ST and T wave abnormality.    Prior ECG: Yes [  x] No [  ]                                                  [facility]                                              CARDIOLOGY AT [place] [address]                                             Telephone: [phone]. Fax:[phone]                                                       CARDIOLOGY CONSULTATION NOTE                                                                                             History obtained by: Patient and medical record  Community Cardiologist:   Chuck for general cards and Maribel for EP   obtained: Yes [  ] No [  ]  Reason for Consultation:   Available out pt records reviewed: Yes [x  ] No [  ]    Chief complaint:    Patient is a 63y old  Male who presents with a chief complaint of "coughing"    HPI:  62 y/o male w/PMHx of Nstemi/CAD s/p CABG, Ischemic Cardiomyopathy, HFrEF (3. EF 35-40%), pAfib on Eliquis, s/p radiofrequency ablation , Type 2DM, HTN, HLD, Colon CA s/p resection, Prostate CA presenting to the ER w/one month of cough and shortness of breath.   Pt was at Dr. López's office (sees Maribel for EP and Chuck for cardiology) and sent here as he was hypoxic into the 80s.     On arrival he is c/o productive (white phlegm) cough and some difficulty breathing, placed on bipapa and seen ICU attending.    CXR with opacities, infiltrates and congestion - pending an official read.   Denies recent travel, sick contacts, headache dizziness, chest pain, pnd, orthopnea hemoptysis, n/v/d/c/abd pain, fever, chills, syncope, presnycope, cramps, numging or tingling, frequency, dysuria, or any other discomfort.    CARDIAC TESTING   ECHO:    STRESS:    CATH:   3/28/2022  [facility]  Department of Cardiology  [address] [place] [address]  [phone]  Cath Lab Report -- Comprehensive Report  Patient: RENATO LEVI  Study date: 2022  Account number: [id number]  MR number: [id number]  : 1960  Gender: Male  Race:   Case Physician(s):  Dago Rodriguez MD  Referring Physician:  Ludy Woods MD  INDICATIONS: Initial NSTEMI. Acute systolic congestive heart failure.  HISTORY: The patient has hypertension, insulin-controlled diabetes, and  medication-treated dyslipidemia.  PROCEDURE:  --  Right heart catheterization.  --  Left heart catheterization.  --  Left coronary angiography.  --  Right coronary angiography.  --  Sonosite.  TECHNIQUE: The risks and alternatives of the procedures and conscious  sedation were explained to the patient and informed consent was obtained.  Cardiac catheterization performed urgently.  Local anesthetic given. Right radial artery access. Right brachial vein  access. Right heart catheterization. The procedure was performed utilizing  a catheter. Left heart catheterization. Left coronary artery angiography.  The vessel was injected utilizing a catheter. Right coronary artery  angiography. The vessel was injected utilizing a catheter. Sonosite.  RADIATION EXPOSURE: 4.8 min.  CONTRAST GIVEN: Omnipaque 41 ml.  MEDICATIONS GIVEN: Midazolam, 1 mg, IV. Fentanyl, 25 mcg, IV. Verapamil  (Isoptin, Calan, Covera), 2.5 mg, IA. Nitroglycerin, 200 mcg, IA. Heparin,  3000 units, IV. 1% Lidocaine, 10 ml, subcutaneously.  CORONARY VESSELS: The coronary circulation is right dominant.  LM:   --  Mid left main: There was a 40 % stenosis. The lesion was  moderately calcified.  LAD:   --  Proximal LAD: There was a 100 % stenosis. This lesion is a  chronic total occlusion.  --  Distal LAD: The distal vessel was supplied by collaterals from the RCA.  CX:   --  Mid circumflex: There was a 99 % stenosis.  RI:   --  Ramus intermedius: The vessel was medium to large sized. There  was a 80 % stenosis.  RCA:   --  RCA: The vessel was large sized (dominant).  --  Mid RCA: There was a 80 % stenosis.  COMPLICATIONS: No complications occurred during the cath lab visit.  DIAGNOSTIC IMPRESSIONS: Multivessel CAD ( LM 40% ?ulcerated plaque,   LAD, sutotal mid CX occlusion and severe RCA stenosis)  Mildly elevated left ventrciular filling pressure ( PCWP 15 mmhg)  DIAGNOSTIC RECOMMENDATIONS: Evaluate by CT surgery for CABG with LIMA to  LAD, SVG to OM, SVG to ramus ,SVG to distal RCA  Prepared and signed by  Dago Rodriguez MD  Signed 2022 17:15:55  HEMODYNAMIC TABLES  Pressures:  Baseline  Pressures:  - HR: 61  Pressures:  - Rhythm:  Pressures:  -- Aortic Pressure (S/D/M): 99/48/73  Pressures:  -- Left Ventricle (s/edp): 104/20/--  Pressures:  -- Pulmonary Artery (S/D/M): 31/8/19  Pressures:  -- Pulmonary Capillary Wedge: /24/15  Pressures:  -- Right Atrium (a/v/M): 10/6/5  Pressures:  -- Right Ventricle (s/edp): 36/11/--  O2 Sats:  Baseline  O2 Sats:  - HR: 61  O2 Sats:  - Rhythm:  O2 Sats:  -- AO: 9.4/92/11.76  O2 Sats:  -- PA: 9.4/58.2/7.44  Outputs:  Baseline  Outputs:  -- CALCULATIONS[age]  Outputs:  -- CALCULATIONS: Body Surface Area: 1.85  Outputs:  -- CALCULATIONS: Height in cm: 160.00  Outputs:  -- CALCULATIONS: Sex: Male  Outputs:  -- CALCULATIONS: Weight in k.60  Outputs:  -- OUTPUTS: CO by Sangita: 5.35  Outputs:  -- OUTPUTS: Sangita cardiac index: 2.89  Outputs:  -- OUTPUTS: O2 consumption: 231.06  Outputs:  -- OUTPUTS: Vo2 Indexed: 125.00  Outputs:  -- RESISTANCES: Left ventricular stroke work: 69.15  Outputs:  -- RESISTANCES: Left Ventricular Stroke Work index: 37.41  Outputs:  -- RESISTANCES: Pulmonary vascular index (dsc): 110.59  Outputs:  -- RESISTANCES: Pulmonary vascular index (Wood Units): 1.38  Outputs:  -- RESISTANCES: Pulmonary vascular resistance (dsc): 59.83  Outputs:  -- RESISTANCES: Pulmonary vascular resistance (Wood Units): 0.75  Outputs:  -- RESISTANCES: PVR_SVR Ratio: 0.06  Outputs:  -- RESISTANCES: Right ventricular stroke work: 18.18  Outputs:  -- RESISTANCES: Right ventricular stroke work index: 9.84  Outputs:  -- RESISTANCES: Systemic vascular index (dsc): 1880.03  Outputs:  -- RESISTANCES: Systemic vascular index (Wood Units): 23.51  Outputs:  -- RESISTANCES: Systemic vascular resistance (dsc): 1017.05  Outputs:  -- RESISTANCES: Systemic vascular resistance (Wood Units): 12.72  Outputs:  -- RESISTANCES: Total pulmonary index (dsc): 525.30  Outputs:  -- RESISTANCES: Total pulmonary index (Wood Units): 6.57  Outputs:  -- RESISTANCES: Total pulmonary resistance (dsc): 284.18  Outputs:  -- RESISTANCES: Total pulmonary resistance (Wood Units): 3.55  Outputs:  -- RESISTANCES: Total vascular index (Wood Units): 25.23  Outputs:  -- RESISTANCES: Total vascular resistance (dsc): 1091.83  Outputs:  -- RESISTANCES: Total vascular resistance (Wood Units): 13.65  Outputs:  -- RESISTANCES: Total vascular resistance index (dsc): 2018.26  Outputs:  -- RESISTANCES: TPR_TVR Ratio: 0.26  Outputs:  -- SHUNTS: Pulmonary flow: 5.35  Outputs:  -- SHUNTS: Qp Indexed: 2.89  Outputs:  -- SHUNTS: Qs Indexed: 2.89    ELECTROPHYSIOLOGY:     PAST MEDICAL HISTORY  Hypertension  Hyperlipidemia  COVID-19 vaccine series completed  Colon cancer  Prostate cancer  BPH (benign prostatic hyperplasia)  Type 2 diabetes mellitus  Unilateral primary osteoarthritis, left knee  Paroxysmal atrial fibrillation  CAD (coronary atherosclerotic disease)    PAST SURGICAL HISTORY  S/P colon resection  S/P hernia repair  S/P total knee replacement, left    SOCIAL HISTORY:  Denies smoking/alcohol/drugs  CIGARETTES:   + smoker < 5 years.    ALCOHOL:  DRUGS:    FAMILY HISTORY:  No pertinent family history in first degree relatives  Family History of Cardiovascular Disease:  Yes [  ] No [ x ]  Coronary Artery Disease in first degree relative: Yes [  ] No [ x ]  Sudden Cardiac Death in First degree relative: Yes [  ] No [x  ]    HOME MEDICATIONS:  atorvastatin 20 mg oral tablet: 1 tab(s) orally once a day (14 Oct 2022 06:14)  azelastine 137 mcg/inh (0.1%) nasal spray: 2 spray(s) nasal 2 times a day, As Needed (13 Oct 2022 07:52)  Eliquis 5 mg oral tablet: 1 tab(s) orally 2 times a day (14 Oct 2022 06:14)  losartan 25 mg oral tablet: 1 tab(s) orally once a day (14 Oct 2022 06:14)  metFORMIN 1000 mg oral tablet, extended release: 1 tab(s) orally once a day (14 Oct 2022 06:14)  Lopressor 25mg po bid  Lasix 40mg po daily  Asprin 81mg po daily    ALLERGIES:   No Known Allergies    REVIEW OF SYMPTOMS:   CONSTITUTIONAL: No fever, no chills, no weight loss, no weight gain, no fatigue   ENMT:  No vertigo; No sinus or throat pain  NECK: No pain or stiffness  CARDIOVASCULAR: No chest pain, no dyspnea, no syncope/presyncope, no palpitations, no dizziness, no Orthopnea, no Paroxsymal nocturnal dyspnea  RESPIRATORY: no Shortness of breath, no cough, no wheezing  : No dysuria, no hematuria   GI: No dark color stool, no nausea, no diarrhea, no constipation, no abdominal pain   NEURO: No headache, no slurred speech   MUSCULOSKELETAL: No joint pain or swelling; No muscle, back, or extremity pain  PSYCH: No agitation, no anxiety.    ALL OTHER REVIEW OF SYSTEMS ARE NEGATIVE.    VITAL SIGNS:  T(C): 36.8 (24 @ 11:51), Max: 36.8 (24 @ 11:51)  T(F): 98.2 (24 @ 11:51), Max: 98.2 (24 @ 11:51)  HR: 78 (24 @ 14:33) (69 - 78)  BP: 136/79 (24 @ 14:33) (134/73 - 136/79)  RR: 25 (24 @ 14:33) (18 - 25)  SpO2: 100% (24 @ 14:33) (94% - 100%)    PHYSICAL EXAM:  Constitutional:  Currently sob noted air hungry on bipap  HEENT: Atraumatic and normocephalic , neck is supple . no JVD. No carotid bruit.  CNS: A&Ox3. No focal deficits.   Respiratory:   Basilar crackles bilaterally and noted wheezing throughout all lobes.    Cardiovascular: RRR normal s1 s2. No murmur. No rubs or gallop.  Gastrointestinal: Soft, non-tender. +Bowel sounds.   Extremities: + Peripheral Pulses, No clubbing, cyanosis, or edema  Psychiatric: Calm . no agitation.   Skin: Warm and dry, no ulcers on extremities     LABS:                        8.3    8.71  )-----------( 155      ( 13 May 2024 13:26 )             30.6         137  |  103  |  20.2<H>  ----------------------------<  100<H>  4.1   |  21.0<L>  |  0.78    Ca    8.7      13 May 2024 13:26    TPro  5.6<L>  /  Alb  3.4  /  TBili  1.0  /  DBili  x   /  AST  26  /  ALT  11  /  AlkPhos  110  05-13    PT/INR - ( 13 May 2024 13:27 )   PT: 15.0 sec;   INR: 1.36 ratio       PTT - ( 13 May 2024 13:27 )  PTT:34.6 sec  Urinalysis Basic - ( 13 May 2024 13:26 )    Color: x / Appearance: x / SG: x / pH: x  Gluc: 100 mg/dL / Ketone: x  / Bili: x / Urobili: x   Blood: x / Protein: x / Nitrite: x   Leuk Esterase: x / RBC: x / WBC x   Sq Epi: x / Non Sq Epi: x / Bacteria: x    INTERPRETATION OF TELEMETRY:   Sr 72, no acute change noted.      ECG:  Sr 71, nonspecific ST and T wave abnormality.    Prior ECG: Yes [  x] No [  ]

## 2024-05-13 NOTE — CONSULT NOTE ADULT - ATTENDING COMMENTS
63M remote smoking hx with hx of CAD s/p CABG, hx of ICM now with recovery EF 50-55%, Afib s/p ablation on Eliquis, colon cancer s/p resection, prostate ca s/p radiation, HTN, HLD, DM referred to the ED from his cardiologist due to hypoxia. Pt reports ongoing dyspnea and cough over the last month worsening over the last few days. Pt denies any associated fevers/chills/sick contacts/hemoptysis/chest pain/nausea/vomiting/abdominal pain/diarrhea or dysuria. Denies any known pulmonary disease but has been started on Trelegy outpatient. He endorses chronic LE edema. Reports compliance with his cardiac regimen including Lasix and Eliquis. Pt was initially hypoxic requiring 4L O2 on presentation but given work of breathing was escalated to BIPAP 10/6 40% with ABG 7.48/32/143. CXR with bilateral lower lobe infiltrates and perihilar infiltrates. Pt was seen just prior to Lasix administration and noted tachypneic on BIPAP but partly in the setting of recurrent coughing. Pt seen following diuresis and significantly improved reporting he is better able to take deep breathes. Pt weaned off BIPAP to 6L NC and tolerating well. If hypoxia worsening can trial on high flow nasal cannula given his recurrent coughing was making NIV difficult to tolerate. Continue with diuresis. Monitor electrolytes. Check Echo. Check CT chest. Lower suspicion for bacterial PNA given afebrile with no leukocytosis. Can check procal. Check full RVP. Wheezing may be cardiac in nature so can hold off on steroids for now unless persists despite diuresis. Optimize GDMT. Low suspicion for PE given imaging findings, reported compliance with Eliquis and no tachycardia, but can obtain duplex LE. Pt does not require ICU level of care at this time. Can monitor in SDU at this time. Please call back if any questions/concerns or change in status. 63M remote smoking hx with hx of CAD s/p CABG, hx of ICM now with recovery EF 50-55%, Afib s/p ablation on Eliquis, colon cancer s/p resection, prostate ca s/p radiation, HTN, HLD, DM referred to the ED from his cardiologist due to hypoxia. Pt reports ongoing dyspnea and cough over the last month worsening over the last few days. Pt denies any associated fevers/chills/sick contacts/hemoptysis/chest pain/nausea/vomiting/abdominal pain/diarrhea or dysuria. Denies any known pulmonary disease but has been started on Trelegy outpatient. He endorses chronic LE edema. Reports compliance with his cardiac regimen including Lasix and Eliquis. Pt was initially hypoxic requiring 4L O2 on presentation but given work of breathing was escalated to BIPAP 10/6 40% with ABG 7.48/32/143. CXR with bilateral lower lobe infiltrates and perihilar infiltrates. Pt was seen just prior to Lasix administration and noted tachypneic on BIPAP but partly in the setting of recurrent coughing. Pt seen following diuresis and significantly improved reporting he is better able to take deep breathes. Pt weaned off BIPAP to 6L NC and tolerating well. If hypoxia worsening can trial on high flow nasal cannula given his recurrent coughing was making NIV difficult to tolerate. Continue with diuresis. Monitor electrolytes. Check Echo. Check CT chest. Lower suspicion for bacterial PNA given afebrile with no leukocytosis. Can check procal. Check full RVP. Wheezing may be cardiac in nature so can hold off on steroids for now unless persists despite diuresis. Optimize GDMT. Low suspicion for PE given imaging findings, reported compliance with Eliquis and no tachycardia, but can obtain duplex LE. Anemia workup and check serial CBC. Pt denies any recent melena/hematochezia or other bleeding. Pt does not require ICU level of care at this time. Can monitor in SDU at this time. Please call back if any questions/concerns or change in status.

## 2024-05-13 NOTE — H&P ADULT - HISTORY OF PRESENT ILLNESS
62 y/o male with PMH of CAD s/p CABG, HFrEF (3.2/22 EF 35-40%), pAfib on Eliquis, s/p radiofrequency ablation 2022, DM-2, HTN, HLD, Colon CA s/p resection, Prostate CA was sent to the ED from pulmonology office for hypoxia. Patient said he has been having shortness of breath and cough  for more than 2 months. He reported cough productive of white sputum; chest pain with cough and fatigue. Patient had 2 appointments today (with pulm and cardiology) did not see card today. He has no fever, chills, orthopnea, nausea, vomiting, abdominal pain, change in bowel/urinary habit, recent travel, sick contact.    Patient seen and examined before midnight.     64 y/o male with PMH of CAD s/p CABG, HFrEF (3.2/22 EF 35-40%), pAfib on Eliquis, s/p radiofrequency ablation 2022, DM-2, HTN, HLD, Colon CA s/p resection, Prostate CA was sent to the ED from pulmonology office for hypoxia. Patient said he has been having shortness of breath and cough  for more than 2 months. He reported cough productive of white sputum; chest pain with cough and fatigue. Patient had 2 appointments today (with pulm and cardiology) did not see card today. He has no fever, chills, orthopnea, nausea, vomiting, abdominal pain, change in bowel/urinary habit, melena, hematochezia, recent travel, sick contact.

## 2024-05-13 NOTE — ED PROVIDER NOTE - NSICDXPASTMEDICALHX_GEN_ALL_CORE_FT
PAST MEDICAL HISTORY:  CAD (coronary atherosclerotic disease)     Colon cancer     COVID-19 vaccine series completed     Hyperlipidemia     Hypertension     Paroxysmal atrial fibrillation     Prostate cancer Treated with Radiation 2015    Type 2 diabetes mellitus     Unilateral primary osteoarthritis, left knee

## 2024-05-13 NOTE — ED PROVIDER NOTE - CLINICAL SUMMARY MEDICAL DECISION MAKING FREE TEXT BOX
64 y/o male w/hypoxic respiratory failure, pt does appear fluid overloaded, however diffuse wheezing on exam with Trelegy medication suggesting mixed CHF/COPD cause of HF. Solumedrol, DuoNebs, Lasix given. 97% on 4LNC however still tachypneic, will place on BiPap for work of breathing. Check cardiac labs, EKG, CXR, cardiology consult.

## 2024-05-13 NOTE — H&P ADULT - ASSESSMENT
62 y/o male with PMH of CAD s/p CABG, HFrEF (3.2/22 EF 35-40%), pAfib on Eliquis, s/p radiofrequency ablation 2022, DM-2, HTN, HLD, Colon CA s/p resection, Prostate CA was sent to the ED from pulmonology office for hypoxia. Patient said he has been having shortness of breath and cough  for more than 2 months. He reported cough productive of white sputum; chest pain with cough and fatigue. Patient had 2 appointments today (with pulm and cardiology) did not see card today. He has no fever, chills, orthopnea, nausea, vomiting, abdominal pain, change in bowel/urinary habit, recent travel, sick contact.  64 y/o male with PMH of CAD s/p CABG, HFrEF (3.2/22 EF 35-40%), pAfib on Eliquis, s/p radiofrequency ablation 2022, DM-2, HTN, HLD, Colon CA s/p resection, Prostate CA was sent to the ED from pulmonology office for hypoxia. Patient said he has been having shortness of breath and cough  for more than 2 months. He reported cough productive of white sputum; chest pain with cough and fatigue. In the ED  64 y/o male with PMH of CAD s/p CABG, HFrEF (3.2/22 EF 35-40%), pAfib on Eliquis, s/p radiofrequency ablation 2022, DM-2, HTN, HLD, Colon CA s/p resection, Prostate CA was sent to the ED from pulmonology office for hypoxia. Patient said he has been having shortness of breath and cough  for more than 2 months. He reported cough productive of white sputum; chest pain with cough and fatigue. In the ED patient was placed on BiPAP for work up breathing, CXR: Bilateral perihilar and lower zone multifocal ill-defined airspace   consolidations indicating pulmonary edema of cardiac or noncardiac origin or infectious pneumonia.       Acute respiratory failure with hypoxia likely due to acute CHFrEF  Admit to telemetry   CXR as noted above   Lasix 80mg once given in the ED, with good response   Cardiology consulted   MICU seen for new BiPAP   CT chest ordered for further evaluation   Patient afebrile, no leukocytosis, procal negative, bacterial PNA less likely, will hold off on antibiotic   Patient transitioned from BiPAP to NC   Continue Lasix 40mg bid, monitor renal function   Daily weight   Echo ordered     Cough   RVP negative   Will continue to hold Losartan (he was taken off because of cough)   Benzonatate PRN   CT chest pending     Generalized weakness likely due to anemia   Hb: 8.3 (baseline 10)   Patient on Eliquis, he said no melena/hematochezia   Will check iron panel with AM lab     pAfib   Rate controlled   Metoprolol tartrate 25mg bid with holding parameters   Eliquis 5mg bid     HTN/HLD/CAD   Atorvastatin 40mg   Metoprolol 25mg bid   Aspirin 81mg     DM-2   Patient not on medication   HbA1C: 5.7 (03/26/22)   Will check HbA1C with AM lab     Supportive   DVT prophylaxis: Eliquis bid   Diet: DASH     Plan of care discussed with patient and ER nurse.

## 2024-05-13 NOTE — H&P ADULT - RESPIRATORY COMMENTS
4 Eyes Skin Assessment     NAME:  Nikhil Jhaveri  YOB: 1957  MEDICAL RECORD NUMBER:  [id number]    The patient is being assessed for  Admission    I agree that at least one RN has performed a thorough Head to Toe Skin Assessment on the patient. ALL assessment sites listed below have been assessed. Areas assessed by both nurses:    Head, Face, Ears, Shoulders, Back, Chest, Arms, Elbows, Hands, Sacrum. Buttock, Coccyx, Ischium, and Legs. Feet and Heels        Does the Patient have a Wound? Yes wound(s) were present on assessment.  LDA wound assessment was Initiated and completed by RN       Silvano Prevention initiated by RN: No  Wound Care Orders initiated by RN: No    Pressure Injury (Stage 3,4, Unstageable, DTI, NWPT, and Complex wounds) if present, place Wound referral order by RN under : No    New Ostomies, if present place, Ostomy referral order under : No     Nurse 1 eSignature: Electronically signed by Astrid Sanders RN on 8/2/23 at [address] PM EDT    **SHARE this note so that the co-signing nurse can place an eSignature**    Nurse 2 eSignature: Electronically signed by Doris Washburn RN on 8/3/23 at 11:22 AM EDT able to speak in full sentence of 3L oxygen.

## 2024-05-13 NOTE — ED ADULT NURSE NOTE - NSFALLHARMRISKINTERV_ED_ALL_ED

## 2024-05-14 ENCOUNTER — RESULT REVIEW (OUTPATIENT)
Age: 64
End: 2024-05-14

## 2024-05-14 DIAGNOSIS — D50.9 IRON DEFICIENCY ANEMIA, UNSPECIFIED: ICD-10-CM

## 2024-05-14 LAB
A1C WITH ESTIMATED AVERAGE GLUCOSE RESULT: 7.4 % — HIGH (ref 4–5.6)
ANION GAP SERPL CALC-SCNC: 16 MMOL/L — SIGNIFICANT CHANGE UP (ref 5–17)
BUN SERPL-MCNC: 24 MG/DL — HIGH (ref 8–20)
CALCIUM SERPL-MCNC: 8.7 MG/DL — SIGNIFICANT CHANGE UP (ref 8.4–10.5)
CHLORIDE SERPL-SCNC: 99 MMOL/L — SIGNIFICANT CHANGE UP (ref 96–108)
CO2 SERPL-SCNC: 22 MMOL/L — SIGNIFICANT CHANGE UP (ref 22–29)
CREAT SERPL-MCNC: 1.24 MG/DL — SIGNIFICANT CHANGE UP (ref 0.5–1.3)
EGFR: 65 ML/MIN/1.73M2 — SIGNIFICANT CHANGE UP
ESTIMATED AVERAGE GLUCOSE: 166 MG/DL — HIGH (ref 68–114)
FERRITIN SERPL-MCNC: 37 NG/ML — SIGNIFICANT CHANGE UP (ref 30–400)
GLUCOSE SERPL-MCNC: 142 MG/DL — HIGH (ref 70–99)
HCT VFR BLD CALC: 28.3 % — LOW (ref 39–50)
HGB BLD-MCNC: 7.9 G/DL — LOW (ref 13–17)
IRON SATN MFR SERPL: 20 UG/DL — LOW (ref 59–158)
IRON SATN MFR SERPL: 4 % — LOW (ref 16–55)
MAGNESIUM SERPL-MCNC: 1.7 MG/DL — SIGNIFICANT CHANGE UP (ref 1.6–2.6)
MCHC RBC-ENTMCNC: 20.1 PG — LOW (ref 27–34)
MCHC RBC-ENTMCNC: 27.9 GM/DL — LOW (ref 32–36)
MCV RBC AUTO: 72 FL — LOW (ref 80–100)
PLATELET # BLD AUTO: 178 K/UL — SIGNIFICANT CHANGE UP (ref 150–400)
POTASSIUM SERPL-MCNC: 3.8 MMOL/L — SIGNIFICANT CHANGE UP (ref 3.5–5.3)
POTASSIUM SERPL-SCNC: 3.8 MMOL/L — SIGNIFICANT CHANGE UP (ref 3.5–5.3)
RBC # BLD: 3.93 M/UL — LOW (ref 4.2–5.8)
RBC # FLD: 19.9 % — HIGH (ref 10.3–14.5)
SODIUM SERPL-SCNC: 137 MMOL/L — SIGNIFICANT CHANGE UP (ref 135–145)
TIBC SERPL-MCNC: 448 UG/DL — HIGH (ref 220–430)
TRANSFERRIN SERPL-MCNC: 313 MG/DL — SIGNIFICANT CHANGE UP (ref 180–329)
WBC # BLD: 6.99 K/UL — SIGNIFICANT CHANGE UP (ref 3.8–10.5)
WBC # FLD AUTO: 6.99 K/UL — SIGNIFICANT CHANGE UP (ref 3.8–10.5)

## 2024-05-14 PROCEDURE — 99232 SBSQ HOSP IP/OBS MODERATE 35: CPT

## 2024-05-14 PROCEDURE — 71250 CT THORAX DX C-: CPT | Mod: 26

## 2024-05-14 PROCEDURE — 99234 HOSP IP/OBS SM DT SF/LOW 45: CPT

## 2024-05-14 PROCEDURE — 93306 TTE W/DOPPLER COMPLETE: CPT | Mod: 26

## 2024-05-14 RX ORDER — IRON SUCROSE 20 MG/ML
200 INJECTION, SOLUTION INTRAVENOUS EVERY 24 HOURS
Refills: 0 | Status: COMPLETED | OUTPATIENT
Start: 2024-05-14 | End: 2024-05-16

## 2024-05-14 RX ORDER — SENNA PLUS 8.6 MG/1
2 TABLET ORAL AT BEDTIME
Refills: 0 | Status: DISCONTINUED | OUTPATIENT
Start: 2024-05-14 | End: 2024-05-22

## 2024-05-14 RX ORDER — FERROUS SULFATE 325(65) MG
325 TABLET ORAL DAILY
Refills: 0 | Status: DISCONTINUED | OUTPATIENT
Start: 2024-05-14 | End: 2024-05-14

## 2024-05-14 RX ADMIN — Medication 25 MILLIGRAM(S): at 05:18

## 2024-05-14 RX ADMIN — Medication 40 MILLIGRAM(S): at 13:14

## 2024-05-14 RX ADMIN — APIXABAN 5 MILLIGRAM(S): 2.5 TABLET, FILM COATED ORAL at 05:19

## 2024-05-14 RX ADMIN — AZITHROMYCIN 255 MILLIGRAM(S): 500 TABLET, FILM COATED ORAL at 17:18

## 2024-05-14 RX ADMIN — SENNA PLUS 2 TABLET(S): 8.6 TABLET ORAL at 21:42

## 2024-05-14 RX ADMIN — Medication 25 MILLIGRAM(S): at 17:17

## 2024-05-14 RX ADMIN — ATORVASTATIN CALCIUM 40 MILLIGRAM(S): 80 TABLET, FILM COATED ORAL at 21:41

## 2024-05-14 RX ADMIN — Medication 2000 UNIT(S): at 13:15

## 2024-05-14 RX ADMIN — APIXABAN 5 MILLIGRAM(S): 2.5 TABLET, FILM COATED ORAL at 17:17

## 2024-05-14 RX ADMIN — IRON SUCROSE 200 MILLIGRAM(S): 20 INJECTION, SOLUTION INTRAVENOUS at 17:18

## 2024-05-14 RX ADMIN — Medication 81 MILLIGRAM(S): at 13:15

## 2024-05-14 RX ADMIN — Medication 100 MILLIGRAM(S): at 17:21

## 2024-05-14 RX ADMIN — PANTOPRAZOLE SODIUM 40 MILLIGRAM(S): 20 TABLET, DELAYED RELEASE ORAL at 05:18

## 2024-05-14 RX ADMIN — Medication 100 MILLIGRAM(S): at 13:19

## 2024-05-14 RX ADMIN — Medication 40 MILLIGRAM(S): at 05:19

## 2024-05-14 RX ADMIN — Medication 100 MILLIGRAM(S): at 06:12

## 2024-05-14 NOTE — PROGRESS NOTE ADULT - SUBJECTIVE AND OBJECTIVE BOX
[facility]                                                         CARDIOLOGY AT [facility]                                                                  [address], [place] [address]                                                         Telephone: [phone]. Fax:[phone]                                                                             PROGRESS NOTE    Reason for follow up:    CHF  Update:   Patient anemic - Will need stool for OB, if positive will need GI consult - in negative will need hematology consult.      Review of symptoms:   Cardiac:  No chest pain. No dyspnea. No palpitations.  Respiratory: no cough. No dyspnea  Gastrointestinal: No diarrhea. No abdominal pain. No bleeding.   Neuro: No focal neuro complaints.    Vitals:  T(C): 36.2 (05-14-24 @ 07:19), Max: 37.1 (05-13-24 @ 20:30)  HR: 76 (05-14-24 @ 07:19) (68 - 92)  BP: 107/57 (05-14-24 @ 07:19) (107/57 - 136/79)  RR: 18 (05-14-24 @ 07:19) (18 - 26)  SpO2: 97% (05-14-24 @ 07:19) (87% - 100%)  I&O's Summary  13 May 2024 07:01  -  14 May 2024 07:00  --------------------------------------------------------  IN: 0 mL / OUT: 1800 mL / NET: -1800 mL  Weight (kg): 78 (05-13 @ 11:51)    PHYSICAL EXAM:  Appearance: Comfortable. No acute distress  HEENT:  Atraumatic. Normocephalic.  Normal oral mucosa  Neurologic: A & O x 3, no gross focal deficits.  Cardiovascular: RRR S1 S2, No murmur, no rubs/gallops. No JVD  Respiratory: Lungs clear to auscultation, unlabored   Gastrointestinal:  Soft, Non-tender, + BS  Lower Extremities: 2+ Peripheral Pulses, No clubbing, cyanosis, or edema  Psychiatry: Patient is calm. No agitation.   Skin: warm and dry.    CURRENT CARDIAC MEDICATIONS:  furosemide   Injectable 40 milliGRAM(s) IV Push two times a day  metoprolol tartrate 25 milliGRAM(s) Oral two times a day    CURRENT OTHER MEDICATIONS:  benzonatate 100 milliGRAM(s) Oral every 8 hours PRN Cough  azithromycin  IVPB 500 milliGRAM(s) IV Intermittent every 24 hours  acetaminophen     Tablet .. 650 milliGRAM(s) Oral every 6 hours PRN Temp greater or equal to 38C (100.4F), Mild Pain (1 - 3)  melatonin 3 milliGRAM(s) Oral at bedtime PRN Insomnia  ondansetron Injectable 4 milliGRAM(s) IV Push every 8 hours PRN Nausea and/or Vomiting  aluminum hydroxide/magnesium hydroxide/simethicone Suspension 30 milliLiter(s) Oral every 4 hours PRN Dyspepsia  pantoprazole    Tablet 40 milliGRAM(s) Oral before breakfast  senna 2 Tablet(s) Oral at bedtime  atorvastatin 40 milliGRAM(s) Oral at bedtime  apixaban 5 milliGRAM(s) Oral every 12 hours  aspirin enteric coated 81 milliGRAM(s) Oral daily  cholecalciferol 2000 Unit(s) Oral daily    LABS:	 	                          7.9    6.99  )-----------( 178      ( 14 May 2024 03:51 )             28.3     05-14    137  |  99  |  24.0<H>  ----------------------------<  142<H>  3.8   |  22.0  |  1.24    Ca    8.7      14 May 2024 03:51  Mg     1.7     05-14    TPro  5.6<L>  /  Alb  3.4  /  TBili  1.0  /  DBili  x   /  AST  26  /  ALT  11  /  AlkPhos  110  05-13    PT/INR/PTT ( 13 May 2024 13:27 )                       :                       :      15.0         :       34.6                  .        .                   .              .           .       1.36        .                                         TELEMETRY:   Reviewed                                                              [facility]                                                         CARDIOLOGY AT [address], [place] [address]                                                         Telephone: [phone]. Fax:[phone]                                                                             PROGRESS NOTE    Reason for follow up:    ADHFp  Update:   Patient anemic - Will need stool for occult blood if positive will need GI consult - if negative will need hematology consult.      Review of symptoms:   Cardiac:  No chest pain. No dyspnea. No palpitations.  Respiratory: no cough. No dyspnea  Gastrointestinal: No diarrhea. No abdominal pain. No bleeding.   Neuro: No focal neuro complaints.    Vitals:  T(C): 36.2 (05-14-24 @ 07:19), Max: 37.1 (05-13-24 @ 20:30)  HR: 76 (05-14-24 @ 07:19) (68 - 92)  BP: 107/57 (05-14-24 @ 07:19) (107/57 - 136/79)  RR: 18 (05-14-24 @ 07:19) (18 - 26)  SpO2: 97% (05-14-24 @ 07:19) (87% - 100%)  I&O's Summary  13 May 2024 07:01  -  14 May 2024 07:00  --------------------------------------------------------  IN: 0 mL / OUT: 1800 mL / NET: -1800 mL  Weight (kg): 78 (05-13 @ 11:51)    PHYSICAL EXAM:  Appearance: Comfortable. No acute distress  HEENT:  Atraumatic. Normocephalic.  Normal oral mucosa  Neurologic: A & O x 3, no gross focal deficits.  Cardiovascular: RRR S1 S2, No murmur, no rubs/gallops. No JVD  Respiratory: Lungs clear to auscultation, unlabored   Gastrointestinal:  Soft, Non-tender, + BS  Lower Extremities: 2+ Peripheral Pulses, No clubbing, cyanosis, or edema  Psychiatry: Patient is calm. No agitation.   Skin: warm and dry.    CURRENT CARDIAC MEDICATIONS:  furosemide   Injectable 40 milliGRAM(s) IV Push two times a day  metoprolol tartrate 25 milliGRAM(s) Oral two times a day    CURRENT OTHER MEDICATIONS:  benzonatate 100 milliGRAM(s) Oral every 8 hours PRN Cough  azithromycin  IVPB 500 milliGRAM(s) IV Intermittent every 24 hours  acetaminophen     Tablet .. 650 milliGRAM(s) Oral every 6 hours PRN Temp greater or equal to 38C (100.4F), Mild Pain (1 - 3)  melatonin 3 milliGRAM(s) Oral at bedtime PRN Insomnia  ondansetron Injectable 4 milliGRAM(s) IV Push every 8 hours PRN Nausea and/or Vomiting  aluminum hydroxide/magnesium hydroxide/simethicone Suspension 30 milliLiter(s) Oral every 4 hours PRN Dyspepsia  pantoprazole    Tablet 40 milliGRAM(s) Oral before breakfast  senna 2 Tablet(s) Oral at bedtime  atorvastatin 40 milliGRAM(s) Oral at bedtime  apixaban 5 milliGRAM(s) Oral every 12 hours  aspirin enteric coated 81 milliGRAM(s) Oral daily  cholecalciferol 2000 Unit(s) Oral daily    LABS:	 	                          7.9    6.99  )-----------( 178      ( 14 May 2024 03:51 )             28.3     05-14    137  |  99  |  24.0<H>  ----------------------------<  142<H>  3.8   |  22.0  |  1.24    Ca    8.7      14 May 2024 03:51  Mg     1.7     05-14    TPro  5.6<L>  /  Alb  3.4  /  TBili  1.0  /  DBili  x   /  AST  26  /  ALT  11  /  AlkPhos  110  05-13    PT/INR/PTT ( 13 May 2024 13:27 )                       :                       :      15.0         :       34.6                  .        .                   .              .           .       1.36        .                                         TELEMETRY:   Reviewed

## 2024-05-14 NOTE — PROGRESS NOTE ADULT - NS ATTEND AMEND GEN_ALL_CORE FT
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 anemia, he reports had recent repeat colonoscopy 4 months ago outpatient told normal, await fecal occult blood if positive then GI eval. for EGD but if negative then have hematology evaluation, start IV iron while currently admitted for concurrent HF benefit  -depends on anemia workup would consider further ischemic eval. with repeat nuclear stress test  (last office 9/2023) vs. 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 anemia, he reports had recent repeat colonoscopy 4 months ago outpatient told normal, await fecal occult blood if positive then GI eval. for EGD but if negative then have hematology evaluation, start IV iron while currently admitted for concurrent HF benefit  -depends on anemia workup would consider further ischemic eval. with repeat nuclear stress test  (last office 9/2023) vs. 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 anemia, he reports had recent repeat colonoscopy 4 months ago outpatient told normal, await fecal occult blood if positive then GI eval. for EGD but if negative then have hematology evaluation, start IV iron while currently admitted for concurrent HF benefit  -depends on anemia workup would consider further ischemic eval. with repeat nuclear stress test  (last office 9/2023) vs. left heart cath  -repeat Echo preserved LV EF and known segmental wall abnormality compared to office study in 2023  -pAfib on Eliquis, if further downtrend in H/H then needs to hold AC  -Advised need fluid/salt restriction in his diet        Jeferson Canales DO, [facility]  Faculty Non-Invasive Cardiologist  [phone]

## 2024-05-14 NOTE — PROGRESS NOTE ADULT - SUBJECTIVE AND OBJECTIVE BOX
[facility] Division of Hospital Medicine    SUBJECTIVE / OVERNIGHT EVENTS:  No events  Admitted overnight     Patient denies chest pain, SOB, abd pain, N/V, fever, chills, dysuria or any other complaints. All remainder ROS negative.     MEDICATIONS  (STANDING):  apixaban 5 milliGRAM(s) Oral every 12 hours  aspirin enteric coated 81 milliGRAM(s) Oral daily  atorvastatin 40 milliGRAM(s) Oral at bedtime  azithromycin  IVPB 500 milliGRAM(s) IV Intermittent every 24 hours  cholecalciferol 2000 Unit(s) Oral daily  furosemide   Injectable 40 milliGRAM(s) IV Push two times a day  metoprolol tartrate 25 milliGRAM(s) Oral two times a day  pantoprazole    Tablet 40 milliGRAM(s) Oral before breakfast  senna 2 Tablet(s) Oral at bedtime    MEDICATIONS  (PRN):  acetaminophen     Tablet .. 650 milliGRAM(s) Oral every 6 hours PRN Temp greater or equal to 38C (100.4F), Mild Pain (1 - 3)  aluminum hydroxide/magnesium hydroxide/simethicone Suspension 30 milliLiter(s) Oral every 4 hours PRN Dyspepsia  benzonatate 100 milliGRAM(s) Oral every 8 hours PRN Cough  melatonin 3 milliGRAM(s) Oral at bedtime PRN Insomnia  ondansetron Injectable 4 milliGRAM(s) IV Push every 8 hours PRN Nausea and/or Vomiting        I&O's Summary    13 May 2024 07:01  -  14 May 2024 07:00  --------------------------------------------------------  IN: 0 mL / OUT: 1800 mL / NET: -1800 mL        PHYSICAL EXAM:  Vital Signs Last 24 Hrs  T(C): 36.2 (14 May 2024 07:19), Max: 37.1 (13 May 2024 20:30)  T(F): 97.1 (14 May 2024 07:19), Max: 98.7 (13 May 2024 20:30)  HR: 76 (14 May 2024 07:19) (68 - 92)  BP: 107/57 (14 May 2024 07:19) (107/57 - 136/79)  BP(mean): 80 (13 May 2024 20:30) (80 - 80)  RR: 18 (14 May 2024 07:19) (18 - 26)  SpO2: 97% (14 May 2024 07:19) (87% - 100%)    Parameters below as of 14 May 2024 07:19  Patient On (Oxygen Delivery Method): nasal cannula            CONSTITUTIONAL: NAD, appears stated age  ENMT: Moist oral mucosa, no pharyngeal injection or exudates; normal dentition  RESPIRATORY: Normal respiratory effort; crackles to auscultation bilaterally  CARDIOVASCULAR: Regular rate and rhythm, normal S1 and S2, no murmur/rub/gallop; Peripheral pulses are 2+ bilaterally  ABDOMEN: Nontender to palpation, normoactive bowel sounds, no rebound/guarding;   MUSCLOSKELETAL:  No clubbing or cyanosis of digits; no joint swelling or tenderness to palpation  PSYCH: A+O to person, place, and time; affect appropriate  NEUROLOGY: CN 2-12 are intact and symmetric; no gross sensory deficits;   SKIN: No rashes; no palpable lesions    LABS:                        7.9    6.99  )-----------( 178      ( 14 May 2024 03:51 )             28.3     05-14    137  |  99  |  24.0<H>  ----------------------------<  142<H>  3.8   |  22.0  |  1.24    Ca    8.7      14 May 2024 03:51  Mg     1.7     05-14    TPro  5.6<L>  /  Alb  3.4  /  TBili  1.0  /  DBili  x   /  AST  26  /  ALT  11  /  AlkPhos  110  05-13    PT/INR - ( 13 May 2024 13:27 )   PT: 15.0 sec;   INR: 1.36 ratio         PTT - ( 13 May 2024 13:27 )  PTT:34.6 sec      Urinalysis Basic - ( 14 May 2024 03:51 )    Color: x / Appearance: x / SG: x / pH: x  Gluc: 142 mg/dL / Ketone: x  / Bili: x / Urobili: x   Blood: x / Protein: x / Nitrite: x   Leuk Esterase: x / RBC: x / WBC x   Sq Epi: x / Non Sq Epi: x / Bacteria: x        CAPILLARY BLOOD GLUCOSE      POCT Blood Glucose.: 166 mg/dL (13 May 2024 22:10)        RADIOLOGY & ADDITIONAL TESTS:  Results Reviewed:   Imaging Personally Reviewed:  Electrocardiogram Personally Reviewed:

## 2024-05-15 DIAGNOSIS — I48.0 PAROXYSMAL ATRIAL FIBRILLATION: ICD-10-CM

## 2024-05-15 DIAGNOSIS — I25.10 ATHEROSCLEROTIC HEART DISEASE OF NATIVE CORONARY ARTERY WITHOUT ANGINA PECTORIS: ICD-10-CM

## 2024-05-15 LAB
ALBUMIN SERPL ELPH-MCNC: 3.7 G/DL — SIGNIFICANT CHANGE UP (ref 3.3–5.2)
ALP SERPL-CCNC: 111 U/L — SIGNIFICANT CHANGE UP (ref 40–120)
ALT FLD-CCNC: 16 U/L — SIGNIFICANT CHANGE UP
ANION GAP SERPL CALC-SCNC: 15 MMOL/L — SIGNIFICANT CHANGE UP (ref 5–17)
AST SERPL-CCNC: 40 U/L — HIGH
BILIRUB SERPL-MCNC: 1.2 MG/DL — SIGNIFICANT CHANGE UP (ref 0.4–2)
BUN SERPL-MCNC: 28 MG/DL — HIGH (ref 8–20)
CALCIUM SERPL-MCNC: 9.1 MG/DL — SIGNIFICANT CHANGE UP (ref 8.4–10.5)
CHLORIDE SERPL-SCNC: 95 MMOL/L — LOW (ref 96–108)
CO2 SERPL-SCNC: 27 MMOL/L — SIGNIFICANT CHANGE UP (ref 22–29)
CREAT SERPL-MCNC: 0.88 MG/DL — SIGNIFICANT CHANGE UP (ref 0.5–1.3)
EGFR: 96 ML/MIN/1.73M2 — SIGNIFICANT CHANGE UP
GLUCOSE SERPL-MCNC: 108 MG/DL — HIGH (ref 70–99)
HCT VFR BLD CALC: 34.6 % — LOW (ref 39–50)
HGB BLD-MCNC: 9.7 G/DL — LOW (ref 13–17)
MAGNESIUM SERPL-MCNC: 1.8 MG/DL — SIGNIFICANT CHANGE UP (ref 1.6–2.6)
MCHC RBC-ENTMCNC: 20 PG — LOW (ref 27–34)
MCHC RBC-ENTMCNC: 28 GM/DL — LOW (ref 32–36)
MCV RBC AUTO: 71.5 FL — LOW (ref 80–100)
OB PNL STL: NEGATIVE — SIGNIFICANT CHANGE UP
PHOSPHATE SERPL-MCNC: 2.8 MG/DL — SIGNIFICANT CHANGE UP (ref 2.4–4.7)
PLATELET # BLD AUTO: 224 K/UL — SIGNIFICANT CHANGE UP (ref 150–400)
POTASSIUM SERPL-MCNC: 3.4 MMOL/L — LOW (ref 3.5–5.3)
POTASSIUM SERPL-SCNC: 3.4 MMOL/L — LOW (ref 3.5–5.3)
PROT SERPL-MCNC: 5.8 G/DL — LOW (ref 6.6–8.7)
RBC # BLD: 4.84 M/UL — SIGNIFICANT CHANGE UP (ref 4.2–5.8)
RBC # FLD: 20.3 % — HIGH (ref 10.3–14.5)
SODIUM SERPL-SCNC: 137 MMOL/L — SIGNIFICANT CHANGE UP (ref 135–145)
WBC # BLD: 9.21 K/UL — SIGNIFICANT CHANGE UP (ref 3.8–10.5)
WBC # FLD AUTO: 9.21 K/UL — SIGNIFICANT CHANGE UP (ref 3.8–10.5)

## 2024-05-15 PROCEDURE — 99232 SBSQ HOSP IP/OBS MODERATE 35: CPT

## 2024-05-15 PROCEDURE — 99234 HOSP IP/OBS SM DT SF/LOW 45: CPT

## 2024-05-15 RX ORDER — POTASSIUM CHLORIDE 20 MEQ
40 PACKET (EA) ORAL ONCE
Refills: 0 | Status: COMPLETED | OUTPATIENT
Start: 2024-05-15 | End: 2024-05-15

## 2024-05-15 RX ORDER — ALBUTEROL 90 UG/1
2 AEROSOL, METERED ORAL EVERY 6 HOURS
Refills: 0 | Status: DISCONTINUED | OUTPATIENT
Start: 2024-05-15 | End: 2024-05-22

## 2024-05-15 RX ORDER — IPRATROPIUM/ALBUTEROL SULFATE 18-103MCG
3 AEROSOL WITH ADAPTER (GRAM) INHALATION EVERY 6 HOURS
Refills: 0 | Status: DISCONTINUED | OUTPATIENT
Start: 2024-05-15 | End: 2024-05-22

## 2024-05-15 RX ADMIN — Medication 40 MILLIGRAM(S): at 14:06

## 2024-05-15 RX ADMIN — APIXABAN 5 MILLIGRAM(S): 2.5 TABLET, FILM COATED ORAL at 16:58

## 2024-05-15 RX ADMIN — Medication 40 MILLIGRAM(S): at 05:20

## 2024-05-15 RX ADMIN — Medication 40 MILLIEQUIVALENT(S): at 09:20

## 2024-05-15 RX ADMIN — ATORVASTATIN CALCIUM 40 MILLIGRAM(S): 80 TABLET, FILM COATED ORAL at 22:06

## 2024-05-15 RX ADMIN — Medication 25 MILLIGRAM(S): at 16:57

## 2024-05-15 RX ADMIN — SENNA PLUS 2 TABLET(S): 8.6 TABLET ORAL at 22:05

## 2024-05-15 RX ADMIN — Medication 3 MILLILITER(S): at 20:24

## 2024-05-15 RX ADMIN — APIXABAN 5 MILLIGRAM(S): 2.5 TABLET, FILM COATED ORAL at 05:21

## 2024-05-15 RX ADMIN — Medication 25 MILLIGRAM(S): at 05:21

## 2024-05-15 RX ADMIN — AZITHROMYCIN 255 MILLIGRAM(S): 500 TABLET, FILM COATED ORAL at 16:58

## 2024-05-15 RX ADMIN — PANTOPRAZOLE SODIUM 40 MILLIGRAM(S): 20 TABLET, DELAYED RELEASE ORAL at 05:21

## 2024-05-15 RX ADMIN — Medication 100 MILLIGRAM(S): at 05:21

## 2024-05-15 RX ADMIN — Medication 2000 UNIT(S): at 09:46

## 2024-05-15 RX ADMIN — Medication 81 MILLIGRAM(S): at 09:47

## 2024-05-15 RX ADMIN — Medication 100 MILLIGRAM(S): at 09:20

## 2024-05-15 RX ADMIN — IRON SUCROSE 200 MILLIGRAM(S): 20 INJECTION, SOLUTION INTRAVENOUS at 15:40

## 2024-05-15 RX ADMIN — Medication 3 MILLILITER(S): at 14:22

## 2024-05-15 NOTE — PROGRESS NOTE ADULT - SUBJECTIVE AND OBJECTIVE BOX
[facility] Division of Hospital Medicine    SUBJECTIVE / OVERNIGHT EVENTS:  No events    Patient denies chest pain, SOB, abd pain, N/V, fever, chills, dysuria or any other complaints. All remainder ROS negative.     MEDICATIONS  (STANDING):  albuterol    90 MICROgram(s) HFA Inhaler 2 Puff(s) Inhalation every 6 hours  albuterol/ipratropium for Nebulization 3 milliLiter(s) Nebulizer every 6 hours  apixaban 5 milliGRAM(s) Oral every 12 hours  aspirin enteric coated 81 milliGRAM(s) Oral daily  atorvastatin 40 milliGRAM(s) Oral at bedtime  azithromycin  IVPB 500 milliGRAM(s) IV Intermittent every 24 hours  cholecalciferol 2000 Unit(s) Oral daily  furosemide   Injectable 40 milliGRAM(s) IV Push two times a day  iron sucrose Injectable 200 milliGRAM(s) IV Push every 24 hours  metoprolol tartrate 25 milliGRAM(s) Oral two times a day  pantoprazole    Tablet 40 milliGRAM(s) Oral before breakfast  senna 2 Tablet(s) Oral at bedtime    MEDICATIONS  (PRN):  acetaminophen     Tablet .. 650 milliGRAM(s) Oral every 6 hours PRN Temp greater or equal to 38C (100.4F), Mild Pain (1 - 3)  aluminum hydroxide/magnesium hydroxide/simethicone Suspension 30 milliLiter(s) Oral every 4 hours PRN Dyspepsia  benzonatate 100 milliGRAM(s) Oral every 8 hours PRN Cough  guaiFENesin Oral Liquid (Sugar-Free) 100 milliGRAM(s) Oral every 6 hours PRN Cough  melatonin 3 milliGRAM(s) Oral at bedtime PRN Insomnia  ondansetron Injectable 4 milliGRAM(s) IV Push every 8 hours PRN Nausea and/or Vomiting        I&O's Summary      PHYSICAL EXAM:  Vital Signs Last 24 Hrs  T(C): 36.6 (15 May 2024 11:16), Max: 36.8 (15 May 2024 04:45)  T(F): 97.9 (15 May 2024 11:16), Max: 98.2 (15 May 2024 04:45)  HR: 77 (15 May 2024 11:16) (63 - 77)  BP: 115/70 (15 May 2024 11:16) (102/58 - 117/52)  BP(mean): --  RR: 22 (15 May 2024 11:16) (18 - 22)  SpO2: 95% (15 May 2024 11:16) (90% - 96%)    Parameters below as of 15 May 2024 11:16  Patient On (Oxygen Delivery Method): nasal cannula  O2 Flow (L/min): 2          CONSTITUTIONAL: NAD, appears stated age  ENMT: Moist oral mucosa, no pharyngeal injection or exudates; normal dentition  RESPIRATORY: Normal respiratory effort; clear to auscultation bilaterally  CARDIOVASCULAR: Regular rate and rhythm, normal S1 and S2, no murmur/rub/gallop; Peripheral pulses are 2+ bilaterally  ABDOMEN: Nontender to palpation, normoactive bowel sounds, no rebound/guarding;   MUSCLOSKELETAL:  No clubbing or cyanosis of digits; no joint swelling or tenderness to palpation  PSYCH: A+O to person, place, and time; affect appropriate  NEUROLOGY: CN 2-12 are intact and symmetric; no gross sensory deficits;   SKIN: No rashes; no palpable lesions    LABS:                        9.7    9.21  )-----------( 224      ( 15 May 2024 07:22 )             34.6     05-15    137  |  95<L>  |  28.0<H>  ----------------------------<  108<H>  3.4<L>   |  27.0  |  0.88    Ca    9.1      15 May 2024 07:22  Phos  2.8     05-15  Mg     1.8     05-15    TPro  5.8<L>  /  Alb  3.7  /  TBili  1.2  /  DBili  x   /  AST  40<H>  /  ALT  16  /  AlkPhos  111  05-15    PT/INR - ( 13 May 2024 13:27 )   PT: 15.0 sec;   INR: 1.36 ratio         PTT - ( 13 May 2024 13:27 )  PTT:34.6 sec      Urinalysis Basic - ( 15 May 2024 07:22 )    Color: x / Appearance: x / SG: x / pH: x  Gluc: 108 mg/dL / Ketone: x  / Bili: x / Urobili: x   Blood: x / Protein: x / Nitrite: x   Leuk Esterase: x / RBC: x / WBC x   Sq Epi: x / Non Sq Epi: x / Bacteria: x        CAPILLARY BLOOD GLUCOSE            RADIOLOGY & ADDITIONAL TESTS:  Results Reviewed:   Imaging Personally Reviewed:  Electrocardiogram Personally Reviewed:

## 2024-05-15 NOTE — PROGRESS NOTE ADULT - NS ATTEND AMEND GEN_ALL_CORE FT
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 atypical pneumonia? with persistent cough, repeat pBNP level  -severe microcytic iron-def. anemia, he reports had recent repeat colonoscopy 4 months ago outpatient told normal, fecal occult blood negative, started IV iron while currently admitted for concurrent HF benefit  -repeat Echo preserved LV EF and known segmental wall abnormality compared to office study in 2023, repeat pharm nuclear stress test (last office 9/2023)   -pAfib on Eliquis, if further downtrend in H/H then needs to hold AC  -Advised need fluid/salt restriction in his diet        Jeferson Canales DO, [facility]  Faculty Non-Invasive Cardiologist  [phone].

## 2024-05-15 NOTE — PROGRESS NOTE ADULT - SUBJECTIVE AND OBJECTIVE BOX
[facility]                                                         CARDIOLOGY AT [facility]                                                                  [address] [address]                                                         Telephone: [phone]. Fax:[phone]                                                                             PROGRESS NOTE      Reason for follow up: HFrEF  Last 24h Telemetry: SR, NSVT 3 beats   Overall Plan: HH at baseline today  plan for NST tomorrow  cont Lasix BID      Review of symptoms:   Cardiac:  No chest pain. + dyspnea. No palpitations.  Respiratory: no cough. + dyspnea, + cough  Gastrointestinal: No diarrhea. No abdominal pain. No bleeding.   Neuro: No focal neuro complaints.        Vitals:  T(C): 36.5 (05-15-24 @ 07:11), Max: 36.8 (05-15-24 @ 04:45)  HR: 72 (05-15-24 @ 08:25) (63 - 76)  BP: 102/58 (05-15-24 @ 07:11) (102/58 - 117/52)  RR: 22 (05-15-24 @ 08:25) (18 - 22)  SpO2: 95% (05-15-24 @ 08:25) (90% - 96%)        Weight (kg): 78 (05-13 @ 11:51)        PHYSICAL EXAM:  Appearance: Comfortable. No acute distress  HEENT:  Atraumatic. Normocephalic.  Normal oral mucosa  Neurologic: A & O x 3, no gross focal deficits.  Cardiovascular: RRR S1 S2, No murmur, no rubs/gallops.  Respiratory: b/L crackles, supplemental O2, unlabored   Gastrointestinal:  Soft, Non-tender, + BS  Lower Extremities: 2+ edema  Psychiatry: Patient is calm. No agitation.   Skin: warm and dry.        CURRENT CARDIAC MEDICATIONS:  furosemide Injectable 40 milliGRAM(s) IV Push two times a day  metoprolol tartrate 25 milliGRAM(s) Oral two times a day        CURRENT OTHER MEDICATIONS:  benzonatate 100 milliGRAM(s) Oral every 8 hours PRN Cough  azithromycin  IVPB 500 milliGRAM(s) IV Intermittent every 24 hours  acetaminophen     Tablet .. 650 milliGRAM(s) Oral every 6 hours PRN Temp greater or equal to 38C (100.4F), Mild Pain (1 - 3)  melatonin 3 milliGRAM(s) Oral at bedtime PRN Insomnia  ondansetron Injectable 4 milliGRAM(s) IV Push every 8 hours PRN Nausea and/or Vomiting  aluminum hydroxide/magnesium hydroxide/simethicone Suspension 30 milliLiter(s) Oral every 4 hours PRN Dyspepsia  pantoprazole    Tablet 40 milliGRAM(s) Oral before breakfast  senna 2 Tablet(s) Oral at bedtime  atorvastatin 40 milliGRAM(s) Oral at bedtime  apixaban 5 milliGRAM(s) Oral every 12 hours  aspirin enteric coated 81 milliGRAM(s) Oral daily  cholecalciferol 2000 Unit(s) Oral daily  iron sucrose Injectable 200 milliGRAM(s) IV Push every 24 hours, Stop order after: 3 Days  potassium chloride    Tablet ER 40 milliEquivalent(s) Oral once, Stop order after: 1 Doses      LABS:	 	                          9.7    9.21  )-----------( 224      ( 15 May 2024 07:22 )             34.6     05-15    137  |  95<L>  |  28.0<H>  ----------------------------<  108<H>  3.4<L>   |  27.0  |  0.88    Ca    9.1      15 May 2024 07:22  Phos  2.8     05-15  Mg     1.8     05-15    TPro  5.8<L>  /  Alb  3.7  /  TBili  1.2  /  DBili  x   /  AST  40<H>  /  ALT  16  /  AlkPhos  111  05-15    PT/INR/PTT ( 13 May 2024 13:27 )                       :                       :      15.0         :       34.6                  .        .                   .              .           .       1.36        .                                        TELEMETRY: SR, 3 beats NSVT      DIAGNOSTIC TESTING:  [ ] Echocardiogram:   < from: TTE W or WO Ultrasound Enhancing Agent (05.14.24 @ 11:29) >  _______________________________________________________________________________________     CONCLUSIONS:      1. Left ventricular systolic function is normal with an ejection fraction of 65 % by Gu's method of disks with an ejection fraction visually estimated at 60 to 65 %.   2. Basal inferior segment and basal inferolateral segment are abnormal.   3. Normal right ventricular cavity size and normal systolic function.   4. The left atrium is mildly dilated.   5. Moderate pulmonic regurgitation.   6. Estimated pulmonary artery systolic pressureis 13 mmHg, consistent with normal pulmonary artery pressure.   7. No pericardial effusion seen.   8. No prior echocardiogram is available for comparison.    ________________________________________________________________________________________    < end of copied text >    [ ]  Catheterization:  [ ] Stress Test:    OTHER:

## 2024-05-16 ENCOUNTER — APPOINTMENT (OUTPATIENT)
Dept: CARDIOLOGY | Facility: CLINIC | Age: 64
End: 2024-05-16

## 2024-05-16 ENCOUNTER — RESULT REVIEW (OUTPATIENT)
Age: 64
End: 2024-05-16

## 2024-05-16 LAB — NT-PROBNP SERPL-SCNC: 442 PG/ML — HIGH (ref 0–300)

## 2024-05-16 PROCEDURE — 93018 CV STRESS TEST I&R ONLY: CPT

## 2024-05-16 PROCEDURE — 99232 SBSQ HOSP IP/OBS MODERATE 35: CPT

## 2024-05-16 PROCEDURE — 99234 HOSP IP/OBS SM DT SF/LOW 45: CPT | Mod: 25

## 2024-05-16 PROCEDURE — 78452 HT MUSCLE IMAGE SPECT MULT: CPT | Mod: 26

## 2024-05-16 PROCEDURE — 93016 CV STRESS TEST SUPVJ ONLY: CPT

## 2024-05-16 PROCEDURE — 71045 X-RAY EXAM CHEST 1 VIEW: CPT | Mod: 26

## 2024-05-16 RX ORDER — POTASSIUM CHLORIDE 20 MEQ
40 PACKET (EA) ORAL ONCE
Refills: 0 | Status: COMPLETED | OUTPATIENT
Start: 2024-05-16 | End: 2024-05-16

## 2024-05-16 RX ADMIN — SENNA PLUS 2 TABLET(S): 8.6 TABLET ORAL at 21:24

## 2024-05-16 RX ADMIN — Medication 100 MILLIGRAM(S): at 06:15

## 2024-05-16 RX ADMIN — Medication 3 MILLILITER(S): at 02:46

## 2024-05-16 RX ADMIN — Medication 40 MILLIGRAM(S): at 13:08

## 2024-05-16 RX ADMIN — Medication 100 MILLIGRAM(S): at 13:09

## 2024-05-16 RX ADMIN — IRON SUCROSE 200 MILLIGRAM(S): 20 INJECTION, SOLUTION INTRAVENOUS at 21:41

## 2024-05-16 RX ADMIN — Medication 100 MILLIGRAM(S): at 01:54

## 2024-05-16 RX ADMIN — Medication 100 MILLIGRAM(S): at 01:05

## 2024-05-16 RX ADMIN — Medication 40 MILLIGRAM(S): at 05:25

## 2024-05-16 RX ADMIN — Medication 25 MILLIGRAM(S): at 18:54

## 2024-05-16 RX ADMIN — Medication 3 MILLILITER(S): at 14:43

## 2024-05-16 RX ADMIN — AZITHROMYCIN 255 MILLIGRAM(S): 500 TABLET, FILM COATED ORAL at 18:53

## 2024-05-16 RX ADMIN — Medication 25 MILLIGRAM(S): at 05:26

## 2024-05-16 RX ADMIN — Medication 100 MILLIGRAM(S): at 13:08

## 2024-05-16 RX ADMIN — ATORVASTATIN CALCIUM 40 MILLIGRAM(S): 80 TABLET, FILM COATED ORAL at 21:24

## 2024-05-16 RX ADMIN — PANTOPRAZOLE SODIUM 40 MILLIGRAM(S): 20 TABLET, DELAYED RELEASE ORAL at 05:26

## 2024-05-16 RX ADMIN — Medication 40 MILLIEQUIVALENT(S): at 13:08

## 2024-05-16 RX ADMIN — Medication 81 MILLIGRAM(S): at 13:09

## 2024-05-16 RX ADMIN — APIXABAN 5 MILLIGRAM(S): 2.5 TABLET, FILM COATED ORAL at 05:25

## 2024-05-16 RX ADMIN — Medication 2000 UNIT(S): at 13:09

## 2024-05-16 NOTE — PROGRESS NOTE ADULT - SUBJECTIVE AND OBJECTIVE BOX
Maninder May MD  [facility]  Contact via Teams or text/call at [phone]    Patient is a 63y old  Male who presents with a chief complaint of     Patient seen and examined at bedside. No overnight events reported.     ALLERGIES:  No Known Allergies    MEDICATIONS  (STANDING):  albuterol    90 MICROgram(s) HFA Inhaler 2 Puff(s) Inhalation every 6 hours  albuterol/ipratropium for Nebulization 3 milliLiter(s) Nebulizer every 6 hours  apixaban 5 milliGRAM(s) Oral every 12 hours  aspirin enteric coated 81 milliGRAM(s) Oral daily  atorvastatin 40 milliGRAM(s) Oral at bedtime  azithromycin  IVPB 500 milliGRAM(s) IV Intermittent every 24 hours  cholecalciferol 2000 Unit(s) Oral daily  furosemide   Injectable 40 milliGRAM(s) IV Push two times a day  iron sucrose Injectable 200 milliGRAM(s) IV Push every 24 hours  metoprolol tartrate 25 milliGRAM(s) Oral two times a day  pantoprazole    Tablet 40 milliGRAM(s) Oral before breakfast  senna 2 Tablet(s) Oral at bedtime    MEDICATIONS  (PRN):  acetaminophen     Tablet .. 650 milliGRAM(s) Oral every 6 hours PRN Temp greater or equal to 38C (100.4F), Mild Pain (1 - 3)  aluminum hydroxide/magnesium hydroxide/simethicone Suspension 30 milliLiter(s) Oral every 4 hours PRN Dyspepsia  benzonatate 100 milliGRAM(s) Oral every 8 hours PRN Cough  guaiFENesin Oral Liquid (Sugar-Free) 100 milliGRAM(s) Oral every 6 hours PRN Cough  melatonin 3 milliGRAM(s) Oral at bedtime PRN Insomnia  ondansetron Injectable 4 milliGRAM(s) IV Push every 8 hours PRN Nausea and/or Vomiting    Vital Signs Last 24 Hrs  T(F): 98 (16 May 2024 09:50), Max: 98.1 (15 May 2024 23:22)  HR: 73 (16 May 2024 09:50) (64 - 85)  BP: 115/73 (16 May 2024 09:50) (101/65 - 116/69)  RR: 18 (16 May 2024 09:50) (17 - 22)  SpO2: 99% (16 May 2024 09:50) (93% - 100%)  I&O's Summary    15 May 2024 07:01  -  16 May 2024 07:00  --------------------------------------------------------  IN: 0 mL / OUT: 250 mL / NET: -250 mL      PHYSICAL EXAM:  General: NAD, A/O x 3  ENT: No gross hearing impairment, Moist mucous membranes, no thrush  Neck: Supple, No JVD  Lungs: Clear to auscultation bilaterally, good air entry, non-labored breathing  Cardio: RRR, S1/S2, No murmur  Abdomen: Soft, Nontender, Nondistended; Bowel sounds present  Extremities: No calf tenderness, No cyanosis, No pitting edema  Psych: Appropriate mood and affect    LABS:                        9.7    9.21  )-----------( 224      ( 15 May 2024 07:22 )             34.6     05-15    137  |  95  |  28.0  ----------------------------<  108  3.4   |  27.0  |  0.88    Ca    9.1      15 May 2024 07:22  Phos  2.8     05-15  Mg     1.8     05-15    TPro  5.8  /  Alb  3.7  /  TBili  1.2  /  DBili  x   /  AST  40  /  ALT  16  /  AlkPhos  111  05-15          PT/INR - ( 13 May 2024 13:27 )   PT: 15.0 sec;   INR: 1.36 ratio         PTT - ( 13 May 2024 13:27 )  PTT:34.6 sec    Procalcitonin: 0.07 ng/mL (05-13-24 @ 13:26)    ABG - ( 13 May 2024 14:25 )  pH, Arterial: 7.480 pH, Blood: x     /  pCO2: 32    /  pO2: 143   / HCO3: 24    / Base Excess: 0.3   /  SaO2: 99.2      Urinalysis Basic - ( 15 May 2024 07:22 )    Color: x / Appearance: x / SG: x / pH: x  Gluc: 108 mg/dL / Ketone: x  / Bili: x / Urobili: x   Blood: x / Protein: x / Nitrite: x   Leuk Esterase: x / RBC: x / WBC x   Sq Epi: x / Non Sq Epi: x / Bacteria: x    RADIOLOGY & ADDITIONAL TESTS:    Care Discussed with Consultants/Other Providers:

## 2024-05-16 NOTE — PROGRESS NOTE ADULT - NS ATTEND AMEND GEN_ALL_CORE FT
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 other pulmonary infiltrates, will plan for R/LHC tomorrow to check filling pressure if not fluid overloaded then would need pulmonary eval. for persistent pulmonary infiltrates, RVP and procalcitonin negative but on empiric azithromycin, check urine legionella   -last dose Eliquis this morning at 5am on 5/16, hold Eliquis pending cath, no prior CVA does not need heparin bridge  -severe microcytic iron-def. anemia, he reports had recent repeat colonoscopy 4 months ago outpatient told normal, fecal occult blood negative, started IV iron while currently admitted for concurrent HF benefit  -pAfib on Eliquis, if further downtrend in H/H then needs to hold AC  -Advised need fluid/salt restriction in his diet        Jeferson Canales DO, [facility]  Faculty Non-Invasive Cardiologist  [phone].

## 2024-05-16 NOTE — PROGRESS NOTE ADULT - SUBJECTIVE AND OBJECTIVE BOX
[facility]                                                         CARDIOLOGY AT [facility]                                                                  [address], [place] [address]                                                         Telephone: [phone]. Fax:[phone]                                                                             PROGRESS NOTE    Reason for follow up: HFrEF  Update: pending NST. will repeat CXR. will need RHC to evaluate pulmonary pressures      Review of symptoms:   Cardiac:  No chest pain. No dyspnea. No palpitations.  Respiratory: + cough. No dyspnea  Gastrointestinal: No diarrhea. No abdominal pain. No bleeding.   Neuro: No focal neuro complaints.    Vitals:  T(C): 36.4 (24 @ 05:23), Max: 36.7 (05-15-24 @ 23:22)  HR: 71 (24 @ 05:23) (64 - 85)  BP: 107/57 (24 @ 05:23) (101/65 - 116/69)  RR: 17 (24 @ 05:23) (17 - 22)  SpO2: 93% (24 @ 05:23) (93% - 100%)  Wt(kg): --  I&O's Summary    15 May 2024 07:01  -  16 May 2024 07:00  --------------------------------------------------------  IN: 0 mL / OUT: 250 mL / NET: -250 mL      Weight (kg): 74.9 ( @ 00:05)    PHYSICAL EXAM:  Appearance: Comfortable. No acute distress  HEENT:  Atraumatic. Normocephalic.  Normal oral mucosa  Neurologic: A & O x 3, no gross focal deficits.  Cardiovascular: RRR S1 S2, No murmur, no rubs/gallops. No JVD  Respiratory: Lungs clear to auscultation, unlabored   Gastrointestinal:  Soft, Non-tender, + BS  Lower Extremities: 2+ Peripheral Pulses, No clubbing, cyanosis, or edema  Psychiatry: Patient is calm. No agitation.   Skin: warm and dry.    CURRENT CARDIAC MEDICATIONS:  furosemide   Injectable 40 milliGRAM(s) IV Push two times a day  metoprolol tartrate 25 milliGRAM(s) Oral two times a day      CURRENT OTHER MEDICATIONS:  albuterol    90 MICROgram(s) HFA Inhaler 2 Puff(s) Inhalation every 6 hours  albuterol/ipratropium for Nebulization 3 milliLiter(s) Nebulizer every 6 hours  benzonatate 100 milliGRAM(s) Oral every 8 hours PRN Cough  guaiFENesin Oral Liquid (Sugar-Free) 100 milliGRAM(s) Oral every 6 hours PRN Cough  azithromycin  IVPB 500 milliGRAM(s) IV Intermittent every 24 hours  acetaminophen     Tablet .. 650 milliGRAM(s) Oral every 6 hours PRN Temp greater or equal to 38C (100.4F), Mild Pain (1 - 3)  melatonin 3 milliGRAM(s) Oral at bedtime PRN Insomnia  ondansetron Injectable 4 milliGRAM(s) IV Push every 8 hours PRN Nausea and/or Vomiting  aluminum hydroxide/magnesium hydroxide/simethicone Suspension 30 milliLiter(s) Oral every 4 hours PRN Dyspepsia  pantoprazole    Tablet 40 milliGRAM(s) Oral before breakfast  senna 2 Tablet(s) Oral at bedtime  atorvastatin 40 milliGRAM(s) Oral at bedtime  apixaban 5 milliGRAM(s) Oral every 12 hours  aspirin enteric coated 81 milliGRAM(s) Oral daily  cholecalciferol 2000 Unit(s) Oral daily  iron sucrose Injectable 200 milliGRAM(s) IV Push every 24 hours, Stop order after: 3 Days      LABS:	 	                            9.7    9.21  )-----------( 224      ( 15 May 2024 07:22 )             34.6     05-15    137  |  95<L>  |  28.0<H>  ----------------------------<  108<H>  3.4<L>   |  27.0  |  0.88    Ca    9.1      15 May 2024 07:22  Phos  2.8     05-15  Mg     1.8     05-15    TPro  5.8<L>  /  Alb  3.7  /  TBili  1.2  /  DBili  x   /  AST  40<H>  /  ALT  16  /  AlkPhos  111  05-15    PT/INR/PTT ( 13 May 2024 13:27 )                       :                       :      15.0         :       34.6                  .        .                   .              .           .       1.36        .                                       Lipid Profile:   HgA1c:   TSH:     TELEMETRY: NSR 3 beats NSVT today   ECG:    DIAGNOSTIC TESTING:  [ ] Echocardiogram:   < from: TTE W or WO Ultrasound Enhancing Agent (24 @ 11:29) >  TRANSTHORACIC ECHOCARDIOGRAM REPORT  ________________________________________________________________________________                                      _______       Pt. Name:       RENATO LEVI Study Date:    2024  MRN:            [id number]           YOB: 1960  Accession #:    [id number]          Age:           63 years  Account#:       [id number]         Gender:        M  Visit ID#  Heart Rate:     69 bpm             Height:        61.81 in (157.00 cm)  Rhythm:   sinus rhythm       Weight:        171.00 lb (77.56 kg)  Blood Pressure: 110/60 mmHg        BSA/BMI:       1.78 m² / 31.47 kg/m²  ________________________________________________________________________________________  Referring Physician:    Judy Moseley  Interpreting Physician: Cy Gaston  Primary Sonographer:    Alisha Aguilar    CPT:               ECHO TTE WO CON COMP W DOPP - 20680.m  Indication(s):     Abnormal electrocardiogram ECG/EKG - R94.31  Procedure:         Transthoracic echocardiogram with 2-D, M-mode and complete                     spectral and color flow Doppler.  Ordering Location: [facility]  Admission Status:  ED  Study Information: Image quality for this study is fair.    _______________________________________________________________________________________     CONCLUSIONS:      1. Left ventricular systolic function is normal with an ejection fraction of 65 % by Gu's method of disks with an ejection fraction visually estimated at 60 to 65 %.   2. Basal inferior segment and basal inferolateral segment are abnormal.   3. Normal right ventricular cavity size and normal systolic function.   4. The left atrium is mildly dilated.   5. Moderate pulmonic regurgitation.   6. Estimated pulmonary artery systolic pressureis 13 mmHg, consistent with normal pulmonary artery pressure.   7. No pericardial effusion seen.   8. No prior echocardiogram is available for comparison.    < end of copied text >  [ ]  Catheterization: < from: Cardiac Catheterization (22 @ 07:47) >  [facility]  Department of Cardiology  [address] [place] [address]  [phone]  Cath Lab Report -- Comprehensive Report  Patient: RENATO LEVI  Study date: 2022  Account number: [id number]  MR number: [id number]  : 1960  Gender: Male  Race:   Case Physician(s):  Dago Rodriguez MD  Referring Physician:  Ludy Woods MD  INDICATIONS: Initial NSTEMI. Acute systolic congestive heart failure.  HISTORY: The patient has hypertension, insulin-controlled diabetes, and  medication-treated dyslipidemia.  PROCEDURE:  --  Right heart catheterization.  --  Left heart catheterization.  --  Left coronary angiography.  --  Right coronary angiography.  --  Sonosite.  TECHNIQUE: The risks and alternatives of the procedures and conscious  sedation were explained to the patient and informed consent was obtained.  Cardiac catheterization performed urgently.  Local anesthetic given. Right radial artery access. Right brachial vein  access. Right heart catheterization. The procedure was performed utilizing  a catheter. Left heart catheterization. Left coronary artery angiography.  The vessel was injected utilizing a catheter. Right coronary artery  angiography. The vessel was injected utilizing a catheter. Sonosite.  RADIATION EXPOSURE: 4.8 min.  CONTRAST GIVEN: Omnipaque 41 ml.  MEDICATIONS GIVEN: Midazolam, 1 mg, IV. Fentanyl, 25 mcg, IV. Verapamil  (Isoptin, Calan, Covera), 2.5 mg, IA. Nitroglycerin, 200 mcg, IA. Heparin,  3000 units, IV. 1% Lidocaine, 10 ml, subcutaneously.  CORONARY VESSELS: The coronary circulation is right dominant.  LM:   --  Mid left main: There was a 40 % stenosis. The lesion was  moderately calcified.  LAD:   --  Proximal LAD: There was a 100 % stenosis. This lesion is a  chronic total occlusion.  --  Distal LAD: The distal vessel was supplied by collaterals from the RCA.  CX:   --  Mid circumflex: There was a 99 % stenosis.  RI:   --  Ramus intermedius: The vessel was medium to large sized. There  was a 80 % stenosis.  RCA:   --  RCA: The vessel was large sized (dominant).  --  Mid RCA: There was a 80 % stenosis.  COMPLICATIONS: No complications occurred during the cath lab visit.  DIAGNOSTIC IMPRESSIONS: Multivessel CAD ( LM 40% ?ulcerated plaque,   LAD, sutotal mid CX occlusion and severe RCA stenosis)  Mildly elevated left ventrciular filling pressure ( PCWP 15 mmhg)  DIAGNOSTIC RECOMMENDATIONS: Evaluate by CT surgery for CABG with LIMA to  LAD, SVG to OM, SVG to ramus ,SVG to distal RCA  Prepared and signed by  Dago Rodriguez MD  Signed 2022 17:15:55    < end of copied text >  3/28/22   Right Heart Pressures:       RA: 5       RV: 36/6       PA: 19       PCWP: 15       CO: 5.35 LPM       CI: 2.89 LPM/m2       SVR: 13.64 Wood Units       PVR: 0.75 Wood Units       : 0.93 Watt       Víctor: 4.6  [ ] Stress Test:    OTHER:

## 2024-05-17 LAB
ANION GAP SERPL CALC-SCNC: 15 MMOL/L — SIGNIFICANT CHANGE UP (ref 5–17)
BUN SERPL-MCNC: 24.4 MG/DL — HIGH (ref 8–20)
CALCIUM SERPL-MCNC: 9.1 MG/DL — SIGNIFICANT CHANGE UP (ref 8.4–10.5)
CHLORIDE SERPL-SCNC: 96 MMOL/L — SIGNIFICANT CHANGE UP (ref 96–108)
CO2 SERPL-SCNC: 26 MMOL/L — SIGNIFICANT CHANGE UP (ref 22–29)
CREAT SERPL-MCNC: 0.94 MG/DL — SIGNIFICANT CHANGE UP (ref 0.5–1.3)
EGFR: 91 ML/MIN/1.73M2 — SIGNIFICANT CHANGE UP
GLUCOSE BLDC GLUCOMTR-MCNC: 111 MG/DL — HIGH (ref 70–99)
GLUCOSE BLDC GLUCOMTR-MCNC: 111 MG/DL — HIGH (ref 70–99)
GLUCOSE SERPL-MCNC: 164 MG/DL — HIGH (ref 70–99)
HCT VFR BLD CALC: 32 % — LOW (ref 39–50)
HGB BLD-MCNC: 9.2 G/DL — LOW (ref 13–17)
MAGNESIUM SERPL-MCNC: 1.8 MG/DL — SIGNIFICANT CHANGE UP (ref 1.6–2.6)
MCHC RBC-ENTMCNC: 20.8 PG — LOW (ref 27–34)
MCHC RBC-ENTMCNC: 28.8 GM/DL — LOW (ref 32–36)
MCV RBC AUTO: 72.4 FL — LOW (ref 80–100)
PHOSPHATE SERPL-MCNC: 3.1 MG/DL — SIGNIFICANT CHANGE UP (ref 2.4–4.7)
PLATELET # BLD AUTO: 213 K/UL — SIGNIFICANT CHANGE UP (ref 150–400)
POTASSIUM SERPL-MCNC: 4 MMOL/L — SIGNIFICANT CHANGE UP (ref 3.5–5.3)
POTASSIUM SERPL-SCNC: 4 MMOL/L — SIGNIFICANT CHANGE UP (ref 3.5–5.3)
RBC # BLD: 4.42 M/UL — SIGNIFICANT CHANGE UP (ref 4.2–5.8)
RBC # FLD: 21.1 % — HIGH (ref 10.3–14.5)
SODIUM SERPL-SCNC: 137 MMOL/L — SIGNIFICANT CHANGE UP (ref 135–145)
WBC # BLD: 10 K/UL — SIGNIFICANT CHANGE UP (ref 3.8–10.5)
WBC # FLD AUTO: 10 K/UL — SIGNIFICANT CHANGE UP (ref 3.8–10.5)

## 2024-05-17 PROCEDURE — 93451 RIGHT HEART CATH: CPT | Mod: 26

## 2024-05-17 PROCEDURE — 93010 ELECTROCARDIOGRAM REPORT: CPT

## 2024-05-17 PROCEDURE — 99233 SBSQ HOSP IP/OBS HIGH 50: CPT

## 2024-05-17 PROCEDURE — 99234 HOSP IP/OBS SM DT SF/LOW 45: CPT

## 2024-05-17 RX ORDER — ENOXAPARIN SODIUM 100 MG/ML
75 INJECTION SUBCUTANEOUS EVERY 12 HOURS
Refills: 0 | Status: DISCONTINUED | OUTPATIENT
Start: 2024-05-17 | End: 2024-05-17

## 2024-05-17 RX ORDER — ENOXAPARIN SODIUM 100 MG/ML
75 INJECTION SUBCUTANEOUS EVERY 12 HOURS
Refills: 0 | Status: DISCONTINUED | OUTPATIENT
Start: 2024-05-18 | End: 2024-05-18

## 2024-05-17 RX ADMIN — Medication 40 MILLIGRAM(S): at 05:25

## 2024-05-17 RX ADMIN — Medication 40 MILLIGRAM(S): at 14:03

## 2024-05-17 RX ADMIN — Medication 100 MILLIGRAM(S): at 15:01

## 2024-05-17 RX ADMIN — Medication 3 MILLILITER(S): at 14:26

## 2024-05-17 RX ADMIN — AZITHROMYCIN 255 MILLIGRAM(S): 500 TABLET, FILM COATED ORAL at 18:01

## 2024-05-17 RX ADMIN — Medication 125 MILLIGRAM(S): at 18:00

## 2024-05-17 RX ADMIN — ATORVASTATIN CALCIUM 40 MILLIGRAM(S): 80 TABLET, FILM COATED ORAL at 22:05

## 2024-05-17 RX ADMIN — Medication 2000 UNIT(S): at 11:04

## 2024-05-17 RX ADMIN — Medication 25 MILLIGRAM(S): at 05:25

## 2024-05-17 RX ADMIN — Medication 3 MILLILITER(S): at 22:11

## 2024-05-17 RX ADMIN — Medication 3 MILLILITER(S): at 08:51

## 2024-05-17 RX ADMIN — PANTOPRAZOLE SODIUM 40 MILLIGRAM(S): 20 TABLET, DELAYED RELEASE ORAL at 05:25

## 2024-05-17 RX ADMIN — SENNA PLUS 2 TABLET(S): 8.6 TABLET ORAL at 22:04

## 2024-05-17 RX ADMIN — Medication 25 MILLIGRAM(S): at 18:01

## 2024-05-17 RX ADMIN — Medication 81 MILLIGRAM(S): at 11:04

## 2024-05-17 RX ADMIN — Medication 3 MILLILITER(S): at 05:26

## 2024-05-17 NOTE — CONSULT NOTE ADULT - SUBJECTIVE AND OBJECTIVE BOX
[facility]                                              INTERVENTIONAL CARDIOLOGY AT [place] [address]                                             Telephone: [phone]. Fax:[phone]                                                       INTERVENTIONAL CARDIOLOGY CONSULTATION NOTE                                                                                             History obtained by: Patient and medical record  Community Cardiologist: Chuck  Reason for Consultation: Evaluation for cardiac catheterization  Available pt records reviewed: Yes [ x ] No [  ]    Chief complaint:    Patient is a 63y old  Male who presents with a chief complaint of shortness of breath (16 May 2024 11:00)      HPI:  Patient seen and examined before midnight.     64 y/o male with PMH of CAD s/p CABG, HFrEF (3.2/22 EF 35-40%), pAfib on Eliquis, s/p radiofrequency ablation 2022, DM-2, HTN, HLD, Colon CA s/p resection, Prostate CA was sent to the ED from pulmonology office for hypoxia. Patient said he has been having shortness of breath and cough  for more than 2 months. He reported cough productive of white sputum; chest pain with cough and fatigue. Patient had 2 appointments today (with pulm and cardiology) did not see card today. He has no fever, chills, orthopnea, nausea, vomiting, abdominal pain, change in bowel/urinary habit, melena, hematochezia, recent travel, sick contact.  HIs HF is resolving but remains SOB and coughing.     (13 May 2024 21:32)      Anginal Class:        Angina (Class): III       Ischemic Symptoms: SOB    Heart Failure:        Systolic/Diastolic/Combined: ?       NYHA Class (within 2 weeks):       PAST MEDICAL HISTORY  Hypertension    Hyperlipidemia    COVID-19 vaccine series completed    Colon cancer    Prostate cancer    BPH (benign prostatic hyperplasia)    Type 2 diabetes mellitus    Unilateral primary osteoarthritis, left knee    Paroxysmal atrial fibrillation    CAD (coronary atherosclerotic disease)        Associated Risk Factors:        Frailty Assessment: (none/mild/mod/severe): mild       Cerebrovascular Disease: N/A       Chronic Lung Disease: N/A       Peripheral Arterial Disease: N/A       Chronic Kidney Disease (if yes, what is GFR): N/A       Uncontrolled Diabetes (if yes, what is HgbA1C or FBS): N/A       Poorly Controlled Hypertension (if yes, what is SBP): N/A       Morbid Obesity (if yes, what is BMI): N/A       History of Recent Ventricular Arrhythmia: N/A       Inability to Ambulate Safely: N/A       Need for Therapeutic Anticoagulation: N/A       Antiplatelet or Contrast Allergy: N/A      PAST SURGICAL HISTORY  S/P colon resection    S/P hernia repair    S/P total knee replacement, left          FAMILY HISTORY:  No pertinent family history in first degree relatives        HOME MEDICATIONS:  atorvastatin 40 mg oral tablet: 1 tab(s) orally once a day (at bedtime) (13 May 2024 21:26)  cholecalciferol 50 mcg (2000 intl units) oral tablet: 1 tab(s) orally once a day (13 May 2024 21:28)  Eliquis 5 mg oral tablet: 1 tab(s) orally 2 times a day (14 Oct 2022 06:14)  losartan 25 mg oral tablet: 1 tab(s) orally once a day (14 Oct 2022 06:14)  metoprolol tartrate 25 mg oral tablet: 1 tab(s) orally 2 times a day (13 May 2024 21:27)  Trelegy Ellipta 100 mcg-62.5 mcg-25 mcg/inh inhalation powder: 1 puff(s) inhaled once a day (13 May 2024 21:29)      CURRENT CARDIAC MEDICATIONS:  furosemide   Injectable 40 milliGRAM(s) IV Push two times a day  metoprolol tartrate 25 milliGRAM(s) Oral two times a day      Antianginal Therapies:        Beta Blockers:  y       Calcium Channel Blockers:        Long Acting Nitrates:        Ranexa:     ALLERGIES:   No Known Allergies      REVIEW OF SYMPTOMS:   CONSTITUTIONAL: o fever, no chills, no weight loss, no weight gain, no fatigue   CARDIOVASCULAR: no chest pain  RESPIRATORY: + Shortness of breath, + cough, + wheezing  : No dysuria, no hematuria   GI: No dark color stool, no nausea, no diarrhea, no constipation, no abdominal pain   NEURO: No headache, no slurred speech   ALL OTHER REVIEW OF SYSTEMS ARE NEGATIVE.    VITAL SIGNS:  T(C): 36.6 (05-17-24 @ 05:26), Max: 37 (05-16-24 @ 20:30)  T(F): 97.8 (05-17-24 @ 05:26), Max: 98.6 (05-16-24 @ 20:30)  HR: 70 (05-17-24 @ 08:52) (68 - 82)  BP: 121/69 (05-17-24 @ 05:26) (101/57 - 129/68)  RR: 18 (05-17-24 @ 05:26) (18 - 18)  SpO2: 94% (05-17-24 @ 08:52) (94% - 99%)    INTAKE AND OUTPUT:     05-16 @ 07:01  -  05-17 @ 07:00  --------------------------------------------------------  IN: 480 mL / OUT: 900 mL / NET: -420 mL        PHYSICAL EXAM:  Constitutional: Comfortable . No acute distress.   HEENT: Atraumatic and normocephalic , neck is supple . no JVD. No carotid bruit.  CNS: A&Ox3. No focal deficits.   Respiratory: RHonchi, rales and crackles noted.  +cough  Cardiovascular: RRR normal s1 s2. No murmur. No rubs or gallop.  Gastrointestinal: Soft, non-tender. +Bowel sounds.   Extremities: 2+ Peripheral Pulses, No clubbing, cyanosis, or edema  Psychiatric: Calm . no agitation.   Skin: Warm and dry, no ulcers on extremities     LABS:                            9.2    10.00 )-----------( 213      ( 17 May 2024 08:41 )             32.0     05-17    137  |  96  |  24.4<H>  ----------------------------<  164<H>  4.0   |  26.0  |  0.94    Ca    9.1      17 May 2024 08:41  Phos  3.1     05-17  Mg     1.8     05-17        Urinalysis Basic - ( 17 May 2024 08:41 )    Color: x / Appearance: x / SG: x / pH: x  Gluc: 164 mg/dL / Ketone: x  / Bili: x / Urobili: x   Blood: x / Protein: x / Nitrite: x   Leuk Esterase: x / RBC: x / WBC x   Sq Epi: x / Non Sq Epi: x / Bacteria: x                ECG:   Prior ECG: Yes [x  ] No [  ]    CARDIAC TESTING   ECHO:  < from: TTE W or WO Ultrasound Enhancing Agent (05.14.24 @ 11:29) >  CONCLUSIONS:      1. Left ventricular systolic function is normal with an ejection fraction of 65 % by Gu's method of disks with an ejection fraction visually estimated at 60 to 65 %.   2. Basal inferior segment and basal inferolateral segment are abnormal.   3. Normal right ventricular cavity size and normal systolic function.   4. The left atrium is mildly dilated.   5. Moderate pulmonic regurgitation.   6. Estimated pulmonary artery systolic pressureis 13 mmHg, consistent with normal pulmonary artery pressure.   7. No pericardial effusion seen.   8. No prior echocardiogram is available for comparison.    < end of copied text >  STRESS:  < from: Nuclear Stress Test-Pharmacologic.. (05.16.24 @ 07:09) >  Conclusions:   1. Myocardial Perfusion: Mildly Abnormal.   2. There is medium sized moderately fixed perfusion defect at basal inferolateral and distal anteroseptal walls, defects persisted on prone imaging with reduced systolic thickening, suggestive of infarcts. No clear evidence of ischemia. No TID (ratio 0.78).   3. The left ventricle is normal in function and normal in size. The post stress left ventricular EF is 60 %. The stress end diastolic volume is 66 ml and systolic volume is 27 ml.   4. The patient underwent stress testing using pharmacological IV regadenoson (bolus injection, 400mcg over 10 to 20 seconds followed by 5ml flush) protocol.   5. Baseline electrocardiogram: sinus bradycardia at a rate of 72 bpm with no sigificant ST abnomalities.   6. Stress electrocardiogram: No significant ischemic ST segment changes.   7. Arrhythmias: Rare VPD's occurred during rest, stress and recovery.   8. Normal pharmocologic heart rate response.   9. Normal pharmocologic blood pressure response.    < end of copied text >    Cardiac Interventions: CABG 2022  LIMA-->LAD  SVG-->Ramus  SVG-->PDA    CATH:   < from: Cardiac Catheterization (03.28.22 @ 07:47) >  CORONARY VESSELS: The coronary circulation is right dominant.  LM:   --  Mid left main: There was a 40 % stenosis. The lesion was  moderately calcified.  LAD:   --  Proximal LAD: There was a 100 % stenosis. This lesion is a  chronic total occlusion.  --  Distal LAD: The distal vessel was supplied by collaterals from the RCA.  CX:   --  Mid circumflex: There was a 99 % stenosis.  RI:   --  Ramus intermedius: The vessel was medium to large sized. There  was a 80 % stenosis.  RCA:   --  RCA: The vessel was large sized (dominant).  --  Mid RCA: There was a 80 % stenosis.  COMPLICATIONS: No complications occurred during the cath lab visit.  DIAGNOSTIC IMPRESSIONS: Multivessel CAD ( LM 40% ?ulcerated plaque,   LAD, sutotal mid CX occlusion and severe RCA stenosis)  Mildly elevated left ventrciular filling pressure ( PCWP 15 mmhg)  DIAGNOSTIC RECOMMENDATIONS: Evaluate by CT surgery for CABG with LIMA to  LAD, SVG to OM, SVG to ramus ,SVG to distal RCA    < end of copied text >    ELECTROPHYSIOLOGY:   ablation

## 2024-05-17 NOTE — PROGRESS NOTE ADULT - SUBJECTIVE AND OBJECTIVE BOX
RENATO LEVI    [id number]    63y      Male    CC: sob     INTERVAL HPI/OVERNIGHT EVENTS: Pt seen and examined. planned for L/RHC today; pt unable to lie flat for procedure     REVIEW OF SYSTEMS:    RESPIRATORY: No wheezing, hemoptysis  CARDIOVASCULAR: No palpitations  GASTROINTESTINAL: No abdominal or epigastric pain. No nausea, vomiting  NEUROLOGICAL: No headaches    Vital Signs Last 24 Hrs  T(C): 36.6 (17 May 2024 12:51), Max: 37 (16 May 2024 20:30)  T(F): 97.8 (17 May 2024 12:51), Max: 98.6 (16 May 2024 20:30)  HR: 74 (17 May 2024 14:09) (70 - 82)  BP: 117/64 (17 May 2024 14:09) (109/49 - 138/71)  BP(mean): --  RR: 26 (17 May 2024 14:09) (18 - 26)  SpO2: 99% (17 May 2024 14:09) (94% - 100%)    Parameters below as of 17 May 2024 14:09  Patient On (Oxygen Delivery Method): nasal cannula  O2 Flow (L/min): 4      PHYSICAL EXAM:    GENERAL: NAD  CHEST/LUNG: decreased at bases   HEART: S1S2+, Regular rate and rhythm  ABDOMEN: Soft, Nontender, Nondistended; Bowel sounds present  SKIN: warm, dry   NEURO: AAOX3, grossly non-focal   PSYCH: calm, cooperative     LABS:                        9.2    10.00 )-----------( 213      ( 17 May 2024 08:41 )             32.0     05-17    137  |  96  |  24.4<H>  ----------------------------<  164<H>  4.0   |  26.0  |  0.94    Ca    9.1      17 May 2024 08:41  Phos  3.1     05-17  Mg     1.8     05-17        Urinalysis Basic - ( 17 May 2024 08:41 )    Color: x / Appearance: x / SG: x / pH: x  Gluc: 164 mg/dL / Ketone: x  / Bili: x / Urobili: x   Blood: x / Protein: x / Nitrite: x   Leuk Esterase: x / RBC: x / WBC x   Sq Epi: x / Non Sq Epi: x / Bacteria: x          MEDICATIONS  (STANDING):  albuterol    90 MICROgram(s) HFA Inhaler 2 Puff(s) Inhalation every 6 hours  albuterol/ipratropium for Nebulization 3 milliLiter(s) Nebulizer every 6 hours  aspirin enteric coated 81 milliGRAM(s) Oral daily  atorvastatin 40 milliGRAM(s) Oral at bedtime  azithromycin  IVPB 500 milliGRAM(s) IV Intermittent every 24 hours  cholecalciferol 2000 Unit(s) Oral daily  furosemide   Injectable 40 milliGRAM(s) IV Push two times a day  metoprolol tartrate 25 milliGRAM(s) Oral two times a day  pantoprazole    Tablet 40 milliGRAM(s) Oral before breakfast  senna 2 Tablet(s) Oral at bedtime    MEDICATIONS  (PRN):  acetaminophen     Tablet .. 650 milliGRAM(s) Oral every 6 hours PRN Temp greater or equal to 38C (100.4F), Mild Pain (1 - 3)  aluminum hydroxide/magnesium hydroxide/simethicone Suspension 30 milliLiter(s) Oral every 4 hours PRN Dyspepsia  benzonatate 100 milliGRAM(s) Oral every 8 hours PRN Cough  guaiFENesin Oral Liquid (Sugar-Free) 100 milliGRAM(s) Oral every 6 hours PRN Cough  melatonin 3 milliGRAM(s) Oral at bedtime PRN Insomnia  ondansetron Injectable 4 milliGRAM(s) IV Push every 8 hours PRN Nausea and/or Vomiting      RADIOLOGY & ADDITIONAL TESTS:

## 2024-05-17 NOTE — PROGRESS NOTE ADULT - NS ATTEND AMEND GEN_ALL_CORE FT
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 LIMA to LAD and SVG to PDA and Ramus in 3/2022), HFrEF since recovered LV EF (on Lasix 40mg once daily at home), pAfib s/p ablation (on Eliquis), chronic anemia, prostate and colon cancer s/p resection admitted with ADHF with hypoxia required BiPAP, CXR and CT chest with diffuse pulmonary edema however peculiar symptoms not abating with persistent cough despite continue IV diuresis and repeat pBNP downtrended but repeat CXR today still with diffuse pulmonary infiltrates, nuclear stress test with infarct pattern but no ischemia    -unresolved dyspnea/cough and pulmonary infiltrates despite on IV diuresis and RHC with normal filling pressures, hence persistent dyspnea/cough and unresolved bilateral pulmonary infiltrates are not cardiac related, suspect idiopathic pulmonary fibrosis vs. inflammatory process? was given empiric IV Solumedrol 125mg x1 in the cath lab, pending pulmonary eval.   -RVP and procalcitonin negative but on empiric azithromycin, check urine legionella and will also check rheumatological workup with RF, HELIO, ANCA  -last dose Eliquis this morning at 5am on 5/16, hold Eliquis pending cath, no prior CVA does not need heparin bridge  -severe microcytic iron-def. anemia, he reports had recent repeat colonoscopy 4 months ago outpatient told normal, fecal occult blood negative, started IV iron while currently admitted for concurrent HF benefit  -pAfib on Eliquis, if further downtrend in H/H then needs to hold AC, will change to LMWH for now in case need further pulmonary workup for biopsy/bronchoscopy           Jeferson Canales DO, [facility]  Faculty Non-Invasive Cardiologist  [phone].

## 2024-05-17 NOTE — PROGRESS NOTE ADULT - SUBJECTIVE AND OBJECTIVE BOX
Now s/p RHC via right brachial vein with dressing in place . Procedure performed by Dr. Hess.  Pt arrived to recovery in NAD and HDS.  LBV access site stable, no bleed/hematoma, distal pulse +.    Procedure results: S/P RHC which revealed NL RA, wedge=12, PA O2 Sat 43-44% and CI 2.2.      Medication received during procedure:  Versed: 0  Fentanyl: 0  Heparin: 0  Omnipaque: 0    Exam:   Neuro: A&O X.  ACEVEDO=  CV: RRR  Lungs: CTA  Ext: + palp pulses.  Vascular access: Right brachial dressing CDI.  Site stable. No bleeding/hematoma/ecchymosis.  + cap refill       Plan: Not cardiac issue  -Formal cath report pending  -Post procedure management/monitoring per protocol  -Access site precautions  -Repeat ECG if any clinical indication or change on tele  -Continue current medical therapy  -Once dose of steroids IV given in cath lab  -Pulmonary consult

## 2024-05-17 NOTE — PROGRESS NOTE ADULT - SUBJECTIVE AND OBJECTIVE BOX
[facility]                                                         CARDIOLOGY AT [facility]                                                                  [address] [address]                                                         Telephone: [phone]. Fax:[phone]                                                                             PROGRESS NOTE    Reason for follow up:   Update:       Review of symptoms:   Cardiac:  No chest pain. No dyspnea. No palpitations.  Respiratory: no cough. No dyspnea  Gastrointestinal: No diarrhea. No abdominal pain. No bleeding.   Neuro: No focal neuro complaints.    Vitals:  T(C): 36.6 (05-17-24 @ 05:26), Max: 37 (05-16-24 @ 20:30)  HR: 70 (05-17-24 @ 08:52) (68 - 82)  BP: 121/69 (05-17-24 @ 05:26) (101/57 - 129/68)  RR: 18 (05-17-24 @ 05:26) (18 - 18)  SpO2: 94% (05-17-24 @ 08:52) (94% - 99%)  Wt(kg): --  I&O's Summary    16 May 2024 07:01  -  17 May 2024 07:00  --------------------------------------------------------  IN: 480 mL / OUT: 900 mL / NET: -420 mL      Weight (kg): 74.9 (05-16 @ 00:05)    PHYSICAL EXAM:  Appearance: Comfortable. No acute distress  HEENT:  Atraumatic. Normocephalic.  Normal oral mucosa  Neurologic: A & O x 3, no gross focal deficits.  Cardiovascular: RRR S1 S2, No murmur, no rubs/gallops. No JVD  Respiratory: Lungs clear to auscultation, unlabored   Gastrointestinal:  Soft, Non-tender, + BS  Lower Extremities: 2+ Peripheral Pulses, No clubbing, cyanosis, or edema  Psychiatry: Patient is calm. No agitation.   Skin: warm and dry.    CURRENT CARDIAC MEDICATIONS:  furosemide   Injectable 40 milliGRAM(s) IV Push two times a day  metoprolol tartrate 25 milliGRAM(s) Oral two times a day      CURRENT OTHER MEDICATIONS:  albuterol    90 MICROgram(s) HFA Inhaler 2 Puff(s) Inhalation every 6 hours  albuterol/ipratropium for Nebulization 3 milliLiter(s) Nebulizer every 6 hours  benzonatate 100 milliGRAM(s) Oral every 8 hours PRN Cough  guaiFENesin Oral Liquid (Sugar-Free) 100 milliGRAM(s) Oral every 6 hours PRN Cough  azithromycin  IVPB 500 milliGRAM(s) IV Intermittent every 24 hours  acetaminophen     Tablet .. 650 milliGRAM(s) Oral every 6 hours PRN Temp greater or equal to 38C (100.4F), Mild Pain (1 - 3)  melatonin 3 milliGRAM(s) Oral at bedtime PRN Insomnia  ondansetron Injectable 4 milliGRAM(s) IV Push every 8 hours PRN Nausea and/or Vomiting  aluminum hydroxide/magnesium hydroxide/simethicone Suspension 30 milliLiter(s) Oral every 4 hours PRN Dyspepsia  pantoprazole    Tablet 40 milliGRAM(s) Oral before breakfast  senna 2 Tablet(s) Oral at bedtime  atorvastatin 40 milliGRAM(s) Oral at bedtime  aspirin enteric coated 81 milliGRAM(s) Oral daily  cholecalciferol 2000 Unit(s) Oral daily      LABS:	 	                            9.2    10.00 )-----------( 213      ( 17 May 2024 08:41 )             32.0     05-17    137  |  96  |  24.4<H>  ----------------------------<  164<H>  4.0   |  26.0  |  0.94    Ca    9.1      17 May 2024 08:41  Phos  3.1     05-17  Mg     1.8     05-17      PT/INR/PTT ( 13 May 2024 13:27 )                       :                       :      15.0         :       34.6                  .        .                   .              .           .       1.36        .                                       Lipid Profile:   HgA1c:   TSH:     TELEMETRY:   ECG:    DIAGNOSTIC TESTING:  [ ] Echocardiogram: < from: TTE W or WO Ultrasound Enhancing Agent (05.14.24 @ 11:29) >    TRANSTHORACIC ECHOCARDIOGRAM REPORT  ________________________________________________________________________________                                      _______       Pt. Name:       RENATO LEVI Study Date:    5/14/2024  MRN:            [id number]           YOB: 1960  Accession #:    [id number]          Age:           63 years  Account#:       [id number]         Gender:        M  Visit ID#  Heart Rate:     69 bpm             Height:        61.81 in (157.00 cm)  Rhythm:   sinus rhythm       Weight:        171.00 lb (77.56 kg)  Blood Pressure: 110/60 mmHg        BSA/BMI:       1.78 m² / 31.47 kg/m²  ________________________________________________________________________________________  Referring Physician:    Judy Moseley  Interpreting Physician: Cy Gaston  Primary Sonographer:    Alisha Aguilar    CPT:               ECHO TTE WO CON COMP W DOPP - 62070.m  Indication(s):     Abnormal electrocardiogram ECG/EKG - R94.31  Procedure:         Transthoracic echocardiogram with 2-D, M-mode and complete                     spectral and color flow Doppler.  Ordering Location: [facility]  Admission Status:  ED  Study Information: Image quality for this study is fair.    _______________________________________________________________________________________     CONCLUSIONS:      1. Left ventricular systolic function is normal with an ejection fraction of 65 % by Gu's method of disks with an ejection fraction visually estimated at 60 to 65 %.   2. Basal inferior segment and basal inferolateral segment are abnormal.   3. Normal right ventricular cavity size and normal systolic function.   4. The left atrium is mildly dilated.   5. Moderate pulmonic regurgitation.   6. Estimated pulmonary artery systolic pressureis 13 mmHg, consistent with normal pulmonary artery pressure.   7. No pericardial effusion seen.   8. No prior echocardiogram is available for comparison.    < end of copied text >    [ ]  Catheterization:< from: Nuclear Stress Test-Pharmacologic.. (05.16.24 @ 07:09) >  Nuclear Pharmacologic Stress Test Report         Patient: RENATO LEVI             Study Date: 5/16/2024           MRN: [id number]                   Birth Date/Age: 1960 Age: 63 years      Access #: [id number]                          Gender: M     Order Loc: [facility]                               Height: /  Request Phys: [phone] Trey                Weight: /                [place]     Procedure: Rest|Stress SESTAMIBI            BSA/ BMI: /    Indication: Acute on chronic diastolic  Admiss Status:                (congestive) heart failure                - I50.33    ---------------------------------------------------------------------------------------------------------------------------------------------------------  ProcedureCode: MYOCARDIAL SPECT MULTIPLE - 77034.m;TC 99M SESTAMIBI PER DOSE -                  .m;INJECTION REGADENOSON - .m;TC 99M SESTAMIBI PER                  DOSE 2nd - .m;STRESS TEST TRACING ONLY - 02030.m    ---------------------------------------------------------------------------------------------------------------------------------------------------------  Image Quality: Fair  Artifact:      Patient motion.  NDC Number(s): [id number]    --------------------------------------------------------------------------------------------------------------------------------------------------------Conclusions:   1. Myocardial Perfusion: Mildly Abnormal.   2. There is medium sized moderately fixed perfusion defect at basal inferolateral and distal anteroseptal walls, defects persisted on prone imaging with reduced systolic thickening, suggestive of infarcts. No clear evidence of ischemia. No TID (ratio 0.78).   3. The left ventricle is normal in function and normal in size. The post stress left ventricular EF is 60 %. The stress end diastolic volume is 66 ml and systolic volume is 27 ml.   4. The patient underwent stress testing using pharmacological IV regadenoson (bolus injection, 400mcg over 10 to 20 seconds followed by 5ml flush) protocol.   5. Baseline electrocardiogram: sinus bradycardia at a rate of 72 bpm with no sigificant ST abnomalities.   6. Stress electrocardiogram: No significant ischemic ST segment changes.   7. Arrhythmias: Rare VPD's occurred during rest, stress and recovery.   8. Normal pharmocologic heart rate response.   9. Normal pharmocologic blood pressure response.    < end of copied text >    [ ] Stress Test:    OTHER: 	                                                                [facility]                                                         CARDIOLOGY AT [facility]                                                                  [address] [facility] [place] [place] [address]                                                         Telephone: [phone]. Fax:[phone]                                                                             PROGRESS NOTE    Reason for follow up: HF, persistent dyspnea/cough  Update: underwent RHC today with normal PCWP 13-14 and RAP 6-8, hence etiology of persistent dyspnea/cough and unresolved bilateral pulmonary infiltrates are not cardiac related, suspect idiopathic pulmonary fibrosis vs. inflammatory process? was given empiric IV Solumedrol 125mg x1 in the cath lab, pending pulmonary eval.       Review of symptoms:   Cardiac:  No chest pain. No dyspnea. No palpitations.  Respiratory: no cough. No dyspnea  Gastrointestinal: No diarrhea. No abdominal pain. No bleeding.   Neuro: No focal neuro complaints.    Vitals:  T(C): 36.6 (05-17-24 @ 05:26), Max: 37 (05-16-24 @ 20:30)  HR: 70 (05-17-24 @ 08:52) (68 - 82)  BP: 121/69 (05-17-24 @ 05:26) (101/57 - 129/68)  RR: 18 (05-17-24 @ 05:26) (18 - 18)  SpO2: 94% (05-17-24 @ 08:52) (94% - 99%)  Wt(kg): --  I&O's Summary    16 May 2024 07:01  -  17 May 2024 07:00  --------------------------------------------------------  IN: 480 mL / OUT: 900 mL / NET: -420 mL      Weight (kg): 74.9 (05-16 @ 00:05)    PHYSICAL EXAM:  Appearance: Comfortable. No acute distress  HEENT:  Atraumatic. Normocephalic.  Normal oral mucosa  Neurologic: A & O x 3, no gross focal deficits.  Cardiovascular: RRR S1 S2, No murmur, no rubs/gallops. No JVD  Respiratory: Lungs clear to auscultation, unlabored   Gastrointestinal:  Soft, Non-tender, + BS  Lower Extremities: 2+ Peripheral Pulses, No clubbing, cyanosis, or edema  Psychiatry: Patient is calm. No agitation.   Skin: warm and dry.    CURRENT CARDIAC MEDICATIONS:  furosemide   Injectable 40 milliGRAM(s) IV Push two times a day  metoprolol tartrate 25 milliGRAM(s) Oral two times a day      CURRENT OTHER MEDICATIONS:  albuterol    90 MICROgram(s) HFA Inhaler 2 Puff(s) Inhalation every 6 hours  albuterol/ipratropium for Nebulization 3 milliLiter(s) Nebulizer every 6 hours  benzonatate 100 milliGRAM(s) Oral every 8 hours PRN Cough  guaiFENesin Oral Liquid (Sugar-Free) 100 milliGRAM(s) Oral every 6 hours PRN Cough  azithromycin  IVPB 500 milliGRAM(s) IV Intermittent every 24 hours  acetaminophen     Tablet .. 650 milliGRAM(s) Oral every 6 hours PRN Temp greater or equal to 38C (100.4F), Mild Pain (1 - 3)  melatonin 3 milliGRAM(s) Oral at bedtime PRN Insomnia  ondansetron Injectable 4 milliGRAM(s) IV Push every 8 hours PRN Nausea and/or Vomiting  aluminum hydroxide/magnesium hydroxide/simethicone Suspension 30 milliLiter(s) Oral every 4 hours PRN Dyspepsia  pantoprazole    Tablet 40 milliGRAM(s) Oral before breakfast  senna 2 Tablet(s) Oral at bedtime  atorvastatin 40 milliGRAM(s) Oral at bedtime  aspirin enteric coated 81 milliGRAM(s) Oral daily  cholecalciferol 2000 Unit(s) Oral daily      LABS:	 	                            9.2    10.00 )-----------( 213      ( 17 May 2024 08:41 )             32.0     05-17    137  |  96  |  24.4<H>  ----------------------------<  164<H>  4.0   |  26.0  |  0.94    Ca    9.1      17 May 2024 08:41  Phos  3.1     05-17  Mg     1.8     05-17      PT/INR/PTT ( 13 May 2024 13:27 )                       :                       :      15.0         :       34.6                  .        .                   .              .           .       1.36        .                                       Lipid Profile:   HgA1c:   TSH:     TELEMETRY:   ECG:    DIAGNOSTIC TESTING:  [ ] Echocardiogram: < from: TTE W or WO Ultrasound Enhancing Agent (05.14.24 @ 11:29) >    TRANSTHORACIC ECHOCARDIOGRAM REPORT  ________________________________________________________________________________                                      _______       Pt. Name:       RENATO LEVI Study Date:    5/14/2024  MRN:            [id number]           YOB: 1960  Accession #:    [id number]          Age:           63 years  Account#:       [id number]         Gender:        M  Visit ID#  Heart Rate:     69 bpm             Height:        61.81 in (157.00 cm)  Rhythm:   sinus rhythm       Weight:        171.00 lb (77.56 kg)  Blood Pressure: 110/60 mmHg        BSA/BMI:       1.78 m² / 31.47 kg/m²  ________________________________________________________________________________________  Referring Physician:    Judy Moseley  Interpreting Physician: Cy Gaston  Primary Sonographer:    Alisha Aguilar    CPT:               ECHO TTE WO CON COMP W DOPP - 61092.m  Indication(s):     Abnormal electrocardiogram ECG/EKG - R94.31  Procedure:         Transthoracic echocardiogram with 2-D, M-mode and complete                     spectral and color flow Doppler.  Ordering Location: [facility]  Admission Status:  ED  Study Information: Image quality for this study is fair.    _______________________________________________________________________________________     CONCLUSIONS:      1. Left ventricular systolic function is normal with an ejection fraction of 65 % by Gu's method of disks with an ejection fraction visually estimated at 60 to 65 %.   2. Basal inferior segment and basal inferolateral segment are abnormal.   3. Normal right ventricular cavity size and normal systolic function.   4. The left atrium is mildly dilated.   5. Moderate pulmonic regurgitation.   6. Estimated pulmonary artery systolic pressureis 13 mmHg, consistent with normal pulmonary artery pressure.   7. No pericardial effusion seen.   8. No prior echocardiogram is available for comparison.    < end of copied text >    [ ]  Catheterization:< from: Nuclear Stress Test-Pharmacologic.. (05.16.24 @ 07:09) >  Nuclear Pharmacologic Stress Test Report         Patient: RENATO LEVI             Study Date: 5/16/2024           MRN: [id number]                   Birth Date/Age: 1960 Age: 63 years      Access #: [id number]                          Gender: M     Order Loc: [facility]                               Height: /  Request Phys: [phone] Trey                Weight: /                [place]     Procedure: Rest|Stress SESTAMIBI            BSA/ BMI: /    Indication: Acute on chronic diastolic  Admiss Status:                (congestive) heart failure                - I50.33    ---------------------------------------------------------------------------------------------------------------------------------------------------------  ProcedureCode: MYOCARDIAL SPECT MULTIPLE - 78959.m;TC 99M SESTAMIBI PER DOSE -                  .m;INJECTION REGADENOSON - .m;TC 99M SESTAMIBI PER                  DOSE 2nd - .m;STRESS TEST TRACING ONLY - 72112.m    ---------------------------------------------------------------------------------------------------------------------------------------------------------  Image Quality: Fair  Artifact:      Patient motion.  NDC Number(s): [id number]    --------------------------------------------------------------------------------------------------------------------------------------------------------Conclusions:   1. Myocardial Perfusion: Mildly Abnormal.   2. There is medium sized moderately fixed perfusion defect at basal inferolateral and distal anteroseptal walls, defects persisted on prone imaging with reduced systolic thickening, suggestive of infarcts. No clear evidence of ischemia. No TID (ratio 0.78).   3. The left ventricle is normal in function and normal in size. The post stress left ventricular EF is 60 %. The stress end diastolic volume is 66 ml and systolic volume is 27 ml.   4. The patient underwent stress testing using pharmacological IV regadenoson (bolus injection, 400mcg over 10 to 20 seconds followed by 5ml flush) protocol.   5. Baseline electrocardiogram: sinus bradycardia at a rate of 72 bpm with no sigificant ST abnomalities.   6. Stress electrocardiogram: No significant ischemic ST segment changes.   7. Arrhythmias: Rare VPD's occurred during rest, stress and recovery.   8. Normal pharmocologic heart rate response.   9. Normal pharmocologic blood pressure response.    < end of copied text >    [ ] Stress Test:    OTHER:

## 2024-05-18 LAB
ANION GAP SERPL CALC-SCNC: 14 MMOL/L — SIGNIFICANT CHANGE UP (ref 5–17)
BUN SERPL-MCNC: 28.9 MG/DL — HIGH (ref 8–20)
CALCIUM SERPL-MCNC: 9.1 MG/DL — SIGNIFICANT CHANGE UP (ref 8.4–10.5)
CHLORIDE SERPL-SCNC: 97 MMOL/L — SIGNIFICANT CHANGE UP (ref 96–108)
CO2 SERPL-SCNC: 27 MMOL/L — SIGNIFICANT CHANGE UP (ref 22–29)
CREAT SERPL-MCNC: 0.84 MG/DL — SIGNIFICANT CHANGE UP (ref 0.5–1.3)
EGFR: 97 ML/MIN/1.73M2 — SIGNIFICANT CHANGE UP
GLUCOSE SERPL-MCNC: 170 MG/DL — HIGH (ref 70–99)
HCT VFR BLD CALC: 29.5 % — LOW (ref 39–50)
HGB BLD-MCNC: 8.4 G/DL — LOW (ref 13–17)
MAGNESIUM SERPL-MCNC: 2.1 MG/DL — SIGNIFICANT CHANGE UP (ref 1.6–2.6)
MCHC RBC-ENTMCNC: 20.7 PG — LOW (ref 27–34)
MCHC RBC-ENTMCNC: 28.5 GM/DL — LOW (ref 32–36)
MCV RBC AUTO: 72.8 FL — LOW (ref 80–100)
PLATELET # BLD AUTO: 203 K/UL — SIGNIFICANT CHANGE UP (ref 150–400)
POTASSIUM SERPL-MCNC: 3.8 MMOL/L — SIGNIFICANT CHANGE UP (ref 3.5–5.3)
POTASSIUM SERPL-SCNC: 3.8 MMOL/L — SIGNIFICANT CHANGE UP (ref 3.5–5.3)
RBC # BLD: 4.05 M/UL — LOW (ref 4.2–5.8)
RBC # FLD: 21.2 % — HIGH (ref 10.3–14.5)
RHEUMATOID FACT SERPL-ACNC: <10 IU/ML — SIGNIFICANT CHANGE UP (ref 0–13)
SODIUM SERPL-SCNC: 138 MMOL/L — SIGNIFICANT CHANGE UP (ref 135–145)
WBC # BLD: 6.38 K/UL — SIGNIFICANT CHANGE UP (ref 3.8–10.5)
WBC # FLD AUTO: 6.38 K/UL — SIGNIFICANT CHANGE UP (ref 3.8–10.5)

## 2024-05-18 PROCEDURE — 99232 SBSQ HOSP IP/OBS MODERATE 35: CPT

## 2024-05-18 PROCEDURE — 99223 1ST HOSP IP/OBS HIGH 75: CPT

## 2024-05-18 PROCEDURE — 99234 HOSP IP/OBS SM DT SF/LOW 45: CPT

## 2024-05-18 RX ORDER — APIXABAN 2.5 MG/1
5 TABLET, FILM COATED ORAL EVERY 12 HOURS
Refills: 0 | Status: DISCONTINUED | OUTPATIENT
Start: 2024-05-18 | End: 2024-05-22

## 2024-05-18 RX ADMIN — Medication 3 MILLILITER(S): at 14:43

## 2024-05-18 RX ADMIN — Medication 3 MILLILITER(S): at 08:55

## 2024-05-18 RX ADMIN — SENNA PLUS 2 TABLET(S): 8.6 TABLET ORAL at 22:08

## 2024-05-18 RX ADMIN — Medication 3 MILLILITER(S): at 20:45

## 2024-05-18 RX ADMIN — ENOXAPARIN SODIUM 75 MILLIGRAM(S): 100 INJECTION SUBCUTANEOUS at 06:19

## 2024-05-18 RX ADMIN — AZITHROMYCIN 255 MILLIGRAM(S): 500 TABLET, FILM COATED ORAL at 17:13

## 2024-05-18 RX ADMIN — Medication 25 MILLIGRAM(S): at 17:13

## 2024-05-18 RX ADMIN — Medication 81 MILLIGRAM(S): at 12:21

## 2024-05-18 RX ADMIN — Medication 25 MILLIGRAM(S): at 06:20

## 2024-05-18 RX ADMIN — Medication 2000 UNIT(S): at 12:21

## 2024-05-18 RX ADMIN — Medication 3 MILLIGRAM(S): at 22:08

## 2024-05-18 RX ADMIN — PANTOPRAZOLE SODIUM 40 MILLIGRAM(S): 20 TABLET, DELAYED RELEASE ORAL at 06:21

## 2024-05-18 RX ADMIN — ATORVASTATIN CALCIUM 40 MILLIGRAM(S): 80 TABLET, FILM COATED ORAL at 22:08

## 2024-05-18 RX ADMIN — Medication 40 MILLIGRAM(S): at 17:13

## 2024-05-18 RX ADMIN — APIXABAN 5 MILLIGRAM(S): 2.5 TABLET, FILM COATED ORAL at 17:13

## 2024-05-18 RX ADMIN — Medication 3 MILLILITER(S): at 03:37

## 2024-05-18 NOTE — DIETITIAN INITIAL EVALUATION ADULT - ORAL INTAKE PTA/DIET HISTORY
Patient tolerating current diet well with fair appetite/PO intake. Reports eating salmon with rice for lunch today and ate 100% of meal tray. States he has been coughing a lot, so overall have been eating less than he normally would. PTA reports eating 3 meals/day following no diet restrictions. No c/o N/V/C/D since admission. States his UBW is about 170 lbs with no reports of unintentional weight loss in the past year. Current weight 165 lbs. Pt provided with verbal education on DASH/TLC diet. Questions addressed and pt with good understanding. RD contact information provided for further nutrition-related questions or concerns. Will continue to monitor and follow up as needed. RD remains available.

## 2024-05-18 NOTE — PROGRESS NOTE ADULT - PROBLEM SELECTOR PLAN 4
.  - TIBC 448, total iron 20  - cont iron sucrose   - HH back to baseline today
.  - TIBC 448, total iron 20  - cont iron sucrose   - HH  stable today
.  - TIBC 448, total iron 20  - cont iron sucrose   - HH back to baseline today
.  - TIBC 448, total iron 20  - cont iron sucrose   - HH labile but no s/s of bleed    No further inpatient cardiac work up at this time. Patient should follow up with Dr. Woods as OP 2 weeks post d/c  Will sign off.  Please reconsult if needed.

## 2024-05-18 NOTE — PROGRESS NOTE ADULT - PROBLEM SELECTOR PROBLEM 1
Heart failure with reduced ejection fraction

## 2024-05-18 NOTE — PROGRESS NOTE ADULT - PROBLEM SELECTOR PLAN 3
.  - Eliquis on hold for procedures today  - SR on monitor   - cont metoprolol
.  - Eliquis on hold for procedures today, can restart if no other procedures planned, otherwise would resume Eliquis 5mg PO BID  - SR on monitor   - cont metoprolol
.  - cont Eliquis at this time, will hold prior to RHC   - SR on monitor   - cont metoprolol
.  - cont Eliquis at this time  - SR on monitor   - cont metoprolol

## 2024-05-18 NOTE — CONSULT NOTE ADULT - SUBJECTIVE AND OBJECTIVE BOX
PULMONARY CONSULT NOTE      RENATO LEVI  MRN-[id number]    Patient is a 63y old  Male who presents with a chief complaint of shortness of breath (16 May 2024 11:00)      HISTORY OF PRESENT ILLNESS:  63M PMH CAD s/p CABG, HFrEF, AFib who presented 5/13/24 with SOB, found with hypoxic respiratory failure, CHF. Had TTE showing EF 60-65%. Had RHC 5/17/24 with normal R-sided pressures.     MEDICATIONS  (STANDING):  albuterol    90 MICROgram(s) HFA Inhaler 2 Puff(s) Inhalation every 6 hours  albuterol/ipratropium for Nebulization 3 milliLiter(s) Nebulizer every 6 hours  apixaban 5 milliGRAM(s) Oral every 12 hours  aspirin enteric coated 81 milliGRAM(s) Oral daily  atorvastatin 40 milliGRAM(s) Oral at bedtime  azithromycin  IVPB 500 milliGRAM(s) IV Intermittent every 24 hours  cholecalciferol 2000 Unit(s) Oral daily  metoprolol tartrate 25 milliGRAM(s) Oral two times a day  pantoprazole    Tablet 40 milliGRAM(s) Oral before breakfast  senna 2 Tablet(s) Oral at bedtime    MEDICATIONS  (PRN):  acetaminophen     Tablet .. 650 milliGRAM(s) Oral every 6 hours PRN Temp greater or equal to 38C (100.4F), Mild Pain (1 - 3)  aluminum hydroxide/magnesium hydroxide/simethicone Suspension 30 milliLiter(s) Oral every 4 hours PRN Dyspepsia  benzonatate 100 milliGRAM(s) Oral every 8 hours PRN Cough  guaiFENesin Oral Liquid (Sugar-Free) 100 milliGRAM(s) Oral every 6 hours PRN Cough  melatonin 3 milliGRAM(s) Oral at bedtime PRN Insomnia  ondansetron Injectable 4 milliGRAM(s) IV Push every 8 hours PRN Nausea and/or Vomiting    Allergies    No Known Allergies    Intolerances      PAST MEDICAL & SURGICAL HISTORY:  Hypertension      Hyperlipidemia      COVID-19 vaccine series completed      Colon cancer      Prostate cancer  Treated with Radiation 2015      Type 2 diabetes mellitus      Unilateral primary osteoarthritis, left knee      Paroxysmal atrial fibrillation      CAD (coronary atherosclerotic disease)      S/P colon resection      S/P hernia repair      S/P total knee replacement, left        FAMILY HISTORY:  No pertinent family history in first degree relatives          SOCIAL HISTORY  Smoking History:       REVIEW OF SYSTEMS:  CONSTITUTIONAL:  No fevers, chills  HEENT:  No headache  CARDIOVASCULAR:  No chest pain  RESPIRATORY:  As per HPI  GASTROINTESTINAL:  No abdominal pain  NEUROLOGIC:  No seizures or headaches  EXTREMITIES: No leg swelling  PSYCHIATRIC:  No disorder of thought or mood      Vital Signs Last 24 Hrs  T(C): 36.7 (18 May 2024 08:01), Max: 37 (18 May 2024 05:00)  T(F): 98.1 (18 May 2024 08:01), Max: 98.6 (18 May 2024 05:00)  HR: 65 (18 May 2024 09:05) (64 - 82)  BP: 116/70 (18 May 2024 08:01) (105/54 - 149/80)  BP(mean): --  RR: 19 (18 May 2024 08:01) (18 - 24)  SpO2: 97% (18 May 2024 09:05) (95% - 100%)    Parameters below as of 18 May 2024 09:05  Patient On (Oxygen Delivery Method): nasal cannula, 2L          PHYSICAL EXAMINATION:  GENERAL: In no apparent distress  HEENT: NC/AT  NECK: Supple, non-tender   LUNGS: CTA B/L, good inspiratory effort, non-labored breathing  CV: +S1, S2, RRR  ABDOMEN: Soft, non-tender  EXTREMITIES: No pedal edema B/L  SKIN: No open wounds  NEUROLOGIC: Grossly non-focal  PSYCH: Normal affect      LABS:                        8.4    6.38  )-----------( 203      ( 18 May 2024 03:58 )             29.5     05-18    138  |  97  |  28.9<H>  ----------------------------<  170<H>  3.8   |  27.0  |  0.84    Ca    9.1      18 May 2024 03:58  Phos  3.1     05-17  Mg     2.1     05-18        Urinalysis Basic - ( 18 May 2024 03:58 )    Color: x / Appearance: x / SG: x / pH: x  Gluc: 170 mg/dL / Ketone: x  / Bili: x / Urobili: x   Blood: x / Protein: x / Nitrite: x   Leuk Esterase: x / RBC: x / WBC x   Sq Epi: x / Non Sq Epi: x / Bacteria: x                      MICROBIOLOGY:      RADIOLOGY & ADDITIONAL STUDIES:  < from: CT Chest No Cont (05.14.24 @ 01:16) >  IMPRESSION:    Bilateral interstitial, groundglass, and airspace opacities favoring   moderate pulmonary edema over atypical pneumonia.        --- End of Report ---            GIBSON EDWARDS MD; Attending Radiologist  This document has been electronically signed. May 14 2024  2:35AM    < end of copied text >      ECHO:  < from: TTE W or WO Ultrasound Enhancing Agent (05.14.24 @ 11:29) >  _______________________________________________________________________________________     CONCLUSIONS:      1. Left ventricular systolic function is normal with an ejection fraction of 65 % by Gu's method of disks with an ejection fraction visually estimated at 60 to 65 %.   2. Basal inferior segment and basal inferolateral segment are abnormal.   3. Normal right ventricular cavity size and normal systolic function.   4. The left atrium is mildly dilated.   5. Moderate pulmonic regurgitation.   6. Estimated pulmonary artery systolic pressureis 13 mmHg, consistent with normal pulmonary artery pressure.   7. No pericardial effusion seen.   8. No prior echocardiogram is available for comparison.    ________________________________________________________________________________________    < end of copied text >

## 2024-05-18 NOTE — PROGRESS NOTE ADULT - SUBJECTIVE AND OBJECTIVE BOX
[facility]                                                         CARDIOLOGY AT [facility]                                                                  [address] [facility], [place] [place] [address]                                                         Telephone: [phone]. Fax:[phone]                                                                             PROGRESS NOTE    Reason for follow up: Dyspnea  Update: s/p RHC underwent with normal PCWP 13-14 and RAP 6-8, etiology of persistent dyspnea/cough and unresolved bilateral pulmonary infiltrates are not cardiac related, suspect idiopathic pulmonary fibrosis vs. inflammatory process? was given empiric IV Solumedrol 125mg x1 in the cath lab, pending pulmonary eval. Lasix d/c        Review of symptoms:   Cardiac:  No chest pain. No dyspnea. No palpitations.  Respiratory: + cough. No dyspnea  Gastrointestinal: No diarrhea. No abdominal pain. No bleeding.   Neuro: No focal neuro complaints.    Vitals:  T(C): 36.7 (05-18-24 @ 08:01), Max: 37 (05-18-24 @ 05:00)  HR: 68 (05-18-24 @ 08:01) (64 - 82)  BP: 116/70 (05-18-24 @ 08:01) (105/54 - 149/80)  RR: 19 (05-18-24 @ 08:01) (18 - 26)  SpO2: 98% (05-18-24 @ 08:01) (94% - 100%)  Wt(kg): --  I&O's Summary    17 May 2024 07:01  -  18 May 2024 07:00  --------------------------------------------------------  IN: 490 mL / OUT: 1250 mL / NET: -760 mL      Weight (kg): 74.9 (05-16 @ 00:05)    PHYSICAL EXAM:  Appearance: Comfortable. No acute distress  HEENT:  Atraumatic. Normocephalic.  Normal oral mucosa  Neurologic: A & O x 3, no gross focal deficits.  Cardiovascular: RRR S1 S2, No murmur, no rubs/gallops. No JVD  Respiratory: Lungs diminished with B/L LL faint crackles  Gastrointestinal:  Soft, Non-tender, + BS  Lower Extremities: 2+ Peripheral Pulses, No clubbing, cyanosis, or edema  Psychiatry: Patient is calm. No agitation.   Skin: warm and dry.    CURRENT CARDIAC MEDICATIONS:  metoprolol tartrate 25 milliGRAM(s) Oral two times a day      CURRENT OTHER MEDICATIONS:  albuterol    90 MICROgram(s) HFA Inhaler 2 Puff(s) Inhalation every 6 hours  albuterol/ipratropium for Nebulization 3 milliLiter(s) Nebulizer every 6 hours  benzonatate 100 milliGRAM(s) Oral every 8 hours PRN Cough  guaiFENesin Oral Liquid (Sugar-Free) 100 milliGRAM(s) Oral every 6 hours PRN Cough  azithromycin  IVPB 500 milliGRAM(s) IV Intermittent every 24 hours  acetaminophen     Tablet .. 650 milliGRAM(s) Oral every 6 hours PRN Temp greater or equal to 38C (100.4F), Mild Pain (1 - 3)  melatonin 3 milliGRAM(s) Oral at bedtime PRN Insomnia  ondansetron Injectable 4 milliGRAM(s) IV Push every 8 hours PRN Nausea and/or Vomiting  aluminum hydroxide/magnesium hydroxide/simethicone Suspension 30 milliLiter(s) Oral every 4 hours PRN Dyspepsia  pantoprazole    Tablet 40 milliGRAM(s) Oral before breakfast  senna 2 Tablet(s) Oral at bedtime  atorvastatin 40 milliGRAM(s) Oral at bedtime  aspirin enteric coated 81 milliGRAM(s) Oral daily  cholecalciferol 2000 Unit(s) Oral daily  enoxaparin Injectable 75 milliGRAM(s) SubCutaneous every 12 hours      LABS:	 	                            8.4    6.38  )-----------( 203      ( 18 May 2024 03:58 )             29.5     05-18    138  |  97  |  28.9<H>  ----------------------------<  170<H>  3.8   |  27.0  |  0.84    Ca    9.1      18 May 2024 03:58  Phos  3.1     05-17  Mg     2.1     05-18      PT/INR/PTT ( 13 May 2024 13:27 )                       :                       :      15.0         :       34.6                  .        .                   .              .           .       1.36        .                                       Lipid Profile:   HgA1c:   TSH:     TELEMETRY:   ECG:    DIAGNOSTIC TESTING:  [ ] Echocardiogram:   < from: TTE W or WO Ultrasound Enhancing Agent (05.14.24 @ 11:29) >    TRANSTHORACIC ECHOCARDIOGRAM REPORT  ________________________________________________________________________________     Pt. Name:       RENATO LEVI Study Date:    5/14/2024  MRN:            [id number]           YOB: 1960  Accession #:    [id number]          Age:           63 years  Account#:       [id number]         Gender:        M  Visit ID#  Heart Rate:     69 bpm             Height:        61.81 in (157.00 cm)  Rhythm:   sinus rhythm       Weight:        171.00 lb (77.56 kg)  Blood Pressure: 110/60 mmHg        BSA/BMI:       1.78 m² / 31.47 kg/m²  ________________________________________________________________________________________  Referring Physician:    Judy Moseley  Interpreting Physician: Cy Gaston  Primary Sonographer:    Alisha Aguilar    CPT:               ECHO TTE WO CON COMP W DOPP - 62055.m  Indication(s):     Abnormal electrocardiogram ECG/EKG - R94.31  Procedure:         Transthoracic echocardiogram with 2-D, M-mode and complete                     spectral and color flow Doppler.  Ordering Location: [facility]  Admission Status:  ED  Study Information: Image quality for this study is fair.    _______________________________________________________________________________________     CONCLUSIONS:      1. Left ventricular systolic function is normal with an ejection fraction of 65 % by Gu's method of disks with an ejection fraction visually estimated at 60 to 65 %.   2. Basal inferior segment and basal inferolateral segment are abnormal.   3. Normal right ventricular cavity size and normal systolic function.   4. The left atrium is mildly dilated.   5. Moderate pulmonic regurgitation.   6. Estimated pulmonary artery systolic pressureis 13 mmHg, consistent with normal pulmonary artery pressure.   7. No pericardial effusion seen.   8. No prior echocardiogram is available for comparison.    < end of copied text >  [ ]  Catheterization:  < from: Cardiac Catheterization (05.17.24 @ 17:02) >  Cath Lab Report  Diagnostic Cardiologist: Benjamin Hess Dr.  Referring Physician: Jeferson Canales MD  Procedures Performed  Procedures: 1. Ultrasound Guided Access  2. Venous Access - Right Brachial  3. RHC  Indications: Dyspnea  Diagnostic Conclusions:  Normal RA and wedge pressure.  Borderline normal cardiac index.  Recommendations:  Despite having significant cough/crackles, patient has normal right heart pressures.  Assess for non-cardiac causes of shortness of breath.    < end of copied text >  [ ] Stress Test:  < from: Nuclear Stress Test-Pharmacologic.. (05.16.24 @ 07:09) >    Nuclear Pharmacologic Stress Test Report         Patient: RENATO LEVI             Study Date: 5/16/2024           MRN: [id number]                   Birth Date/Age: 1960 Age: 63 years      Access #: [id number]                          Gender: M     Order Loc: [facility]                               Height: /  Request Phys: [id number] Trey                Weight: /                [place]     Procedure: Rest|Stress SESTAMIBI            BSA/ BMI: /    Indication: Acute on chronic diastolic  Admiss Status:                (congestive) heart failure                - I50.33    ---------------------------------------------------------------------------------------------------------------------------------------------------------  ProcedureCode: MYOCARDIAL SPECT MULTIPLE - 33341.m;TC 99M SESTAMIBI PER DOSE -                  .m;INJECTION REGADENOSON - .m;TC 99M SESTAMIBI PER                  DOSE 2nd - .m;STRESS TEST TRACING ONLY - 77412.m    ---------------------------------------------------------------------------------------------------------------------------------------------------------  Image Quality: Fair  Artifact:      Patient motion.  NDC Number(s): [id number]    --------------------------------------------------------------------------------------------------------------------------------------------------------Conclusions:   1. Myocardial Perfusion: Mildly Abnormal.   2. There is medium sized moderately fixed perfusion defect at basal inferolateral and distal anteroseptal walls, defects persisted on prone imaging with reduced systolic thickening, suggestive of infarcts. No clear evidence of ischemia. No TID (ratio 0.78).   3. The left ventricle is normal in function and normal in size. The post stress left ventricular EF is 60 %. The stress end diastolic volume is 66 ml and systolic volume is 27 ml.   4. The patient underwent stress testing using pharmacological IV regadenoson (bolus injection, 400mcg over 10 to 20 seconds followed by 5ml flush) protocol.   5. Baseline electrocardiogram: sinus bradycardia at a rate of 72 bpm with no sigificant ST abnomalities.   6. Stress electrocardiogram: No significant ischemic ST segment changes.   7. Arrhythmias: Rare VPD's occurred during rest, stress and recovery.   8. Normal pharmocologic heart rate response.   9. Normal pharmocologic blood pressure response.    < end of copied text >  < from: Nuclear Stress Test-Pharmacologic.. (05.16.24 @ 07:09) >    OTHER: 	                                                                [facility]                                                         CARDIOLOGY AT [facility]                                                                  [address] [facility], [place]-[address] [place] [address]                                                         Telephone: [phone]. Fax:[phone]                                                                             PROGRESS NOTE    Reason for follow up: Dyspnea  Update: s/p RHC underwent with normal PCWP 13-14 and RAP 6-8, etiology of persistent dyspnea/cough and unresolved bilateral pulmonary infiltrates are not cardiac related, suspect idiopathic pulmonary fibrosis vs. inflammatory process? was given empiric IV Solumedrol 125mg x1 in the cath lab, pending pulmonary eval. Lasix d/c        Review of symptoms:   Cardiac:  No chest pain. No dyspnea. No palpitations.  Respiratory: + cough. No dyspnea  Gastrointestinal: No diarrhea. No abdominal pain. No bleeding.   Neuro: No focal neuro complaints.    Vitals:  T(C): 36.7 (05-18-24 @ 08:01), Max: 37 (05-18-24 @ 05:00)  HR: 68 (05-18-24 @ 08:01) (64 - 82)  BP: 116/70 (05-18-24 @ 08:01) (105/54 - 149/80)  RR: 19 (05-18-24 @ 08:01) (18 - 26)  SpO2: 98% (05-18-24 @ 08:01) (94% - 100%)  Wt(kg): --  I&O's Summary    17 May 2024 07:01  -  18 May 2024 07:00  --------------------------------------------------------  IN: 490 mL / OUT: 1250 mL / NET: -760 mL      Weight (kg): 74.9 (05-16 @ 00:05)    PHYSICAL EXAM:  Appearance: Comfortable. No acute distress  HEENT:  Atraumatic. Normocephalic.  Normal oral mucosa  Neurologic: A & O x 3, no gross focal deficits.  Cardiovascular: RRR S1 S2, No murmur, no rubs/gallops. No JVD  Respiratory: Lungs diminished with B/L LL faint crackles  Gastrointestinal:  Soft, Non-tender, + BS  Lower Extremities: 2+ Peripheral Pulses, No clubbing, cyanosis, or edema  Psychiatry: Patient is calm. No agitation.   Skin: warm and dry.  CATH SITE: right arm benign s/p cath.  No bleeding, no ecchymosis, no hematoma. Extremity Warm to touch, with palpable distal pulses, and brisk capillary refill.      CURRENT CARDIAC MEDICATIONS:  metoprolol tartrate 25 milliGRAM(s) Oral two times a day      CURRENT OTHER MEDICATIONS:  albuterol    90 MICROgram(s) HFA Inhaler 2 Puff(s) Inhalation every 6 hours  albuterol/ipratropium for Nebulization 3 milliLiter(s) Nebulizer every 6 hours  benzonatate 100 milliGRAM(s) Oral every 8 hours PRN Cough  guaiFENesin Oral Liquid (Sugar-Free) 100 milliGRAM(s) Oral every 6 hours PRN Cough  azithromycin  IVPB 500 milliGRAM(s) IV Intermittent every 24 hours  acetaminophen     Tablet .. 650 milliGRAM(s) Oral every 6 hours PRN Temp greater or equal to 38C (100.4F), Mild Pain (1 - 3)  melatonin 3 milliGRAM(s) Oral at bedtime PRN Insomnia  ondansetron Injectable 4 milliGRAM(s) IV Push every 8 hours PRN Nausea and/or Vomiting  aluminum hydroxide/magnesium hydroxide/simethicone Suspension 30 milliLiter(s) Oral every 4 hours PRN Dyspepsia  pantoprazole    Tablet 40 milliGRAM(s) Oral before breakfast  senna 2 Tablet(s) Oral at bedtime  atorvastatin 40 milliGRAM(s) Oral at bedtime  aspirin enteric coated 81 milliGRAM(s) Oral daily  cholecalciferol 2000 Unit(s) Oral daily  enoxaparin Injectable 75 milliGRAM(s) SubCutaneous every 12 hours      LABS:	 	                            8.4    6.38  )-----------( 203      ( 18 May 2024 03:58 )             29.5     05-18    138  |  97  |  28.9<H>  ----------------------------<  170<H>  3.8   |  27.0  |  0.84    Ca    9.1      18 May 2024 03:58  Phos  3.1     05-17  Mg     2.1     05-18      PT/INR/PTT ( 13 May 2024 13:27 )                       :                       :      15.0         :       34.6                  .        .                   .              .           .       1.36        .                                       Lipid Profile:   HgA1c:   TSH:     TELEMETRY:   ECG:    DIAGNOSTIC TESTING:  [ ] Echocardiogram:   < from: TTE W or WO Ultrasound Enhancing Agent (05.14.24 @ 11:29) >    TRANSTHORACIC ECHOCARDIOGRAM REPORT  ________________________________________________________________________________     Pt. Name:       RENATO LEVI Study Date:    5/14/2024  MRN:            [id number]           YOB: 1960  Accession #:    [id number]          Age:           63 years  Account#:       [id number]         Gender:        M  Visit ID#  Heart Rate:     69 bpm             Height:        61.81 in (157.00 cm)  Rhythm:   sinus rhythm       Weight:        171.00 lb (77.56 kg)  Blood Pressure: 110/60 mmHg        BSA/BMI:       1.78 m² / 31.47 kg/m²  ________________________________________________________________________________________  Referring Physician:    Judy Moseley  Interpreting Physician: Cy Gaston  Primary Sonographer:    Alisha Aguilar    CPT:               ECHO TTE WO CON COMP W DOPP - 50714.m  Indication(s):     Abnormal electrocardiogram ECG/EKG - R94.31  Procedure:         Transthoracic echocardiogram with 2-D, M-mode and complete                     spectral and color flow Doppler.  Ordering Location: [facility]  Admission Status:  ED  Study Information: Image quality for this study is fair.    _______________________________________________________________________________________     CONCLUSIONS:      1. Left ventricular systolic function is normal with an ejection fraction of 65 % by Gu's method of disks with an ejection fraction visually estimated at 60 to 65 %.   2. Basal inferior segment and basal inferolateral segment are abnormal.   3. Normal right ventricular cavity size and normal systolic function.   4. The left atrium is mildly dilated.   5. Moderate pulmonic regurgitation.   6. Estimated pulmonary artery systolic pressureis 13 mmHg, consistent with normal pulmonary artery pressure.   7. No pericardial effusion seen.   8. No prior echocardiogram is available for comparison.    < end of copied text >  [ ]  Catheterization:  < from: Cardiac Catheterization (05.17.24 @ 17:02) >  Cath Lab Report  Diagnostic Cardiologist: Benjamin Hess Dr.  Referring Physician: Jeferson Canales MD  Procedures Performed  Procedures: 1. Ultrasound Guided Access  2. Venous Access - Right Brachial  3. RHC  Indications: Dyspnea  Diagnostic Conclusions:  Normal RA and wedge pressure.  Borderline normal cardiac index.  Recommendations:  Despite having significant cough/crackles, patient has normal right heart pressures.  Assess for non-cardiac causes of shortness of breath.    < end of copied text >  [ ] Stress Test:  < from: Nuclear Stress Test-Pharmacologic.. (05.16.24 @ 07:09) >    Nuclear Pharmacologic Stress Test Report         Patient: RENATO LEVI             Study Date: 5/16/2024           MRN: [id number]                   Birth Date/Age: 1960 Age: 63 years      Access #: [id number]                          Gender: M     Order Loc: [facility]                               Height: /  Request Phys: [phone] Trey                Weight: /                [place]     Procedure: Rest|Stress SESTAMIBI            BSA/ BMI: /    Indication: Acute on chronic diastolic  Admiss Status:                (congestive) heart failure                - I50.33    ---------------------------------------------------------------------------------------------------------------------------------------------------------  ProcedureCode: MYOCARDIAL SPECT MULTIPLE - 48013.m;TC 99M SESTAMIBI PER DOSE -                  .m;INJECTION REGADENOSON - .m;TC 99M SESTAMIBI PER                  DOSE 2nd - .m;STRESS TEST TRACING ONLY - 61115.m    ---------------------------------------------------------------------------------------------------------------------------------------------------------  Image Quality: Fair  Artifact:      Patient motion.  NDC Number(s): [id number]    --------------------------------------------------------------------------------------------------------------------------------------------------------Conclusions:   1. Myocardial Perfusion: Mildly Abnormal.   2. There is medium sized moderately fixed perfusion defect at basal inferolateral and distal anteroseptal walls, defects persisted on prone imaging with reduced systolic thickening, suggestive of infarcts. No clear evidence of ischemia. No TID (ratio 0.78).   3. The left ventricle is normal in function and normal in size. The post stress left ventricular EF is 60 %. The stress end diastolic volume is 66 ml and systolic volume is 27 ml.   4. The patient underwent stress testing using pharmacological IV regadenoson (bolus injection, 400mcg over 10 to 20 seconds followed by 5ml flush) protocol.   5. Baseline electrocardiogram: sinus bradycardia at a rate of 72 bpm with no sigificant ST abnomalities.   6. Stress electrocardiogram: No significant ischemic ST segment changes.   7. Arrhythmias: Rare VPD's occurred during rest, stress and recovery.   8. Normal pharmocologic heart rate response.   9. Normal pharmocologic blood pressure response.    < end of copied text >  < from: Nuclear Stress Test-Pharmacologic.. (05.16.24 @ 07:09) >    OTHER:

## 2024-05-18 NOTE — PROGRESS NOTE ADULT - PROBLEM SELECTOR PROBLEM 2
Iron deficiency anemia
CAD (coronary atherosclerotic disease)

## 2024-05-18 NOTE — CONSULT NOTE ADULT - ASSESSMENT
Indication: 63 year old male who presents with SOB and cough.   H/O HF but now recovered.  On lasix.  Pt remains SOB.  Initially presents for RHC/LHC.  Pt unable to lay flat and remains coughing.  Will attempt RHC to help with medical management.     Risk Stratification:  ASA: 3  Mallampati: 3  Bleeding Risk: 2.7%  Creatinine: 0.94  GFR: 91      Plan/Recommendations:   -plan for RHC  -preferred access: RBV  -patient seen and examined  -confirmed appropriate NPO duration  -ECG and Labs reviewed  -procedure discussed with patient; risks and benefits explained, questions answered  -consent obtained by attending IC    
# Acute hypoxic respiratory failure likely 2/2 combination of cardiogenic and lung disease  # Anemia    64yo with coughing and worsening sob x 1 month and hypoxia. Exam showed bilateral wheezing and coarse crackle in lower lungs, hypoxic into the 80's on r/a, and CXR with opacities, infiltrates and congestion, pedal edema. No WBC, negative Procalcitonin. Likely over loaded + lung pathology (virus vs inflammatory lung disease). Pt was placed Bipap and given 80 mg Lasix which now improved his condition. Changed to 6LNC, satting 94-95%. MICU level of care not required at this time.
SOB  HFrEF - previously EF 35-40%, now improved to 60-65%  CAD s/p CABG  H/o colon and prostate CA    Recommendations:  Pt continues to feel SOB. He will need PFTs - would check bedside spirometry pre and post. Trial of Prednisone 40mg - ?underlying ILD. Check HELIO, ANCA, ESR, CRP, RF, dsDNA. Monitor O2 requirements for possible home O2. Bedside PT, OOBTC. Incentive spirometry. Will continue to follow.    D[facility] with Dr. Shelly Alvarez [facility], [facility]  , Pulmonary & Critical Care Medicine  [facility]  Pulmonary and Sleep Medicine at [place]  [address]  [place] [address]  T: [phone]  F: [phone]
64 y/o male w/PMHx of Nstemi/CAD s/p CABG, Ischemic Cardiomyopathy, HFrEF (3.2/22 EF 35-40%), pAfib on Eliquis, s/p radiofrequency ablation 2022, Type 2DM, HTN, HLD, Colon CA s/p resection, Prostate CA presenting to the ER w/one month of cough and shortness of breath.   Pt was at Dr. López's office (sees Maribel for EP and Chuck for cardiology) and sent here as he was hypoxic into the 80s.     On arrival he is c/o productive (white phlegm) cough and some difficulty breathing, placed on bipapa and seen ICU attending.    CXR with opacities, infiltrates and congestion - pending an official read.   Denies recent travel, sick contacts, headache dizziness, chest pain, pnd, orthopnea hemoptysis, n/v/d/c/abd pain, fever, chills, syncope, presnycope, cramps, numging or tingling, frequency, dysuria, or any other discomfort.    CARDIAC TESTING

## 2024-05-18 NOTE — DIETITIAN INITIAL EVALUATION ADULT - PERTINENT MEDS FT
MEDICATIONS  (STANDING):  apixaban 5 milliGRAM(s) Oral every 12 hours  azithromycin  IVPB 500 milliGRAM(s) IV Intermittent every 24 hours  cholecalciferol 2000 Unit(s) Oral daily  metoprolol tartrate 25 milliGRAM(s) Oral two times a day  pantoprazole    Tablet 40 milliGRAM(s) Oral before breakfast  senna 2 Tablet(s) Oral at bedtime    MEDICATIONS  (PRN):  ondansetron Injectable 4 milliGRAM(s) IV Push every 8 hours PRN Nausea and/or Vomiting

## 2024-05-18 NOTE — PROGRESS NOTE ADULT - SUBJECTIVE AND OBJECTIVE BOX
RENATO LEVI    [id number]    63y      Male    CC: sob     INTERVAL HPI/OVERNIGHT EVENTS: pt seen and examined. s/p cath yesterday with normal PCWP    REVIEW OF SYSTEMS:    CONSTITUTIONAL: No fever, weight loss  RESPIRATORY: No wheezing, hemoptysis  CARDIOVASCULAR: No chest pain, palpitations  GASTROINTESTINAL: No abdominal or epigastric pain. No nausea, vomiting    Vital Signs Last 24 Hrs  T(C): 36.7 (18 May 2024 08:01), Max: 37 (18 May 2024 05:00)  T(F): 98.1 (18 May 2024 08:01), Max: 98.6 (18 May 2024 05:00)  HR: 65 (18 May 2024 09:05) (64 - 82)  BP: 116/70 (18 May 2024 08:01) (105/54 - 149/80)  BP(mean): --  RR: 19 (18 May 2024 08:01) (18 - 26)  SpO2: 97% (18 May 2024 09:05) (95% - 100%)    Parameters below as of 18 May 2024 09:05  Patient On (Oxygen Delivery Method): nasal cannula, 2L        PHYSICAL EXAM:    GENERAL: NAD  CHEST/LUNG: fine crackles at bases; respirations unlabored on NC   HEART: S1S2+, Regular rate and rhythm  ABDOMEN: Soft, Nontender, Nondistended; Bowel sounds present  SKIN: warm, dry   NEURO: AAOX3, grossly non-focal   PSYCH: calm, cooperative     LABS:                        8.4    6.38  )-----------( 203      ( 18 May 2024 03:58 )             29.5     05-18    138  |  97  |  28.9<H>  ----------------------------<  170<H>  3.8   |  27.0  |  0.84    Ca    9.1      18 May 2024 03:58  Phos  3.1     05-17  Mg     2.1     05-18        Urinalysis Basic - ( 18 May 2024 03:58 )    Color: x / Appearance: x / SG: x / pH: x  Gluc: 170 mg/dL / Ketone: x  / Bili: x / Urobili: x   Blood: x / Protein: x / Nitrite: x   Leuk Esterase: x / RBC: x / WBC x   Sq Epi: x / Non Sq Epi: x / Bacteria: x          MEDICATIONS  (STANDING):  albuterol    90 MICROgram(s) HFA Inhaler 2 Puff(s) Inhalation every 6 hours  albuterol/ipratropium for Nebulization 3 milliLiter(s) Nebulizer every 6 hours  aspirin enteric coated 81 milliGRAM(s) Oral daily  atorvastatin 40 milliGRAM(s) Oral at bedtime  azithromycin  IVPB 500 milliGRAM(s) IV Intermittent every 24 hours  cholecalciferol 2000 Unit(s) Oral daily  enoxaparin Injectable 75 milliGRAM(s) SubCutaneous every 12 hours  metoprolol tartrate 25 milliGRAM(s) Oral two times a day  pantoprazole    Tablet 40 milliGRAM(s) Oral before breakfast  senna 2 Tablet(s) Oral at bedtime    MEDICATIONS  (PRN):  acetaminophen     Tablet .. 650 milliGRAM(s) Oral every 6 hours PRN Temp greater or equal to 38C (100.4F), Mild Pain (1 - 3)  aluminum hydroxide/magnesium hydroxide/simethicone Suspension 30 milliLiter(s) Oral every 4 hours PRN Dyspepsia  benzonatate 100 milliGRAM(s) Oral every 8 hours PRN Cough  guaiFENesin Oral Liquid (Sugar-Free) 100 milliGRAM(s) Oral every 6 hours PRN Cough  melatonin 3 milliGRAM(s) Oral at bedtime PRN Insomnia  ondansetron Injectable 4 milliGRAM(s) IV Push every 8 hours PRN Nausea and/or Vomiting      RADIOLOGY & ADDITIONAL TESTS:

## 2024-05-18 NOTE — DIETITIAN INITIAL EVALUATION ADULT - PERTINENT LABORATORY DATA
05-18    138  |  97  |  28.9<H>  ----------------------------<  170<H>  3.8   |  27.0  |  0.84    Ca    9.1      18 May 2024 03:58  Phos  3.1     05-17  Mg     2.1     05-18    POCT Blood Glucose.: 111 mg/dL (05-17-24 @ 17:30)  A1C with Estimated Average Glucose Result: 7.4 % (05-14-24 @ 03:51)

## 2024-05-18 NOTE — DIETITIAN INITIAL EVALUATION ADULT - ADD RECOMMEND
1) Continue diet as tolerated.   2) Encourage po intake, monitor diet tolerance, and provide assistance at meals as needed.   3) Rx: MVI daily.   4) Monitor BG levels, correct prn   5) Obtain daily weights to monitor trends.

## 2024-05-18 NOTE — PROGRESS NOTE ADULT - NS ATTEND AMEND GEN_ALL_CORE FT
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 LIMA to LAD and SVG to PDA and Ramus in 3/2022), HFrEF since recovered LV EF (on Lasix 40mg once daily at home), pAfib s/p ablation (on Eliquis), chronic anemia, prostate and colon cancer s/p resection admitted with ADHF with hypoxia required BiPAP, CXR and CT chest with diffuse pulmonary edema however peculiar symptoms not abating with persistent cough despite continue IV diuresis and repeat pBNP downtrended but repeat CXR today still with diffuse pulmonary infiltrates, nuclear stress test with infarct pattern but no ischemia    -unresolved dyspnea/cough and pulmonary infiltrates despite on IV diuresis and RHC with normal filling pressures, hence persistent dyspnea/cough and unresolved bilateral pulmonary infiltrates are not cardiac related, suspect idiopathic pulmonary fibrosis vs. inflammatory process? was given empiric IV Solumedrol 125mg x1 in the cath lab 5/17, pending pulmonary eval.   -RVP and procalcitonin negative but on empiric azithromycin, check urine legionella and will also check rheumatological workup with RF, HELIO, ANCA  -severe microcytic iron-def. anemia, he reports had recent repeat colonoscopy 4 months ago outpatient told normal, fecal occult blood negative, started IV iron while currently admitted for concurrent HF benefit  -pAfib on Eliquis, if further downtrend in H/H then needs to hold AC, will change to LMWH for now in case need further pulmonary workup for biopsy/bronchoscopy   -reconsult if new cardiac issue arise         Jeferson Canales DO, [facility]  Faculty Non-Invasive Cardiologist  [phone]

## 2024-05-18 NOTE — DIETITIAN INITIAL EVALUATION ADULT - OTHER INFO
63y/oM PMH of CAD s/p CABG, HFrEF (3.2/22 EF 35-40%), pAfib on Eliquis, s/p radiofrequency ablation 2022, DM-2, HTN, HLD, Colon CA s/p resection, Prostate CA was sent to the ED from pulmonology office for hypoxia. Pt reports SOB x2 months. In the ED patient was placed on BiPAP for work up breathing, admitted with chf

## 2024-05-18 NOTE — PROGRESS NOTE ADULT - PROBLEM SELECTOR PLAN 2
.  - 3/2022: CABG- LIMA-LAD, SVG-Ramus, SVG-PDA  - NST 9/2023  - NST 5/17 without ischemia
Stool for ob  Hemology consult
.  - 3/2022: CABG- LIMA-LAD, SVG-Ramus, SVG-PDA  - NST 9/2023  - given repeated HF exacerbations, will plan for NST tomorrow  - NPO after MN
.  - 3/2022: CABG- LIMA-LAD, SVG-Ramus, SVG-PDA  - NST 9/2023  - NST 5/17 without ischemia  - Plan for LHC to evaluate graft patentcy and RHC for right heart pressures, keep NPO
.  - 3/2022: CABG- LIMA-LAD, SVG-Ramus, SVG-PDA  - NST 9/2023  - given repeated HF exacerbation, will plan for NST today

## 2024-05-18 NOTE — PROGRESS NOTE ADULT - PROBLEM SELECTOR PLAN 1
.  - now recovered (60-65%)  - remains volume overloaded  - cont lasix 40mg IV BID  - monitor I and Os , not recorded, states filled three bottles  - cont metoprolol   - will possibly add farxiga prior to discharge
.  - HFpEF  - now recovered (60-65%)  - still with LE edema, pro- today   - cont Lasix 40mg IV BID  - will repeat CXR today, CXR does not appear to be heart failure, appears to be possibly pulmonary fibrosis  - monitor I and Os , not recorded, states filled three bottles  - cont metoprolol   - will possibly add Farxiga prior to discharge  - will plan for repeat RHC to re-assess pulmonary pressures, mostly normal in 2022
Hx of Ischemic cardiomyopathy and systolic heart failure  TTE 3/26/2022 EF 35-40%, TTE with improvement noted 7/8/22 to 40-45% now 4/23 EF50-55%  Patient with improvement noted  and feeling better nearing euvolemia  GDMT:    Ct with Lasix 40mg IV bid  Ct with losartan  Ct lopressor 25mg po bid  Strict I and o's  Monitor kidney function daily.  TTE Pending  Ischemic work up when optimized
.  - HFpEF now recovered (60-65%)  - still with LE edema, pro- today   - s/p RHC with normal pressures, noncardiac etiology of pulmonary problems  - lasix d/c  - continue bipap  - cont metoprolol 25mg PO BID  - will possibly add Farxiga prior to discharge  - pending pulmonary consult for further evaluation
.  - HFpEF now recovered (60-65%)  - still with LE edema, pro- today   - cont Lasix 40mg IV BID  - repeat CXR somewhat Improved  - patient reports improvement in coughing   - cont metoprolol 25mg PO BID  - will possibly add Farxiga prior to discharge  - will plan for repeat RHC to re-assess pulmonary pressures, mostly normal in 2022. Also plan for LHC.

## 2024-05-19 LAB
ANION GAP SERPL CALC-SCNC: 12 MMOL/L — SIGNIFICANT CHANGE UP (ref 5–17)
BUN SERPL-MCNC: 25.2 MG/DL — HIGH (ref 8–20)
CALCIUM SERPL-MCNC: 9.3 MG/DL — SIGNIFICANT CHANGE UP (ref 8.4–10.5)
CHLORIDE SERPL-SCNC: 97 MMOL/L — SIGNIFICANT CHANGE UP (ref 96–108)
CO2 SERPL-SCNC: 26 MMOL/L — SIGNIFICANT CHANGE UP (ref 22–29)
CREAT SERPL-MCNC: 1.09 MG/DL — SIGNIFICANT CHANGE UP (ref 0.5–1.3)
CRP SERPL-MCNC: 5 MG/L — HIGH
EGFR: 76 ML/MIN/1.73M2 — SIGNIFICANT CHANGE UP
ERYTHROCYTE [SEDIMENTATION RATE] IN BLOOD: 12 MM/HR — SIGNIFICANT CHANGE UP (ref 0–15)
GLUCOSE SERPL-MCNC: 179 MG/DL — HIGH (ref 70–99)
HCT VFR BLD CALC: 30.6 % — LOW (ref 39–50)
HGB BLD-MCNC: 8.7 G/DL — LOW (ref 13–17)
MAGNESIUM SERPL-MCNC: 2 MG/DL — SIGNIFICANT CHANGE UP (ref 1.6–2.6)
MCHC RBC-ENTMCNC: 21 PG — LOW (ref 27–34)
MCHC RBC-ENTMCNC: 28.4 GM/DL — LOW (ref 32–36)
MCV RBC AUTO: 73.9 FL — LOW (ref 80–100)
PLATELET # BLD AUTO: 189 K/UL — SIGNIFICANT CHANGE UP (ref 150–400)
POTASSIUM SERPL-MCNC: 4.2 MMOL/L — SIGNIFICANT CHANGE UP (ref 3.5–5.3)
POTASSIUM SERPL-SCNC: 4.2 MMOL/L — SIGNIFICANT CHANGE UP (ref 3.5–5.3)
RBC # BLD: 4.14 M/UL — LOW (ref 4.2–5.8)
RBC # FLD: 22.5 % — HIGH (ref 10.3–14.5)
SODIUM SERPL-SCNC: 135 MMOL/L — SIGNIFICANT CHANGE UP (ref 135–145)
WBC # BLD: 10.65 K/UL — HIGH (ref 3.8–10.5)
WBC # FLD AUTO: 10.65 K/UL — HIGH (ref 3.8–10.5)

## 2024-05-19 PROCEDURE — 99232 SBSQ HOSP IP/OBS MODERATE 35: CPT

## 2024-05-19 RX ADMIN — Medication 3 MILLILITER(S): at 03:32

## 2024-05-19 RX ADMIN — Medication 25 MILLIGRAM(S): at 17:55

## 2024-05-19 RX ADMIN — APIXABAN 5 MILLIGRAM(S): 2.5 TABLET, FILM COATED ORAL at 05:02

## 2024-05-19 RX ADMIN — Medication 3 MILLIGRAM(S): at 22:17

## 2024-05-19 RX ADMIN — APIXABAN 5 MILLIGRAM(S): 2.5 TABLET, FILM COATED ORAL at 17:55

## 2024-05-19 RX ADMIN — AZITHROMYCIN 255 MILLIGRAM(S): 500 TABLET, FILM COATED ORAL at 17:57

## 2024-05-19 RX ADMIN — Medication 81 MILLIGRAM(S): at 11:29

## 2024-05-19 RX ADMIN — Medication 25 MILLIGRAM(S): at 05:02

## 2024-05-19 RX ADMIN — ATORVASTATIN CALCIUM 40 MILLIGRAM(S): 80 TABLET, FILM COATED ORAL at 22:17

## 2024-05-19 RX ADMIN — SENNA PLUS 2 TABLET(S): 8.6 TABLET ORAL at 22:16

## 2024-05-19 RX ADMIN — Medication 100 MILLIGRAM(S): at 22:16

## 2024-05-19 RX ADMIN — Medication 3 MILLILITER(S): at 15:02

## 2024-05-19 RX ADMIN — Medication 100 MILLIGRAM(S): at 03:47

## 2024-05-19 RX ADMIN — PANTOPRAZOLE SODIUM 40 MILLIGRAM(S): 20 TABLET, DELAYED RELEASE ORAL at 05:02

## 2024-05-19 RX ADMIN — Medication 2000 UNIT(S): at 11:29

## 2024-05-19 RX ADMIN — Medication 40 MILLIGRAM(S): at 05:02

## 2024-05-19 RX ADMIN — Medication 100 MILLIGRAM(S): at 17:56

## 2024-05-19 RX ADMIN — Medication 3 MILLILITER(S): at 21:58

## 2024-05-19 RX ADMIN — Medication 3 MILLILITER(S): at 08:38

## 2024-05-19 NOTE — PROGRESS NOTE ADULT - SUBJECTIVE AND OBJECTIVE BOX
RENATO LEVI    [id number]    63y      Male    CC: sob    INTERVAL HPI/OVERNIGHT EVENTS: pt seen and examined. no acute events reported o/n     REVIEW OF SYSTEMS:    RESPIRATORY: No wheezing, hemoptysis  CARDIOVASCULAR: No chest pain, palpitations  GASTROINTESTINAL: No abdominal or epigastric pain. No nausea, vomiting    Vital Signs Last 24 Hrs  T(C): 36.6 (19 May 2024 08:14), Max: 36.8 (19 May 2024 04:53)  T(F): 97.8 (19 May 2024 08:14), Max: 98.2 (19 May 2024 04:53)  HR: 70 (19 May 2024 08:38) (64 - 88)  BP: 126/72 (19 May 2024 08:14) (104/60 - 126/72)  BP(mean): --  RR: 19 (19 May 2024 08:14) (18 - 19)  SpO2: 98% (19 May 2024 08:38) (93% - 98%)    Parameters below as of 19 May 2024 08:38  Patient On (Oxygen Delivery Method): nasal cannula, 2L        PHYSICAL EXAM:    GENERAL: NAD  CHEST/LUNG: +dry cough noted; respirations unlabored on 2L NC   HEART: S1S2+, Regular rate and rhythm  ABDOMEN: Soft, Nontender, Nondistended; Bowel sounds present  SKIN: warm, dry   NEURO: AAOX3, grossly non-focal   PSYCH: calm, cooperative     LABS:                        8.7    10.65 )-----------( 189      ( 19 May 2024 04:52 )             30.6     05-19    135  |  97  |  25.2<H>  ----------------------------<  179<H>  4.2   |  26.0  |  1.09    Ca    9.3      19 May 2024 04:52  Mg     2.0     05-19        Urinalysis Basic - ( 19 May 2024 04:52 )    Color: x / Appearance: x / SG: x / pH: x  Gluc: 179 mg/dL / Ketone: x  / Bili: x / Urobili: x   Blood: x / Protein: x / Nitrite: x   Leuk Esterase: x / RBC: x / WBC x   Sq Epi: x / Non Sq Epi: x / Bacteria: x          MEDICATIONS  (STANDING):  albuterol    90 MICROgram(s) HFA Inhaler 2 Puff(s) Inhalation every 6 hours  albuterol/ipratropium for Nebulization 3 milliLiter(s) Nebulizer every 6 hours  apixaban 5 milliGRAM(s) Oral every 12 hours  aspirin enteric coated 81 milliGRAM(s) Oral daily  atorvastatin 40 milliGRAM(s) Oral at bedtime  azithromycin  IVPB 500 milliGRAM(s) IV Intermittent every 24 hours  cholecalciferol 2000 Unit(s) Oral daily  metoprolol tartrate 25 milliGRAM(s) Oral two times a day  pantoprazole    Tablet 40 milliGRAM(s) Oral before breakfast  predniSONE   Tablet 40 milliGRAM(s) Oral daily  senna 2 Tablet(s) Oral at bedtime    MEDICATIONS  (PRN):  acetaminophen     Tablet .. 650 milliGRAM(s) Oral every 6 hours PRN Temp greater or equal to 38C (100.4F), Mild Pain (1 - 3)  aluminum hydroxide/magnesium hydroxide/simethicone Suspension 30 milliLiter(s) Oral every 4 hours PRN Dyspepsia  benzonatate 100 milliGRAM(s) Oral every 8 hours PRN Cough  guaiFENesin Oral Liquid (Sugar-Free) 100 milliGRAM(s) Oral every 6 hours PRN Cough  melatonin 3 milliGRAM(s) Oral at bedtime PRN Insomnia  ondansetron Injectable 4 milliGRAM(s) IV Push every 8 hours PRN Nausea and/or Vomiting      RADIOLOGY & ADDITIONAL TESTS:

## 2024-05-20 LAB
ANA TITR SER: NEGATIVE — SIGNIFICANT CHANGE UP
ANION GAP SERPL CALC-SCNC: 15 MMOL/L — SIGNIFICANT CHANGE UP (ref 5–17)
BUN SERPL-MCNC: 31 MG/DL — HIGH (ref 8–20)
CALCIUM SERPL-MCNC: 9.7 MG/DL — SIGNIFICANT CHANGE UP (ref 8.4–10.5)
CHLORIDE SERPL-SCNC: 97 MMOL/L — SIGNIFICANT CHANGE UP (ref 96–108)
CO2 SERPL-SCNC: 26 MMOL/L — SIGNIFICANT CHANGE UP (ref 22–29)
CREAT SERPL-MCNC: 0.92 MG/DL — SIGNIFICANT CHANGE UP (ref 0.5–1.3)
EGFR: 93 ML/MIN/1.73M2 — SIGNIFICANT CHANGE UP
GLUCOSE SERPL-MCNC: 118 MG/DL — HIGH (ref 70–99)
HCT VFR BLD CALC: 33.1 % — LOW (ref 39–50)
HGB BLD-MCNC: 9.3 G/DL — LOW (ref 13–17)
MAGNESIUM SERPL-MCNC: 1.9 MG/DL — SIGNIFICANT CHANGE UP (ref 1.6–2.6)
MCHC RBC-ENTMCNC: 20.9 PG — LOW (ref 27–34)
MCHC RBC-ENTMCNC: 28.1 GM/DL — LOW (ref 32–36)
MCV RBC AUTO: 74.4 FL — LOW (ref 80–100)
PLATELET # BLD AUTO: 205 K/UL — SIGNIFICANT CHANGE UP (ref 150–400)
POTASSIUM SERPL-MCNC: 4.1 MMOL/L — SIGNIFICANT CHANGE UP (ref 3.5–5.3)
POTASSIUM SERPL-SCNC: 4.1 MMOL/L — SIGNIFICANT CHANGE UP (ref 3.5–5.3)
RBC # BLD: 4.45 M/UL — SIGNIFICANT CHANGE UP (ref 4.2–5.8)
RBC # FLD: 23.4 % — HIGH (ref 10.3–14.5)
SODIUM SERPL-SCNC: 138 MMOL/L — SIGNIFICANT CHANGE UP (ref 135–145)
WBC # BLD: 10.88 K/UL — HIGH (ref 3.8–10.5)
WBC # FLD AUTO: 10.88 K/UL — HIGH (ref 3.8–10.5)

## 2024-05-20 PROCEDURE — 99232 SBSQ HOSP IP/OBS MODERATE 35: CPT

## 2024-05-20 RX ADMIN — Medication 100 MILLIGRAM(S): at 17:53

## 2024-05-20 RX ADMIN — APIXABAN 5 MILLIGRAM(S): 2.5 TABLET, FILM COATED ORAL at 17:51

## 2024-05-20 RX ADMIN — Medication 100 MILLIGRAM(S): at 04:47

## 2024-05-20 RX ADMIN — APIXABAN 5 MILLIGRAM(S): 2.5 TABLET, FILM COATED ORAL at 04:46

## 2024-05-20 RX ADMIN — Medication 25 MILLIGRAM(S): at 17:50

## 2024-05-20 RX ADMIN — ATORVASTATIN CALCIUM 40 MILLIGRAM(S): 80 TABLET, FILM COATED ORAL at 21:33

## 2024-05-20 RX ADMIN — PANTOPRAZOLE SODIUM 40 MILLIGRAM(S): 20 TABLET, DELAYED RELEASE ORAL at 04:46

## 2024-05-20 RX ADMIN — Medication 100 MILLIGRAM(S): at 12:44

## 2024-05-20 RX ADMIN — Medication 2000 UNIT(S): at 12:44

## 2024-05-20 RX ADMIN — Medication 40 MILLIGRAM(S): at 04:46

## 2024-05-20 RX ADMIN — Medication 100 MILLIGRAM(S): at 12:45

## 2024-05-20 RX ADMIN — AZITHROMYCIN 255 MILLIGRAM(S): 500 TABLET, FILM COATED ORAL at 17:50

## 2024-05-20 RX ADMIN — Medication 100 MILLIGRAM(S): at 04:46

## 2024-05-20 RX ADMIN — Medication 81 MILLIGRAM(S): at 12:44

## 2024-05-20 RX ADMIN — Medication 100 MILLIGRAM(S): at 21:33

## 2024-05-20 RX ADMIN — Medication 3 MILLILITER(S): at 20:56

## 2024-05-20 RX ADMIN — Medication 3 MILLILITER(S): at 08:44

## 2024-05-20 RX ADMIN — SENNA PLUS 2 TABLET(S): 8.6 TABLET ORAL at 21:34

## 2024-05-20 RX ADMIN — Medication 25 MILLIGRAM(S): at 04:47

## 2024-05-20 RX ADMIN — Medication 3 MILLILITER(S): at 14:12

## 2024-05-20 NOTE — PROGRESS NOTE ADULT - SUBJECTIVE AND OBJECTIVE BOX
[facility] Division of Hospital Medicine  Mike Pena MD    Chief Complaint:  Patient is a 63y old  Male who presents with a chief complaint of Acute respiratory failure with hypoxia     (18 May 2024 14:53)      SUBJECTIVE / OVERNIGHT EVENTS:  Patient seen and examined at bedside. No acute events reported overnight. No new complaints.    MEDICATIONS  (STANDING):  albuterol    90 MICROgram(s) HFA Inhaler 2 Puff(s) Inhalation every 6 hours  albuterol/ipratropium for Nebulization 3 milliLiter(s) Nebulizer every 6 hours  apixaban 5 milliGRAM(s) Oral every 12 hours  aspirin enteric coated 81 milliGRAM(s) Oral daily  atorvastatin 40 milliGRAM(s) Oral at bedtime  azithromycin  IVPB 500 milliGRAM(s) IV Intermittent every 24 hours  cholecalciferol 2000 Unit(s) Oral daily  metoprolol tartrate 25 milliGRAM(s) Oral two times a day  pantoprazole    Tablet 40 milliGRAM(s) Oral before breakfast  predniSONE   Tablet 40 milliGRAM(s) Oral daily  senna 2 Tablet(s) Oral at bedtime    MEDICATIONS  (PRN):  acetaminophen     Tablet .. 650 milliGRAM(s) Oral every 6 hours PRN Temp greater or equal to 38C (100.4F), Mild Pain (1 - 3)  aluminum hydroxide/magnesium hydroxide/simethicone Suspension 30 milliLiter(s) Oral every 4 hours PRN Dyspepsia  benzonatate 100 milliGRAM(s) Oral every 8 hours PRN Cough  guaiFENesin Oral Liquid (Sugar-Free) 100 milliGRAM(s) Oral every 6 hours PRN Cough  melatonin 3 milliGRAM(s) Oral at bedtime PRN Insomnia  ondansetron Injectable 4 milliGRAM(s) IV Push every 8 hours PRN Nausea and/or Vomiting        I&O's Summary    19 May 2024 07:01  -  20 May 2024 07:00  --------------------------------------------------------  IN: 0 mL / OUT: 400 mL / NET: -400 mL        PHYSICAL EXAM:  Vital Signs Last 24 Hrs  T(C): 36.5 (20 May 2024 08:13), Max: 36.6 (19 May 2024 16:30)  T(F): 97.7 (20 May 2024 08:13), Max: 97.9 (19 May 2024 16:30)  HR: 64 (20 May 2024 08:44) (63 - 78)  BP: 114/58 (20 May 2024 08:13) (106/61 - 120/63)  BP(mean): --  RR: 20 (20 May 2024 08:13) (18 - 20)  SpO2: 99% (20 May 2024 08:44) (95% - 100%)    Parameters below as of 20 May 2024 08:44  Patient On (Oxygen Delivery Method): nasal cannula, 3.5L        CONSTITUTIONAL: NAD  HEENT: NC/AT, PERRL, no JVD  RESPIRATORY: +Fine crackles, normal effort  CARDIOVASCULAR: RRR, S1/S2+, no m/g/r  ABDOMEN: Nontender to palpation, normoactive bowel sounds, no rebound/guarding  MUSCULOSKELETAL: No edema, cyanosis or deformities.  PSYCH: Calm, affect appropriate.  NEUROLOGY: Awake, alert, no focal neurological deficits.   SKIN: No rashes; no palpable lesions  VASC: Distal pulses palpable    LABS:                        9.3    10.88 )-----------( 205      ( 20 May 2024 05:32 )             33.1     05-20    138  |  97  |  31.0<H>  ----------------------------<  118<H>  4.1   |  26.0  |  0.92    Ca    9.7      20 May 2024 05:32  Mg     1.9     05-20            Urinalysis Basic - ( 20 May 2024 05:32 )    Color: x / Appearance: x / SG: x / pH: x  Gluc: 118 mg/dL / Ketone: x  / Bili: x / Urobili: x   Blood: x / Protein: x / Nitrite: x   Leuk Esterase: x / RBC: x / WBC x   Sq Epi: x / Non Sq Epi: x / Bacteria: x        CAPILLARY BLOOD GLUCOSE            RADIOLOGY & ADDITIONAL TESTS:  Results Reviewed:   Imaging Personally Reviewed:  Electrocardiogram Personally Reviewed:

## 2024-05-21 LAB
ALBUMIN SERPL ELPH-MCNC: 3.7 G/DL — SIGNIFICANT CHANGE UP (ref 3.3–5.2)
ALP SERPL-CCNC: 76 U/L — SIGNIFICANT CHANGE UP (ref 40–120)
ALT FLD-CCNC: 28 U/L — SIGNIFICANT CHANGE UP
ANION GAP SERPL CALC-SCNC: 16 MMOL/L — SIGNIFICANT CHANGE UP (ref 5–17)
AST SERPL-CCNC: 42 U/L — HIGH
BILIRUB SERPL-MCNC: 0.7 MG/DL — SIGNIFICANT CHANGE UP (ref 0.4–2)
BUN SERPL-MCNC: 24.4 MG/DL — HIGH (ref 8–20)
CALCIUM SERPL-MCNC: 9.2 MG/DL — SIGNIFICANT CHANGE UP (ref 8.4–10.5)
CHLORIDE SERPL-SCNC: 97 MMOL/L — SIGNIFICANT CHANGE UP (ref 96–108)
CO2 SERPL-SCNC: 23 MMOL/L — SIGNIFICANT CHANGE UP (ref 22–29)
CREAT SERPL-MCNC: 0.93 MG/DL — SIGNIFICANT CHANGE UP (ref 0.5–1.3)
EGFR: 92 ML/MIN/1.73M2 — SIGNIFICANT CHANGE UP
GLUCOSE SERPL-MCNC: 281 MG/DL — HIGH (ref 70–99)
HCT VFR BLD CALC: 32.8 % — LOW (ref 39–50)
HGB BLD-MCNC: 9.5 G/DL — LOW (ref 13–17)
MCHC RBC-ENTMCNC: 21.7 PG — LOW (ref 27–34)
MCHC RBC-ENTMCNC: 29 GM/DL — LOW (ref 32–36)
MCV RBC AUTO: 75.1 FL — LOW (ref 80–100)
PLATELET # BLD AUTO: 168 K/UL — SIGNIFICANT CHANGE UP (ref 150–400)
POTASSIUM SERPL-MCNC: 4.4 MMOL/L — SIGNIFICANT CHANGE UP (ref 3.5–5.3)
POTASSIUM SERPL-SCNC: 4.4 MMOL/L — SIGNIFICANT CHANGE UP (ref 3.5–5.3)
PROT SERPL-MCNC: 5.6 G/DL — LOW (ref 6.6–8.7)
RBC # BLD: 4.37 M/UL — SIGNIFICANT CHANGE UP (ref 4.2–5.8)
RBC # FLD: 23.6 % — HIGH (ref 10.3–14.5)
SODIUM SERPL-SCNC: 136 MMOL/L — SIGNIFICANT CHANGE UP (ref 135–145)
WBC # BLD: 8.82 K/UL — SIGNIFICANT CHANGE UP (ref 3.8–10.5)
WBC # FLD AUTO: 8.82 K/UL — SIGNIFICANT CHANGE UP (ref 3.8–10.5)

## 2024-05-21 PROCEDURE — 99233 SBSQ HOSP IP/OBS HIGH 50: CPT

## 2024-05-21 PROCEDURE — 99232 SBSQ HOSP IP/OBS MODERATE 35: CPT

## 2024-05-21 PROCEDURE — 71045 X-RAY EXAM CHEST 1 VIEW: CPT | Mod: 26

## 2024-05-21 RX ORDER — FUROSEMIDE 40 MG
40 TABLET ORAL DAILY
Refills: 0 | Status: DISCONTINUED | OUTPATIENT
Start: 2024-05-21 | End: 2024-05-22

## 2024-05-21 RX ADMIN — APIXABAN 5 MILLIGRAM(S): 2.5 TABLET, FILM COATED ORAL at 17:35

## 2024-05-21 RX ADMIN — Medication 40 MILLIGRAM(S): at 05:57

## 2024-05-21 RX ADMIN — Medication 100 MILLIGRAM(S): at 05:57

## 2024-05-21 RX ADMIN — Medication 100 MILLIGRAM(S): at 22:31

## 2024-05-21 RX ADMIN — ATORVASTATIN CALCIUM 40 MILLIGRAM(S): 80 TABLET, FILM COATED ORAL at 22:31

## 2024-05-21 RX ADMIN — APIXABAN 5 MILLIGRAM(S): 2.5 TABLET, FILM COATED ORAL at 05:56

## 2024-05-21 RX ADMIN — Medication 3 MILLILITER(S): at 14:22

## 2024-05-21 RX ADMIN — Medication 3 MILLILITER(S): at 09:01

## 2024-05-21 RX ADMIN — Medication 81 MILLIGRAM(S): at 17:35

## 2024-05-21 RX ADMIN — Medication 2000 UNIT(S): at 17:35

## 2024-05-21 RX ADMIN — Medication 3 MILLILITER(S): at 04:03

## 2024-05-21 RX ADMIN — Medication 100 MILLIGRAM(S): at 06:00

## 2024-05-21 RX ADMIN — Medication 25 MILLIGRAM(S): at 17:35

## 2024-05-21 RX ADMIN — PANTOPRAZOLE SODIUM 40 MILLIGRAM(S): 20 TABLET, DELAYED RELEASE ORAL at 05:57

## 2024-05-21 RX ADMIN — Medication 100 MILLIGRAM(S): at 00:13

## 2024-05-21 NOTE — PROGRESS NOTE ADULT - SUBJECTIVE AND OBJECTIVE BOX
PULMONARY PROGRESS NOTE      [id number]    Patient is a 63y old  Male who presents with a chief complaint of Acute respiratory failure with hypoxia  PMH CAD s/p CABG, HFrEF, AFib who presented 5/13/24 with SOB, found with hypoxic respiratory failure, CHF. Had TTE showing EF 60-65%. Had RHC 5/17/24 with normal R-sided pressures.     INTERVAL HPI/OVERNIGHT EVENTS:  progressively improved now off bipap and on room air      MEDICATIONS  (STANDING):  albuterol    90 MICROgram(s) HFA Inhaler 2 Puff(s) Inhalation every 6 hours  albuterol/ipratropium for Nebulization 3 milliLiter(s) Nebulizer every 6 hours  apixaban 5 milliGRAM(s) Oral every 12 hours  aspirin enteric coated 81 milliGRAM(s) Oral daily  atorvastatin 40 milliGRAM(s) Oral at bedtime  azithromycin  IVPB 500 milliGRAM(s) IV Intermittent every 24 hours  cholecalciferol 2000 Unit(s) Oral daily  metoprolol tartrate 25 milliGRAM(s) Oral two times a day  pantoprazole    Tablet 40 milliGRAM(s) Oral before breakfast  predniSONE   Tablet 40 milliGRAM(s) Oral daily  senna 2 Tablet(s) Oral at bedtime      MEDICATIONS  (PRN):  acetaminophen     Tablet .. 650 milliGRAM(s) Oral every 6 hours PRN Temp greater or equal to 38C (100.4F), Mild Pain (1 - 3)  aluminum hydroxide/magnesium hydroxide/simethicone Suspension 30 milliLiter(s) Oral every 4 hours PRN Dyspepsia  benzonatate 100 milliGRAM(s) Oral every 8 hours PRN Cough  guaiFENesin Oral Liquid (Sugar-Free) 100 milliGRAM(s) Oral every 6 hours PRN Cough  melatonin 3 milliGRAM(s) Oral at bedtime PRN Insomnia  ondansetron Injectable 4 milliGRAM(s) IV Push every 8 hours PRN Nausea and/or Vomiting      Allergies    No Known Allergies    Intolerances        PAST MEDICAL & SURGICAL HISTORY:  Hypertension      Hyperlipidemia      COVID-19 vaccine series completed      Colon cancer      Prostate cancer  Treated with Radiation 2015      Type 2 diabetes mellitus      Unilateral primary osteoarthritis, left knee      Paroxysmal atrial fibrillation      CAD (coronary atherosclerotic disease)      S/P colon resection      S/P hernia repair      S/P total knee replacement, left          SOCIAL HISTORY  Smoking History:       REVIEW OF SYSTEMS:    CONSTITUTIONAL:  No distress    HEENT:  Eyes:  No diplopia or blurred vision. ENT:  No earache, sore throat or runny nose.    CARDIOVASCULAR:  No pressure, squeezing, tightness, heaviness or aching about the chest; no palpitations.    RESPIRATORY:  per HPI     GASTROINTESTINAL:  No nausea, vomiting or diarrhea.    GENITOURINARY:  No dysuria, frequency or urgency.    NEUROLOGIC:  No paresthesias, fasciculations, seizures or weakness.    PSYCHIATRIC:  No disorder of thought or mood.    Vital Signs Last 24 Hrs  T(C): 36.8 (21 May 2024 07:46), Max: 36.8 (21 May 2024 07:46)  T(F): 98.3 (21 May 2024 07:46), Max: 98.3 (21 May 2024 07:46)  HR: 68 (21 May 2024 09:04) (68 - 82)  BP: 100/60 (21 May 2024 07:46) (100/60 - 119/64)  BP(mean): --  RR: 18 (21 May 2024 07:46) (18 - 19)  SpO2: 98% (21 May 2024 09:04) (91% - 100%)    Parameters below as of 21 May 2024 09:04  Patient On (Oxygen Delivery Method): nasal cannula        PHYSICAL EXAMINATION:    GENERAL: The patient is awake and alert in no apparent distress.     HEENT: Head is normocephalic and atraumatic.     NECK: Supple.    LUNGS: diminished b/l occasional wheezing    HEART: Regular rate and rhythm without murmur.    ABDOMEN: Soft, nontender, and nondistended.      EXTREMITIES: Without any cyanosis, clubbing, rash, lesions or edema.    NEUROLOGIC: Grossly intact.    LABS:                        9.5    8.82  )-----------( 168      ( 21 May 2024 10:50 )             32.8     05-21    136  |  97  |  24.4<H>  ----------------------------<  281<H>  4.4   |  23.0  |  0.93    Ca    9.2      21 May 2024 10:50  Mg     1.9     05-20    TPro  5.6<L>  /  Alb  3.7  /  TBili  0.7  /  DBili  x   /  AST  42<H>  /  ALT  28  /  AlkPhos  76  05-21      Urinalysis Basic - ( 21 May 2024 10:50 )    Color: x / Appearance: x / SG: x / pH: x  Gluc: 281 mg/dL / Ketone: x  / Bili: x / Urobili: x   Blood: x / Protein: x / Nitrite: x   Leuk Esterase: x / RBC: x / WBC x   Sq Epi: x / Non Sq Epi: x / Bacteria: x        MICROBIOLOGY:    RADIOLOGY & ADDITIONAL STUDIES:  CT :  IMPRESSION:    Bilateral interstitial, ground glass, and airspace opacities favoring moderate pulmonary edema over atypical pneumonia.  compared to CT chest in 2022

## 2024-05-21 NOTE — PROGRESS NOTE ADULT - ASSESSMENT
63y/oM PMH of CAD s/p CABG, HFrEF (3.2/22 EF 35-40%), pAfib on Eliquis, s/p radiofrequency ablation 2022, DM-2, HTN, HLD, Colon CA s/p resection, Prostate CA was sent to the ED from pulmonology office for hypoxia. Pt reports SOB x2 months. In the ED patient was placed on BiPAP for work up breathing, admitted with chf     Acute hypoxic respiratory failure due to acute on chronic HFimpEF   -prior EF 30-35%; now with recovered EF 60-65%  -required BIPAP initially but now tolerating NC   -lasix d/c'ed   -Daily weights  -cardio recs appreciated   -s/p RHC 5/17 with normal pressures  -suspected non-cardiac etiology of cough/sob   -cont metoprolol   -add farxiga prior to dc   -f/u pulm consult   -wean supplemental O2 as tolerated     Hypokalemia  - repleted  -f/u am bmp     Iron deficiency anemia   -Hb: (baseline 10)   -Patient on Eliquis  -cont iron supplement     paroxysmal Afib   -cont Metoprolol   -cont Eliquis 5mg bid     HTN/HLD/CAD   -Atorvastatin 40mg   -Metoprolol 25mg bid   -Aspirin 81mg     DM2   -Patient not on medication   -HbA1C: 7.4    VTE prophylaxis: Eliquis bid   
63y/oM PMH of CAD s/p CABG, HFrEF (3.2/22 EF 35-40%), pAfib on Eliquis, s/p radiofrequency ablation 2022, DM-2, HTN, HLD, Colon CA s/p resection, Prostate CA was sent to the ED from pulmonology office for hypoxia. Pt reports SOB x2 months. In the ED patient was placed on BiPAP for work up breathing, admitted with chf     Acute hypoxic respiratory failure due to acute on chronic HFimpEF   r/o ILD   - prior EF 30-35%; now with recovered EF 60-65%  - required BIPAP initially but now tolerating NC   - lasix d/c'ed   - Daily weights  - cardio recs appreciated   - s/p RHC 5/17 with normal pressures  - suspected non-cardiac etiology of cough/sob   - cont metoprolol   - add farxiga prior to dc   - trial prednisone   - RF neg, esr wnl, crp slightly elevated  - f/u desi, anca, dsDNA  - Pulm recs appreciated, awaiting f/u  - supplemental oxygen PRN, wean as tolerated. Currently on 2L NC. Will attempt to wean off to RA  - Repeat CXR    Hypokalemia  - resolved    Iron deficiency anemia   -Hb: (baseline 10)   -Patient on Eliquis  -cont iron supplement     paroxysmal Afib   -cont Metoprolol   -cont Eliquis 5mg bid     HTN/HLD/CAD   -Atorvastatin 40mg   -Metoprolol 25mg bid   -Aspirin 81mg     DM2   -Patient not on medication   -HbA1C: 7.4    VTE prophylaxis: Eliquis bid     
63y/oM PMH of CAD s/p CABG, HFrEF (3.2/22 EF 35-40%), pAfib on Eliquis, s/p radiofrequency ablation 2022, DM-2, HTN, HLD, Colon CA s/p resection, Prostate CA was sent to the ED from pulmonology office for hypoxia. Pt reports SOB x2 months. In the ED patient was placed on BiPAP for work up breathing, admitted with chf     Acute hypoxic respiratory failure due to acute on chronic HFimpEF   r/o ILD   -prior EF 30-35%; now with recovered EF 60-65%  -required BIPAP initially but now tolerating NC   -lasix d/c'ed   -Daily weights  -cardio recs appreciated   -s/p RHC 5/17 with normal pressures  -suspected non-cardiac etiology of cough/sob   -cont metoprolol   -add farxiga prior to dc   -pulm recs appreciated; d/w Dr. Alvarez   -trial prednisone   -RF neg, esr wnl, crp slightly elevated  -f/u desi, anca, dsDNA  -wean supplemental O2 as tolerated , seen on 2L 5/19     Hypokalemia  - repleted  -f/u am bmp     Iron deficiency anemia   -Hb: (baseline 10)   -Patient on Eliquis  -cont iron supplement     paroxysmal Afib   -cont Metoprolol   -cont Eliquis 5mg bid     HTN/HLD/CAD   -Atorvastatin 40mg   -Metoprolol 25mg bid   -Aspirin 81mg     DM2   -Patient not on medication   -HbA1C: 7.4    VTE prophylaxis: Eliquis bid   
64 y/o male with PMH of CAD s/p CABG, HFrEF (3.2/22 EF 35-40%), pAfib on Eliquis, s/p radiofrequency ablation 2022, DM-2, HTN, HLD, Colon CA s/p resection, Prostate CA was sent to the ED from pulmonology office for hypoxia. Patient said he has been having shortness of breath and cough  for more than 2 months. He reported cough productive of white sputum; chest pain with cough and fatigue. In the ED patient was placed on BiPAP for work up breathing, CXR: Bilateral perihilar and lower zone multifocal ill-defined airspace  consolidations indicating pulmonary edema       Acute respiratory failure with hypoxia likely due to acute CHFrEF  Patient transitioned from BiPAP to NC   Continue Lasix 40mg bid, monitor renal function   Daily weight   cardio following  tte reviewed  NST in am    Generalized weakness likely due to anemia   Hb: (baseline 10)   Patient on Eliquis, he said no melena/hematochezia   Iron studies c/w REA  Occult blood negative  IV Iron    pAfib   Rate controlled   Metoprolol tartrate 25mg bid with holding parameters   Eliquis 5mg bid     HTN/HLD/CAD   Atorvastatin 40mg   Metoprolol 25mg bid   Aspirin 81mg     DM-2   Patient not on medication   HbA1C: 5.7 (03/26/22)     Supportive   DVT prophylaxis: Eliquis bid   Diet: DASH   
63y/oM PMH of CAD s/p CABG, HFrEF (3.2/22 EF 35-40%), pAfib on Eliquis, s/p radiofrequency ablation 2022, DM-2, HTN, HLD, Colon CA s/p resection, Prostate CA was sent to the ED from pulmonology office for hypoxia. Pt reports SOB x2 months. In the ED patient was placed on BiPAP for work up breathing, admitted with chf     Acute hypoxic respiratory failure due to acute on chronic HFimpEF   -prior EF 30-35%; now with recovered EF 60-65%  -required BIPAP initially but now tolerating NC   -Continue Lasix 40mg bid  -Daily weights  -cardio recs appreciated   -initially planned for L/RHC today 5/17, pt unable to lie flat for procedure      Hypokalemia  - repleted  -f/u am bmp     Iron deficiency anemia   -Hb: (baseline 10)   -Patient on Eliquis  -cont iron supplement     paroxysmal Afib   -cont Metoprolol   -cont Eliquis 5mg bid     HTN/HLD/CAD   -Atorvastatin 40mg   -Metoprolol 25mg bid   -Aspirin 81mg     DM2   -Patient not on medication   -HbA1C: 7.4    VTE prophylaxis: Eliquis bid     
64 y/o male with PMH of CAD s/p CABG, HFrEF (3.2/22 EF 35-40%), pAfib on Eliquis, s/p radiofrequency ablation 2022, DM-2, HTN, HLD, Colon CA s/p resection, Prostate CA was sent to the ED from pulmonology office for hypoxia. Patient said he has been having shortness of breath and cough  for more than 2 months. He reported cough productive of white sputum; chest pain with cough and fatigue. In the ED patient was placed on BiPAP for work up breathing, CXR: Bilateral perihilar and lower zone multifocal ill-defined airspace  consolidations indicating pulmonary edema     Acute respiratory failure with hypoxia likely due to acute on chronic heart failure with reduced ejection fraction   - EF 35-40%  - required BIPAP initially but now tolerating NC   Continue Lasix 40mg bid, monitor renal function   Daily weight   cardio following  tte reviewed    Hypokalemia  - replete with PO potassium     Generalized weakness likely due to anemia   Hb: (baseline 10)   Patient on Eliquis, he said no melena/hematochezia   Iron studies c/w REA  Occult blood negative  IV Iron    pAfib   Rate controlled   Metoprolol tartrate 25mg bid with holding parameters   Eliquis 5mg bid     HTN/HLD/CAD   Atorvastatin 40mg   Metoprolol 25mg bid   Aspirin 81mg     DM2   Patient not on medication   HbA1C: 5.7 (03/26/22)     Supportive   DVT prophylaxis: Eliquis bid   Diet: DASH   
b/l GGO  SOB  HFrEF - previously EF 35-40%, now improved to 60-65%  CAD s/p CABG  H/o colon and prostate CA    Recommendations:   ?underlying ILD. Check HELIO, ANCA, ESR, CRP, RF, dsDNA. neg so far some pending.  improving clinically. cont with prednisone taper cont pred 40mg x 3 days, 20mg x 4 days, 10mg x 4days 5mg x3 days  follow up in clinic outpt in 4-6 weeks will need fup CT non con for resolution  needs fup PFT  PRN albuterol  if not resolved will need further workup    pulmonary will sign off feel free to reach out with any questions.
62 y/o male with PMH of CAD s/p CABG, HFrEF (3.2/22 EF 35-40%), pAfib on Eliquis, s/p radiofrequency ablation 2022, DM-2, HTN, HLD, Colon CA s/p resection, Prostate CA was sent to the ED from pulmonology office for hypoxia. Patient said he has been having shortness of breath and cough  for more than 2 months. He reported cough productive of white sputum; chest pain with cough and fatigue. In the ED patient was placed on BiPAP for work up breathing, CXR: Bilateral perihilar and lower zone multifocal ill-defined airspace  consolidations indicating pulmonary edema       Acute respiratory failure with hypoxia likely due to acute CHFrEF  Patient transitioned from BiPAP to NC   Continue Lasix 40mg bid, monitor renal function   Daily weight   Echo ordered - Pending  cardio following    Generalized weakness likely due to anemia   Hb: (baseline 10)   Patient on Eliquis, he said no melena/hematochezia   Iron studies c/w REA  Occult blood.  Start oral iron after sample collected    pAfib   Rate controlled   Metoprolol tartrate 25mg bid with holding parameters   Eliquis 5mg bid     HTN/HLD/CAD   Atorvastatin 40mg   Metoprolol 25mg bid   Aspirin 81mg     DM-2   Patient not on medication   HbA1C: 5.7 (03/26/22)     Supportive   DVT prophylaxis: Eliquis bid   Diet: DASH     Acute  
63y/oM PMH of CAD s/p CABG, HFrEF (3.2/22 EF 35-40%), pAfib on Eliquis, s/p radiofrequency ablation 2022, DM-2, HTN, HLD, Colon CA s/p resection, Prostate CA was sent to the ED from pulmonology office for hypoxia. Pt reports SOB x2 months. In the ED patient was placed on BiPAP for work up breathing, admitted with chf     Acute hypoxic respiratory failure due to acute on chronic HFimpEF   r/o ILD   - prior EF 30-35%; now with recovered EF 60-65%  - required BIPAP initially but now tolerating NC   - lasix d/c'ed   - Daily weights  - cardio recs appreciated   - s/p RHC 5/17 with normal pressures  - suspected non-cardiac etiology of cough/sob   - cont metoprolol   - add farxiga prior to dc   - pulm recs appreciated; d/w Dr. Alvarez   - trial prednisone   - RF neg, esr wnl, crp slightly elevated  - f/u desi, anca, dsDNA  - supplemental oxygen PRN, wean as tolerated     Hypokalemia  - resolved    Iron deficiency anemia   -Hb: (baseline 10)   -Patient on Eliquis  -cont iron supplement     paroxysmal Afib   -cont Metoprolol   -cont Eliquis 5mg bid     HTN/HLD/CAD   -Atorvastatin 40mg   -Metoprolol 25mg bid   -Aspirin 81mg     DM2   -Patient not on medication   -HbA1C: 7.4    VTE prophylaxis: Eliquis bid 
62 y/o male w/PMHx of Nstemi/CAD s/p CABG, Ischemic Cardiomyopathy, HFrEF (3.2/22 EF 35-40%), pAfib on Eliquis, s/p radiofrequency ablation 2022, Type 2DM, HTN, HLD, Colon CA s/p resection, Prostate CA presenting to the ER w/one month of cough and shortness of breath.   Pt was at Dr. López's office (sees Maribel for EP and Chuck for cardiology) and sent here as he was hypoxic into the 80s.     On arrival he is c/o productive (white phlegm) cough and some difficulty breathing, placed on bipapa and seen ICU attending.    CXR with opacities, infiltrates and congestion - pending an official read.
62 y/o male w/PMHx of Nstemi/CAD s/p CABG, Ischemic Cardiomyopathy, HFrEF (3.2/22 EF 35-40%), pAfib on Eliquis, s/p radiofrequency ablation 2022, Type 2DM, HTN, HLD, Colon CA s/p resection, Prostate CA presenting to the ER w/one month of cough and shortness of breath. Pt was at Dr. López's office (sees Maribel for EP and Chuck for cardiology) and sent here as he was hypoxic into the 80s. On arrival he is c/o productive (white phlegm) cough and some difficulty breathing, placed on bipap
64 y/o male w/PMHx of Nstemi/CAD s/p CABG, Ischemic Cardiomyopathy, HFrEF (3.2/22 EF 35-40%), pAfib on Eliquis, s/p radiofrequency ablation 2022, Type 2DM, HTN, HLD, Colon CA s/p resection, Prostate CA presenting to the ER w/one month of cough and shortness of breath. Pt was at Dr. López's office (sees Maribel for EP and Chuck for cardiology) and sent here as he was hypoxic into the 80s. On arrival he is c/o productive (white phlegm) cough and some difficulty breathing, placed on bipap

## 2024-05-21 NOTE — PROGRESS NOTE ADULT - TIME BILLING
reviewing labs, radiology, other provider's notes, d/w pt at bedside
Acute HFpEF, Iron-def. anemia, pAfib
Acute on chronic HFpEF, Iron-def. anemia, pAfib
Acute respiratory failure/pulmonary infiltrates, CAD, pAfib
Acute respiratory failure, pulmonary infiltrates
Pulmonary infiltrates, Acute on chronic HFpEF

## 2024-05-21 NOTE — PROGRESS NOTE ADULT - SUBJECTIVE AND OBJECTIVE BOX
[facility] Division of Hospital Medicine  Mike Pena MD    Chief Complaint:  Patient is a 63y old  Male who presents with a chief complaint of Acute respiratory failure with hypoxia     (18 May 2024 14:53)      SUBJECTIVE / OVERNIGHT EVENTS:  Patient seen and examined at bedside. No acute events reported overnight. No new complaints. Still with cough and SOB    MEDICATIONS  (STANDING):  albuterol    90 MICROgram(s) HFA Inhaler 2 Puff(s) Inhalation every 6 hours  albuterol/ipratropium for Nebulization 3 milliLiter(s) Nebulizer every 6 hours  apixaban 5 milliGRAM(s) Oral every 12 hours  aspirin enteric coated 81 milliGRAM(s) Oral daily  atorvastatin 40 milliGRAM(s) Oral at bedtime  azithromycin  IVPB 500 milliGRAM(s) IV Intermittent every 24 hours  cholecalciferol 2000 Unit(s) Oral daily  metoprolol tartrate 25 milliGRAM(s) Oral two times a day  pantoprazole    Tablet 40 milliGRAM(s) Oral before breakfast  predniSONE   Tablet 40 milliGRAM(s) Oral daily  senna 2 Tablet(s) Oral at bedtime    MEDICATIONS  (PRN):  acetaminophen     Tablet .. 650 milliGRAM(s) Oral every 6 hours PRN Temp greater or equal to 38C (100.4F), Mild Pain (1 - 3)  aluminum hydroxide/magnesium hydroxide/simethicone Suspension 30 milliLiter(s) Oral every 4 hours PRN Dyspepsia  benzonatate 100 milliGRAM(s) Oral every 8 hours PRN Cough  guaiFENesin Oral Liquid (Sugar-Free) 100 milliGRAM(s) Oral every 6 hours PRN Cough  melatonin 3 milliGRAM(s) Oral at bedtime PRN Insomnia  ondansetron Injectable 4 milliGRAM(s) IV Push every 8 hours PRN Nausea and/or Vomiting        I&O's Summary    20 May 2024 07:01  -  21 May 2024 07:00  --------------------------------------------------------  IN: 480 mL / OUT: 900 mL / NET: -420 mL        PHYSICAL EXAM:  Vital Signs Last 24 Hrs  T(C): 36.8 (21 May 2024 07:46), Max: 36.8 (21 May 2024 07:46)  T(F): 98.3 (21 May 2024 07:46), Max: 98.3 (21 May 2024 07:46)  HR: 68 (21 May 2024 09:04) (68 - 82)  BP: 100/60 (21 May 2024 07:46) (100/60 - 119/64)  BP(mean): --  RR: 18 (21 May 2024 07:46) (18 - 19)  SpO2: 98% (21 May 2024 09:04) (91% - 100%)    Parameters below as of 21 May 2024 09:04  Patient On (Oxygen Delivery Method): nasal cannula            CONSTITUTIONAL: NAD  HEENT: NC/AT, PERRL, no JVD  RESPIRATORY: B/l rhonchi  CARDIOVASCULAR: RRR, S1/S2+, no m/g/r  ABDOMEN: Nontender to palpation, normoactive bowel sounds, no rebound/guarding  MUSCULOSKELETAL: No edema, cyanosis or deformities.  PSYCH: Calm, affect appropriate.  NEUROLOGY: Awake, alert, no focal neurological deficits.   SKIN: No rashes; no palpable lesions  VASC: Distal pulses palpable    LABS:                        9.5    8.82  )-----------( 168      ( 21 May 2024 10:50 )             32.8     05-21    136  |  97  |  24.4<H>  ----------------------------<  281<H>  4.4   |  23.0  |  0.93    Ca    9.2      21 May 2024 10:50  Mg     1.9     05-20    TPro  5.6<L>  /  Alb  3.7  /  TBili  0.7  /  DBili  x   /  AST  42<H>  /  ALT  28  /  AlkPhos  76  05-21          Urinalysis Basic - ( 21 May 2024 10:50 )    Color: x / Appearance: x / SG: x / pH: x  Gluc: 281 mg/dL / Ketone: x  / Bili: x / Urobili: x   Blood: x / Protein: x / Nitrite: x   Leuk Esterase: x / RBC: x / WBC x   Sq Epi: x / Non Sq Epi: x / Bacteria: x        CAPILLARY BLOOD GLUCOSE            RADIOLOGY & ADDITIONAL TESTS:  Results Reviewed:   Imaging Personally Reviewed:  Electrocardiogram Personally Reviewed:

## 2024-05-22 ENCOUNTER — TRANSCRIPTION ENCOUNTER (OUTPATIENT)
Age: 64
End: 2024-05-22

## 2024-05-22 VITALS
OXYGEN SATURATION: 97 % | SYSTOLIC BLOOD PRESSURE: 119 MMHG | DIASTOLIC BLOOD PRESSURE: 58 MMHG | RESPIRATION RATE: 18 BRPM | HEART RATE: 78 BPM | TEMPERATURE: 99 F

## 2024-05-22 LAB
ALBUMIN SERPL ELPH-MCNC: 3.6 G/DL — SIGNIFICANT CHANGE UP (ref 3.3–5.2)
ALP SERPL-CCNC: 70 U/L — SIGNIFICANT CHANGE UP (ref 40–120)
ALT FLD-CCNC: 28 U/L — SIGNIFICANT CHANGE UP
ANION GAP SERPL CALC-SCNC: 10 MMOL/L — SIGNIFICANT CHANGE UP (ref 5–17)
AST SERPL-CCNC: 35 U/L — SIGNIFICANT CHANGE UP
BILIRUB SERPL-MCNC: 0.7 MG/DL — SIGNIFICANT CHANGE UP (ref 0.4–2)
BUN SERPL-MCNC: 26.5 MG/DL — HIGH (ref 8–20)
CALCIUM SERPL-MCNC: 9.3 MG/DL — SIGNIFICANT CHANGE UP (ref 8.4–10.5)
CHLORIDE SERPL-SCNC: 102 MMOL/L — SIGNIFICANT CHANGE UP (ref 96–108)
CO2 SERPL-SCNC: 28 MMOL/L — SIGNIFICANT CHANGE UP (ref 22–29)
CREAT SERPL-MCNC: 0.78 MG/DL — SIGNIFICANT CHANGE UP (ref 0.5–1.3)
DSDNA AB SER-ACNC: <1 IU/ML — SIGNIFICANT CHANGE UP
EGFR: 100 ML/MIN/1.73M2 — SIGNIFICANT CHANGE UP
GLUCOSE SERPL-MCNC: 112 MG/DL — HIGH (ref 70–99)
HCT VFR BLD CALC: 31.6 % — LOW (ref 39–50)
HGB BLD-MCNC: 9.1 G/DL — LOW (ref 13–17)
MCHC RBC-ENTMCNC: 21.5 PG — LOW (ref 27–34)
MCHC RBC-ENTMCNC: 28.8 GM/DL — LOW (ref 32–36)
MCV RBC AUTO: 74.7 FL — LOW (ref 80–100)
PLATELET # BLD AUTO: 170 K/UL — SIGNIFICANT CHANGE UP (ref 150–400)
POTASSIUM SERPL-MCNC: 4.1 MMOL/L — SIGNIFICANT CHANGE UP (ref 3.5–5.3)
POTASSIUM SERPL-SCNC: 4.1 MMOL/L — SIGNIFICANT CHANGE UP (ref 3.5–5.3)
PROT SERPL-MCNC: 5.3 G/DL — LOW (ref 6.6–8.7)
RBC # BLD: 4.23 M/UL — SIGNIFICANT CHANGE UP (ref 4.2–5.8)
RBC # FLD: 23.9 % — HIGH (ref 10.3–14.5)
RHEUMATOID FACT SERPL-ACNC: 11 IU/ML — SIGNIFICANT CHANGE UP (ref 0–13)
SODIUM SERPL-SCNC: 140 MMOL/L — SIGNIFICANT CHANGE UP (ref 135–145)
WBC # BLD: 8.62 K/UL — SIGNIFICANT CHANGE UP (ref 3.8–10.5)
WBC # FLD AUTO: 8.62 K/UL — SIGNIFICANT CHANGE UP (ref 3.8–10.5)

## 2024-05-22 PROCEDURE — 85027 COMPLETE CBC AUTOMATED: CPT

## 2024-05-22 PROCEDURE — 84100 ASSAY OF PHOSPHORUS: CPT

## 2024-05-22 PROCEDURE — 86038 ANTINUCLEAR ANTIBODIES: CPT

## 2024-05-22 PROCEDURE — 86225 DNA ANTIBODY NATIVE: CPT

## 2024-05-22 PROCEDURE — 93005 ELECTROCARDIOGRAM TRACING: CPT

## 2024-05-22 PROCEDURE — 83540 ASSAY OF IRON: CPT

## 2024-05-22 PROCEDURE — 99285 EMERGENCY DEPT VISIT HI MDM: CPT | Mod: 25

## 2024-05-22 PROCEDURE — 71250 CT THORAX DX C-: CPT | Mod: MC

## 2024-05-22 PROCEDURE — 83735 ASSAY OF MAGNESIUM: CPT

## 2024-05-22 PROCEDURE — 84295 ASSAY OF SERUM SODIUM: CPT

## 2024-05-22 PROCEDURE — 36415 COLL VENOUS BLD VENIPUNCTURE: CPT

## 2024-05-22 PROCEDURE — 80053 COMPREHEN METABOLIC PANEL: CPT

## 2024-05-22 PROCEDURE — 82947 ASSAY GLUCOSE BLOOD QUANT: CPT

## 2024-05-22 PROCEDURE — 96374 THER/PROPH/DIAG INJ IV PUSH: CPT

## 2024-05-22 PROCEDURE — 94760 N-INVAS EAR/PLS OXIMETRY 1: CPT

## 2024-05-22 PROCEDURE — 93451 RIGHT HEART CATH: CPT

## 2024-05-22 PROCEDURE — 82435 ASSAY OF BLOOD CHLORIDE: CPT

## 2024-05-22 PROCEDURE — 93017 CV STRESS TEST TRACING ONLY: CPT

## 2024-05-22 PROCEDURE — 85018 HEMOGLOBIN: CPT

## 2024-05-22 PROCEDURE — 85610 PROTHROMBIN TIME: CPT

## 2024-05-22 PROCEDURE — 84484 ASSAY OF TROPONIN QUANT: CPT

## 2024-05-22 PROCEDURE — 83550 IRON BINDING TEST: CPT

## 2024-05-22 PROCEDURE — 83605 ASSAY OF LACTIC ACID: CPT

## 2024-05-22 PROCEDURE — 82272 OCCULT BLD FECES 1-3 TESTS: CPT

## 2024-05-22 PROCEDURE — C1894: CPT

## 2024-05-22 PROCEDURE — 99239 HOSP IP/OBS DSCHRG MGMT >30: CPT

## 2024-05-22 PROCEDURE — C1887: CPT

## 2024-05-22 PROCEDURE — 84145 PROCALCITONIN (PCT): CPT

## 2024-05-22 PROCEDURE — 80048 BASIC METABOLIC PNL TOTAL CA: CPT

## 2024-05-22 PROCEDURE — 78452 HT MUSCLE IMAGE SPECT MULT: CPT | Mod: MC

## 2024-05-22 PROCEDURE — 86036 ANCA SCREEN EACH ANTIBODY: CPT

## 2024-05-22 PROCEDURE — A9500: CPT

## 2024-05-22 PROCEDURE — 84466 ASSAY OF TRANSFERRIN: CPT

## 2024-05-22 PROCEDURE — 82330 ASSAY OF CALCIUM: CPT

## 2024-05-22 PROCEDURE — 94640 AIRWAY INHALATION TREATMENT: CPT

## 2024-05-22 PROCEDURE — 82803 BLOOD GASES ANY COMBINATION: CPT

## 2024-05-22 PROCEDURE — 85730 THROMBOPLASTIN TIME PARTIAL: CPT

## 2024-05-22 PROCEDURE — 83880 ASSAY OF NATRIURETIC PEPTIDE: CPT

## 2024-05-22 PROCEDURE — 87637 SARSCOV2&INF A&B&RSV AMP PRB: CPT

## 2024-05-22 PROCEDURE — 71045 X-RAY EXAM CHEST 1 VIEW: CPT

## 2024-05-22 PROCEDURE — 86140 C-REACTIVE PROTEIN: CPT

## 2024-05-22 PROCEDURE — 94060 EVALUATION OF WHEEZING: CPT

## 2024-05-22 PROCEDURE — 86431 RHEUMATOID FACTOR QUANT: CPT

## 2024-05-22 PROCEDURE — 96375 TX/PRO/DX INJ NEW DRUG ADDON: CPT

## 2024-05-22 PROCEDURE — 94660 CPAP INITIATION&MGMT: CPT

## 2024-05-22 PROCEDURE — 85014 HEMATOCRIT: CPT

## 2024-05-22 PROCEDURE — 85652 RBC SED RATE AUTOMATED: CPT

## 2024-05-22 PROCEDURE — 93306 TTE W/DOPPLER COMPLETE: CPT

## 2024-05-22 PROCEDURE — 82728 ASSAY OF FERRITIN: CPT

## 2024-05-22 PROCEDURE — 84132 ASSAY OF SERUM POTASSIUM: CPT

## 2024-05-22 PROCEDURE — 83036 HEMOGLOBIN GLYCOSYLATED A1C: CPT

## 2024-05-22 PROCEDURE — 85025 COMPLETE CBC W/AUTO DIFF WBC: CPT

## 2024-05-22 PROCEDURE — 82962 GLUCOSE BLOOD TEST: CPT

## 2024-05-22 RX ORDER — LOSARTAN POTASSIUM 100 MG/1
1 TABLET, FILM COATED ORAL
Qty: 0 | Refills: 0 | DISCHARGE

## 2024-05-22 RX ORDER — SENNA PLUS 8.6 MG/1
2 TABLET ORAL
Qty: 60 | Refills: 0
Start: 2024-05-22 | End: 2024-06-20

## 2024-05-22 RX ORDER — ALBUTEROL 90 UG/1
2 AEROSOL, METERED ORAL
Qty: 1 | Refills: 0
Start: 2024-05-22

## 2024-05-22 RX ORDER — METFORMIN HYDROCHLORIDE 850 MG/1
1 TABLET ORAL
Qty: 60 | Refills: 0
Start: 2024-05-22 | End: 2024-06-20

## 2024-05-22 RX ADMIN — Medication 100 MILLIGRAM(S): at 15:27

## 2024-05-22 RX ADMIN — Medication 40 MILLIGRAM(S): at 05:56

## 2024-05-22 RX ADMIN — Medication 3 MILLILITER(S): at 14:52

## 2024-05-22 RX ADMIN — Medication 100 MILLIGRAM(S): at 05:57

## 2024-05-22 RX ADMIN — PANTOPRAZOLE SODIUM 40 MILLIGRAM(S): 20 TABLET, DELAYED RELEASE ORAL at 05:56

## 2024-05-22 RX ADMIN — Medication 3 MILLILITER(S): at 08:56

## 2024-05-22 RX ADMIN — APIXABAN 5 MILLIGRAM(S): 2.5 TABLET, FILM COATED ORAL at 05:56

## 2024-05-22 RX ADMIN — Medication 81 MILLIGRAM(S): at 15:28

## 2024-05-22 RX ADMIN — Medication 2000 UNIT(S): at 15:27

## 2024-05-22 RX ADMIN — Medication 25 MILLIGRAM(S): at 05:56

## 2024-05-22 RX ADMIN — Medication 100 MILLIGRAM(S): at 05:56

## 2024-05-22 NOTE — DISCHARGE NOTE PROVIDER - NSDCHHCONTACT_GEN_ALL_CORE_FT
16 As certified below, I, or a nurse practitioner or physician assistant working with me, had a face-to-face encounter that meets the physician face-to-face encounter requirements.

## 2024-05-22 NOTE — DISCHARGE NOTE PROVIDER - CARE PROVIDER_API CALL
Micha Vargas  Pulmonary Disease  [address], [address]  [place] [address]  Phone: [phone]  Fax: [phone]  Follow Up Time:     Primary doctor,   Phone: (   )    -  Fax: (   )    -  Follow Up Time:     Ludy Woods  Cardiology  [address] [place] [address]  Phone: [phone]  Fax: [phone]  Follow Up Time:    Micha Vargas  Pulmonary Disease  [address], [address]  [place] [address]  Phone: [phone]  Fax: [phone]  Follow Up Time:     Ludy Woods  Cardiology  [address] [place] [address]  Phone: [phone]  Fax: [phone]  Follow Up Time:     Primary doctor,   Phone: (   )    -  Fax: (   )    -  Follow Up Time:     Gerry Gutierres  Endocrinology/Metab/Diabetes  [address] [place] [address]  Phone: [phone]  Fax: [phone]  Follow Up Time:

## 2024-05-22 NOTE — DISCHARGE NOTE PROVIDER - ATTENDING DISCHARGE PHYSICAL EXAMINATION:
Vital Signs Last 24 Hrs  T(F): 98.4 (22 May 2024 11:39), Max: 98.4 (22 May 2024 11:39)  HR: 75 (22 May 2024 11:39) (65 - 91)  BP: 136/83 (22 May 2024 11:39) (107/62 - 136/83)  RR: 18 (22 May 2024 11:39) (17 - 18)  SpO2: 93% (22 May 2024 11:39) (91% - 99%)    Physical Exam:  Constitutional: NAD  HEENT: NC/AT, PERRL, EOMI, trachea midline, no JVD  Respiratory: CTA bilaterally, symmetrical chest rise  Cardiovascular: RRR, no m/g/r  Gastrointestinal: Soft, NT/ND, BS+  Vascular: 2+ peripheral pulses  Neurological: A/O x 3, no focal neurological deficits  Psych: Fair mood/affect  Musculoskeletal: No edema, cyanosis, deformities. ROM normal  Skin: No obvious rash, lesions. No jaundice.

## 2024-05-22 NOTE — DISCHARGE NOTE PROVIDER - NSDCCPCAREPLAN_GEN_ALL_CORE_FT
PRINCIPAL DISCHARGE DIAGNOSIS  Diagnosis: Acute hypoxic respiratory failure  Assessment and Plan of Treatment: Likely ILD  Continue current medications as prescribed.  Complete steroid taper.  Follow up with primary doctor and pulmonary.  Home O2 supplementation.      SECONDARY DISCHARGE DIAGNOSES  Diagnosis: Heart failure with reduced ejection fraction  Assessment and Plan of Treatment: Continue current medications as prescribed.  Follow up with primary doctor and cardiology.    Diagnosis: Paroxysmal atrial fibrillation  Assessment and Plan of Treatment: Continue current medications as prescribed.  Follow up with primary doctor and cardiology.

## 2024-05-22 NOTE — DISCHARGE NOTE NURSING/CASE MANAGEMENT/SOCIAL WORK - NSDCPEFALRISK_GEN_ALL_CORE
For information on Fall & Injury Prevention, visit: [email or web address] OR  [email or web address] OR  https://www.cdc.gov/steadi/patient.html

## 2024-05-22 NOTE — DISCHARGE NOTE PROVIDER - NSDCMRMEDTOKEN_GEN_ALL_CORE_FT
albuterol 90 mcg/inh inhalation aerosol: 2 puff(s) inhaled every 6 hours  aspirin 81 mg oral delayed release tablet: 1 tab(s) orally once a day  atorvastatin 40 mg oral tablet: 1 tab(s) orally once a day (at bedtime)  benzonatate 100 mg oral capsule: 1 cap(s) orally every 8 hours as needed for Cough  cholecalciferol 50 mcg (2000 intl units) oral tablet: 1 tab(s) orally once a day  Eliquis 5 mg oral tablet: 1 tab(s) orally 2 times a day  furosemide 40 mg oral tablet: 1 tab(s) orally once a day  guaiFENesin 600 mg oral tablet, extended release: 1 tab(s) orally 2 times a day  metoprolol tartrate 25 mg oral tablet: 1 tab(s) orally 2 times a day  omeprazole 20 mg oral delayed release capsule: take 1 capsule twice a day for two weeks then resume once a day.   predniSONE 10 mg oral tablet: 4 tab(s) orally once a day x3days, then 3tabs PO daily x4days, then 2tabs PO daily x4days, then 1tab PO daily x4days, then 0.5 tab PO x4days  senna leaf extract oral tablet: 2 tab(s) orally once a day (at bedtime)  Trelegy Ellipta 100 mcg-62.5 mcg-25 mcg/inh inhalation powder: 1 puff(s) inhaled once a day   albuterol 90 mcg/inh inhalation aerosol: 2 puff(s) inhaled every 6 hours  aspirin 81 mg oral delayed release tablet: 1 tab(s) orally once a day  atorvastatin 40 mg oral tablet: 1 tab(s) orally once a day (at bedtime)  benzonatate 100 mg oral capsule: 1 cap(s) orally every 8 hours as needed for Cough  cholecalciferol 50 mcg (2000 intl units) oral tablet: 1 tab(s) orally once a day  Eliquis 5 mg oral tablet: 1 tab(s) orally 2 times a day  furosemide 40 mg oral tablet: 1 tab(s) orally once a day  guaiFENesin 600 mg oral tablet, extended release: 1 tab(s) orally 2 times a day  metFORMIN 500 mg oral tablet: 1 tab(s) orally 2 times a day  metoprolol tartrate 25 mg oral tablet: 1 tab(s) orally 2 times a day  omeprazole 20 mg oral delayed release capsule: take 1 capsule twice a day for two weeks then resume once a day.   predniSONE 10 mg oral tablet: 4 tab(s) orally once a day x3days, then 3tabs PO daily x4days, then 2tabs PO daily x4days, then 1tab PO daily x4days, then 0.5 tab PO x4days  senna leaf extract oral tablet: 2 tab(s) orally once a day (at bedtime)  Trelegy Ellipta 100 mcg-62.5 mcg-25 mcg/inh inhalation powder: 1 puff(s) inhaled once a day

## 2024-05-22 NOTE — DISCHARGE NOTE PROVIDER - CARE PROVIDERS DIRECT ADDRESSES
,[email or web address],[email or web address],[email or web address]

## 2024-05-22 NOTE — DISCHARGE NOTE PROVIDER - PROVIDER TOKENS
PROVIDER:[TOKEN:[[id number]]],FREE:[LAST:[Primary doctor],PHONE:[(   )    -],FAX:[(   )    -]],PROVIDER:[TOKEN:[[id number]]] PROVIDER:[TOKEN:[[id number]]],PROVIDER:[TOKEN:[[id number]]],FREE:[LAST:[Primary doctor],PHONE:[(   )    -],FAX:[(   )    -]],PROVIDER:[TOKEN:[[id number]]]

## 2024-05-22 NOTE — DISCHARGE NOTE PROVIDER - HOSPITAL COURSE
63y/oM PMH of CAD s/p CABG, HFrEF (3.2/22 EF 35-40%), pAfib on Eliquis, s/p radiofrequency ablation 2022, DM-2, HTN, HLD, Colon CA s/p resection, Prostate CA was sent to the ED from pulmonology office for hypoxia. Pt reports SOB x2 months. In the ED patient was placed on BiPAP for work up breathing, admitted with CHF.  Cardiology followed.  Prior EF 30-35%; now with recovered EF 60-65%.  Patient weaned to NC.  Patient underwent RHC 5/17/24 with normal pressures.  Patient followed by pulmonary, possible ILD.  RF neg, esr wnl, crp slightly elevated.  Patient started on Prednisone and will need taper as outpatient.  Patient evaluated for home O2 and will need O2 supplementation on discharge.  Patient stable for discharge to home with home care and home O2.      Vital Signs Last 24 Hrs  T(C): 36.9 (22 May 2024 11:39), Max: 36.9 (22 May 2024 11:39)  T(F): 98.4 (22 May 2024 11:39), Max: 98.4 (22 May 2024 11:39)  HR: 75 (22 May 2024 11:39) (65 - 91)  BP: 136/83 (22 May 2024 11:39) (107/62 - 136/83)  BP(mean): 101 (22 May 2024 11:39) (101 - 101)  RR: 18 (22 May 2024 11:39) (17 - 18)  SpO2: 93% (22 May 2024 11:39) (91% - 99%)    Parameters below as of 22 May 2024 11:39  Patient On (Oxygen Delivery Method): room air    PHYSICAL EXAM:  GENERAL: NAD  HEAD:  Atraumatic, Normocephalic  NECK: Supple, No JVD, Normal thyroid  NERVOUS SYSTEM:  Alert & Oriented X3, Good concentration; Motor Strength 5/5 B/L upper and lower extremities  CHEST/LUNG: Coarse BS bilaterally  HEART: Regular rate and rhythm; No murmurs, rubs, or gallops  ABDOMEN: Soft, Nontender, Nondistended; Bowel sounds present  EXTREMITIES:  2+ Peripheral Pulses, (+) 1 LE edema  SKIN: No rashes or lesions   63y/oM PMH of CAD s/p CABG, HFrEF (3.2/22 EF 35-40%), pAfib on Eliquis, s/p radiofrequency ablation 2022, DM-2, HTN, HLD, Colon CA s/p resection, Prostate CA was sent to the ED from pulmonology office for hypoxia. Pt reports SOB x2 months. In the ED patient was placed on BiPAP for work up breathing, admitted for acute hypoxemic respiratory failure due to acute on chronic HFpEF exacerbation and possible ILD exacerbation. Cardiology followed.  Prior EF 30-35%; now with recovered EF 60-65%.  Patient weaned to NC.  Patient underwent RHC 5/17/24 with normal pressures.  Patient followed by pulmonary, possible ILD.  RF neg, esr wnl, crp slightly elevated.  Patient started on Prednisone and will need taper as outpatient.  Patient evaluated for home O2 and will need O2 supplementation on discharge.  Patient stable for discharge to home with home care and home O2.      Vital Signs Last 24 Hrs  T(C): 36.9 (22 May 2024 11:39), Max: 36.9 (22 May 2024 11:39)  T(F): 98.4 (22 May 2024 11:39), Max: 98.4 (22 May 2024 11:39)  HR: 75 (22 May 2024 11:39) (65 - 91)  BP: 136/83 (22 May 2024 11:39) (107/62 - 136/83)  BP(mean): 101 (22 May 2024 11:39) (101 - 101)  RR: 18 (22 May 2024 11:39) (17 - 18)  SpO2: 93% (22 May 2024 11:39) (91% - 99%)    Parameters below as of 22 May 2024 11:39  Patient On (Oxygen Delivery Method): room air    PHYSICAL EXAM:  GENERAL: NAD  HEAD:  Atraumatic, Normocephalic  NECK: Supple, No JVD, Normal thyroid  NERVOUS SYSTEM:  Alert & Oriented X3, Good concentration; Motor Strength 5/5 B/L upper and lower extremities  CHEST/LUNG: Coarse BS bilaterally  HEART: Regular rate and rhythm; No murmurs, rubs, or gallops  ABDOMEN: Soft, Nontender, Nondistended; Bowel sounds present  EXTREMITIES:  2+ Peripheral Pulses, (+) 1 LE edema  SKIN: No rashes or lesions

## 2024-05-22 NOTE — DISCHARGE NOTE NURSING/CASE MANAGEMENT/SOCIAL WORK - PATIENT PORTAL LINK FT
You can access the FollowMyHealth Patient Portal offered by [facility] by registering at the following website: [email or web address]. By joining [employer]’s FollowMyHealth portal, you will also be able to view your health information using other applications (apps) compatible with our system.

## 2024-05-22 NOTE — DISCHARGE NOTE NURSING/CASE MANAGEMENT/SOCIAL WORK - NSTOBACCONEVERSMOKERY/N_GEN_A
1. Aranesp every 3 weeks with Hgb check every 3 weeks.  2. Change next RTC MD to 1 year with labs prior to visit.  3. Keep lab draw for 6/23/2021.  
Yes

## 2024-05-24 LAB
AUTO DIFF PNL BLD: NEGATIVE — SIGNIFICANT CHANGE UP
C-ANCA SER-ACNC: NEGATIVE — SIGNIFICANT CHANGE UP
MPO AB + PR3 PNL SER: SIGNIFICANT CHANGE UP
P-ANCA SER-ACNC: NEGATIVE — SIGNIFICANT CHANGE UP

## 2024-05-25 LAB — ANA TITR SER: NEGATIVE — SIGNIFICANT CHANGE UP

## 2024-07-19 ENCOUNTER — APPOINTMENT (OUTPATIENT)
Dept: PULMONOLOGY | Facility: CLINIC | Age: 64
End: 2024-07-19
Payer: COMMERCIAL

## 2024-07-19 VITALS
OXYGEN SATURATION: 97 % | HEIGHT: 62 IN | WEIGHT: 170 LBS | DIASTOLIC BLOOD PRESSURE: 70 MMHG | HEART RATE: 67 BPM | SYSTOLIC BLOOD PRESSURE: 110 MMHG | BODY MASS INDEX: 31.28 KG/M2

## 2024-07-19 DIAGNOSIS — Z85.46 PERSONAL HISTORY OF MALIGNANT NEOPLASM OF PROSTATE: ICD-10-CM

## 2024-07-19 DIAGNOSIS — Z87.891 PERSONAL HISTORY OF NICOTINE DEPENDENCE: ICD-10-CM

## 2024-07-19 DIAGNOSIS — R06.02 SHORTNESS OF BREATH: ICD-10-CM

## 2024-07-19 DIAGNOSIS — G47.33 OBSTRUCTIVE SLEEP APNEA (ADULT) (PEDIATRIC): ICD-10-CM

## 2024-07-19 DIAGNOSIS — R93.89 ABNORMAL FINDINGS ON DIAGNOSTIC IMAGING OF OTHER SPECIFIED BODY STRUCTURES: ICD-10-CM

## 2024-07-19 DIAGNOSIS — R91.8 OTHER NONSPECIFIC ABNORMAL FINDING OF LUNG FIELD: ICD-10-CM

## 2024-07-19 DIAGNOSIS — E66.9 OBESITY, UNSPECIFIED: ICD-10-CM

## 2024-07-19 DIAGNOSIS — Z78.9 OTHER SPECIFIED HEALTH STATUS: ICD-10-CM

## 2024-07-19 DIAGNOSIS — Z85.038 PERSONAL HISTORY OF OTHER MALIGNANT NEOPLASM OF LARGE INTESTINE: ICD-10-CM

## 2024-07-19 PROCEDURE — G2211 COMPLEX E/M VISIT ADD ON: CPT | Mod: NC

## 2024-07-19 PROCEDURE — 99214 OFFICE O/P EST MOD 30 MIN: CPT

## 2024-07-19 RX ORDER — METFORMIN HYDROCHLORIDE 625 MG/1
TABLET ORAL
Refills: 0 | Status: ACTIVE | COMMUNITY

## 2024-07-19 RX ORDER — BENZONATATE 100 MG/1
100 CAPSULE ORAL
Refills: 0 | Status: ACTIVE | COMMUNITY

## 2024-08-08 ENCOUNTER — APPOINTMENT (OUTPATIENT)
Dept: CARDIOLOGY | Facility: CLINIC | Age: 64
End: 2024-08-08

## 2024-08-22 ENCOUNTER — OFFICE (OUTPATIENT)
Dept: URBAN - METROPOLITAN AREA CLINIC 104 | Facility: CLINIC | Age: 64
Setting detail: OPHTHALMOLOGY
End: 2024-08-22
Payer: COMMERCIAL

## 2024-08-22 DIAGNOSIS — H25.11: ICD-10-CM

## 2024-08-22 DIAGNOSIS — H25.13: ICD-10-CM

## 2024-08-22 DIAGNOSIS — E11.3213: ICD-10-CM

## 2024-08-22 DIAGNOSIS — H52.213: ICD-10-CM

## 2024-08-22 PROBLEM — H25.12 CATARACT SENILE NUCLEAR SCLEROSIS; RIGHT EYE, LEFT EYE, BOTH EYES: Status: ACTIVE | Noted: 2024-08-22

## 2024-08-22 PROCEDURE — 99204 OFFICE O/P NEW MOD 45 MIN: CPT | Performed by: OPHTHALMOLOGY

## 2024-08-22 PROCEDURE — 92136 OPHTHALMIC BIOMETRY: CPT | Performed by: OPHTHALMOLOGY

## 2024-08-22 PROCEDURE — 92025 CPTRIZED CORNEAL TOPOGRAPHY: CPT | Performed by: OPHTHALMOLOGY

## 2024-08-22 PROCEDURE — 92134 CPTRZ OPH DX IMG PST SGM RTA: CPT | Performed by: OPHTHALMOLOGY

## 2024-08-22 ASSESSMENT — CONFRONTATIONAL VISUAL FIELD TEST (CVF)
OS_FINDINGS: FULL
OD_FINDINGS: FULL

## 2024-08-22 ASSESSMENT — LID EXAM ASSESSMENTS
OD_BLEPHARITIS: RLL RUL 1+
OS_BLEPHARITIS: LLL LUL 1+

## 2024-09-04 ENCOUNTER — APPOINTMENT (OUTPATIENT)
Dept: CARDIOLOGY | Facility: CLINIC | Age: 64
End: 2024-09-04
Payer: COMMERCIAL

## 2024-09-04 VITALS
DIASTOLIC BLOOD PRESSURE: 61 MMHG | OXYGEN SATURATION: 96 % | SYSTOLIC BLOOD PRESSURE: 112 MMHG | HEIGHT: 62 IN | HEART RATE: 75 BPM | WEIGHT: 172 LBS | BODY MASS INDEX: 31.65 KG/M2

## 2024-09-04 DIAGNOSIS — Z98.890 OTHER SPECIFIED POSTPROCEDURAL STATES: ICD-10-CM

## 2024-09-04 DIAGNOSIS — I10 ESSENTIAL (PRIMARY) HYPERTENSION: ICD-10-CM

## 2024-09-04 DIAGNOSIS — I48.0 PAROXYSMAL ATRIAL FIBRILLATION: ICD-10-CM

## 2024-09-04 DIAGNOSIS — Z01.810 ENCOUNTER FOR PREPROCEDURAL CARDIOVASCULAR EXAMINATION: ICD-10-CM

## 2024-09-04 DIAGNOSIS — E78.5 HYPERLIPIDEMIA, UNSPECIFIED: ICD-10-CM

## 2024-09-04 DIAGNOSIS — I50.30 UNSPECIFIED DIASTOLIC (CONGESTIVE) HEART FAILURE: ICD-10-CM

## 2024-09-04 DIAGNOSIS — I25.10 ATHEROSCLEROTIC HEART DISEASE OF NATIVE CORONARY ARTERY W/OUT ANGINA PECTORIS: ICD-10-CM

## 2024-09-04 DIAGNOSIS — Z86.79 OTHER SPECIFIED POSTPROCEDURAL STATES: ICD-10-CM

## 2024-09-04 PROCEDURE — 99214 OFFICE O/P EST MOD 30 MIN: CPT | Mod: 25

## 2024-09-04 PROCEDURE — 93000 ELECTROCARDIOGRAM COMPLETE: CPT | Mod: NC

## 2024-09-06 ENCOUNTER — OUTPATIENT (OUTPATIENT)
Dept: OUTPATIENT SERVICES | Facility: HOSPITAL | Age: 64
LOS: 1 days | End: 2024-09-06
Payer: COMMERCIAL

## 2024-09-06 DIAGNOSIS — Z96.652 PRESENCE OF LEFT ARTIFICIAL KNEE JOINT: Chronic | ICD-10-CM

## 2024-09-06 DIAGNOSIS — Z98.890 OTHER SPECIFIED POSTPROCEDURAL STATES: Chronic | ICD-10-CM

## 2024-09-06 DIAGNOSIS — G47.33 OBSTRUCTIVE SLEEP APNEA (ADULT) (PEDIATRIC): ICD-10-CM

## 2024-09-06 DIAGNOSIS — Z90.49 ACQUIRED ABSENCE OF OTHER SPECIFIED PARTS OF DIGESTIVE TRACT: Chronic | ICD-10-CM

## 2024-09-06 PROCEDURE — 95810 POLYSOM 6/> YRS 4/> PARAM: CPT | Mod: 26

## 2024-09-06 PROCEDURE — 95810 POLYSOM 6/> YRS 4/> PARAM: CPT

## 2024-09-09 ENCOUNTER — NON-APPOINTMENT (OUTPATIENT)
Age: 64
End: 2024-09-09

## 2024-09-09 PROBLEM — I50.30 HEART FAILURE WITH PRESERVED LEFT VENTRICULAR FUNCTION (HFPEF): Status: ACTIVE | Noted: 2024-09-09

## 2024-09-09 PROBLEM — Z01.810 PREOPERATIVE CARDIOVASCULAR EXAMINATION: Status: ACTIVE | Noted: 2024-09-09

## 2024-09-09 NOTE — ASSESSMENT
[FreeTextEntry1] : 64 y/o M with PMH of HTN,  HLD, DM 2,  s/p TKR on 3/18/22,  NSTEMI, CAD s/p CABG x 3 on 3/29/2022 (LIMA-LAD, SVG-Ramus, SVG-PDA), post op AFib, Ischemic Cardiomyopathy / HFrEF (EF 35-40%  increased to 40-45% on echo 7/2022),  with EF 50-55% on most recent echo 4/14/2023, HM showed PAF with RVR, NSVT, s/p AF ablation with Dr López on 10/13/2022 , seen 8/17/2023 with cough and LE edema, Lasix dose increased, pBNP 678,  nuclear stress test 9/19/2023 was negative for ischemia,  presents today routine follow-up.  1. HFrEF with improved LVEF 65%. - appears euvolemic with no chest pain or shortness of breath;  EKG reviewed and unchanged from prior  on Lasxi 40mg po qdaily, Losartan 25mg po q daily and Toprol XL 25mg po q 12hrs   2. CAD s/p CABG x 3  3/2022, HFrEF ischemic CM EF 50-55% on last TTE in 4/2023. denies chest pain, recent nuclear stress test no ischemia   3. Hyperlipidemia:  LDL 57 11/2023, continue Atorvastatin 40 mg daily , recheck lipids  4.  PAF s/p AF ablation with Dr López 10/13//22, (Chads- vasc 4). In sinus rhythm. Continue Metoprolol tartrate 25 mg BID, Eliquis 5 mg BID, denies any bleeding episodes. follow up with EP   5. Preoperative cardiovascular assessment prior to cataract procedure - no chest pain or shortness of breath  - EKG was unchanged with no acute ischemic changes - recent TTE shows normal LVEF 65% and NST which no evidence of ischemia  - - patient had a RCRI of 2 with Class III Risk 10.1 % 30-day risk of death, MI, or cardiac arrest, and thus considered elevated risk for a low/intermediate risk procedure and currently is optimized for planned procedure and does not require any further cardiovascular work up and thus may proceed with planned procedure. -close intra procedure monitoring - early out of bed to chair and ambulation - hold Eliquis 3 days prior to planned procedure and resume after procedure   Ludy Woods D.O. [facility] Cardiology/Vascular Cardiology -[facility] Telephone # [phone]

## 2024-09-09 NOTE — HISTORY OF PRESENT ILLNESS
[FreeTextEntry1] : 64 y/o M with PMH of HTN,  HLD, DM 2,  s/p TKR on 3/18/22,  NSTEMI, CAD s/p CABG x 3 on 3/29/2022 (LIMA-LAD, SVG-Ramus, SVG-PDA), post op AFib, Ischemic Cardiomyopathy / HFrEF (EF 35-40%  increased to 40-45% on last echo 7/2022),  with EF 50-55% on most recent echo 4/14/2023, HM showed PAF with RVR, NSVT, s/p AF ablation with Dr López on 10/13/2022 , seen 8/17/2023 with cough and LE edema, Lasix dose increased, pBNP 678,  nuclear stress test 9/19/2023 was negative for ischemia,  presents today routine follow-up.  Patient notes no chest pain and interval improvement in shortness of breath, with no lower extremity edema,, no orthopnea or PND  Patient is scheduled for cataract procedure

## 2024-09-09 NOTE — CARDIOLOGY SUMMARY
[de-identified] : 9/4/2024: Sinus Rhythm @ 73 bpm  Low voltage in precordial leads. Old anterior infarct. 1/31/2024- ekg done with EP  Dr López - no new changes  11/9/2023 Sinus rhythm 74 poor r wave progression  8/17/2023 Sinus rhythm 73 rbbb  04/03/2023 Sinus rhythm 75 bpm, WNL, no acute ST T changes, QTc 418 10/03/2022 Sinus bradycardia 56 bpm 8/3/2022: Sinus  Rhythm  Low voltage in precordial leads. Old anterior infarct. 06/01/2022 NSR  5/9/2022: Possible atrial fibrillation @ 135 bpm Low voltage in limb leads. Poor R-wave progression -may be secondary to pulmonary disease consider old anterior infarct. Nonspecific ST depression + Diffuse nonspecific T-abnormality -Nondiagnostic.     [de-identified] : 05/17/22 7 d HM sinus, min HR 48, max 146 average 72, PAF: AF burden 0.46%, longest 5 min. 3 beat NSVT, total PVC burden <0.01%  [de-identified] : 5/14/2024: 1. Myocardial Perfusion: Mildly Abnormal.  2. There is medium sized moderately fixed perfusion defect at basal inferolateral and distal anteroseptal walls, defects persisted on prone imaging with reduced systolic thickening, suggestive of infarcts. No clear evidence of ischemia. No TID (ratio 0.78).  3. The left ventricle is normal in function and normal in size. The post stress left ventricular EF is 60 %. The stress end diastolic volume is 66 ml and systolic volume is 27 ml.9/29/2023 1. ABNORMAL; NON-ISCHEMIC STUDY 2. Inability to exercise due to decrease exercise capacity. 3. The patient underwent stress testing using pharmacological IV regadenoson 4. No cardiac symptoms. No ischemic ECG changes. 5. Perfusion Findings: Scar in distal LAD and left circumflex territory. No ischemia. 6. The left ventricular EF% during stress is 67 %.  [de-identified] : 5/14/2024: 1. Left ventricular systolic function is normal with an ejection fraction of 65 % by Gu's method of disks with an ejection fraction visually estimated at 60 to 65 %.  2. Basal inferior segment and basal inferolateral segment are abnormal.  3. Normal right ventricular cavity size and normal systolic function.  4. The left atrium is mildly dilated.  5. Moderate pulmonic regurgitation.  6. Estimated pulmonary artery systolic pressure is 13 mmHg, consistent with normal pulmonary artery pressure.  7. No pericardial effusion seen.  8. No prior echocardiogram is available for comparison.  4/25/2023 TTE  LVEF 50-55%, normal diastolic function, trace MR, asc aorta normal in size, aortic root at sinuses of valsalva  mildly dilated 4. 0 cm   TTE: 7/8/2022: Left ventricular ejection fraction, by visual estimation, is 40 to 45%. Mildly to moderately decreased global left ventricular systolic function. Basal and mid inferolateral wall, apical septal segment, and basal inferior segment are abnormal as described above. Abnormal septal motion consistent with post-operative status. Normal right ventricular size and function. There is mild septal left ventricular hypertrophy. Spectral Doppler shows impaired relaxation pattern of left ventricular myocardial filling (Grade I diastolic dysfunction). Dilation of the sinuses of valsalva measuring 4.3 cm. There is no evidence of pericardial effusion. Compared to prior inpatient TTE done on 3/25/2022, the left ventricular function has increased from 35-40% to 40-45%.  3/26/2022: Left ventricular ejection fraction, by visual estimation, is 35 to 40%. Moderately decreased global left ventricular systolic function. Apical septal segment and apex are abnormal as described above. Spectral Doppler shows pseudonormal pattern of left ventricular myocardial filling (Grade II diastolic dysfunction). Elevated mean left atrial pressure. Normal left ventricular internal cavity size. Normal right ventricular size and function. Normal left atrial size. Normal right atrial size. Mild mitral annular calcification. Mild thickening of the anterior and posterior mitral valve leaflets. Mild mitral valve regurgitation. Sclerotic aortic valve with normal opening. Moderate pulmonic valve regurgitation. Mildly dilated Ao root at 3.9cm. There is no evidence of pericardial effusion.  [de-identified] : CORONARY VESSELS: The coronary circulation is right dominant.   LM: -- Mid left main: There was a 40 % stenosis. The lesion was moderately calcified.   LAD: -- Proximal LAD: There was a 100 % stenosis. This lesion is a chronic total occlusion. -- Distal LAD: The distal vessel was supplied by collaterals from the RCA.   CX: -- Mid circumflex: There was a 99 % stenosis.   RI: -- Ramus intermedius: The vessel was medium to large sized. There was a 80 % stenosis.   RCA: -- RCA: The vessel was large sized (dominant). -- Mid RCA: There was a 80 % stenosis.   COMPLICATIONS: No complications occurred during the cath lab visit. DIAGNOSTIC IMPRESSIONS: Multivessel CAD ( LM 40% ?ulcerated plaque,  LAD, sutotal mid CX occlusion and severe RCA stenosis) Mildly elevated left ventrciular filling pressure ( PCWP 15 mmhg)   DIAGNOSTIC RECOMMENDATIONS: Evaluate by CT surgery for CABG with LIMA to LAD, SVG to OM, SVG to ramus ,SVG to distal RCA

## 2024-09-09 NOTE — PHYSICAL EXAM
[Normal] : normal S1, S2, no murmur, no rub, no gallop [Clear Lung Fields] : clear lung fields [Good Air Entry] : good air entry [No Respiratory Distress] : no respiratory distress  [Soft] : abdomen soft [Non Tender] : non-tender [Normal Bowel Sounds] : normal bowel sounds [Normal Gait] : normal gait [No Cyanosis] : no cyanosis [No Clubbing] : no clubbing [No Varicosities] : no varicosities [Edema ___] : edema [unfilled] [Moves all extremities] : moves all extremities [Normal Speech] : normal speech [No Focal Deficits] : no focal deficits [Alert and Oriented] : alert and oriented [Normal memory] : normal memory

## 2024-09-30 ENCOUNTER — APPOINTMENT (OUTPATIENT)
Dept: PULMONOLOGY | Facility: CLINIC | Age: 64
End: 2024-09-30
Payer: COMMERCIAL

## 2024-09-30 VITALS
OXYGEN SATURATION: 96 % | RESPIRATION RATE: 16 BRPM | HEART RATE: 78 BPM | SYSTOLIC BLOOD PRESSURE: 120 MMHG | DIASTOLIC BLOOD PRESSURE: 74 MMHG

## 2024-09-30 VITALS — HEIGHT: 60.5 IN | BODY MASS INDEX: 33.66 KG/M2 | WEIGHT: 176 LBS

## 2024-09-30 DIAGNOSIS — R91.8 OTHER NONSPECIFIC ABNORMAL FINDING OF LUNG FIELD: ICD-10-CM

## 2024-09-30 DIAGNOSIS — E66.9 OBESITY, UNSPECIFIED: ICD-10-CM

## 2024-09-30 DIAGNOSIS — R06.02 SHORTNESS OF BREATH: ICD-10-CM

## 2024-09-30 DIAGNOSIS — Z87.891 PERSONAL HISTORY OF NICOTINE DEPENDENCE: ICD-10-CM

## 2024-09-30 DIAGNOSIS — R93.89 ABNORMAL FINDINGS ON DIAGNOSTIC IMAGING OF OTHER SPECIFIED BODY STRUCTURES: ICD-10-CM

## 2024-09-30 DIAGNOSIS — G47.33 OBSTRUCTIVE SLEEP APNEA (ADULT) (PEDIATRIC): ICD-10-CM

## 2024-09-30 PROCEDURE — 94729 DIFFUSING CAPACITY: CPT

## 2024-09-30 PROCEDURE — 94010 BREATHING CAPACITY TEST: CPT

## 2024-09-30 PROCEDURE — 94727 GAS DIL/WSHOT DETER LNG VOL: CPT

## 2024-09-30 PROCEDURE — 99215 OFFICE O/P EST HI 40 MIN: CPT | Mod: 25

## 2024-09-30 PROCEDURE — 85018 HEMOGLOBIN: CPT | Mod: QW

## 2024-09-30 NOTE — VITALS
[TextEntry] : Ex-ox from 9/30/24: The patient was 97% on RA at rest and desaturated to 92% on RA with exertion.

## 2024-09-30 NOTE — END OF VISIT
[Time Spent: ___ minutes] : I have spent [unfilled] minutes of time on the encounter which excludes teaching and separately reported services. [TextEntry] : Discussed with pt at length regarding SARA, obesity, soboe, abnormal CT, infiltrates, resolved hypoxia; reviewed w/u with pt as above.

## 2024-09-30 NOTE — DISCUSSION/SUMMARY
[FreeTextEntry1] :  #1. PFTs from 9/30/24 with near normal quyen and lung volumes but severely reduced DLCO. #2. The patient does not appear to require chronic BD therapy at this time. #3. Diet and exercise for weight loss. #4. SOBOE is likely at least somewhat related to weight or deconditioning.  #5. Repeat chest CT to re-evaluate b/l infiltrates/ILD. Serologies for further evaluation of possible ILD. #6. Start autoCPAP to treat severe SARA with an AHI of 35.5; encouraged at least 70% compliance. #7. Replace PAP equipment as needed; ordered 9/30/24. #8. S/p Covid vaccines and boosters. #9. F/u in 2 months with chest CT, labs, and compliance. #10. Cardiology f/u to optimize cardiac function given h/o AF and CAD. #11. He is no longer using his O2, reporting his pulse ox is consistently > 94%. Pt without desaturation on ex-ox on 9/30/24. #12. Consider evaluation by ILD specialist if changes persist on CT.  The patient expressed understanding and agreement with the above recommendations/plan and accepts responsibility to be compliant with recommended testing, therapies, and f/u visits. All relevant questions and concerns were addressed.

## 2024-09-30 NOTE — PROCEDURE
[FreeTextEntry1] : PFTs 9/30/24 - Near normal spirometry and lung volumes with severely reduced DLCO.

## 2024-09-30 NOTE — DISCUSSION/SUMMARY
[FreeTextEntry1] :  #1. PFTs from 9/30/24 with near normal quyen and lung volumes but severely reduced DLCO. #2. The patient does not appear to require chronic BD therapy at this time. #3. Diet and exercise for weight loss. #4. SOBOE is likely at least somewhat related to weight or deconditioning.  #5. Repeat chest CT to re-evaluate b/l infiltrates/ILD. Serologies for further evaluation of possible ILD. #6. Start autoCPAP to treat severe SARA with an AHI of 35.5; encouraged at least 70% compliance. #7. Replace PAP equipment as needed; ordered 9/30/24. #8. S/p Covid vaccines and boosters. #9. F/u in 2 months with chest CT, labs, and compliance. #10. Cardiology f/u to optimize cardiac function given h/o AF and CAD. #11. He is no longer using his O2, reporting his pulse ox is consistently > 94%. Pt without desaturation on ex-ox on 9/30/24. #12. Consider evaluation by ILD specialist if changes persist on CT.  The patient expressed understanding and agreement with the above recommendations/plan and accepts responsibility to be compliant with recommended testing, therapies, and f/u visits. All relevant questions and concerns were addressed.  knows first/last names

## 2024-09-30 NOTE — PHYSICAL EXAM
[No Acute Distress] : no acute distress [Normal Appearance] : normal appearance [Supple] : supple [Normal Rate/Rhythm] : normal rate/rhythm [Normal S1, S2] : normal s1, s2 [No Murmurs] : no murmurs [No Resp Distress] : no resp distress [No Acc Muscle Use] : no acc muscle use [Normal Rhythm and Effort] : normal rhythm and effort [No Abnormalities] : no abnormalities [Benign] : benign [Not Tender] : not tender [Soft] : soft [No Clubbing] : no clubbing [No Edema] : no edema [No Focal Deficits] : no focal deficits [Oriented x3] : oriented x3 [Rales] : rales

## 2024-09-30 NOTE — HISTORY OF PRESENT ILLNESS
[Snoring] : snoring [Unrefreshing Sleep] : unrefreshing sleep [Initial Evaluation] : an initial evaluation of [Excess Weight] : excess weight [Currently Experiencing] : The patient is currently experiencing symptoms. [Dyspnea] : dyspnea [Back Pain] : back pain [Low Calorie Diet] : low calorie diet [Fair Compliance] : fair compliance with treatment [Fair Tolerance] : fair tolerance of treatment [Poor Symptom Control] : poor symptom control [Hypertension] : hypertension [Coronary Artery Disease] : coronary artery disease [High] : high [Low Calorie] : low calorie [Well Balanced Diet] : well balanced meals [On ___] : performed on [unfilled] [Patient] : the patient [Indication ___] : for an indication of [unfilled] [None] : no new symptoms reported [TextBox_4] : Remote smoking hx. Recent hospitalization for pneumonia from 5/13-5/22/24 with the following d/c summary: Hospital Course: 63y/oM PMH of CAD s/p CABG, HFrEF (3.2/22 EF 35-40%), pAfib on Eliquis, s/p radiofrequency ablation 2022, DM-2, HTN, HLD, Colon CA s/p resection, Prostate CA was sent to the ED from pulmonology office for hypoxia. Pt reports SOB x2 months. In the ED patient was placed on BiPAP for work up breathing, admitted for acute hypoxemic respiratory failure due to acute on chronic HFpEF exacerbation and possible ILD exacerbation. Cardiology followed. Prior EF 30-35%; now with recovered EF 60-65%. Patient weaned to NC. Patient underwent RHC 5/17/24 with normal pressures. Patient followed by pulmonary, possible ILD. RF neg, esr wnl, crp slightly elevated. Patient started on Prednisone and will need taper as outpatient. Patient evaluated for home O2 and will need O2 supplementation on discharge. Patient stable for discharge to home with home care and home O2.  Pt now better and back to baseline. Not using O2. [ESS] : 3 [TextBox_11] : 3 [Excessive Daytime Sleepiness] : no excessive daytime sleepiness [Witnessed Apnea During Sleep] : no witnessed apnea during sleep [Witnessed Gasping During Sleep] : no witnessed gasping during sleep [Sleepy When Sedentary] : no sleepy when sedentary [de-identified] : AF [FreeTextEntry9] : Chest CT [FreeTextEntry8] : B/l infiltrates/GGO with L consolidation. [TextEntry] : Repeat CT pending.

## 2024-09-30 NOTE — REASON FOR VISIT
[Follow-Up] : a follow-up visit [Abnormal CXR/ Chest CT] : an abnormal CXR/ chest CT [Sleep Apnea] : sleep apnea [Pneumonia] : pneumonia [Cough] : cough [Shortness of Breath] : shortness of breath [ILD] : ILD [Obesity] : obesity

## 2024-09-30 NOTE — HISTORY OF PRESENT ILLNESS
[Snoring] : snoring [Unrefreshing Sleep] : unrefreshing sleep [Initial Evaluation] : an initial evaluation of [Excess Weight] : excess weight [Currently Experiencing] : The patient is currently experiencing symptoms. [Dyspnea] : dyspnea [Back Pain] : back pain [Low Calorie Diet] : low calorie diet [Fair Compliance] : fair compliance with treatment [Fair Tolerance] : fair tolerance of treatment [Poor Symptom Control] : poor symptom control [Hypertension] : hypertension [Coronary Artery Disease] : coronary artery disease [High] : high [Low Calorie] : low calorie [Well Balanced Diet] : well balanced meals [On ___] : performed on [unfilled] [Patient] : the patient [Indication ___] : for an indication of [unfilled] [None] : no new symptoms reported [TextBox_4] : Remote smoking hx. Recent hospitalization for pneumonia from 5/13-5/22/24 with the following d/c summary: Hospital Course: 63y/oM PMH of CAD s/p CABG, HFrEF (3.2/22 EF 35-40%), pAfib on Eliquis, s/p radiofrequency ablation 2022, DM-2, HTN, HLD, Colon CA s/p resection, Prostate CA was sent to the ED from pulmonology office for hypoxia. Pt reports SOB x2 months. In the ED patient was placed on BiPAP for work up breathing, admitted for acute hypoxemic respiratory failure due to acute on chronic HFpEF exacerbation and possible ILD exacerbation. Cardiology followed. Prior EF 30-35%; now with recovered EF 60-65%. Patient weaned to NC. Patient underwent RHC 5/17/24 with normal pressures. Patient followed by pulmonary, possible ILD. RF neg, esr wnl, crp slightly elevated. Patient started on Prednisone and will need taper as outpatient. Patient evaluated for home O2 and will need O2 supplementation on discharge. Patient stable for discharge to home with home care and home O2.  Pt now better and back to baseline. Not using O2. [ESS] : 3 [TextBox_11] : 3 [Excessive Daytime Sleepiness] : no excessive daytime sleepiness [Witnessed Apnea During Sleep] : no witnessed apnea during sleep [Witnessed Gasping During Sleep] : no witnessed gasping during sleep [Sleepy When Sedentary] : no sleepy when sedentary [de-identified] : AF [FreeTextEntry9] : Chest CT [FreeTextEntry8] : B/l infiltrates/GGO with L consolidation. [TextEntry] : Repeat CT pending.

## 2024-09-30 NOTE — CONSULT LETTER
[Dear  ___] : Dear  [unfilled], [Consult Letter:] : I had the pleasure of evaluating your patient, [unfilled]. [Please see my note below.] : Please see my note below. [Consult Closing:] : Thank you very much for allowing me to participate in the care of this patient.  If you have any questions, please do not hesitate to contact me. [Sincerely,] : Sincerely, [FreeTextEntry3] : David Burr MD, FCCP, D. [facility] at [place]

## 2024-09-30 NOTE — CONSULT LETTER
[Dear  ___] : Dear  [unfilled], [Consult Letter:] : I had the pleasure of evaluating your patient, [unfilled]. [Please see my note below.] : Please see my note below. [Consult Closing:] : Thank you very much for allowing me to participate in the care of this patient.  If you have any questions, please do not hesitate to contact me. [Sincerely,] : Sincerely, [FreeTextEntry3] : David Burr MD, FCCP, D. [facility] at [place]

## 2024-09-30 NOTE — RESULTS/DATA
[TextEntry] : Chest CT imaging as above.  CXRs from 5/13/24, 5/16, and 5/21/24 revealed b/l infiltrates - reviewed results and films with patient

## 2024-11-07 ENCOUNTER — NON-APPOINTMENT (OUTPATIENT)
Age: 64
End: 2024-11-07

## 2024-11-26 ENCOUNTER — APPOINTMENT (OUTPATIENT)
Dept: ORTHOPEDIC SURGERY | Facility: CLINIC | Age: 64
End: 2024-11-26
Payer: COMMERCIAL

## 2024-11-26 VITALS
DIASTOLIC BLOOD PRESSURE: 75 MMHG | SYSTOLIC BLOOD PRESSURE: 116 MMHG | HEIGHT: 60 IN | BODY MASS INDEX: 34.55 KG/M2 | HEART RATE: 69 BPM | WEIGHT: 176 LBS

## 2024-11-26 DIAGNOSIS — Z96.652 AFTERCARE FOLLOWING JOINT REPLACEMENT SURGERY: ICD-10-CM

## 2024-11-26 DIAGNOSIS — Z47.1 AFTERCARE FOLLOWING JOINT REPLACEMENT SURGERY: ICD-10-CM

## 2024-11-26 DIAGNOSIS — Z79.01 LONG TERM (CURRENT) USE OF ANTICOAGULANTS: ICD-10-CM

## 2024-11-26 DIAGNOSIS — M17.11 UNILATERAL PRIMARY OSTEOARTHRITIS, RIGHT KNEE: ICD-10-CM

## 2024-11-26 PROCEDURE — 73564 X-RAY EXAM KNEE 4 OR MORE: CPT | Mod: 26,RT

## 2024-11-26 PROCEDURE — 20610 DRAIN/INJ JOINT/BURSA W/O US: CPT | Mod: RT

## 2024-11-26 PROCEDURE — 99214 OFFICE O/P EST MOD 30 MIN: CPT | Mod: 25

## 2024-12-04 ENCOUNTER — APPOINTMENT (OUTPATIENT)
Dept: CARDIOLOGY | Facility: CLINIC | Age: 64
End: 2024-12-04

## 2024-12-04 VITALS
BODY MASS INDEX: 33.38 KG/M2 | DIASTOLIC BLOOD PRESSURE: 68 MMHG | SYSTOLIC BLOOD PRESSURE: 122 MMHG | OXYGEN SATURATION: 95 % | WEIGHT: 170 LBS | HEART RATE: 69 BPM | HEIGHT: 60 IN

## 2024-12-04 VITALS — DIASTOLIC BLOOD PRESSURE: 68 MMHG | SYSTOLIC BLOOD PRESSURE: 122 MMHG

## 2024-12-04 DIAGNOSIS — M19.90 UNSPECIFIED OSTEOARTHRITIS, UNSPECIFIED SITE: ICD-10-CM

## 2024-12-04 PROCEDURE — 93000 ELECTROCARDIOGRAM COMPLETE: CPT

## 2024-12-04 PROCEDURE — 99213 OFFICE O/P EST LOW 20 MIN: CPT | Mod: 25

## 2024-12-04 RX ORDER — ALBUTEROL SULFATE 2.5 MG/3ML
(2.5 MG/3ML) SOLUTION RESPIRATORY (INHALATION)
Refills: 0 | Status: ACTIVE | COMMUNITY

## 2024-12-04 RX ORDER — PREDNISONE 2.5 MG/1
2.5 TABLET ORAL DAILY
Qty: 15 | Refills: 0 | Status: ACTIVE | COMMUNITY
Start: 1900-01-01 | End: 1900-01-01

## 2024-12-04 RX ORDER — METOPROLOL TARTRATE 25 MG/1
25 TABLET ORAL
Refills: 0 | Status: ACTIVE | COMMUNITY

## 2024-12-05 ENCOUNTER — APPOINTMENT (OUTPATIENT)
Dept: PULMONOLOGY | Facility: CLINIC | Age: 64
End: 2024-12-05
Payer: COMMERCIAL

## 2024-12-05 VITALS
WEIGHT: 170 LBS | DIASTOLIC BLOOD PRESSURE: 64 MMHG | OXYGEN SATURATION: 96 % | BODY MASS INDEX: 33.38 KG/M2 | HEART RATE: 69 BPM | RESPIRATION RATE: 16 BRPM | SYSTOLIC BLOOD PRESSURE: 122 MMHG | HEIGHT: 60 IN

## 2024-12-05 DIAGNOSIS — G47.33 OBSTRUCTIVE SLEEP APNEA (ADULT) (PEDIATRIC): ICD-10-CM

## 2024-12-05 DIAGNOSIS — Z87.891 PERSONAL HISTORY OF NICOTINE DEPENDENCE: ICD-10-CM

## 2024-12-05 DIAGNOSIS — R06.02 SHORTNESS OF BREATH: ICD-10-CM

## 2024-12-05 DIAGNOSIS — R91.8 OTHER NONSPECIFIC ABNORMAL FINDING OF LUNG FIELD: ICD-10-CM

## 2024-12-05 DIAGNOSIS — R93.89 ABNORMAL FINDINGS ON DIAGNOSTIC IMAGING OF OTHER SPECIFIED BODY STRUCTURES: ICD-10-CM

## 2024-12-05 DIAGNOSIS — E66.9 OBESITY, UNSPECIFIED: ICD-10-CM

## 2024-12-05 PROCEDURE — G2211 COMPLEX E/M VISIT ADD ON: CPT | Mod: NC

## 2024-12-05 PROCEDURE — 99214 OFFICE O/P EST MOD 30 MIN: CPT

## 2024-12-18 ENCOUNTER — APPOINTMENT (OUTPATIENT)
Dept: ORTHOPEDIC SURGERY | Facility: CLINIC | Age: 64
End: 2024-12-18
Payer: COMMERCIAL

## 2024-12-18 VITALS
WEIGHT: 170 LBS | HEIGHT: 60 IN | DIASTOLIC BLOOD PRESSURE: 72 MMHG | BODY MASS INDEX: 33.38 KG/M2 | SYSTOLIC BLOOD PRESSURE: 110 MMHG

## 2024-12-18 DIAGNOSIS — Z79.01 LONG TERM (CURRENT) USE OF ANTICOAGULANTS: ICD-10-CM

## 2024-12-18 DIAGNOSIS — M17.11 UNILATERAL PRIMARY OSTEOARTHRITIS, RIGHT KNEE: ICD-10-CM

## 2024-12-18 DIAGNOSIS — Z96.652 AFTERCARE FOLLOWING JOINT REPLACEMENT SURGERY: ICD-10-CM

## 2024-12-18 DIAGNOSIS — Z47.1 AFTERCARE FOLLOWING JOINT REPLACEMENT SURGERY: ICD-10-CM

## 2024-12-18 PROCEDURE — 99214 OFFICE O/P EST MOD 30 MIN: CPT

## 2024-12-30 ENCOUNTER — NON-APPOINTMENT (OUTPATIENT)
Age: 64
End: 2024-12-30

## 2025-01-21 ENCOUNTER — APPOINTMENT (OUTPATIENT)
Dept: ORTHOPEDIC SURGERY | Facility: CLINIC | Age: 65
End: 2025-01-21
Payer: COMMERCIAL

## 2025-01-21 VITALS
DIASTOLIC BLOOD PRESSURE: 79 MMHG | BODY MASS INDEX: 33.38 KG/M2 | HEIGHT: 60 IN | SYSTOLIC BLOOD PRESSURE: 122 MMHG | WEIGHT: 170 LBS

## 2025-01-21 DIAGNOSIS — M17.11 UNILATERAL PRIMARY OSTEOARTHRITIS, RIGHT KNEE: ICD-10-CM

## 2025-01-21 PROCEDURE — 99213 OFFICE O/P EST LOW 20 MIN: CPT | Mod: 25

## 2025-01-21 PROCEDURE — 20610 DRAIN/INJ JOINT/BURSA W/O US: CPT | Mod: RT

## 2025-01-29 ENCOUNTER — APPOINTMENT (OUTPATIENT)
Dept: ELECTROPHYSIOLOGY | Facility: CLINIC | Age: 65
End: 2025-01-29
Payer: COMMERCIAL

## 2025-01-29 VITALS
HEART RATE: 71 BPM | HEIGHT: 60 IN | WEIGHT: 170 LBS | BODY MASS INDEX: 33.38 KG/M2 | DIASTOLIC BLOOD PRESSURE: 71 MMHG | SYSTOLIC BLOOD PRESSURE: 112 MMHG | OXYGEN SATURATION: 95 %

## 2025-01-29 DIAGNOSIS — I48.0 PAROXYSMAL ATRIAL FIBRILLATION: ICD-10-CM

## 2025-01-29 DIAGNOSIS — I50.20 UNSPECIFIED SYSTOLIC (CONGESTIVE) HEART FAILURE: ICD-10-CM

## 2025-01-29 PROCEDURE — 99214 OFFICE O/P EST MOD 30 MIN: CPT | Mod: 25

## 2025-01-29 PROCEDURE — 93000 ELECTROCARDIOGRAM COMPLETE: CPT

## 2025-04-24 ENCOUNTER — APPOINTMENT (OUTPATIENT)
Dept: CARDIOLOGY | Facility: CLINIC | Age: 65
End: 2025-04-24

## 2025-04-24 VITALS
OXYGEN SATURATION: 96 % | WEIGHT: 166 LBS | HEART RATE: 73 BPM | SYSTOLIC BLOOD PRESSURE: 109 MMHG | BODY MASS INDEX: 32.59 KG/M2 | DIASTOLIC BLOOD PRESSURE: 65 MMHG | HEIGHT: 60 IN

## 2025-04-24 DIAGNOSIS — R60.0 LOCALIZED EDEMA: ICD-10-CM

## 2025-04-24 DIAGNOSIS — R05.1 ACUTE COUGH: ICD-10-CM

## 2025-04-24 PROCEDURE — 99214 OFFICE O/P EST MOD 30 MIN: CPT

## 2025-04-24 PROCEDURE — G2211 COMPLEX E/M VISIT ADD ON: CPT | Mod: NC

## 2025-04-24 PROCEDURE — 93000 ELECTROCARDIOGRAM COMPLETE: CPT

## 2025-04-29 ENCOUNTER — APPOINTMENT (OUTPATIENT)
Dept: ORTHOPEDIC SURGERY | Facility: CLINIC | Age: 65
End: 2025-04-29
Payer: COMMERCIAL

## 2025-04-29 VITALS
HEIGHT: 60 IN | SYSTOLIC BLOOD PRESSURE: 112 MMHG | DIASTOLIC BLOOD PRESSURE: 71 MMHG | WEIGHT: 166 LBS | BODY MASS INDEX: 32.59 KG/M2

## 2025-04-29 DIAGNOSIS — Z96.652 AFTERCARE FOLLOWING JOINT REPLACEMENT SURGERY: ICD-10-CM

## 2025-04-29 DIAGNOSIS — Z47.1 AFTERCARE FOLLOWING JOINT REPLACEMENT SURGERY: ICD-10-CM

## 2025-04-29 DIAGNOSIS — M17.11 UNILATERAL PRIMARY OSTEOARTHRITIS, RIGHT KNEE: ICD-10-CM

## 2025-04-29 PROCEDURE — 99213 OFFICE O/P EST LOW 20 MIN: CPT | Mod: 25

## 2025-04-29 PROCEDURE — 20610 DRAIN/INJ JOINT/BURSA W/O US: CPT | Mod: RT

## 2025-05-13 ENCOUNTER — NON-APPOINTMENT (OUTPATIENT)
Age: 65
End: 2025-05-13

## 2025-06-09 ENCOUNTER — APPOINTMENT (OUTPATIENT)
Dept: CARDIOLOGY | Facility: CLINIC | Age: 65
End: 2025-06-09
Payer: COMMERCIAL

## 2025-06-09 VITALS
DIASTOLIC BLOOD PRESSURE: 60 MMHG | BODY MASS INDEX: 32.2 KG/M2 | HEIGHT: 60 IN | OXYGEN SATURATION: 93 % | SYSTOLIC BLOOD PRESSURE: 118 MMHG | HEART RATE: 71 BPM | WEIGHT: 164 LBS

## 2025-06-09 PROCEDURE — 93000 ELECTROCARDIOGRAM COMPLETE: CPT

## 2025-06-09 PROCEDURE — 99215 OFFICE O/P EST HI 40 MIN: CPT | Mod: 25

## 2025-06-09 RX ORDER — METHOCARBAMOL 750 MG/1
750 TABLET, FILM COATED ORAL 3 TIMES DAILY
Refills: 0 | Status: ACTIVE | COMMUNITY

## 2025-06-10 PROBLEM — Z01.810 PRE-PROCEDURAL CARDIOVASCULAR EXAMINATION: Status: ACTIVE | Noted: 2025-06-09

## 2025-06-17 ENCOUNTER — APPOINTMENT (OUTPATIENT)
Dept: PULMONOLOGY | Facility: CLINIC | Age: 65
End: 2025-06-17
Payer: MEDICARE

## 2025-06-17 VITALS
OXYGEN SATURATION: 93 % | HEART RATE: 68 BPM | RESPIRATION RATE: 16 BRPM | SYSTOLIC BLOOD PRESSURE: 120 MMHG | DIASTOLIC BLOOD PRESSURE: 68 MMHG

## 2025-06-17 VITALS — WEIGHT: 168 LBS | BODY MASS INDEX: 32.98 KG/M2 | HEIGHT: 60 IN

## 2025-06-17 PROCEDURE — 99205 OFFICE O/P NEW HI 60 MIN: CPT | Mod: 25

## 2025-06-17 PROCEDURE — 94010 BREATHING CAPACITY TEST: CPT

## 2025-06-24 ENCOUNTER — APPOINTMENT (OUTPATIENT)
Dept: ORTHOPEDIC SURGERY | Facility: CLINIC | Age: 65
End: 2025-06-24
Payer: COMMERCIAL

## 2025-06-24 ENCOUNTER — NON-APPOINTMENT (OUTPATIENT)
Age: 65
End: 2025-06-24

## 2025-06-24 PROCEDURE — 99212 OFFICE O/P EST SF 10 MIN: CPT

## 2025-06-26 RX ORDER — SPIRONOLACTONE 25 MG/1
25 TABLET ORAL
Qty: 90 | Refills: 0 | Status: ACTIVE | COMMUNITY
Start: 2025-06-26 | End: 1900-01-01

## 2025-07-05 LAB
ANION GAP SERPL CALC-SCNC: 15 MMOL/L
BUN SERPL-MCNC: 16 MG/DL
CALCIUM SERPL-MCNC: 9.1 MG/DL
CHLORIDE SERPL-SCNC: 107 MMOL/L
CO2 SERPL-SCNC: 21 MMOL/L
CREAT SERPL-MCNC: 0.76 MG/DL
EGFRCR SERPLBLD CKD-EPI 2021: 100 ML/MIN/1.73M2
GLUCOSE SERPL-MCNC: 97 MG/DL
NT-PROBNP SERPL-MCNC: 1044 PG/ML
POTASSIUM SERPL-SCNC: 4 MMOL/L
SODIUM SERPL-SCNC: 143 MMOL/L

## 2025-07-07 ENCOUNTER — NON-APPOINTMENT (OUTPATIENT)
Age: 65
End: 2025-07-07

## 2025-07-08 ENCOUNTER — APPOINTMENT (OUTPATIENT)
Dept: PULMONOLOGY | Facility: CLINIC | Age: 65
End: 2025-07-08
Payer: MEDICARE

## 2025-07-08 VITALS
SYSTOLIC BLOOD PRESSURE: 114 MMHG | RESPIRATION RATE: 16 BRPM | OXYGEN SATURATION: 90 % | DIASTOLIC BLOOD PRESSURE: 56 MMHG | HEART RATE: 72 BPM

## 2025-07-08 VITALS — BODY MASS INDEX: 32 KG/M2 | HEIGHT: 60 IN | WEIGHT: 163 LBS

## 2025-07-08 PROCEDURE — G2211 COMPLEX E/M VISIT ADD ON: CPT

## 2025-07-08 PROCEDURE — 99215 OFFICE O/P EST HI 40 MIN: CPT

## 2025-07-08 RX ORDER — PREDNISONE 10 MG/1
10 TABLET ORAL DAILY
Qty: 100 | Refills: 0 | Status: ACTIVE | COMMUNITY
Start: 2025-07-08 | End: 1900-01-01

## 2025-07-08 RX ORDER — AZITHROMYCIN 250 MG/1
250 TABLET, FILM COATED ORAL
Qty: 1 | Refills: 0 | Status: ACTIVE | COMMUNITY
Start: 2025-07-08 | End: 1900-01-01

## 2025-07-18 ENCOUNTER — APPOINTMENT (OUTPATIENT)
Dept: CARDIOLOGY | Facility: CLINIC | Age: 65
End: 2025-07-18

## 2025-07-18 PROCEDURE — 93306 TTE W/DOPPLER COMPLETE: CPT

## 2025-07-22 ENCOUNTER — APPOINTMENT (OUTPATIENT)
Dept: ORTHOPEDIC SURGERY | Facility: CLINIC | Age: 65
End: 2025-07-22
Payer: MEDICARE

## 2025-07-22 VITALS
BODY MASS INDEX: 32 KG/M2 | WEIGHT: 163 LBS | HEIGHT: 60 IN | SYSTOLIC BLOOD PRESSURE: 113 MMHG | DIASTOLIC BLOOD PRESSURE: 70 MMHG

## 2025-07-22 DIAGNOSIS — M17.11 UNILATERAL PRIMARY OSTEOARTHRITIS, RIGHT KNEE: ICD-10-CM

## 2025-07-22 PROCEDURE — 99212 OFFICE O/P EST SF 10 MIN: CPT

## 2025-07-25 ENCOUNTER — APPOINTMENT (OUTPATIENT)
Dept: CARDIOLOGY | Facility: CLINIC | Age: 65
End: 2025-07-25
Payer: MEDICARE

## 2025-07-25 VITALS
BODY MASS INDEX: 31.02 KG/M2 | HEART RATE: 71 BPM | DIASTOLIC BLOOD PRESSURE: 61 MMHG | SYSTOLIC BLOOD PRESSURE: 111 MMHG | WEIGHT: 158 LBS | OXYGEN SATURATION: 90 % | HEIGHT: 60 IN

## 2025-07-25 DIAGNOSIS — E78.5 HYPERLIPIDEMIA, UNSPECIFIED: ICD-10-CM

## 2025-07-25 DIAGNOSIS — R05.9 COUGH, UNSPECIFIED: ICD-10-CM

## 2025-07-25 DIAGNOSIS — I50.30 UNSPECIFIED DIASTOLIC (CONGESTIVE) HEART FAILURE: ICD-10-CM

## 2025-07-25 DIAGNOSIS — R06.02 SHORTNESS OF BREATH: ICD-10-CM

## 2025-07-25 DIAGNOSIS — I73.9 PERIPHERAL VASCULAR DISEASE, UNSPECIFIED: ICD-10-CM

## 2025-07-25 PROCEDURE — G2211 COMPLEX E/M VISIT ADD ON: CPT

## 2025-07-25 PROCEDURE — 99214 OFFICE O/P EST MOD 30 MIN: CPT

## 2025-07-25 RX ORDER — EMPAGLIFLOZIN 10 MG/1
10 TABLET, FILM COATED ORAL DAILY
Qty: 30 | Refills: 0 | Status: ACTIVE | COMMUNITY
Start: 2025-07-25 | End: 1900-01-01

## 2025-07-28 PROBLEM — R05.9 COMPLAINING OF COUGH: Status: ACTIVE | Noted: 2025-07-08

## 2025-07-31 ENCOUNTER — NON-APPOINTMENT (OUTPATIENT)
Age: 65
End: 2025-07-31

## 2025-08-06 ENCOUNTER — APPOINTMENT (OUTPATIENT)
Dept: ELECTROPHYSIOLOGY | Facility: CLINIC | Age: 65
End: 2025-08-06
Payer: COMMERCIAL

## 2025-08-06 VITALS
DIASTOLIC BLOOD PRESSURE: 64 MMHG | WEIGHT: 156 LBS | SYSTOLIC BLOOD PRESSURE: 107 MMHG | HEIGHT: 60 IN | HEART RATE: 63 BPM | BODY MASS INDEX: 30.63 KG/M2 | OXYGEN SATURATION: 92 %

## 2025-08-06 DIAGNOSIS — I48.0 PAROXYSMAL ATRIAL FIBRILLATION: ICD-10-CM

## 2025-08-06 PROCEDURE — 99214 OFFICE O/P EST MOD 30 MIN: CPT | Mod: 25

## 2025-08-06 PROCEDURE — 93000 ELECTROCARDIOGRAM COMPLETE: CPT

## 2025-08-09 ENCOUNTER — APPOINTMENT (OUTPATIENT)
Dept: CARDIOLOGY | Facility: CLINIC | Age: 65
End: 2025-08-09
Payer: MEDICARE

## 2025-08-09 PROCEDURE — 93923 UPR/LXTR ART STDY 3+ LVLS: CPT

## 2025-08-09 PROCEDURE — 93925 LOWER EXTREMITY STUDY: CPT

## 2025-08-12 ENCOUNTER — NON-APPOINTMENT (OUTPATIENT)
Age: 65
End: 2025-08-12

## 2025-08-14 ENCOUNTER — OUTPATIENT (OUTPATIENT)
Dept: OUTPATIENT SERVICES | Facility: HOSPITAL | Age: 65
LOS: 1 days | End: 2025-08-14
Payer: MEDICARE

## 2025-08-14 VITALS
HEART RATE: 58 BPM | DIASTOLIC BLOOD PRESSURE: 60 MMHG | RESPIRATION RATE: 14 BRPM | SYSTOLIC BLOOD PRESSURE: 100 MMHG | HEIGHT: 62 IN | TEMPERATURE: 98 F | OXYGEN SATURATION: 96 % | WEIGHT: 154.32 LBS

## 2025-08-14 DIAGNOSIS — Z01.818 ENCOUNTER FOR OTHER PREPROCEDURAL EXAMINATION: ICD-10-CM

## 2025-08-14 DIAGNOSIS — Z96.652 PRESENCE OF LEFT ARTIFICIAL KNEE JOINT: Chronic | ICD-10-CM

## 2025-08-14 DIAGNOSIS — Z90.49 ACQUIRED ABSENCE OF OTHER SPECIFIED PARTS OF DIGESTIVE TRACT: Chronic | ICD-10-CM

## 2025-08-14 DIAGNOSIS — Z98.890 OTHER SPECIFIED POSTPROCEDURAL STATES: Chronic | ICD-10-CM

## 2025-08-14 LAB
ALBUMIN SERPL ELPH-MCNC: 4.2 G/DL — SIGNIFICANT CHANGE UP (ref 3.3–5.2)
ALP SERPL-CCNC: 92 U/L — SIGNIFICANT CHANGE UP (ref 40–120)
ALT FLD-CCNC: 11 U/L — SIGNIFICANT CHANGE UP
ANION GAP SERPL CALC-SCNC: 15 MMOL/L — SIGNIFICANT CHANGE UP (ref 5–17)
APTT BLD: 32.4 SEC — SIGNIFICANT CHANGE UP (ref 26.1–36.8)
AST SERPL-CCNC: 22 U/L — SIGNIFICANT CHANGE UP
BASOPHILS # BLD AUTO: 0.04 K/UL — SIGNIFICANT CHANGE UP (ref 0–0.2)
BASOPHILS NFR BLD AUTO: 0.4 % — SIGNIFICANT CHANGE UP (ref 0–2)
BILIRUB SERPL-MCNC: 0.9 MG/DL — SIGNIFICANT CHANGE UP (ref 0.4–2)
BLD GP AB SCN SERPL QL: SIGNIFICANT CHANGE UP
BUN SERPL-MCNC: 23.1 MG/DL — HIGH (ref 8–20)
CALCIUM SERPL-MCNC: 9.5 MG/DL — SIGNIFICANT CHANGE UP (ref 8.4–10.5)
CHLORIDE SERPL-SCNC: 101 MMOL/L — SIGNIFICANT CHANGE UP (ref 96–108)
CHOLEST SERPL-MCNC: 100 MG/DL — SIGNIFICANT CHANGE UP
CO2 SERPL-SCNC: 22 MMOL/L — SIGNIFICANT CHANGE UP (ref 22–29)
CREAT SERPL-MCNC: 0.94 MG/DL — SIGNIFICANT CHANGE UP (ref 0.5–1.3)
EGFR: 90 ML/MIN/1.73M2 — SIGNIFICANT CHANGE UP
EGFR: 90 ML/MIN/1.73M2 — SIGNIFICANT CHANGE UP
EOSINOPHIL # BLD AUTO: 0.18 K/UL — SIGNIFICANT CHANGE UP (ref 0–0.5)
EOSINOPHIL NFR BLD AUTO: 1.8 % — SIGNIFICANT CHANGE UP (ref 0–6)
GLUCOSE SERPL-MCNC: 103 MG/DL — HIGH (ref 70–99)
HCT VFR BLD CALC: 35.3 % — LOW (ref 39–50)
HDLC SERPL-MCNC: 33 MG/DL — LOW
HGB BLD-MCNC: 9.7 G/DL — LOW (ref 13–17)
IMM GRANULOCYTES # BLD AUTO: 0.03 K/UL — SIGNIFICANT CHANGE UP (ref 0–0.07)
IMM GRANULOCYTES NFR BLD AUTO: 0.3 % — SIGNIFICANT CHANGE UP (ref 0–0.9)
INR BLD: 1.72 RATIO — HIGH (ref 0.85–1.16)
LDLC SERPL-MCNC: 36 MG/DL — SIGNIFICANT CHANGE UP
LIPID PNL WITH DIRECT LDL SERPL: 36 MG/DL — SIGNIFICANT CHANGE UP
LYMPHOCYTES # BLD AUTO: 1.36 K/UL — SIGNIFICANT CHANGE UP (ref 1–3.3)
LYMPHOCYTES NFR BLD AUTO: 13.9 % — SIGNIFICANT CHANGE UP (ref 13–44)
MAGNESIUM SERPL-MCNC: 1.7 MG/DL — SIGNIFICANT CHANGE UP (ref 1.6–2.6)
MCHC RBC-ENTMCNC: 19.7 PG — LOW (ref 27–34)
MCHC RBC-ENTMCNC: 27.5 G/DL — LOW (ref 32–36)
MCV RBC AUTO: 71.7 FL — LOW (ref 80–100)
MONOCYTES # BLD AUTO: 0.71 K/UL — SIGNIFICANT CHANGE UP (ref 0–0.9)
MONOCYTES NFR BLD AUTO: 7.2 % — SIGNIFICANT CHANGE UP (ref 2–14)
NEUTROPHILS # BLD AUTO: 7.48 K/UL — HIGH (ref 1.8–7.4)
NEUTROPHILS NFR BLD AUTO: 76.4 % — SIGNIFICANT CHANGE UP (ref 43–77)
NONHDLC SERPL-MCNC: 67 MG/DL — SIGNIFICANT CHANGE UP
NRBC # BLD AUTO: 0 K/UL — SIGNIFICANT CHANGE UP (ref 0–0)
NRBC # FLD: 0 K/UL — SIGNIFICANT CHANGE UP (ref 0–0)
PLATELET # BLD AUTO: 172 K/UL — SIGNIFICANT CHANGE UP (ref 150–400)
PMV BLD: SIGNIFICANT CHANGE UP FL (ref 7–13)
POTASSIUM SERPL-MCNC: 4.1 MMOL/L — SIGNIFICANT CHANGE UP (ref 3.5–5.3)
POTASSIUM SERPL-SCNC: 4.1 MMOL/L — SIGNIFICANT CHANGE UP (ref 3.5–5.3)
PROT SERPL-MCNC: 6.2 G/DL — LOW (ref 6.6–8.7)
PROTHROM AB SERPL-ACNC: 19.3 SEC — HIGH (ref 9.9–13.4)
RBC # BLD: 4.92 M/UL — SIGNIFICANT CHANGE UP (ref 4.2–5.8)
RBC # FLD: 19.3 % — HIGH (ref 10.3–14.5)
SODIUM SERPL-SCNC: 138 MMOL/L — SIGNIFICANT CHANGE UP (ref 135–145)
TRIGL SERPL-MCNC: 192 MG/DL — HIGH
WBC # BLD: 9.8 K/UL — SIGNIFICANT CHANGE UP (ref 3.8–10.5)
WBC # FLD AUTO: 9.8 K/UL — SIGNIFICANT CHANGE UP (ref 3.8–10.5)

## 2025-08-14 PROCEDURE — 83735 ASSAY OF MAGNESIUM: CPT

## 2025-08-14 PROCEDURE — 85730 THROMBOPLASTIN TIME PARTIAL: CPT

## 2025-08-14 PROCEDURE — 86901 BLOOD TYPING SEROLOGIC RH(D): CPT

## 2025-08-14 PROCEDURE — 93010 ELECTROCARDIOGRAM REPORT: CPT

## 2025-08-14 PROCEDURE — 86850 RBC ANTIBODY SCREEN: CPT

## 2025-08-14 PROCEDURE — 85610 PROTHROMBIN TIME: CPT

## 2025-08-14 PROCEDURE — 80053 COMPREHEN METABOLIC PANEL: CPT

## 2025-08-14 PROCEDURE — 86900 BLOOD TYPING SEROLOGIC ABO: CPT

## 2025-08-14 PROCEDURE — 36415 COLL VENOUS BLD VENIPUNCTURE: CPT

## 2025-08-14 PROCEDURE — 85025 COMPLETE CBC W/AUTO DIFF WBC: CPT

## 2025-08-14 PROCEDURE — 80061 LIPID PANEL: CPT

## 2025-08-14 PROCEDURE — 93005 ELECTROCARDIOGRAM TRACING: CPT

## 2025-08-14 PROCEDURE — G0463: CPT

## 2025-08-21 ENCOUNTER — RX RENEWAL (OUTPATIENT)
Age: 65
End: 2025-08-21

## 2025-08-22 ENCOUNTER — OUTPATIENT (OUTPATIENT)
Dept: OUTPATIENT SERVICES | Facility: HOSPITAL | Age: 65
LOS: 1 days | End: 2025-08-22
Payer: MEDICARE

## 2025-08-22 ENCOUNTER — TRANSCRIPTION ENCOUNTER (OUTPATIENT)
Age: 65
End: 2025-08-22

## 2025-08-22 VITALS
DIASTOLIC BLOOD PRESSURE: 72 MMHG | SYSTOLIC BLOOD PRESSURE: 111 MMHG | RESPIRATION RATE: 20 BRPM | HEART RATE: 60 BPM | OXYGEN SATURATION: 97 %

## 2025-08-22 VITALS
DIASTOLIC BLOOD PRESSURE: 63 MMHG | SYSTOLIC BLOOD PRESSURE: 113 MMHG | HEART RATE: 61 BPM | TEMPERATURE: 98 F | OXYGEN SATURATION: 95 % | RESPIRATION RATE: 30 BRPM

## 2025-08-22 DIAGNOSIS — Z98.890 OTHER SPECIFIED POSTPROCEDURAL STATES: Chronic | ICD-10-CM

## 2025-08-22 DIAGNOSIS — Z96.652 PRESENCE OF LEFT ARTIFICIAL KNEE JOINT: Chronic | ICD-10-CM

## 2025-08-22 DIAGNOSIS — I25.10 ATHEROSCLEROTIC HEART DISEASE OF NATIVE CORONARY ARTERY WITHOUT ANGINA PECTORIS: ICD-10-CM

## 2025-08-22 DIAGNOSIS — Z90.49 ACQUIRED ABSENCE OF OTHER SPECIFIED PARTS OF DIGESTIVE TRACT: Chronic | ICD-10-CM

## 2025-08-22 LAB — GLUCOSE BLDC GLUCOMTR-MCNC: 91 MG/DL — SIGNIFICANT CHANGE UP (ref 70–99)

## 2025-08-22 PROCEDURE — 99152 MOD SED SAME PHYS/QHP 5/>YRS: CPT

## 2025-08-22 PROCEDURE — 99232 SBSQ HOSP IP/OBS MODERATE 35: CPT | Mod: 25

## 2025-08-22 PROCEDURE — 93461 R&L HRT ART/VENTRICLE ANGIO: CPT | Mod: 26

## 2025-08-22 PROCEDURE — C1894: CPT

## 2025-08-22 PROCEDURE — 82962 GLUCOSE BLOOD TEST: CPT

## 2025-08-22 PROCEDURE — C1889: CPT

## 2025-08-22 PROCEDURE — C1887: CPT

## 2025-08-22 PROCEDURE — C1760: CPT

## 2025-08-22 PROCEDURE — 93461 R&L HRT ART/VENTRICLE ANGIO: CPT

## 2025-08-22 PROCEDURE — C1769: CPT

## 2025-08-22 RX ORDER — METHOCARBAMOL 500 MG/1
2 TABLET, FILM COATED ORAL
Refills: 0 | DISCHARGE

## 2025-08-22 RX ORDER — SPIRONOLACTONE 25 MG
1 TABLET ORAL
Refills: 0 | DISCHARGE

## 2025-08-22 RX ORDER — OMEPRAZOLE 20 MG/1
1 CAPSULE, DELAYED RELEASE ORAL
Qty: 0 | Refills: 0 | DISCHARGE
Start: 2025-08-22

## 2025-08-22 RX ORDER — FUROSEMIDE 10 MG/ML
1 INJECTION INTRAMUSCULAR; INTRAVENOUS
Qty: 0 | Refills: 0 | DISCHARGE
Start: 2025-08-22

## 2025-08-22 RX ORDER — LOSARTAN POTASSIUM 100 MG/1
0.5 TABLET, FILM COATED ORAL
Refills: 0 | DISCHARGE

## 2025-08-22 RX ORDER — EMPAGLIFLOZIN 25 MG/1
1 TABLET, FILM COATED ORAL
Refills: 0 | DISCHARGE

## 2025-08-22 RX ORDER — ALBUTEROL SULFATE 2.5 MG/3ML
2 VIAL, NEBULIZER (ML) INHALATION
Qty: 0 | Refills: 0 | DISCHARGE
Start: 2025-08-22

## 2025-08-22 RX ORDER — RITUXIMAB-PVVR 100 MG/10ML
375 INJECTION, SOLUTION INTRAVENOUS
Refills: 0 | DISCHARGE

## 2025-08-22 RX ORDER — EMPAGLIFLOZIN 25 MG/1
1 TABLET, FILM COATED ORAL
Qty: 90 | Refills: 3
Start: 2025-08-22 | End: 2026-08-16

## 2025-08-22 RX ORDER — ASPIRIN 325 MG
1 TABLET ORAL
Qty: 90 | Refills: 3
Start: 2025-08-22 | End: 2026-08-16

## 2025-08-22 RX ORDER — ASPIRIN 325 MG
81 TABLET ORAL ONCE
Refills: 0 | Status: COMPLETED | OUTPATIENT
Start: 2025-08-22 | End: 2025-08-22

## 2025-08-22 RX ORDER — APIXABAN 5 MG/1
1 TABLET, FILM COATED ORAL
Qty: 180 | Refills: 3
Start: 2025-08-22 | End: 2026-08-16

## 2025-08-22 RX ORDER — METFORMIN HYDROCHLORIDE 850 MG/1
1 TABLET ORAL
Qty: 0 | Refills: 0 | DISCHARGE
Start: 2025-08-22

## 2025-08-22 RX ADMIN — Medication 250 MILLILITER(S): at 07:40

## 2025-08-22 RX ADMIN — Medication 81 MILLIGRAM(S): at 07:41

## 2025-08-22 RX ADMIN — Medication 200 MILLILITER(S): at 10:11

## 2025-09-16 ENCOUNTER — APPOINTMENT (OUTPATIENT)
Dept: PULMONOLOGY | Facility: CLINIC | Age: 65
End: 2025-09-16

## (undated) DEVICE — DRSG KLING 4"

## (undated) DEVICE — TUBING ALARIS PUMP MODULE NON-DEHP

## (undated) DEVICE — VASOVIEW HARVESTING SYS 7 XB

## (undated) DEVICE — SUT PERMAHAND SILK 2 60" TIES

## (undated) DEVICE — NDL BLUNT 18G LOOP VESSEL MAXI WHITE

## (undated) DEVICE — DRAPE IOBAN 23X33"

## (undated) DEVICE — SUT DOUBLE 6 WIRE STERNAL

## (undated) DEVICE — SUT SILK 0 30" TIES

## (undated) DEVICE — SUT SILK 2-0 8-18" SH

## (undated) DEVICE — ELCTR MULTIFUNCTION DEFIBRILLATION ELECTRODE EDGE SYSTEM ADULT

## (undated) DEVICE — SUT VICRYL 2 27" TP-1 UNDYED

## (undated) DEVICE — SPONGE RAYTEC 4X4 16PLY

## (undated) DEVICE — KIT SUCT MAXIFLO STRT CATH 14FR

## (undated) DEVICE — SENSOR MYOCARDIAL TEMP 15MM

## (undated) DEVICE — SOL IRR POUR NS 0.9% 500ML

## (undated) DEVICE — GOWN XL W TOWEL

## (undated) DEVICE — RANGER BLOOD/FLUID WARMING SET DISP

## (undated) DEVICE — SOL ANTI FOG

## (undated) DEVICE — PERF ST MULT CLR CODE 38CM

## (undated) DEVICE — SUT PROLENE 7-0 24" BV175-6

## (undated) DEVICE — CABLE PACE BI-V TEMP ALLIGA  DISP 12FT

## (undated) DEVICE — DRSG SAFETAC FOAM MEPILEX 4X12"

## (undated) DEVICE — SUT PROLENE 3-0 36" SH

## (undated) DEVICE — Device

## (undated) DEVICE — BLADE MINI SHARP ALL ROUND

## (undated) DEVICE — SUT PROLENE 4-0 36" RB-1

## (undated) DEVICE — SPONGE PEANUT AUTO COUNT

## (undated) DEVICE — STAPLER SKIN PROXIMATE

## (undated) DEVICE — PLEDGET POLYMER 9.5X4.8MM

## (undated) DEVICE — SWITCH PERF ARISS TABLE

## (undated) DEVICE — NDL HYPO SAFE 18G X 1.5"

## (undated) DEVICE — SUT PROLENE 4-0 36" SH

## (undated) DEVICE — DRSG 4X4

## (undated) DEVICE — PUN AOR STD 5MM

## (undated) DEVICE — GLV 7.5 SENSICARE W ALOE

## (undated) DEVICE — SUT BLUNT SZ 5

## (undated) DEVICE — GLV 6 PROTEXIS

## (undated) DEVICE — SOL INJ NS 0.9% 1000ML

## (undated) DEVICE — TONGUE DEPRESSOR

## (undated) DEVICE — ELCTR BOVIE HANDHELD PENCIL ROCKER SWITCH 15FT

## (undated) DEVICE — TUBING TRUWAVE PRESSURE MALE/FEMALE 72"

## (undated) DEVICE — POSITIONER HEART STABILZR URCHIN EVO

## (undated) DEVICE — VASCULAR PROBES

## (undated) DEVICE — GLV 8.5 PROTEXIS

## (undated) DEVICE — DRSG MOLNLYCKE MEPILEX BORDER AG 4X4

## (undated) DEVICE — MEDTRONIC CLEARVIEW BLOWER MISTER KIT W TUBING SET

## (undated) DEVICE — GOWN TRIMAX LG

## (undated) DEVICE — DRSG TAPE TRANSPORE 3"

## (undated) DEVICE — TUBING MEDI-VAC W MAXIGRIP CONNECTORS 1/4"X6'

## (undated) DEVICE — LIGATING CLIPS AESCULAP MEDIUM BLUE 24

## (undated) DEVICE — CLIP SOFTJAW DBL 6MM

## (undated) DEVICE — STOPCOCK 3 WAY W SWIVEL MALE LUER LOCK

## (undated) DEVICE — SUT STAINLESS STEEL 5 18" SCC

## (undated) DEVICE — BLADE ZIMMER PATELLA REAMER W PILOT HOLE SZ 32

## (undated) DEVICE — DRAPE SLUSH MACHINE 44X66"

## (undated) DEVICE — SUT SILK 0 30" SH

## (undated) DEVICE — SYR LUER LOK 20CC

## (undated) DEVICE — PUN AOR 4MM

## (undated) DEVICE — NEEDLE COUNTER  FOAM AND MAGNET 40-70

## (undated) DEVICE — SUT VICRYL 2-0 27" CT-1 UNDYED

## (undated) DEVICE — SUT VICRYL 2-0 27" CT-2 UNDYED

## (undated) DEVICE — DRAIN DRAINAGE SET CHEST DRY ADL

## (undated) DEVICE — PAD PHRENIC NERVE MED

## (undated) DEVICE — DEVICE ORTHALIGN PLUS

## (undated) DEVICE — PREP CHLORAPREP ORANGE 2PCT 26ML

## (undated) DEVICE — SET BLOOD Y-TYPE

## (undated) DEVICE — PRESSURE INFUSER BAG 500ML DISP

## (undated) DEVICE — SUT MONOCRYL 4-0 27" PS-2 UNDYED

## (undated) DEVICE — SUT PROLENE 5-0 36" RB-1

## (undated) DEVICE — TOURNIQUET KIT TOURNIKWIK STYLE

## (undated) DEVICE — DRAPE TOWEL BLUE 17" X 24"

## (undated) DEVICE — PREP SCRUB BRUSH W CHG 4%

## (undated) DEVICE — SUT PDS II 2-0 27" CT-1

## (undated) DEVICE — GLV 7.5 PROTEXIS

## (undated) DEVICE — SPONGE LAP X-RAY DETECTABLE 18X18"

## (undated) DEVICE — GLV 7 PROTEXIS

## (undated) DEVICE — GLV 6.5 ESTEEM BLUE

## (undated) DEVICE — SAW BLADE STRYKER SAGITTAL SAFEDGE 40.5X47.5X0.64

## (undated) DEVICE — SUT PROLENE 6-0 30" C-1

## (undated) DEVICE — DRAPE TOWEL WHITE 17" X 24"

## (undated) DEVICE — CATH IV SAFE BC YEL 24GAX0.75"

## (undated) DEVICE — MARKER SKIN MULTI TIP 6"

## (undated) DEVICE — SUT OCTOBASE HOLDING INSERT

## (undated) DEVICE — MEDICATION LABELS W MARKER

## (undated) DEVICE — PRESSURE INFUSOR BAG 1000ML

## (undated) DEVICE — VESSEL LOOP ASPEN MAXI RED

## (undated) DEVICE — DRSG OPSITE POSTOP 13.75"X4"

## (undated) DEVICE — SUT PROLENE 7-0 30" BV-1

## (undated) DEVICE — BLANKET HYPER/HYPO ADULT CT/10

## (undated) DEVICE — CONN REDUCR 3/8 X 1/2

## (undated) DEVICE — TUBING SUCTION 20FT

## (undated) DEVICE — DRSG WRAP ORTHO LF 6X5"

## (undated) DEVICE — CLAMP SOFT FIBRA INSERT

## (undated) DEVICE — GLV 8 PROTEXIS

## (undated) DEVICE — DRSG TEGADERM 4X4.75"

## (undated) DEVICE — LIGATING CLIPS AESCULAP LARGE 6

## (undated) DEVICE — SUT ETHIBOND 5 4-30" CCS

## (undated) DEVICE — GLV 6.5 PROTEXIS

## (undated) DEVICE — SUT PROLENE 5-0 30" RB-2

## (undated) DEVICE — SUT VICRYL 0 36" CTX UNDYED

## (undated) DEVICE — POSITIONER CARDIAC BUMP

## (undated) DEVICE — GLV 7 ESTEEM BLUE

## (undated) DEVICE — SUT PROLENE 3-0 36" MH

## (undated) DEVICE — DRSG OPSITE POSTOP 2.5"X2"

## (undated) DEVICE — BLADE SURGICAL #15 CARBON